# Patient Record
Sex: FEMALE | Race: WHITE | NOT HISPANIC OR LATINO | Employment: FULL TIME | URBAN - METROPOLITAN AREA
[De-identification: names, ages, dates, MRNs, and addresses within clinical notes are randomized per-mention and may not be internally consistent; named-entity substitution may affect disease eponyms.]

---

## 2017-01-03 ENCOUNTER — HOSPITAL ENCOUNTER (OUTPATIENT)
Dept: RADIOLOGY | Facility: HOSPITAL | Age: 47
Discharge: HOME | End: 2017-01-03
Attending: SURGERY
Payer: COMMERCIAL

## 2017-01-03 ENCOUNTER — GENERIC CONVERSION - ENCOUNTER (OUTPATIENT)
Dept: OTHER | Facility: OTHER | Age: 47
End: 2017-01-03

## 2017-01-03 DIAGNOSIS — T88.8XXA OTHER SPECIFIED COMPLICATIONS OF SURGICAL AND MEDICAL CARE, NOT ELSEWHERE CLASSIFIED, INITIAL ENCOUNTER: ICD-10-CM

## 2017-01-03 DIAGNOSIS — N64.89 RECURRENT SEROMA OF BREAST: ICD-10-CM

## 2017-01-03 DIAGNOSIS — N64.89 OTHER SPECIFIED DISORDERS OF BREAST: ICD-10-CM

## 2017-01-03 PROCEDURE — 87070 CULTURE OTHR SPECIMN AEROBIC: CPT | Performed by: SURGERY

## 2017-01-03 PROCEDURE — 87205 SMEAR GRAM STAIN: CPT | Performed by: SURGERY

## 2017-01-03 PROCEDURE — 10030 IMG GID FLU COLL DRG SFT TIS: CPT

## 2017-01-05 ENCOUNTER — HOSPITAL ENCOUNTER (OUTPATIENT)
Dept: RADIOLOGY | Facility: HOSPITAL | Age: 47
Discharge: HOME | End: 2017-01-05
Attending: SURGERY | Admitting: SURGERY
Payer: COMMERCIAL

## 2017-01-05 ENCOUNTER — GENERIC CONVERSION - ENCOUNTER (OUTPATIENT)
Dept: OTHER | Facility: OTHER | Age: 47
End: 2017-01-05

## 2017-01-05 VITALS
BODY MASS INDEX: 35.87 KG/M2 | SYSTOLIC BLOOD PRESSURE: 144 MMHG | TEMPERATURE: 98.4 F | OXYGEN SATURATION: 99 % | DIASTOLIC BLOOD PRESSURE: 75 MMHG | WEIGHT: 190 LBS | HEIGHT: 61 IN | RESPIRATION RATE: 18 BRPM | HEART RATE: 67 BPM

## 2017-01-05 DIAGNOSIS — N64.89 RECURRENT SEROMA OF BREAST: ICD-10-CM

## 2017-01-05 PROCEDURE — C1729 CATH, DRAINAGE: HCPCS

## 2017-01-05 PROCEDURE — 10030 IMG GID FLU COLL DRG SFT TIS: CPT

## 2017-01-05 PROCEDURE — C1769 GUIDE WIRE: HCPCS

## 2017-01-05 RX ORDER — MIDAZOLAM HYDROCHLORIDE 1 MG/ML
INJECTION INTRAMUSCULAR; INTRAVENOUS CODE/TRAUMA/SEDATION MEDICATION
Status: COMPLETED | OUTPATIENT
Start: 2017-01-05 | End: 2017-01-05

## 2017-01-05 RX ORDER — FENTANYL CITRATE 50 UG/ML
INJECTION, SOLUTION INTRAMUSCULAR; INTRAVENOUS CODE/TRAUMA/SEDATION MEDICATION
Status: COMPLETED | OUTPATIENT
Start: 2017-01-05 | End: 2017-01-05

## 2017-01-05 RX ORDER — SODIUM CHLORIDE 9 MG/ML
75 INJECTION, SOLUTION INTRAVENOUS CONTINUOUS
Status: DISCONTINUED | OUTPATIENT
Start: 2017-01-05 | End: 2017-01-06 | Stop reason: HOSPADM

## 2017-01-05 RX ADMIN — FENTANYL CITRATE 50 MCG: 50 INJECTION INTRAMUSCULAR; INTRAVENOUS at 11:08

## 2017-01-05 RX ADMIN — MIDAZOLAM HYDROCHLORIDE 1 MG: 1 INJECTION, SOLUTION INTRAMUSCULAR; INTRAVENOUS at 10:56

## 2017-01-05 RX ADMIN — MIDAZOLAM HYDROCHLORIDE 1 MG: 1 INJECTION, SOLUTION INTRAMUSCULAR; INTRAVENOUS at 10:59

## 2017-01-05 RX ADMIN — FENTANYL CITRATE 50 MCG: 50 INJECTION INTRAMUSCULAR; INTRAVENOUS at 10:53

## 2017-01-05 RX ADMIN — MIDAZOLAM HYDROCHLORIDE 1 MG: 1 INJECTION, SOLUTION INTRAMUSCULAR; INTRAVENOUS at 10:53

## 2017-01-05 RX ADMIN — MIDAZOLAM HYDROCHLORIDE 1 MG: 1 INJECTION, SOLUTION INTRAMUSCULAR; INTRAVENOUS at 11:08

## 2017-01-05 RX ADMIN — FENTANYL CITRATE 50 MCG: 50 INJECTION INTRAMUSCULAR; INTRAVENOUS at 10:56

## 2017-01-06 ENCOUNTER — GENERIC CONVERSION - ENCOUNTER (OUTPATIENT)
Dept: OTHER | Facility: OTHER | Age: 47
End: 2017-01-06

## 2017-01-06 LAB
BACTERIA SPEC BFLD CULT: NO GROWTH
BACTERIA SPEC BFLD CULT: NO GROWTH
GRAM STN SPEC: NORMAL

## 2017-01-10 ENCOUNTER — HOSPITAL ENCOUNTER (OUTPATIENT)
Dept: RADIOLOGY | Facility: HOSPITAL | Age: 47
Discharge: HOME | End: 2017-01-10
Attending: SURGERY | Admitting: RADIOLOGY
Payer: COMMERCIAL

## 2017-01-10 DIAGNOSIS — N64.89 RECURRENT SEROMA OF BREAST: ICD-10-CM

## 2017-01-10 PROCEDURE — 49423 EXCHANGE DRAINAGE CATHETER: CPT

## 2017-01-10 PROCEDURE — 75984 XRAY CONTROL CATHETER CHANGE: CPT

## 2017-01-10 PROCEDURE — 49424 ASSESS CYST CONTRAST INJECT: CPT

## 2017-01-10 PROCEDURE — C1769 GUIDE WIRE: HCPCS

## 2017-01-10 PROCEDURE — C1729 CATH, DRAINAGE: HCPCS

## 2017-01-10 PROCEDURE — 76080 X-RAY EXAM OF FISTULA: CPT

## 2017-01-10 RX ADMIN — IOHEXOL 13 ML: 300 INJECTION, SOLUTION INTRAVENOUS at 14:49

## 2017-01-12 ENCOUNTER — ALLSCRIPTS OFFICE VISIT (OUTPATIENT)
Dept: OTHER | Facility: OTHER | Age: 47
End: 2017-01-12

## 2017-01-13 ENCOUNTER — APPOINTMENT (EMERGENCY)
Dept: RADIOLOGY | Facility: HOSPITAL | Age: 47
DRG: 858 | End: 2017-01-13
Payer: COMMERCIAL

## 2017-01-13 ENCOUNTER — APPOINTMENT (EMERGENCY)
Dept: ULTRASOUND IMAGING | Facility: HOSPITAL | Age: 47
DRG: 858 | End: 2017-01-13
Payer: COMMERCIAL

## 2017-01-13 ENCOUNTER — HOSPITAL ENCOUNTER (INPATIENT)
Facility: HOSPITAL | Age: 47
LOS: 3 days | Discharge: HOME | DRG: 858 | End: 2017-01-16
Attending: EMERGENCY MEDICINE | Admitting: SURGERY
Payer: COMMERCIAL

## 2017-01-13 DIAGNOSIS — A41.9 SEPSIS (HCC): Primary | ICD-10-CM

## 2017-01-13 DIAGNOSIS — T88.8XXA FLUID COLLECTION AT SURGICAL SITE: ICD-10-CM

## 2017-01-13 DIAGNOSIS — N64.4 BREAST PAIN, LEFT: ICD-10-CM

## 2017-01-13 LAB
ALBUMIN SERPL BCP-MCNC: 3.2 G/DL (ref 3.5–5)
ALP SERPL-CCNC: 74 U/L (ref 46–116)
ALT SERPL W P-5'-P-CCNC: 13 U/L (ref 12–78)
ANION GAP SERPL CALCULATED.3IONS-SCNC: 8 MMOL/L (ref 4–13)
AST SERPL W P-5'-P-CCNC: 12 U/L (ref 5–45)
BASOPHILS # BLD AUTO: 0.03 THOUSANDS/ΜL (ref 0–0.1)
BASOPHILS NFR BLD AUTO: 0 % (ref 0–1)
BILIRUB SERPL-MCNC: 0.4 MG/DL (ref 0.2–1)
BUN SERPL-MCNC: 25 MG/DL (ref 5–25)
CALCIUM SERPL-MCNC: 8.9 MG/DL (ref 8.3–10.1)
CHLORIDE SERPL-SCNC: 102 MMOL/L (ref 100–108)
CLARITY, POC: CLEAR
CO2 SERPL-SCNC: 24 MMOL/L (ref 21–32)
COLOR, POC: YELLOW
CREAT SERPL-MCNC: 0.86 MG/DL (ref 0.6–1.3)
EOSINOPHIL # BLD AUTO: 0.07 THOUSAND/ΜL (ref 0–0.61)
EOSINOPHIL NFR BLD AUTO: 0 % (ref 0–6)
ERYTHROCYTE [DISTWIDTH] IN BLOOD BY AUTOMATED COUNT: 19.8 % (ref 11.6–15.1)
EXT BILIRUBIN, UA: NEGATIVE
EXT BLOOD URINE: NEGATIVE
EXT GLUCOSE, UA: NEGATIVE
EXT KETONES: NEGATIVE
EXT NITRITE, UA: NEGATIVE
EXT PH, UA: 6
EXT PROTEIN, UA: NEGATIVE
EXT SPECIFIC GRAVITY, UA: 1
EXT UROBILINOGEN: 0.2
GFR SERPL CREATININE-BSD FRML MDRD: >60 ML/MIN/1.73SQ M
GLUCOSE SERPL-MCNC: 110 MG/DL (ref 65–140)
HCT VFR BLD AUTO: 32.4 % (ref 34.8–46.1)
HGB BLD-MCNC: 10.1 G/DL (ref 11.5–15.4)
INR PPP: 1.4 (ref 0.86–1.16)
LACTATE SERPL-SCNC: 1.1 MMOL/L (ref 0.5–2)
LYMPHOCYTES # BLD AUTO: 0.94 THOUSANDS/ΜL (ref 0.6–4.47)
LYMPHOCYTES NFR BLD AUTO: 5 % (ref 14–44)
MCH RBC QN AUTO: 23.7 PG (ref 26.8–34.3)
MCHC RBC AUTO-ENTMCNC: 31.2 G/DL (ref 31.4–37.4)
MCV RBC AUTO: 76 FL (ref 82–98)
MONOCYTES # BLD AUTO: 1.19 THOUSAND/ΜL (ref 0.17–1.22)
MONOCYTES NFR BLD AUTO: 6 % (ref 4–12)
NEUTROPHILS # BLD AUTO: 16.87 THOUSANDS/ΜL (ref 1.85–7.62)
NEUTS SEG NFR BLD AUTO: 89 % (ref 43–75)
PLATELET # BLD AUTO: 356 THOUSANDS/UL (ref 149–390)
PMV BLD AUTO: 9.3 FL (ref 8.9–12.7)
POTASSIUM SERPL-SCNC: 3.1 MMOL/L (ref 3.5–5.3)
PROT SERPL-MCNC: 7.6 G/DL (ref 6.4–8.2)
PROTHROMBIN TIME: 16.8 SECONDS (ref 12–14.3)
RBC # BLD AUTO: 4.26 MILLION/UL (ref 3.81–5.12)
SODIUM SERPL-SCNC: 134 MMOL/L (ref 136–145)
WBC # BLD AUTO: 19.1 THOUSAND/UL (ref 4.31–10.16)
WBC # BLD EST: NEGATIVE 10*3/UL

## 2017-01-13 PROCEDURE — 76642 ULTRASOUND BREAST LIMITED: CPT

## 2017-01-13 PROCEDURE — 80053 COMPREHEN METABOLIC PANEL: CPT | Performed by: EMERGENCY MEDICINE

## 2017-01-13 PROCEDURE — 87147 CULTURE TYPE IMMUNOLOGIC: CPT | Performed by: EMERGENCY MEDICINE

## 2017-01-13 PROCEDURE — 85610 PROTHROMBIN TIME: CPT | Performed by: EMERGENCY MEDICINE

## 2017-01-13 PROCEDURE — 87205 SMEAR GRAM STAIN: CPT | Performed by: EMERGENCY MEDICINE

## 2017-01-13 PROCEDURE — 87040 BLOOD CULTURE FOR BACTERIA: CPT | Performed by: EMERGENCY MEDICINE

## 2017-01-13 PROCEDURE — 96361 HYDRATE IV INFUSION ADD-ON: CPT

## 2017-01-13 PROCEDURE — 81002 URINALYSIS NONAUTO W/O SCOPE: CPT | Performed by: EMERGENCY MEDICINE

## 2017-01-13 PROCEDURE — 87186 SC STD MICRODIL/AGAR DIL: CPT | Performed by: EMERGENCY MEDICINE

## 2017-01-13 PROCEDURE — 36415 COLL VENOUS BLD VENIPUNCTURE: CPT | Performed by: EMERGENCY MEDICINE

## 2017-01-13 PROCEDURE — 71020 HB CHEST X-RAY 2VW FRONTAL&LATL: CPT

## 2017-01-13 PROCEDURE — 83605 ASSAY OF LACTIC ACID: CPT | Performed by: EMERGENCY MEDICINE

## 2017-01-13 PROCEDURE — 99285 EMERGENCY DEPT VISIT HI MDM: CPT

## 2017-01-13 PROCEDURE — 96365 THER/PROPH/DIAG IV INF INIT: CPT

## 2017-01-13 PROCEDURE — 87070 CULTURE OTHR SPECIMN AEROBIC: CPT | Performed by: EMERGENCY MEDICINE

## 2017-01-13 PROCEDURE — 85025 COMPLETE CBC W/AUTO DIFF WBC: CPT | Performed by: EMERGENCY MEDICINE

## 2017-01-13 RX ORDER — ONDANSETRON 2 MG/ML
4 INJECTION INTRAMUSCULAR; INTRAVENOUS ONCE AS NEEDED
Status: DISCONTINUED | OUTPATIENT
Start: 2017-01-13 | End: 2017-01-16 | Stop reason: HOSPADM

## 2017-01-13 RX ORDER — TEMAZEPAM 15 MG/1
15 CAPSULE ORAL
Status: DISCONTINUED | OUTPATIENT
Start: 2017-01-13 | End: 2017-01-16 | Stop reason: HOSPADM

## 2017-01-13 RX ORDER — ALPRAZOLAM 0.25 MG/1
0.25 TABLET ORAL 4 TIMES DAILY PRN
Status: DISCONTINUED | OUTPATIENT
Start: 2017-01-13 | End: 2017-01-16 | Stop reason: HOSPADM

## 2017-01-13 RX ORDER — LANOLIN ALCOHOL/MO/W.PET/CERES
6 CREAM (GRAM) TOPICAL
Status: DISCONTINUED | OUTPATIENT
Start: 2017-01-13 | End: 2017-01-16 | Stop reason: HOSPADM

## 2017-01-13 RX ORDER — CLINDAMYCIN PHOSPHATE 600 MG/50ML
600 INJECTION INTRAVENOUS ONCE
Status: DISCONTINUED | OUTPATIENT
Start: 2017-01-13 | End: 2017-01-13

## 2017-01-13 RX ORDER — ONDANSETRON 2 MG/ML
4 INJECTION INTRAMUSCULAR; INTRAVENOUS ONCE
Status: COMPLETED | OUTPATIENT
Start: 2017-01-13 | End: 2017-01-13

## 2017-01-13 RX ORDER — ACETAMINOPHEN 325 MG/1
650 TABLET ORAL EVERY 6 HOURS PRN
Status: DISCONTINUED | OUTPATIENT
Start: 2017-01-13 | End: 2017-01-16 | Stop reason: HOSPADM

## 2017-01-13 RX ORDER — ONDANSETRON 2 MG/ML
4 INJECTION INTRAMUSCULAR; INTRAVENOUS EVERY 6 HOURS PRN
Status: DISCONTINUED | OUTPATIENT
Start: 2017-01-13 | End: 2017-01-16 | Stop reason: HOSPADM

## 2017-01-13 RX ORDER — SODIUM CHLORIDE, SODIUM LACTATE, POTASSIUM CHLORIDE, CALCIUM CHLORIDE 600; 310; 30; 20 MG/100ML; MG/100ML; MG/100ML; MG/100ML
125 INJECTION, SOLUTION INTRAVENOUS CONTINUOUS
Status: DISCONTINUED | OUTPATIENT
Start: 2017-01-13 | End: 2017-01-16 | Stop reason: HOSPADM

## 2017-01-13 RX ORDER — ALPRAZOLAM 0.5 MG/1
0.5 TABLET ORAL
Status: DISCONTINUED | OUTPATIENT
Start: 2017-01-13 | End: 2017-01-16 | Stop reason: HOSPADM

## 2017-01-13 RX ORDER — METHOCARBAMOL 500 MG/1
500 TABLET, FILM COATED ORAL ONCE
Status: COMPLETED | OUTPATIENT
Start: 2017-01-13 | End: 2017-01-13

## 2017-01-13 RX ORDER — BUPROPION HYDROCHLORIDE 150 MG/1
150 TABLET ORAL DAILY
Status: DISCONTINUED | OUTPATIENT
Start: 2017-01-14 | End: 2017-01-16 | Stop reason: HOSPADM

## 2017-01-13 RX ORDER — IBUPROFEN 400 MG/1
400 TABLET ORAL ONCE
Status: COMPLETED | OUTPATIENT
Start: 2017-01-13 | End: 2017-01-13

## 2017-01-13 RX ORDER — ZOLPIDEM TARTRATE 5 MG/1
2.5 TABLET ORAL
Status: DISCONTINUED | OUTPATIENT
Start: 2017-01-13 | End: 2017-01-13

## 2017-01-13 RX ORDER — IBUPROFEN 400 MG/1
400 TABLET ORAL EVERY 4 HOURS PRN
Status: DISCONTINUED | OUTPATIENT
Start: 2017-01-13 | End: 2017-01-16 | Stop reason: HOSPADM

## 2017-01-13 RX ADMIN — ONDANSETRON 4 MG: 2 INJECTION INTRAMUSCULAR; INTRAVENOUS at 18:02

## 2017-01-13 RX ADMIN — TEMAZEPAM 15 MG: 15 CAPSULE ORAL at 23:39

## 2017-01-13 RX ADMIN — METHOCARBAMOL 500 MG: 500 TABLET ORAL at 18:10

## 2017-01-13 RX ADMIN — CEFEPIME 2000 MG: 2 INJECTION, POWDER, FOR SOLUTION INTRAMUSCULAR; INTRAVENOUS at 16:28

## 2017-01-13 RX ADMIN — IBUPROFEN 400 MG: 400 TABLET ORAL at 17:03

## 2017-01-13 RX ADMIN — SODIUM CHLORIDE 1000 ML: 0.9 INJECTION, SOLUTION INTRAVENOUS at 14:49

## 2017-01-14 ENCOUNTER — GENERIC CONVERSION - ENCOUNTER (OUTPATIENT)
Dept: OTHER | Facility: OTHER | Age: 47
End: 2017-01-14

## 2017-01-14 ENCOUNTER — APPOINTMENT (INPATIENT)
Dept: RADIOLOGY | Facility: HOSPITAL | Age: 47
DRG: 858 | End: 2017-01-14
Attending: SURGERY
Payer: COMMERCIAL

## 2017-01-14 PROBLEM — T88.8XXA FLUID COLLECTION AT SURGICAL SITE: Status: ACTIVE | Noted: 2017-01-14

## 2017-01-14 LAB
ANION GAP SERPL CALCULATED.3IONS-SCNC: 8 MMOL/L (ref 4–13)
BASOPHILS # BLD AUTO: 0.03 THOUSANDS/ΜL (ref 0–0.1)
BASOPHILS NFR BLD AUTO: 0 % (ref 0–1)
BUN SERPL-MCNC: 17 MG/DL (ref 5–25)
CALCIUM SERPL-MCNC: 8.4 MG/DL (ref 8.3–10.1)
CHLORIDE SERPL-SCNC: 105 MMOL/L (ref 100–108)
CO2 SERPL-SCNC: 22 MMOL/L (ref 21–32)
CREAT SERPL-MCNC: 0.71 MG/DL (ref 0.6–1.3)
EOSINOPHIL # BLD AUTO: 0.39 THOUSAND/ΜL (ref 0–0.61)
EOSINOPHIL NFR BLD AUTO: 3 % (ref 0–6)
ERYTHROCYTE [DISTWIDTH] IN BLOOD BY AUTOMATED COUNT: 19.9 % (ref 11.6–15.1)
GFR SERPL CREATININE-BSD FRML MDRD: >60 ML/MIN/1.73SQ M
GLUCOSE SERPL-MCNC: 97 MG/DL (ref 65–140)
HCT VFR BLD AUTO: 29.8 % (ref 34.8–46.1)
HGB BLD-MCNC: 9.1 G/DL (ref 11.5–15.4)
LYMPHOCYTES # BLD AUTO: 1.08 THOUSANDS/ΜL (ref 0.6–4.47)
LYMPHOCYTES NFR BLD AUTO: 7 % (ref 14–44)
MCH RBC QN AUTO: 23.6 PG (ref 26.8–34.3)
MCHC RBC AUTO-ENTMCNC: 30.5 G/DL (ref 31.4–37.4)
MCV RBC AUTO: 77 FL (ref 82–98)
MONOCYTES # BLD AUTO: 1 THOUSAND/ΜL (ref 0.17–1.22)
MONOCYTES NFR BLD AUTO: 7 % (ref 4–12)
NEUTROPHILS # BLD AUTO: 12.99 THOUSANDS/ΜL (ref 1.85–7.62)
NEUTS SEG NFR BLD AUTO: 83 % (ref 43–75)
PLATELET # BLD AUTO: 283 THOUSANDS/UL (ref 149–390)
PMV BLD AUTO: 9.5 FL (ref 8.9–12.7)
POTASSIUM SERPL-SCNC: 3.6 MMOL/L (ref 3.5–5.3)
RBC # BLD AUTO: 3.86 MILLION/UL (ref 3.81–5.12)
SODIUM SERPL-SCNC: 135 MMOL/L (ref 136–145)
WBC # BLD AUTO: 15.49 THOUSAND/UL (ref 4.31–10.16)

## 2017-01-14 PROCEDURE — 85025 COMPLETE CBC W/AUTO DIFF WBC: CPT | Performed by: PHYSICIAN ASSISTANT

## 2017-01-14 PROCEDURE — 0W9G3ZX DRAINAGE OF PERITONEAL CAVITY, PERCUTANEOUS APPROACH, DIAGNOSTIC: ICD-10-PCS | Performed by: RADIOLOGY

## 2017-01-14 PROCEDURE — C1729 CATH, DRAINAGE: HCPCS

## 2017-01-14 PROCEDURE — C1769 GUIDE WIRE: HCPCS

## 2017-01-14 PROCEDURE — 87070 CULTURE OTHR SPECIMN AEROBIC: CPT | Performed by: SURGERY

## 2017-01-14 PROCEDURE — 87186 SC STD MICRODIL/AGAR DIL: CPT | Performed by: SURGERY

## 2017-01-14 PROCEDURE — 80048 BASIC METABOLIC PNL TOTAL CA: CPT | Performed by: PHYSICIAN ASSISTANT

## 2017-01-14 PROCEDURE — 10030 IMG GID FLU COLL DRG SFT TIS: CPT

## 2017-01-14 PROCEDURE — 87147 CULTURE TYPE IMMUNOLOGIC: CPT | Performed by: SURGERY

## 2017-01-14 PROCEDURE — 87205 SMEAR GRAM STAIN: CPT | Performed by: SURGERY

## 2017-01-14 RX ORDER — MIDAZOLAM HYDROCHLORIDE 1 MG/ML
INJECTION INTRAMUSCULAR; INTRAVENOUS CODE/TRAUMA/SEDATION MEDICATION
Status: DISCONTINUED | OUTPATIENT
Start: 2017-01-14 | End: 2017-01-16 | Stop reason: HOSPADM

## 2017-01-14 RX ORDER — FENTANYL CITRATE 50 UG/ML
INJECTION, SOLUTION INTRAMUSCULAR; INTRAVENOUS CODE/TRAUMA/SEDATION MEDICATION
Status: DISCONTINUED | OUTPATIENT
Start: 2017-01-14 | End: 2017-01-16 | Stop reason: HOSPADM

## 2017-01-14 RX ADMIN — IBUPROFEN 400 MG: 400 TABLET ORAL at 16:50

## 2017-01-14 RX ADMIN — ALPRAZOLAM 0.25 MG: 0.25 TABLET ORAL at 16:49

## 2017-01-14 RX ADMIN — FENTANYL CITRATE 50 MCG: 50 INJECTION, SOLUTION INTRAMUSCULAR; INTRAVENOUS at 10:26

## 2017-01-14 RX ADMIN — SODIUM CHLORIDE, SODIUM LACTATE, POTASSIUM CHLORIDE, AND CALCIUM CHLORIDE 125 ML/HR: .6; .31; .03; .02 INJECTION, SOLUTION INTRAVENOUS at 08:58

## 2017-01-14 RX ADMIN — VANCOMYCIN HYDROCHLORIDE 1250 MG: 1 INJECTION, POWDER, LYOPHILIZED, FOR SOLUTION INTRAVENOUS at 16:33

## 2017-01-14 RX ADMIN — IBUPROFEN 400 MG: 400 TABLET ORAL at 00:14

## 2017-01-14 RX ADMIN — ALPRAZOLAM 0.5 MG: 0.5 TABLET ORAL at 22:44

## 2017-01-14 RX ADMIN — MIDAZOLAM HYDROCHLORIDE 0.5 MG: 1 INJECTION, SOLUTION INTRAMUSCULAR; INTRAVENOUS at 10:33

## 2017-01-14 RX ADMIN — TEMAZEPAM 15 MG: 15 CAPSULE ORAL at 22:45

## 2017-01-14 RX ADMIN — MIDAZOLAM HYDROCHLORIDE 0.5 MG: 1 INJECTION, SOLUTION INTRAMUSCULAR; INTRAVENOUS at 10:30

## 2017-01-14 RX ADMIN — MIDAZOLAM HYDROCHLORIDE 0.5 MG: 1 INJECTION, SOLUTION INTRAMUSCULAR; INTRAVENOUS at 10:42

## 2017-01-14 RX ADMIN — CEFEPIME HYDROCHLORIDE 2000 MG: 2 INJECTION, POWDER, FOR SOLUTION INTRAVENOUS at 07:39

## 2017-01-14 RX ADMIN — ALPRAZOLAM 0.5 MG: 0.5 TABLET ORAL at 00:14

## 2017-01-14 RX ADMIN — MIDAZOLAM HYDROCHLORIDE 1 MG: 1 INJECTION, SOLUTION INTRAMUSCULAR; INTRAVENOUS at 10:26

## 2017-01-14 RX ADMIN — BUPROPION HYDROCHLORIDE 150 MG: 150 TABLET, FILM COATED, EXTENDED RELEASE ORAL at 08:18

## 2017-01-14 RX ADMIN — FENTANYL CITRATE 25 MCG: 50 INJECTION, SOLUTION INTRAMUSCULAR; INTRAVENOUS at 10:39

## 2017-01-14 RX ADMIN — IBUPROFEN 400 MG: 400 TABLET ORAL at 07:39

## 2017-01-14 RX ADMIN — SODIUM CHLORIDE, SODIUM LACTATE, POTASSIUM CHLORIDE, AND CALCIUM CHLORIDE 125 ML/HR: .6; .31; .03; .02 INJECTION, SOLUTION INTRAVENOUS at 00:15

## 2017-01-14 RX ADMIN — FENTANYL CITRATE 25 MCG: 50 INJECTION, SOLUTION INTRAMUSCULAR; INTRAVENOUS at 10:48

## 2017-01-14 RX ADMIN — BUPROPION HYDROCHLORIDE 150 MG: 150 TABLET, FILM COATED, EXTENDED RELEASE ORAL at 08:13

## 2017-01-14 RX ADMIN — FENTANYL CITRATE 25 MCG: 50 INJECTION, SOLUTION INTRAMUSCULAR; INTRAVENOUS at 10:33

## 2017-01-15 LAB
ANION GAP SERPL CALCULATED.3IONS-SCNC: 6 MMOL/L (ref 4–13)
BASOPHILS # BLD AUTO: 0.02 THOUSANDS/ΜL (ref 0–0.1)
BASOPHILS NFR BLD AUTO: 0 % (ref 0–1)
BUN SERPL-MCNC: 12 MG/DL (ref 5–25)
CALCIUM SERPL-MCNC: 8.5 MG/DL (ref 8.3–10.1)
CHLORIDE SERPL-SCNC: 108 MMOL/L (ref 100–108)
CO2 SERPL-SCNC: 25 MMOL/L (ref 21–32)
CREAT SERPL-MCNC: 0.57 MG/DL (ref 0.6–1.3)
EOSINOPHIL # BLD AUTO: 0.46 THOUSAND/ΜL (ref 0–0.61)
EOSINOPHIL NFR BLD AUTO: 6 % (ref 0–6)
ERYTHROCYTE [DISTWIDTH] IN BLOOD BY AUTOMATED COUNT: 19.9 % (ref 11.6–15.1)
GFR SERPL CREATININE-BSD FRML MDRD: >60 ML/MIN/1.73SQ M
GLUCOSE SERPL-MCNC: 82 MG/DL (ref 65–140)
HCT VFR BLD AUTO: 27.2 % (ref 34.8–46.1)
HGB BLD-MCNC: 8.2 G/DL (ref 11.5–15.4)
LYMPHOCYTES # BLD AUTO: 1.06 THOUSANDS/ΜL (ref 0.6–4.47)
LYMPHOCYTES NFR BLD AUTO: 13 % (ref 14–44)
MCH RBC QN AUTO: 23.1 PG (ref 26.8–34.3)
MCHC RBC AUTO-ENTMCNC: 30.1 G/DL (ref 31.4–37.4)
MCV RBC AUTO: 77 FL (ref 82–98)
MONOCYTES # BLD AUTO: 0.71 THOUSAND/ΜL (ref 0.17–1.22)
MONOCYTES NFR BLD AUTO: 9 % (ref 4–12)
NEUTROPHILS # BLD AUTO: 5.87 THOUSANDS/ΜL (ref 1.85–7.62)
NEUTS SEG NFR BLD AUTO: 72 % (ref 43–75)
PLATELET # BLD AUTO: 287 THOUSANDS/UL (ref 149–390)
PMV BLD AUTO: 10.3 FL (ref 8.9–12.7)
POTASSIUM SERPL-SCNC: 3.5 MMOL/L (ref 3.5–5.3)
RBC # BLD AUTO: 3.55 MILLION/UL (ref 3.81–5.12)
SODIUM SERPL-SCNC: 139 MMOL/L (ref 136–145)
WBC # BLD AUTO: 8.12 THOUSAND/UL (ref 4.31–10.16)

## 2017-01-15 PROCEDURE — 80048 BASIC METABOLIC PNL TOTAL CA: CPT | Performed by: PHYSICIAN ASSISTANT

## 2017-01-15 PROCEDURE — 85025 COMPLETE CBC W/AUTO DIFF WBC: CPT | Performed by: PHYSICIAN ASSISTANT

## 2017-01-15 RX ORDER — POLYETHYLENE GLYCOL 3350 17 G/17G
17 POWDER, FOR SOLUTION ORAL DAILY PRN
Status: DISCONTINUED | OUTPATIENT
Start: 2017-01-15 | End: 2017-01-16 | Stop reason: HOSPADM

## 2017-01-15 RX ORDER — SENNOSIDES 8.6 MG
1 TABLET ORAL
Status: DISCONTINUED | OUTPATIENT
Start: 2017-01-15 | End: 2017-01-16 | Stop reason: HOSPADM

## 2017-01-15 RX ORDER — DOCUSATE SODIUM 100 MG/1
100 CAPSULE, LIQUID FILLED ORAL 2 TIMES DAILY PRN
Status: DISCONTINUED | OUTPATIENT
Start: 2017-01-15 | End: 2017-01-16 | Stop reason: HOSPADM

## 2017-01-15 RX ADMIN — TEMAZEPAM 15 MG: 15 CAPSULE ORAL at 22:45

## 2017-01-15 RX ADMIN — ALPRAZOLAM 0.5 MG: 0.5 TABLET ORAL at 08:00

## 2017-01-15 RX ADMIN — POLYETHYLENE GLYCOL 3350 17 G: 17 POWDER, FOR SOLUTION ORAL at 10:50

## 2017-01-15 RX ADMIN — DOCUSATE SODIUM 100 MG: 100 CAPSULE, LIQUID FILLED ORAL at 22:47

## 2017-01-15 RX ADMIN — VANCOMYCIN HYDROCHLORIDE 1250 MG: 1 INJECTION, POWDER, LYOPHILIZED, FOR SOLUTION INTRAVENOUS at 05:42

## 2017-01-15 RX ADMIN — IBUPROFEN 400 MG: 400 TABLET ORAL at 06:18

## 2017-01-15 RX ADMIN — IBUPROFEN 400 MG: 400 TABLET ORAL at 19:39

## 2017-01-15 RX ADMIN — ALPRAZOLAM 0.25 MG: 0.25 TABLET ORAL at 23:42

## 2017-01-15 RX ADMIN — BUPROPION HYDROCHLORIDE 150 MG: 150 TABLET, FILM COATED, EXTENDED RELEASE ORAL at 08:00

## 2017-01-15 RX ADMIN — ENOXAPARIN SODIUM 40 MG: 40 INJECTION SUBCUTANEOUS at 10:50

## 2017-01-15 RX ADMIN — VANCOMYCIN HYDROCHLORIDE 1250 MG: 1 INJECTION, POWDER, LYOPHILIZED, FOR SOLUTION INTRAVENOUS at 16:38

## 2017-01-15 RX ADMIN — DOCUSATE SODIUM 100 MG: 100 CAPSULE, LIQUID FILLED ORAL at 10:50

## 2017-01-16 VITALS
SYSTOLIC BLOOD PRESSURE: 108 MMHG | OXYGEN SATURATION: 100 % | TEMPERATURE: 98.2 F | WEIGHT: 200.62 LBS | BODY MASS INDEX: 39.39 KG/M2 | HEIGHT: 60 IN | RESPIRATION RATE: 16 BRPM | HEART RATE: 67 BPM | DIASTOLIC BLOOD PRESSURE: 54 MMHG

## 2017-01-16 PROBLEM — N64.89 SEROMA OF BREAST: Status: ACTIVE | Noted: 2017-01-16

## 2017-01-16 LAB
BACTERIA SPEC BFLD CULT: NORMAL
BACTERIA SPEC BFLD CULT: NORMAL
GRAM STN SPEC: NORMAL

## 2017-01-16 RX ORDER — CEPHALEXIN 500 MG/1
500 CAPSULE ORAL 4 TIMES DAILY
Qty: 28 CAPSULE | Refills: 0 | Status: SHIPPED | OUTPATIENT
Start: 2017-01-16 | End: 2017-01-23

## 2017-01-16 RX ADMIN — ENOXAPARIN SODIUM 40 MG: 40 INJECTION SUBCUTANEOUS at 09:22

## 2017-01-16 RX ADMIN — MAGNESIUM HYDROXIDE 30 ML: 400 SUSPENSION ORAL at 09:31

## 2017-01-16 RX ADMIN — VANCOMYCIN HYDROCHLORIDE 1250 MG: 1 INJECTION, POWDER, LYOPHILIZED, FOR SOLUTION INTRAVENOUS at 04:39

## 2017-01-16 RX ADMIN — CEFAZOLIN SODIUM 2000 MG: 2 SOLUTION INTRAVENOUS at 13:37

## 2017-01-16 RX ADMIN — BUPROPION HYDROCHLORIDE 150 MG: 150 TABLET, FILM COATED, EXTENDED RELEASE ORAL at 09:22

## 2017-01-16 RX ADMIN — DOCUSATE SODIUM 100 MG: 100 CAPSULE, LIQUID FILLED ORAL at 13:49

## 2017-01-18 ENCOUNTER — GENERIC CONVERSION - ENCOUNTER (OUTPATIENT)
Dept: OTHER | Facility: OTHER | Age: 47
End: 2017-01-18

## 2017-01-18 ENCOUNTER — HOSPITAL ENCOUNTER (OUTPATIENT)
Dept: RADIOLOGY | Facility: HOSPITAL | Age: 47
Discharge: HOME | End: 2017-01-18
Attending: SURGERY
Payer: COMMERCIAL

## 2017-01-18 DIAGNOSIS — N64.89 RECURRENT SEROMA OF BREAST: ICD-10-CM

## 2017-01-18 LAB — BACTERIA BLD CULT: NORMAL

## 2017-01-18 PROCEDURE — 75984 XRAY CONTROL CATHETER CHANGE: CPT

## 2017-01-18 PROCEDURE — C1729 CATH, DRAINAGE: HCPCS

## 2017-01-18 PROCEDURE — 49423 EXCHANGE DRAINAGE CATHETER: CPT

## 2017-01-18 PROCEDURE — 49185 SCLEROTX FLUID COLLECTION: CPT

## 2017-01-18 PROCEDURE — C1769 GUIDE WIRE: HCPCS

## 2017-01-18 RX ADMIN — ALCOHOL 5 ML: 0.98 INJECTION INTRASPINAL at 13:19

## 2017-01-18 RX ADMIN — IOHEXOL 30 ML: 300 INJECTION, SOLUTION INTRAVENOUS at 14:19

## 2017-01-19 ENCOUNTER — HOSPITAL ENCOUNTER (OUTPATIENT)
Dept: RADIOLOGY | Facility: HOSPITAL | Age: 47
Discharge: HOME | End: 2017-01-19
Attending: SURGERY

## 2017-01-22 ENCOUNTER — ANESTHESIA EVENT (OUTPATIENT)
Dept: PERIOP | Facility: HOSPITAL | Age: 47
End: 2017-01-22
Payer: COMMERCIAL

## 2017-01-23 ENCOUNTER — ANESTHESIA (OUTPATIENT)
Dept: PERIOP | Facility: HOSPITAL | Age: 47
End: 2017-01-23
Payer: COMMERCIAL

## 2017-01-23 ENCOUNTER — HOSPITAL ENCOUNTER (OUTPATIENT)
Facility: HOSPITAL | Age: 47
Setting detail: OUTPATIENT SURGERY
Discharge: HOME | End: 2017-01-23
Attending: SURGERY | Admitting: SURGERY
Payer: COMMERCIAL

## 2017-01-23 VITALS
TEMPERATURE: 98.6 F | OXYGEN SATURATION: 100 % | SYSTOLIC BLOOD PRESSURE: 106 MMHG | HEIGHT: 60 IN | DIASTOLIC BLOOD PRESSURE: 52 MMHG | RESPIRATION RATE: 18 BRPM | HEART RATE: 71 BPM

## 2017-01-23 DIAGNOSIS — Z85.3 PERSONAL HISTORY OF MALIGNANT NEOPLASM OF BREAST: ICD-10-CM

## 2017-01-23 PROBLEM — T85.79XA INFECTION OF BREAST IMPLANT (HCC): Status: ACTIVE | Noted: 2017-01-23

## 2017-01-23 LAB — EXT PREGNANCY TEST URINE: NEGATIVE

## 2017-01-23 PROCEDURE — 87077 CULTURE AEROBIC IDENTIFY: CPT | Performed by: SURGERY

## 2017-01-23 PROCEDURE — 87186 SC STD MICRODIL/AGAR DIL: CPT | Performed by: SURGERY

## 2017-01-23 PROCEDURE — 88305 TISSUE EXAM BY PATHOLOGIST: CPT | Performed by: SURGERY

## 2017-01-23 PROCEDURE — 87075 CULTR BACTERIA EXCEPT BLOOD: CPT | Performed by: SURGERY

## 2017-01-23 PROCEDURE — 87205 SMEAR GRAM STAIN: CPT | Performed by: SURGERY

## 2017-01-23 PROCEDURE — 88300 SURGICAL PATH GROSS: CPT | Performed by: SURGERY

## 2017-01-23 PROCEDURE — 87070 CULTURE OTHR SPECIMN AEROBIC: CPT | Performed by: SURGERY

## 2017-01-23 PROCEDURE — 88302 TISSUE EXAM BY PATHOLOGIST: CPT | Performed by: SURGERY

## 2017-01-23 PROCEDURE — 87176 TISSUE HOMOGENIZATION CULTR: CPT | Performed by: SURGERY

## 2017-01-23 RX ORDER — ACETAMINOPHEN 325 MG/1
975 TABLET ORAL ONCE
Status: DISCONTINUED | OUTPATIENT
Start: 2017-01-23 | End: 2017-01-23 | Stop reason: HOSPADM

## 2017-01-23 RX ORDER — EPHEDRINE SULFATE 50 MG/ML
INJECTION, SOLUTION INTRAVENOUS AS NEEDED
Status: DISCONTINUED | OUTPATIENT
Start: 2017-01-23 | End: 2017-01-23 | Stop reason: SURG

## 2017-01-23 RX ORDER — LORAZEPAM 2 MG/ML
1 INJECTION INTRAMUSCULAR ONCE AS NEEDED
Status: DISCONTINUED | OUTPATIENT
Start: 2017-01-23 | End: 2017-01-23

## 2017-01-23 RX ORDER — FENTANYL CITRATE 50 UG/ML
INJECTION, SOLUTION INTRAMUSCULAR; INTRAVENOUS AS NEEDED
Status: DISCONTINUED | OUTPATIENT
Start: 2017-01-23 | End: 2017-01-23 | Stop reason: SURG

## 2017-01-23 RX ORDER — SODIUM CHLORIDE, SODIUM LACTATE, POTASSIUM CHLORIDE, CALCIUM CHLORIDE 600; 310; 30; 20 MG/100ML; MG/100ML; MG/100ML; MG/100ML
125 INJECTION, SOLUTION INTRAVENOUS CONTINUOUS
Status: DISCONTINUED | OUTPATIENT
Start: 2017-01-23 | End: 2017-01-23 | Stop reason: HOSPADM

## 2017-01-23 RX ORDER — ONDANSETRON 2 MG/ML
4 INJECTION INTRAMUSCULAR; INTRAVENOUS ONCE
Status: DISCONTINUED | OUTPATIENT
Start: 2017-01-23 | End: 2017-01-23 | Stop reason: HOSPADM

## 2017-01-23 RX ORDER — CEPHALEXIN 500 MG/1
500 CAPSULE ORAL 4 TIMES DAILY
Qty: 28 CAPSULE | Refills: 0 | Status: SHIPPED | OUTPATIENT
Start: 2017-01-23 | End: 2017-01-30

## 2017-01-23 RX ORDER — MIDAZOLAM HYDROCHLORIDE 1 MG/ML
INJECTION INTRAMUSCULAR; INTRAVENOUS AS NEEDED
Status: DISCONTINUED | OUTPATIENT
Start: 2017-01-23 | End: 2017-01-23 | Stop reason: SURG

## 2017-01-23 RX ORDER — TRAMADOL HYDROCHLORIDE 50 MG/1
50 TABLET ORAL EVERY 6 HOURS PRN
Qty: 30 TABLET | Refills: 0 | Status: SHIPPED | OUTPATIENT
Start: 2017-01-23 | End: 2017-02-02

## 2017-01-23 RX ORDER — KETOROLAC TROMETHAMINE 30 MG/ML
30 INJECTION, SOLUTION INTRAMUSCULAR; INTRAVENOUS ONCE
Status: DISCONTINUED | OUTPATIENT
Start: 2017-01-23 | End: 2017-01-23 | Stop reason: HOSPADM

## 2017-01-23 RX ORDER — ALPRAZOLAM 0.5 MG/1
0.5 TABLET ORAL
Status: ON HOLD | COMMUNITY
End: 2017-06-15

## 2017-01-23 RX ORDER — FENTANYL CITRATE/PF 50 MCG/ML
25 SYRINGE (ML) INJECTION
Status: DISCONTINUED | OUTPATIENT
Start: 2017-01-23 | End: 2017-01-23 | Stop reason: HOSPADM

## 2017-01-23 RX ORDER — ROCURONIUM BROMIDE 10 MG/ML
INJECTION, SOLUTION INTRAVENOUS AS NEEDED
Status: DISCONTINUED | OUTPATIENT
Start: 2017-01-23 | End: 2017-01-23 | Stop reason: SURG

## 2017-01-23 RX ORDER — ONDANSETRON 2 MG/ML
INJECTION INTRAMUSCULAR; INTRAVENOUS AS NEEDED
Status: DISCONTINUED | OUTPATIENT
Start: 2017-01-23 | End: 2017-01-23 | Stop reason: SURG

## 2017-01-23 RX ORDER — LORAZEPAM 2 MG/ML
0.5 INJECTION INTRAMUSCULAR
Status: DISCONTINUED | OUTPATIENT
Start: 2017-01-23 | End: 2017-01-23 | Stop reason: HOSPADM

## 2017-01-23 RX ORDER — GLYCOPYRROLATE 0.2 MG/ML
INJECTION INTRAMUSCULAR; INTRAVENOUS AS NEEDED
Status: DISCONTINUED | OUTPATIENT
Start: 2017-01-23 | End: 2017-01-23 | Stop reason: SURG

## 2017-01-23 RX ORDER — TRAMADOL HYDROCHLORIDE 50 MG/1
50 TABLET ORAL EVERY 6 HOURS PRN
Status: DISCONTINUED | OUTPATIENT
Start: 2017-01-23 | End: 2017-01-23 | Stop reason: HOSPADM

## 2017-01-23 RX ORDER — LIDOCAINE HYDROCHLORIDE 10 MG/ML
INJECTION, SOLUTION INFILTRATION; PERINEURAL AS NEEDED
Status: DISCONTINUED | OUTPATIENT
Start: 2017-01-23 | End: 2017-01-23 | Stop reason: SURG

## 2017-01-23 RX ORDER — PROPOFOL 10 MG/ML
INJECTION, EMULSION INTRAVENOUS AS NEEDED
Status: DISCONTINUED | OUTPATIENT
Start: 2017-01-23 | End: 2017-01-23 | Stop reason: SURG

## 2017-01-23 RX ADMIN — NEOSTIGMINE METHYLSULFATE 2 MG: 1 INJECTION INTRAMUSCULAR; INTRAVENOUS; SUBCUTANEOUS at 09:07

## 2017-01-23 RX ADMIN — FENTANYL CITRATE 25 MCG: 50 INJECTION, SOLUTION INTRAMUSCULAR; INTRAVENOUS at 09:45

## 2017-01-23 RX ADMIN — ONDANSETRON 4 MG: 2 INJECTION INTRAMUSCULAR; INTRAVENOUS at 08:43

## 2017-01-23 RX ADMIN — CEFAZOLIN SODIUM 2000 MG: 2 SOLUTION INTRAVENOUS at 08:01

## 2017-01-23 RX ADMIN — GLYCOPYRROLATE 0.4 MG: 0.2 INJECTION INTRAMUSCULAR; INTRAVENOUS at 09:07

## 2017-01-23 RX ADMIN — LIDOCAINE HYDROCHLORIDE 50 MG: 10 INJECTION, SOLUTION INFILTRATION; PERINEURAL at 07:57

## 2017-01-23 RX ADMIN — FENTANYL CITRATE 50 MCG: 50 INJECTION INTRAMUSCULAR; INTRAVENOUS at 08:19

## 2017-01-23 RX ADMIN — ROCURONIUM BROMIDE 40 MG: 10 INJECTION, SOLUTION INTRAVENOUS at 08:01

## 2017-01-23 RX ADMIN — SODIUM CHLORIDE, SODIUM LACTATE, POTASSIUM CHLORIDE, AND CALCIUM CHLORIDE 125 ML/HR: .6; .31; .03; .02 INJECTION, SOLUTION INTRAVENOUS at 07:08

## 2017-01-23 RX ADMIN — FENTANYL CITRATE 50 MCG: 50 INJECTION INTRAMUSCULAR; INTRAVENOUS at 07:57

## 2017-01-23 RX ADMIN — PROPOFOL 200 MG: 10 INJECTION, EMULSION INTRAVENOUS at 07:57

## 2017-01-23 RX ADMIN — EPHEDRINE SULFATE 5 MG: 50 INJECTION, SOLUTION INTRAMUSCULAR; INTRAVENOUS; SUBCUTANEOUS at 08:56

## 2017-01-23 RX ADMIN — LORAZEPAM 0.5 MG: 2 INJECTION INTRAMUSCULAR; INTRAVENOUS at 09:54

## 2017-01-23 RX ADMIN — MIDAZOLAM HYDROCHLORIDE 4 MG: 1 INJECTION, SOLUTION INTRAMUSCULAR; INTRAVENOUS at 07:47

## 2017-01-25 ENCOUNTER — HOSPITAL ENCOUNTER (OUTPATIENT)
Dept: RADIOLOGY | Facility: HOSPITAL | Age: 47
Discharge: HOME | End: 2017-01-25
Attending: SURGERY
Payer: COMMERCIAL

## 2017-01-25 DIAGNOSIS — Z85.3 PERSONAL HISTORY OF MALIGNANT NEOPLASM OF BREAST: ICD-10-CM

## 2017-01-25 PROCEDURE — 49185 SCLEROTX FLUID COLLECTION: CPT

## 2017-01-25 RX ADMIN — IOHEXOL 5 ML: 300 INJECTION, SOLUTION INTRAVENOUS at 15:07

## 2017-01-26 LAB
BACTERIA SPEC ANAEROBE CULT: NO GROWTH
BACTERIA SPEC ANAEROBE CULT: NORMAL
BACTERIA WND AEROBE CULT: NO GROWTH
GRAM STN SPEC: NORMAL

## 2017-01-27 ENCOUNTER — GENERIC CONVERSION - ENCOUNTER (OUTPATIENT)
Dept: OTHER | Facility: OTHER | Age: 47
End: 2017-01-27

## 2017-01-27 LAB
BACTERIA TISS AEROBE CULT: NORMAL
GRAM STN SPEC: NORMAL

## 2017-01-30 ENCOUNTER — HOSPITAL ENCOUNTER (OUTPATIENT)
Dept: RADIOLOGY | Facility: HOSPITAL | Age: 47
Discharge: HOME | End: 2017-01-30
Attending: SURGERY | Admitting: SURGERY
Payer: COMMERCIAL

## 2017-01-30 DIAGNOSIS — C50.912 MALIGNANT NEOPLASM OF LEFT FEMALE BREAST (HCC): ICD-10-CM

## 2017-01-30 PROCEDURE — 49424 ASSESS CYST CONTRAST INJECT: CPT

## 2017-01-30 PROCEDURE — 76080 X-RAY EXAM OF FISTULA: CPT

## 2017-01-30 RX ADMIN — IOHEXOL 10 ML: 300 INJECTION, SOLUTION INTRAVENOUS at 16:13

## 2017-02-01 ENCOUNTER — ALLSCRIPTS OFFICE VISIT (OUTPATIENT)
Dept: OTHER | Facility: OTHER | Age: 47
End: 2017-02-01

## 2017-02-03 ENCOUNTER — ALLSCRIPTS OFFICE VISIT (OUTPATIENT)
Dept: OTHER | Facility: OTHER | Age: 47
End: 2017-02-03

## 2017-02-07 ENCOUNTER — GENERIC CONVERSION - ENCOUNTER (OUTPATIENT)
Dept: OTHER | Facility: OTHER | Age: 47
End: 2017-02-07

## 2017-02-07 ENCOUNTER — ALLSCRIPTS OFFICE VISIT (OUTPATIENT)
Dept: OTHER | Facility: OTHER | Age: 47
End: 2017-02-07

## 2017-02-09 ENCOUNTER — ALLSCRIPTS OFFICE VISIT (OUTPATIENT)
Dept: OTHER | Facility: OTHER | Age: 47
End: 2017-02-09

## 2017-02-14 ENCOUNTER — APPOINTMENT (OUTPATIENT)
Dept: PHYSICAL THERAPY | Facility: CLINIC | Age: 47
End: 2017-02-14
Payer: COMMERCIAL

## 2017-02-16 ENCOUNTER — APPOINTMENT (OUTPATIENT)
Dept: PHYSICAL THERAPY | Facility: CLINIC | Age: 47
End: 2017-02-16
Payer: COMMERCIAL

## 2017-02-16 PROCEDURE — 97110 THERAPEUTIC EXERCISES: CPT

## 2017-02-16 PROCEDURE — 97162 PT EVAL MOD COMPLEX 30 MIN: CPT

## 2017-02-22 ENCOUNTER — APPOINTMENT (OUTPATIENT)
Dept: PHYSICAL THERAPY | Facility: CLINIC | Age: 47
End: 2017-02-22
Payer: COMMERCIAL

## 2017-02-23 ENCOUNTER — APPOINTMENT (OUTPATIENT)
Dept: PHYSICAL THERAPY | Facility: CLINIC | Age: 47
End: 2017-02-23
Payer: COMMERCIAL

## 2017-02-23 PROCEDURE — 97140 MANUAL THERAPY 1/> REGIONS: CPT

## 2017-02-23 PROCEDURE — 97110 THERAPEUTIC EXERCISES: CPT

## 2017-02-24 ENCOUNTER — GENERIC CONVERSION - ENCOUNTER (OUTPATIENT)
Dept: OTHER | Facility: OTHER | Age: 47
End: 2017-02-24

## 2017-02-27 ENCOUNTER — GENERIC CONVERSION - ENCOUNTER (OUTPATIENT)
Dept: OTHER | Facility: OTHER | Age: 47
End: 2017-02-27

## 2017-02-28 ENCOUNTER — APPOINTMENT (OUTPATIENT)
Dept: PHYSICAL THERAPY | Facility: CLINIC | Age: 47
End: 2017-02-28
Payer: COMMERCIAL

## 2017-02-28 ENCOUNTER — GENERIC CONVERSION - ENCOUNTER (OUTPATIENT)
Dept: OTHER | Facility: OTHER | Age: 47
End: 2017-02-28

## 2017-02-28 ENCOUNTER — ALLSCRIPTS OFFICE VISIT (OUTPATIENT)
Dept: OTHER | Facility: OTHER | Age: 47
End: 2017-02-28

## 2017-02-28 PROCEDURE — 97140 MANUAL THERAPY 1/> REGIONS: CPT

## 2017-02-28 PROCEDURE — 97110 THERAPEUTIC EXERCISES: CPT

## 2017-03-02 ENCOUNTER — APPOINTMENT (OUTPATIENT)
Dept: PHYSICAL THERAPY | Facility: CLINIC | Age: 47
End: 2017-03-02
Payer: COMMERCIAL

## 2017-03-07 ENCOUNTER — ALLSCRIPTS OFFICE VISIT (OUTPATIENT)
Dept: OTHER | Facility: OTHER | Age: 47
End: 2017-03-07

## 2017-03-07 ENCOUNTER — APPOINTMENT (OUTPATIENT)
Dept: PHYSICAL THERAPY | Facility: CLINIC | Age: 47
End: 2017-03-07
Payer: COMMERCIAL

## 2017-03-07 PROCEDURE — 97140 MANUAL THERAPY 1/> REGIONS: CPT

## 2017-03-07 PROCEDURE — 97110 THERAPEUTIC EXERCISES: CPT

## 2017-03-14 ENCOUNTER — APPOINTMENT (OUTPATIENT)
Dept: PHYSICAL THERAPY | Facility: CLINIC | Age: 47
End: 2017-03-14
Payer: COMMERCIAL

## 2017-03-21 ENCOUNTER — ALLSCRIPTS OFFICE VISIT (OUTPATIENT)
Dept: OTHER | Facility: OTHER | Age: 47
End: 2017-03-21

## 2017-03-22 ENCOUNTER — APPOINTMENT (OUTPATIENT)
Dept: PHYSICAL THERAPY | Facility: CLINIC | Age: 47
End: 2017-03-22
Payer: COMMERCIAL

## 2017-03-22 PROCEDURE — 97110 THERAPEUTIC EXERCISES: CPT

## 2017-03-22 PROCEDURE — 97140 MANUAL THERAPY 1/> REGIONS: CPT

## 2017-03-23 ENCOUNTER — APPOINTMENT (OUTPATIENT)
Dept: PHYSICAL THERAPY | Facility: CLINIC | Age: 47
End: 2017-03-23
Payer: COMMERCIAL

## 2017-03-30 ENCOUNTER — APPOINTMENT (OUTPATIENT)
Dept: PHYSICAL THERAPY | Facility: CLINIC | Age: 47
End: 2017-03-30
Payer: COMMERCIAL

## 2017-03-30 PROCEDURE — 97140 MANUAL THERAPY 1/> REGIONS: CPT

## 2017-03-30 PROCEDURE — 97110 THERAPEUTIC EXERCISES: CPT

## 2017-04-04 ENCOUNTER — APPOINTMENT (OUTPATIENT)
Dept: PHYSICAL THERAPY | Facility: CLINIC | Age: 47
End: 2017-04-04
Payer: COMMERCIAL

## 2017-04-04 PROCEDURE — 97110 THERAPEUTIC EXERCISES: CPT

## 2017-04-04 PROCEDURE — 97140 MANUAL THERAPY 1/> REGIONS: CPT

## 2017-04-06 ENCOUNTER — APPOINTMENT (OUTPATIENT)
Dept: PHYSICAL THERAPY | Facility: CLINIC | Age: 47
End: 2017-04-06
Payer: COMMERCIAL

## 2017-04-13 ENCOUNTER — APPOINTMENT (OUTPATIENT)
Dept: PHYSICAL THERAPY | Facility: CLINIC | Age: 47
End: 2017-04-13
Payer: COMMERCIAL

## 2017-04-13 ENCOUNTER — GENERIC CONVERSION - ENCOUNTER (OUTPATIENT)
Dept: OTHER | Facility: OTHER | Age: 47
End: 2017-04-13

## 2017-04-13 PROCEDURE — 97110 THERAPEUTIC EXERCISES: CPT

## 2017-04-13 PROCEDURE — 97140 MANUAL THERAPY 1/> REGIONS: CPT

## 2017-04-18 ENCOUNTER — GENERIC CONVERSION - ENCOUNTER (OUTPATIENT)
Dept: OTHER | Facility: OTHER | Age: 47
End: 2017-04-18

## 2017-04-18 ENCOUNTER — HOSPITAL ENCOUNTER (OUTPATIENT)
Dept: ULTRASOUND IMAGING | Facility: CLINIC | Age: 47
Discharge: HOME | End: 2017-04-18
Payer: COMMERCIAL

## 2017-04-18 ENCOUNTER — HOSPITAL ENCOUNTER (OUTPATIENT)
Dept: MAMMOGRAPHY | Facility: CLINIC | Age: 47
Discharge: HOME | End: 2017-04-18
Payer: COMMERCIAL

## 2017-04-18 DIAGNOSIS — R35.1 NOCTURIA: ICD-10-CM

## 2017-04-18 DIAGNOSIS — C50.412 MALIGNANT NEOPLASM OF UPPER-OUTER QUADRANT OF LEFT FEMALE BREAST (HCC): ICD-10-CM

## 2017-04-18 DIAGNOSIS — R92.8 ABNORMAL FINDING ON MAMMOGRAPHY: ICD-10-CM

## 2017-04-18 PROCEDURE — G0206 DX MAMMO INCL CAD UNI: HCPCS

## 2017-04-18 PROCEDURE — 76642 ULTRASOUND BREAST LIMITED: CPT

## 2017-04-20 ENCOUNTER — ALLSCRIPTS OFFICE VISIT (OUTPATIENT)
Dept: OTHER | Facility: OTHER | Age: 47
End: 2017-04-20

## 2017-04-26 ENCOUNTER — ALLSCRIPTS OFFICE VISIT (OUTPATIENT)
Dept: OTHER | Facility: OTHER | Age: 47
End: 2017-04-26

## 2017-04-26 ENCOUNTER — APPOINTMENT (OUTPATIENT)
Dept: PHYSICAL THERAPY | Facility: CLINIC | Age: 47
End: 2017-04-26
Payer: COMMERCIAL

## 2017-05-23 ENCOUNTER — TRANSCRIBE ORDERS (OUTPATIENT)
Dept: LAB | Facility: CLINIC | Age: 47
End: 2017-05-23

## 2017-05-23 ENCOUNTER — APPOINTMENT (OUTPATIENT)
Dept: LAB | Facility: CLINIC | Age: 47
End: 2017-05-23
Payer: COMMERCIAL

## 2017-05-23 DIAGNOSIS — Z85.3 PERSONAL HISTORY OF MALIGNANT NEOPLASM OF BREAST: Primary | ICD-10-CM

## 2017-05-23 DIAGNOSIS — Z85.3 PERSONAL HISTORY OF MALIGNANT NEOPLASM OF BREAST: ICD-10-CM

## 2017-05-23 LAB
ANION GAP SERPL CALCULATED.3IONS-SCNC: 9 MMOL/L (ref 4–13)
BASOPHILS # BLD AUTO: 0.03 THOUSANDS/ΜL (ref 0–0.1)
BASOPHILS NFR BLD AUTO: 0 % (ref 0–1)
BUN SERPL-MCNC: 25 MG/DL (ref 5–25)
CALCIUM SERPL-MCNC: 8.9 MG/DL (ref 8.3–10.1)
CHLORIDE SERPL-SCNC: 107 MMOL/L (ref 100–108)
CO2 SERPL-SCNC: 23 MMOL/L (ref 21–32)
CREAT SERPL-MCNC: 0.93 MG/DL (ref 0.6–1.3)
EOSINOPHIL # BLD AUTO: 0.07 THOUSAND/ΜL (ref 0–0.61)
EOSINOPHIL NFR BLD AUTO: 1 % (ref 0–6)
ERYTHROCYTE [DISTWIDTH] IN BLOOD BY AUTOMATED COUNT: 19.7 % (ref 11.6–15.1)
GFR SERPL CREATININE-BSD FRML MDRD: >60 ML/MIN/1.73SQ M
GLUCOSE SERPL-MCNC: 94 MG/DL (ref 65–140)
HCT VFR BLD AUTO: 37.6 % (ref 34.8–46.1)
HGB BLD-MCNC: 12 G/DL (ref 11.5–15.4)
LYMPHOCYTES # BLD AUTO: 1.84 THOUSANDS/ΜL (ref 0.6–4.47)
LYMPHOCYTES NFR BLD AUTO: 22 % (ref 14–44)
MCH RBC QN AUTO: 26.3 PG (ref 26.8–34.3)
MCHC RBC AUTO-ENTMCNC: 31.9 G/DL (ref 31.4–37.4)
MCV RBC AUTO: 83 FL (ref 82–98)
MONOCYTES # BLD AUTO: 0.65 THOUSAND/ΜL (ref 0.17–1.22)
MONOCYTES NFR BLD AUTO: 8 % (ref 4–12)
NEUTROPHILS # BLD AUTO: 5.95 THOUSANDS/ΜL (ref 1.85–7.62)
NEUTS SEG NFR BLD AUTO: 69 % (ref 43–75)
PLATELET # BLD AUTO: 301 THOUSANDS/UL (ref 149–390)
PMV BLD AUTO: 9.1 FL (ref 8.9–12.7)
POTASSIUM SERPL-SCNC: 3.9 MMOL/L (ref 3.5–5.3)
RBC # BLD AUTO: 4.56 MILLION/UL (ref 3.81–5.12)
SODIUM SERPL-SCNC: 139 MMOL/L (ref 136–145)
WBC # BLD AUTO: 8.54 THOUSAND/UL (ref 4.31–10.16)

## 2017-05-23 PROCEDURE — 80048 BASIC METABOLIC PNL TOTAL CA: CPT

## 2017-05-23 PROCEDURE — 85025 COMPLETE CBC W/AUTO DIFF WBC: CPT

## 2017-05-23 PROCEDURE — 36415 COLL VENOUS BLD VENIPUNCTURE: CPT

## 2017-05-25 ENCOUNTER — ALLSCRIPTS OFFICE VISIT (OUTPATIENT)
Dept: OTHER | Facility: OTHER | Age: 47
End: 2017-05-25

## 2017-06-07 ENCOUNTER — ANESTHESIA EVENT (OUTPATIENT)
Dept: PERIOP | Facility: HOSPITAL | Age: 47
End: 2017-06-07
Payer: COMMERCIAL

## 2017-06-08 ENCOUNTER — HOSPITAL ENCOUNTER (OUTPATIENT)
Facility: HOSPITAL | Age: 47
Setting detail: OUTPATIENT SURGERY
Discharge: HOME | End: 2017-06-08
Attending: SURGERY | Admitting: SURGERY
Payer: COMMERCIAL

## 2017-06-08 ENCOUNTER — ANESTHESIA (OUTPATIENT)
Dept: PERIOP | Facility: HOSPITAL | Age: 47
End: 2017-06-08
Payer: COMMERCIAL

## 2017-06-08 VITALS
TEMPERATURE: 98.3 F | HEIGHT: 60 IN | HEART RATE: 68 BPM | SYSTOLIC BLOOD PRESSURE: 98 MMHG | OXYGEN SATURATION: 99 % | BODY MASS INDEX: 36.12 KG/M2 | DIASTOLIC BLOOD PRESSURE: 53 MMHG | WEIGHT: 184 LBS | RESPIRATION RATE: 18 BRPM

## 2017-06-08 LAB — EXT PREGNANCY TEST URINE: NEGATIVE

## 2017-06-08 PROCEDURE — A9270 NON-COVERED ITEM OR SERVICE: HCPCS | Performed by: SURGERY

## 2017-06-08 PROCEDURE — A9270 NON-COVERED ITEM OR SERVICE: HCPCS | Performed by: NURSE ANESTHETIST, CERTIFIED REGISTERED

## 2017-06-08 RX ORDER — FENTANYL CITRATE/PF 50 MCG/ML
50 SYRINGE (ML) INJECTION
Status: DISCONTINUED | OUTPATIENT
Start: 2017-06-08 | End: 2017-06-08 | Stop reason: HOSPADM

## 2017-06-08 RX ORDER — METOCLOPRAMIDE HYDROCHLORIDE 5 MG/ML
10 INJECTION INTRAMUSCULAR; INTRAVENOUS ONCE AS NEEDED
Status: DISCONTINUED | OUTPATIENT
Start: 2017-06-08 | End: 2017-06-08 | Stop reason: HOSPADM

## 2017-06-08 RX ORDER — MIDAZOLAM HYDROCHLORIDE 1 MG/ML
INJECTION INTRAMUSCULAR; INTRAVENOUS AS NEEDED
Status: DISCONTINUED | OUTPATIENT
Start: 2017-06-08 | End: 2017-06-08 | Stop reason: SURG

## 2017-06-08 RX ORDER — ONDANSETRON 2 MG/ML
4 INJECTION INTRAMUSCULAR; INTRAVENOUS ONCE AS NEEDED
Status: DISCONTINUED | OUTPATIENT
Start: 2017-06-08 | End: 2017-06-08 | Stop reason: HOSPADM

## 2017-06-08 RX ORDER — LIDOCAINE HYDROCHLORIDE 10 MG/ML
INJECTION, SOLUTION INFILTRATION; PERINEURAL AS NEEDED
Status: DISCONTINUED | OUTPATIENT
Start: 2017-06-08 | End: 2017-06-08 | Stop reason: SURG

## 2017-06-08 RX ORDER — SUCCINYLCHOLINE CHLORIDE 20 MG/ML
INJECTION INTRAMUSCULAR; INTRAVENOUS AS NEEDED
Status: DISCONTINUED | OUTPATIENT
Start: 2017-06-08 | End: 2017-06-08 | Stop reason: SURG

## 2017-06-08 RX ORDER — SODIUM CHLORIDE, SODIUM LACTATE, POTASSIUM CHLORIDE, CALCIUM CHLORIDE 600; 310; 30; 20 MG/100ML; MG/100ML; MG/100ML; MG/100ML
100 INJECTION, SOLUTION INTRAVENOUS CONTINUOUS
Status: DISCONTINUED | OUTPATIENT
Start: 2017-06-08 | End: 2017-06-08 | Stop reason: HOSPADM

## 2017-06-08 RX ORDER — NAPROXEN 500 MG/1
500 TABLET ORAL 2 TIMES DAILY WITH MEALS
Qty: 60 TABLET | Refills: 0 | Status: SHIPPED | OUTPATIENT
Start: 2017-06-08 | End: 2017-06-15 | Stop reason: HOSPADM

## 2017-06-08 RX ORDER — EPHEDRINE SULFATE 50 MG/ML
INJECTION, SOLUTION INTRAVENOUS AS NEEDED
Status: DISCONTINUED | OUTPATIENT
Start: 2017-06-08 | End: 2017-06-08 | Stop reason: SURG

## 2017-06-08 RX ORDER — OXYCODONE HYDROCHLORIDE 5 MG/1
5 TABLET ORAL EVERY 4 HOURS PRN
Qty: 61 TABLET | Refills: 0 | Status: SHIPPED | OUTPATIENT
Start: 2017-06-08 | End: 2017-06-18

## 2017-06-08 RX ORDER — PROPOFOL 10 MG/ML
INJECTION, EMULSION INTRAVENOUS AS NEEDED
Status: DISCONTINUED | OUTPATIENT
Start: 2017-06-08 | End: 2017-06-08 | Stop reason: SURG

## 2017-06-08 RX ORDER — ROCURONIUM BROMIDE 10 MG/ML
INJECTION, SOLUTION INTRAVENOUS AS NEEDED
Status: DISCONTINUED | OUTPATIENT
Start: 2017-06-08 | End: 2017-06-08 | Stop reason: SURG

## 2017-06-08 RX ORDER — SODIUM CHLORIDE, SODIUM LACTATE, POTASSIUM CHLORIDE, AND CALCIUM CHLORIDE .6; .31; .03; .02 G/100ML; G/100ML; G/100ML; G/100ML
IRRIGANT IRRIGATION AS NEEDED
Status: DISCONTINUED | OUTPATIENT
Start: 2017-06-08 | End: 2017-06-08 | Stop reason: HOSPADM

## 2017-06-08 RX ORDER — ACETAMINOPHEN 325 MG/1
975 TABLET ORAL ONCE
Status: COMPLETED | OUTPATIENT
Start: 2017-06-08 | End: 2017-06-08

## 2017-06-08 RX ORDER — SENNA AND DOCUSATE SODIUM 50; 8.6 MG/1; MG/1
1 TABLET, FILM COATED ORAL
Qty: 60 TABLET | Refills: 0 | OUTPATIENT
Start: 2017-06-08 | End: 2017-07-08

## 2017-06-08 RX ORDER — ACETAMINOPHEN 500 MG
500 TABLET ORAL EVERY 6 HOURS
Qty: 30 TABLET | Refills: 0 | Status: SHIPPED | OUTPATIENT
Start: 2017-06-08 | End: 2017-06-15

## 2017-06-08 RX ORDER — FENTANYL CITRATE 50 UG/ML
INJECTION, SOLUTION INTRAMUSCULAR; INTRAVENOUS AS NEEDED
Status: DISCONTINUED | OUTPATIENT
Start: 2017-06-08 | End: 2017-06-08 | Stop reason: SURG

## 2017-06-08 RX ORDER — OXYCODONE HYDROCHLORIDE 5 MG/1
5 TABLET ORAL EVERY 4 HOURS PRN
Status: DISCONTINUED | OUTPATIENT
Start: 2017-06-08 | End: 2017-06-08 | Stop reason: HOSPADM

## 2017-06-08 RX ORDER — MINERAL OIL
OIL (ML) MISCELLANEOUS AS NEEDED
Status: DISCONTINUED | OUTPATIENT
Start: 2017-06-08 | End: 2017-06-08 | Stop reason: HOSPADM

## 2017-06-08 RX ADMIN — EPHEDRINE SULFATE 10 MG: 50 INJECTION, SOLUTION INTRAMUSCULAR; INTRAVENOUS; SUBCUTANEOUS at 08:25

## 2017-06-08 RX ADMIN — SODIUM CHLORIDE, SODIUM LACTATE, POTASSIUM CHLORIDE, AND CALCIUM CHLORIDE: .6; .31; .03; .02 INJECTION, SOLUTION INTRAVENOUS at 10:28

## 2017-06-08 RX ADMIN — EPHEDRINE SULFATE 10 MG: 50 INJECTION, SOLUTION INTRAMUSCULAR; INTRAVENOUS; SUBCUTANEOUS at 10:50

## 2017-06-08 RX ADMIN — FENTANYL CITRATE 50 MCG: 50 INJECTION, SOLUTION INTRAMUSCULAR; INTRAVENOUS at 12:08

## 2017-06-08 RX ADMIN — FENTANYL CITRATE 50 MCG: 50 INJECTION, SOLUTION INTRAMUSCULAR; INTRAVENOUS at 08:00

## 2017-06-08 RX ADMIN — EPHEDRINE SULFATE 10 MG: 50 INJECTION, SOLUTION INTRAMUSCULAR; INTRAVENOUS; SUBCUTANEOUS at 09:15

## 2017-06-08 RX ADMIN — HYDROMORPHONE HYDROCHLORIDE 0.3 MG: 1 INJECTION, SOLUTION INTRAMUSCULAR; INTRAVENOUS; SUBCUTANEOUS at 09:56

## 2017-06-08 RX ADMIN — LIDOCAINE HYDROCHLORIDE 50 MG: 10 INJECTION, SOLUTION INFILTRATION; PERINEURAL at 07:53

## 2017-06-08 RX ADMIN — EPHEDRINE SULFATE 10 MG: 50 INJECTION, SOLUTION INTRAMUSCULAR; INTRAVENOUS; SUBCUTANEOUS at 08:33

## 2017-06-08 RX ADMIN — SODIUM CHLORIDE, SODIUM LACTATE, POTASSIUM CHLORIDE, AND CALCIUM CHLORIDE: .6; .31; .03; .02 INJECTION, SOLUTION INTRAVENOUS at 07:15

## 2017-06-08 RX ADMIN — PROPOFOL 200 MG: 10 INJECTION, EMULSION INTRAVENOUS at 07:53

## 2017-06-08 RX ADMIN — FENTANYL CITRATE 50 MCG: 50 INJECTION, SOLUTION INTRAMUSCULAR; INTRAVENOUS at 08:50

## 2017-06-08 RX ADMIN — EPHEDRINE SULFATE 10 MG: 50 INJECTION, SOLUTION INTRAMUSCULAR; INTRAVENOUS; SUBCUTANEOUS at 09:11

## 2017-06-08 RX ADMIN — FENTANYL CITRATE 50 MCG: 50 INJECTION, SOLUTION INTRAMUSCULAR; INTRAVENOUS at 10:00

## 2017-06-08 RX ADMIN — CEFAZOLIN SODIUM 2000 MG: 2 SOLUTION INTRAVENOUS at 11:23

## 2017-06-08 RX ADMIN — MIDAZOLAM HYDROCHLORIDE 4 MG: 1 INJECTION, SOLUTION INTRAMUSCULAR; INTRAVENOUS at 07:51

## 2017-06-08 RX ADMIN — EPHEDRINE SULFATE 10 MG: 50 INJECTION, SOLUTION INTRAMUSCULAR; INTRAVENOUS; SUBCUTANEOUS at 08:30

## 2017-06-08 RX ADMIN — FENTANYL CITRATE 50 MCG: 50 INJECTION, SOLUTION INTRAMUSCULAR; INTRAVENOUS at 09:25

## 2017-06-08 RX ADMIN — OXYCODONE HYDROCHLORIDE 5 MG: 5 TABLET ORAL at 13:24

## 2017-06-08 RX ADMIN — FENTANYL CITRATE 50 MCG: 50 INJECTION, SOLUTION INTRAMUSCULAR; INTRAVENOUS at 07:53

## 2017-06-08 RX ADMIN — EPHEDRINE SULFATE 10 MG: 50 INJECTION, SOLUTION INTRAMUSCULAR; INTRAVENOUS; SUBCUTANEOUS at 10:46

## 2017-06-08 RX ADMIN — SUCCINYLCHOLINE CHLORIDE 100 MG: 20 INJECTION, SOLUTION INTRAMUSCULAR; INTRAVENOUS at 07:54

## 2017-06-08 RX ADMIN — CEFAZOLIN SODIUM 2000 MG: 2 SOLUTION INTRAVENOUS at 07:56

## 2017-06-08 RX ADMIN — ACETAMINOPHEN 975 MG: 325 TABLET, FILM COATED ORAL at 13:22

## 2017-06-08 RX ADMIN — FENTANYL CITRATE 50 MCG: 50 INJECTION, SOLUTION INTRAMUSCULAR; INTRAVENOUS at 12:04

## 2017-06-08 RX ADMIN — DEXAMETHASONE SODIUM PHOSPHATE 10 MG: 10 INJECTION INTRAMUSCULAR; INTRAVENOUS at 08:00

## 2017-06-08 RX ADMIN — ROCURONIUM BROMIDE 10 MG: 10 INJECTION, SOLUTION INTRAVENOUS at 08:05

## 2017-06-08 RX ADMIN — SODIUM CHLORIDE, SODIUM LACTATE, POTASSIUM CHLORIDE, AND CALCIUM CHLORIDE: .6; .31; .03; .02 INJECTION, SOLUTION INTRAVENOUS at 08:31

## 2017-06-08 RX ADMIN — EPHEDRINE SULFATE 10 MG: 50 INJECTION, SOLUTION INTRAMUSCULAR; INTRAVENOUS; SUBCUTANEOUS at 10:43

## 2017-06-13 ENCOUNTER — APPOINTMENT (EMERGENCY)
Dept: ULTRASOUND IMAGING | Facility: HOSPITAL | Age: 47
End: 2017-06-13
Payer: COMMERCIAL

## 2017-06-13 ENCOUNTER — APPOINTMENT (EMERGENCY)
Dept: CT IMAGING | Facility: HOSPITAL | Age: 47
End: 2017-06-13
Payer: COMMERCIAL

## 2017-06-13 ENCOUNTER — GENERIC CONVERSION - ENCOUNTER (OUTPATIENT)
Dept: OTHER | Facility: OTHER | Age: 47
End: 2017-06-13

## 2017-06-13 ENCOUNTER — HOSPITAL ENCOUNTER (OUTPATIENT)
Facility: HOSPITAL | Age: 47
Setting detail: OBSERVATION
Discharge: HOME | End: 2017-06-15
Attending: EMERGENCY MEDICINE | Admitting: INTERNAL MEDICINE
Payer: COMMERCIAL

## 2017-06-13 ENCOUNTER — APPOINTMENT (EMERGENCY)
Dept: RADIOLOGY | Facility: HOSPITAL | Age: 47
End: 2017-06-13
Payer: COMMERCIAL

## 2017-06-13 DIAGNOSIS — T81.9XXA POST-OPERATIVE COMPLICATION: ICD-10-CM

## 2017-06-13 DIAGNOSIS — R55 NEAR SYNCOPE: ICD-10-CM

## 2017-06-13 DIAGNOSIS — T88.8XXA FLUID COLLECTION AT SURGICAL SITE: ICD-10-CM

## 2017-06-13 DIAGNOSIS — C50.912 MALIGNANT NEOPLASM OF LEFT BREAST (HCC): ICD-10-CM

## 2017-06-13 DIAGNOSIS — D64.9 ANEMIA: Primary | ICD-10-CM

## 2017-06-13 PROBLEM — R06.02 SHORTNESS OF BREATH: Status: ACTIVE | Noted: 2017-06-13

## 2017-06-13 PROBLEM — M79.606 LEG PAIN: Status: ACTIVE | Noted: 2017-06-13

## 2017-06-13 LAB
ABO GROUP BLD: NORMAL
ANION GAP SERPL CALCULATED.3IONS-SCNC: 7 MMOL/L (ref 4–13)
BASOPHILS # BLD AUTO: 0.02 THOUSANDS/ΜL (ref 0–0.1)
BASOPHILS NFR BLD AUTO: 0 % (ref 0–1)
BLD GP AB SCN SERPL QL: NEGATIVE
BUN SERPL-MCNC: 21 MG/DL (ref 5–25)
CALCIUM SERPL-MCNC: 8.2 MG/DL (ref 8.3–10.1)
CHLORIDE SERPL-SCNC: 110 MMOL/L (ref 100–108)
CO2 SERPL-SCNC: 25 MMOL/L (ref 21–32)
CREAT SERPL-MCNC: 0.72 MG/DL (ref 0.6–1.3)
DEPRECATED D DIMER PPP: 1744 NG/ML (FEU) (ref 0–424)
EOSINOPHIL # BLD AUTO: 0.11 THOUSAND/ΜL (ref 0–0.61)
EOSINOPHIL NFR BLD AUTO: 1 % (ref 0–6)
ERYTHROCYTE [DISTWIDTH] IN BLOOD BY AUTOMATED COUNT: 18.9 % (ref 11.6–15.1)
GFR SERPL CREATININE-BSD FRML MDRD: >60 ML/MIN/1.73SQ M
GLUCOSE SERPL-MCNC: 113 MG/DL (ref 65–140)
HCT VFR BLD AUTO: 23.1 % (ref 34.8–46.1)
HGB BLD-MCNC: 7.1 G/DL (ref 11.5–15.4)
LYMPHOCYTES # BLD AUTO: 1.44 THOUSANDS/ΜL (ref 0.6–4.47)
LYMPHOCYTES NFR BLD AUTO: 13 % (ref 14–44)
MCH RBC QN AUTO: 26.7 PG (ref 26.8–34.3)
MCHC RBC AUTO-ENTMCNC: 30.7 G/DL (ref 31.4–37.4)
MCV RBC AUTO: 87 FL (ref 82–98)
MONOCYTES # BLD AUTO: 0.81 THOUSAND/ΜL (ref 0.17–1.22)
MONOCYTES NFR BLD AUTO: 7 % (ref 4–12)
NEUTROPHILS # BLD AUTO: 8.74 THOUSANDS/ΜL (ref 1.85–7.62)
NEUTS SEG NFR BLD AUTO: 79 % (ref 43–75)
PLATELET # BLD AUTO: 286 THOUSANDS/UL (ref 149–390)
PMV BLD AUTO: 9.2 FL (ref 8.9–12.7)
POTASSIUM SERPL-SCNC: 3.5 MMOL/L (ref 3.5–5.3)
RBC # BLD AUTO: 2.66 MILLION/UL (ref 3.81–5.12)
RH BLD: POSITIVE
SODIUM SERPL-SCNC: 142 MMOL/L (ref 136–145)
SPECIMEN EXPIRATION DATE: NORMAL
TROPONIN I SERPL-MCNC: <0.02 NG/ML
WBC # BLD AUTO: 11.12 THOUSAND/UL (ref 4.31–10.16)

## 2017-06-13 PROCEDURE — 84443 ASSAY THYROID STIM HORMONE: CPT | Performed by: PHYSICIAN ASSISTANT

## 2017-06-13 PROCEDURE — 80048 BASIC METABOLIC PNL TOTAL CA: CPT | Performed by: EMERGENCY MEDICINE

## 2017-06-13 PROCEDURE — 36415 COLL VENOUS BLD VENIPUNCTURE: CPT | Performed by: EMERGENCY MEDICINE

## 2017-06-13 PROCEDURE — 93970 EXTREMITY STUDY: CPT

## 2017-06-13 PROCEDURE — 85025 COMPLETE CBC W/AUTO DIFF WBC: CPT | Performed by: EMERGENCY MEDICINE

## 2017-06-13 PROCEDURE — 86900 BLOOD TYPING SEROLOGIC ABO: CPT | Performed by: EMERGENCY MEDICINE

## 2017-06-13 PROCEDURE — 71275 CT ANGIOGRAPHY CHEST: CPT

## 2017-06-13 PROCEDURE — 93005 ELECTROCARDIOGRAM TRACING: CPT | Performed by: EMERGENCY MEDICINE

## 2017-06-13 PROCEDURE — 85014 HEMATOCRIT: CPT | Performed by: PHYSICIAN ASSISTANT

## 2017-06-13 PROCEDURE — 85018 HEMOGLOBIN: CPT | Performed by: PHYSICIAN ASSISTANT

## 2017-06-13 PROCEDURE — 96375 TX/PRO/DX INJ NEW DRUG ADDON: CPT

## 2017-06-13 PROCEDURE — 99285 EMERGENCY DEPT VISIT HI MDM: CPT

## 2017-06-13 PROCEDURE — 84484 ASSAY OF TROPONIN QUANT: CPT | Performed by: EMERGENCY MEDICINE

## 2017-06-13 PROCEDURE — 86850 RBC ANTIBODY SCREEN: CPT | Performed by: EMERGENCY MEDICINE

## 2017-06-13 PROCEDURE — 86920 COMPATIBILITY TEST SPIN: CPT

## 2017-06-13 PROCEDURE — 96361 HYDRATE IV INFUSION ADD-ON: CPT

## 2017-06-13 PROCEDURE — 86901 BLOOD TYPING SEROLOGIC RH(D): CPT | Performed by: EMERGENCY MEDICINE

## 2017-06-13 PROCEDURE — 85379 FIBRIN DEGRADATION QUANT: CPT | Performed by: EMERGENCY MEDICINE

## 2017-06-13 PROCEDURE — 71010 HB CHEST X-RAY 1 VIEW FRONTAL (PORTABLE): CPT

## 2017-06-13 PROCEDURE — 96374 THER/PROPH/DIAG INJ IV PUSH: CPT

## 2017-06-13 RX ORDER — AMOXICILLIN 250 MG
1 CAPSULE ORAL
Status: DISCONTINUED | OUTPATIENT
Start: 2017-06-14 | End: 2017-06-15 | Stop reason: HOSPADM

## 2017-06-13 RX ORDER — BUPROPION HYDROCHLORIDE 150 MG/1
150 TABLET ORAL
Status: DISCONTINUED | OUTPATIENT
Start: 2017-06-14 | End: 2017-06-15 | Stop reason: HOSPADM

## 2017-06-13 RX ORDER — MORPHINE SULFATE 10 MG/ML
8 INJECTION, SOLUTION INTRAMUSCULAR; INTRAVENOUS ONCE
Status: COMPLETED | OUTPATIENT
Start: 2017-06-13 | End: 2017-06-13

## 2017-06-13 RX ORDER — ALPRAZOLAM 0.5 MG/1
0.5 TABLET ORAL
Status: DISCONTINUED | OUTPATIENT
Start: 2017-06-13 | End: 2017-06-15 | Stop reason: HOSPADM

## 2017-06-13 RX ORDER — TEMAZEPAM 15 MG/1
15 CAPSULE ORAL
Status: DISCONTINUED | OUTPATIENT
Start: 2017-06-13 | End: 2017-06-15 | Stop reason: HOSPADM

## 2017-06-13 RX ORDER — ACETAMINOPHEN 325 MG/1
650 TABLET ORAL EVERY 6 HOURS PRN
Status: DISCONTINUED | OUTPATIENT
Start: 2017-06-13 | End: 2017-06-14

## 2017-06-13 RX ORDER — DOCUSATE SODIUM 100 MG/1
100 CAPSULE, LIQUID FILLED ORAL 2 TIMES DAILY
Status: DISCONTINUED | OUTPATIENT
Start: 2017-06-13 | End: 2017-06-13 | Stop reason: SDUPTHER

## 2017-06-13 RX ORDER — SODIUM CHLORIDE 9 MG/ML
75 INJECTION, SOLUTION INTRAVENOUS CONTINUOUS
Status: DISCONTINUED | OUTPATIENT
Start: 2017-06-13 | End: 2017-06-14

## 2017-06-13 RX ORDER — DIPHENHYDRAMINE HYDROCHLORIDE 50 MG/ML
25 INJECTION INTRAMUSCULAR; INTRAVENOUS ONCE
Status: COMPLETED | OUTPATIENT
Start: 2017-06-13 | End: 2017-06-13

## 2017-06-13 RX ORDER — ONDANSETRON 2 MG/ML
4 INJECTION INTRAMUSCULAR; INTRAVENOUS EVERY 6 HOURS PRN
Status: DISCONTINUED | OUTPATIENT
Start: 2017-06-13 | End: 2017-06-15 | Stop reason: HOSPADM

## 2017-06-13 RX ORDER — TOPIRAMATE 25 MG/1
25 TABLET ORAL 2 TIMES DAILY
Status: DISCONTINUED | OUTPATIENT
Start: 2017-06-13 | End: 2017-06-15 | Stop reason: HOSPADM

## 2017-06-13 RX ORDER — ONDANSETRON 2 MG/ML
4 INJECTION INTRAMUSCULAR; INTRAVENOUS ONCE
Status: COMPLETED | OUTPATIENT
Start: 2017-06-13 | End: 2017-06-13

## 2017-06-13 RX ORDER — OXYCODONE HYDROCHLORIDE 5 MG/1
5 TABLET ORAL EVERY 4 HOURS PRN
Status: DISCONTINUED | OUTPATIENT
Start: 2017-06-13 | End: 2017-06-15 | Stop reason: HOSPADM

## 2017-06-13 RX ADMIN — SODIUM CHLORIDE 500 ML: 0.9 INJECTION, SOLUTION INTRAVENOUS at 19:23

## 2017-06-13 RX ADMIN — ACETAMINOPHEN 650 MG: 325 TABLET, FILM COATED ORAL at 22:44

## 2017-06-13 RX ADMIN — DIPHENHYDRAMINE HYDROCHLORIDE 25 MG: 50 INJECTION, SOLUTION INTRAMUSCULAR; INTRAVENOUS at 20:02

## 2017-06-13 RX ADMIN — TEMAZEPAM 15 MG: 15 CAPSULE ORAL at 22:44

## 2017-06-13 RX ADMIN — MORPHINE SULFATE 8 MG: 10 INJECTION, SOLUTION INTRAMUSCULAR; INTRAVENOUS at 20:02

## 2017-06-13 RX ADMIN — SODIUM CHLORIDE 75 ML/HR: 0.9 INJECTION, SOLUTION INTRAVENOUS at 22:45

## 2017-06-13 RX ADMIN — ONDANSETRON 4 MG: 2 INJECTION INTRAMUSCULAR; INTRAVENOUS at 19:18

## 2017-06-13 RX ADMIN — IOHEXOL 85 ML: 350 INJECTION, SOLUTION INTRAVENOUS at 20:41

## 2017-06-13 RX ADMIN — TOPIRAMATE 25 MG: 25 TABLET, FILM COATED ORAL at 22:44

## 2017-06-14 ENCOUNTER — APPOINTMENT (OUTPATIENT)
Dept: CT IMAGING | Facility: HOSPITAL | Age: 47
End: 2017-06-14
Payer: COMMERCIAL

## 2017-06-14 LAB
ANION GAP SERPL CALCULATED.3IONS-SCNC: 8 MMOL/L (ref 4–13)
ATRIAL RATE: 89 BPM
BUN SERPL-MCNC: 16 MG/DL (ref 5–25)
CALCIUM SERPL-MCNC: 8 MG/DL (ref 8.3–10.1)
CHLORIDE SERPL-SCNC: 112 MMOL/L (ref 100–108)
CO2 SERPL-SCNC: 24 MMOL/L (ref 21–32)
CREAT SERPL-MCNC: 0.63 MG/DL (ref 0.6–1.3)
ERYTHROCYTE [DISTWIDTH] IN BLOOD BY AUTOMATED COUNT: 18.9 % (ref 11.6–15.1)
GFR SERPL CREATININE-BSD FRML MDRD: >60 ML/MIN/1.73SQ M
GLUCOSE SERPL-MCNC: 94 MG/DL (ref 65–140)
HCT VFR BLD AUTO: 21.1 % (ref 34.8–46.1)
HCT VFR BLD AUTO: 21.3 % (ref 34.8–46.1)
HGB BLD-MCNC: 6.4 G/DL (ref 11.5–15.4)
HGB BLD-MCNC: 6.5 G/DL (ref 11.5–15.4)
MAGNESIUM SERPL-MCNC: 1.8 MG/DL (ref 1.6–2.6)
MCH RBC QN AUTO: 26.8 PG (ref 26.8–34.3)
MCHC RBC AUTO-ENTMCNC: 30.3 G/DL (ref 31.4–37.4)
MCV RBC AUTO: 88 FL (ref 82–98)
P AXIS: 74 DEGREES
PHOSPHATE SERPL-MCNC: 3.5 MG/DL (ref 2.7–4.5)
PLATELET # BLD AUTO: 255 THOUSANDS/UL (ref 149–390)
PMV BLD AUTO: 9.3 FL (ref 8.9–12.7)
POTASSIUM SERPL-SCNC: 4 MMOL/L (ref 3.5–5.3)
PR INTERVAL: 130 MS
QRS AXIS: 46 DEGREES
QRSD INTERVAL: 102 MS
QT INTERVAL: 362 MS
QTC INTERVAL: 430 MS
RBC # BLD AUTO: 2.39 MILLION/UL (ref 3.81–5.12)
SODIUM SERPL-SCNC: 144 MMOL/L (ref 136–145)
T WAVE AXIS: 47 DEGREES
TSH SERPL DL<=0.05 MIU/L-ACNC: 5.06 UIU/ML (ref 0.36–3.74)
VENTRICULAR RATE: 85 BPM
WBC # BLD AUTO: 8.58 THOUSAND/UL (ref 4.31–10.16)

## 2017-06-14 PROCEDURE — 85027 COMPLETE CBC AUTOMATED: CPT | Performed by: PHYSICIAN ASSISTANT

## 2017-06-14 PROCEDURE — P9021 RED BLOOD CELLS UNIT: HCPCS

## 2017-06-14 PROCEDURE — 73701 CT LOWER EXTREMITY W/DYE: CPT

## 2017-06-14 PROCEDURE — P9016 RBC LEUKOCYTES REDUCED: HCPCS

## 2017-06-14 PROCEDURE — 80048 BASIC METABOLIC PNL TOTAL CA: CPT | Performed by: PHYSICIAN ASSISTANT

## 2017-06-14 PROCEDURE — 83735 ASSAY OF MAGNESIUM: CPT | Performed by: PHYSICIAN ASSISTANT

## 2017-06-14 PROCEDURE — 84100 ASSAY OF PHOSPHORUS: CPT | Performed by: PHYSICIAN ASSISTANT

## 2017-06-14 RX ORDER — ACETAMINOPHEN 325 MG/1
650 TABLET ORAL EVERY 6 HOURS PRN
Status: DISCONTINUED | OUTPATIENT
Start: 2017-06-14 | End: 2017-06-15 | Stop reason: HOSPADM

## 2017-06-14 RX ORDER — IBUPROFEN 600 MG/1
600 TABLET ORAL EVERY 8 HOURS PRN
Status: DISCONTINUED | OUTPATIENT
Start: 2017-06-14 | End: 2017-06-14

## 2017-06-14 RX ADMIN — OXYCODONE HYDROCHLORIDE 5 MG: 5 TABLET ORAL at 21:01

## 2017-06-14 RX ADMIN — BUPROPION HYDROCHLORIDE 150 MG: 150 TABLET, FILM COATED, EXTENDED RELEASE ORAL at 05:13

## 2017-06-14 RX ADMIN — IBUPROFEN 600 MG: 600 TABLET ORAL at 12:31

## 2017-06-14 RX ADMIN — STANDARDIZED SENNA CONCENTRATE AND DOCUSATE SODIUM 1 TABLET: 8.6; 5 TABLET, FILM COATED ORAL at 17:23

## 2017-06-14 RX ADMIN — TEMAZEPAM 15 MG: 15 CAPSULE ORAL at 23:26

## 2017-06-14 RX ADMIN — TOPIRAMATE 25 MG: 25 TABLET, FILM COATED ORAL at 09:50

## 2017-06-14 RX ADMIN — STANDARDIZED SENNA CONCENTRATE AND DOCUSATE SODIUM 1 TABLET: 8.6; 5 TABLET, FILM COATED ORAL at 09:50

## 2017-06-14 RX ADMIN — TOPIRAMATE 25 MG: 25 TABLET, FILM COATED ORAL at 17:24

## 2017-06-14 RX ADMIN — IOHEXOL 100 ML: 350 INJECTION, SOLUTION INTRAVENOUS at 16:27

## 2017-06-14 RX ADMIN — OXYCODONE HYDROCHLORIDE 5 MG: 5 TABLET ORAL at 06:52

## 2017-06-15 VITALS
DIASTOLIC BLOOD PRESSURE: 68 MMHG | SYSTOLIC BLOOD PRESSURE: 118 MMHG | OXYGEN SATURATION: 99 % | RESPIRATION RATE: 18 BRPM | HEIGHT: 60 IN | TEMPERATURE: 98.5 F | HEART RATE: 82 BPM

## 2017-06-15 PROBLEM — R06.02 SHORTNESS OF BREATH: Status: RESOLVED | Noted: 2017-06-13 | Resolved: 2017-06-15

## 2017-06-15 LAB
ABO GROUP BLD BPU: NORMAL
ABO GROUP BLD BPU: NORMAL
BPU ID: NORMAL
BPU ID: NORMAL
CROSSMATCH: NORMAL
CROSSMATCH: NORMAL
ERYTHROCYTE [DISTWIDTH] IN BLOOD BY AUTOMATED COUNT: 17.6 % (ref 11.6–15.1)
FERRITIN SERPL-MCNC: 129 NG/ML (ref 8–388)
FOLATE SERPL-MCNC: 9.2 NG/ML (ref 3.1–17.5)
HCT VFR BLD AUTO: 29.5 % (ref 34.8–46.1)
HEMOCCULT STL QL: NEGATIVE
HGB BLD-MCNC: 9.3 G/DL (ref 11.5–15.4)
IRON SATN MFR SERPL: 14 %
IRON SERPL-MCNC: 35 UG/DL (ref 50–170)
MCH RBC QN AUTO: 27.7 PG (ref 26.8–34.3)
MCHC RBC AUTO-ENTMCNC: 31.5 G/DL (ref 31.4–37.4)
MCV RBC AUTO: 88 FL (ref 82–98)
PLATELET # BLD AUTO: 283 THOUSANDS/UL (ref 149–390)
PMV BLD AUTO: 9 FL (ref 8.9–12.7)
RBC # BLD AUTO: 3.36 MILLION/UL (ref 3.81–5.12)
RETICS # AUTO: ABNORMAL 10*3/UL (ref 14097–95744)
RETICS # CALC: 3.03 % (ref 0.37–1.87)
TIBC SERPL-MCNC: 259 UG/DL (ref 250–450)
UNIT DISPENSE STATUS: NORMAL
UNIT DISPENSE STATUS: NORMAL
UNIT PRODUCT CODE: NORMAL
UNIT PRODUCT CODE: NORMAL
UNIT RH: NORMAL
UNIT RH: NORMAL
VIT B12 SERPL-MCNC: 478 PG/ML (ref 100–900)
WBC # BLD AUTO: 9.7 THOUSAND/UL (ref 4.31–10.16)

## 2017-06-15 PROCEDURE — 82272 OCCULT BLD FECES 1-3 TESTS: CPT | Performed by: PHYSICIAN ASSISTANT

## 2017-06-15 PROCEDURE — 82607 VITAMIN B-12: CPT | Performed by: INTERNAL MEDICINE

## 2017-06-15 PROCEDURE — 82728 ASSAY OF FERRITIN: CPT | Performed by: INTERNAL MEDICINE

## 2017-06-15 PROCEDURE — 85045 AUTOMATED RETICULOCYTE COUNT: CPT | Performed by: INTERNAL MEDICINE

## 2017-06-15 PROCEDURE — 85027 COMPLETE CBC AUTOMATED: CPT | Performed by: INTERNAL MEDICINE

## 2017-06-15 PROCEDURE — 82746 ASSAY OF FOLIC ACID SERUM: CPT | Performed by: INTERNAL MEDICINE

## 2017-06-15 PROCEDURE — 83540 ASSAY OF IRON: CPT | Performed by: INTERNAL MEDICINE

## 2017-06-15 PROCEDURE — 83550 IRON BINDING TEST: CPT | Performed by: INTERNAL MEDICINE

## 2017-06-15 RX ORDER — FERROUS SULFATE 325(65) MG
325 TABLET ORAL 2 TIMES DAILY WITH MEALS
Qty: 60 TABLET | Refills: 0 | Status: SHIPPED | OUTPATIENT
Start: 2017-06-15 | End: 2017-07-02 | Stop reason: ALTCHOICE

## 2017-06-15 RX ORDER — ALPRAZOLAM 0.5 MG/1
0.5 TABLET ORAL
Qty: 30 TABLET | Refills: 0 | Status: SHIPPED | OUTPATIENT
Start: 2017-06-15 | End: 2017-12-20 | Stop reason: ALTCHOICE

## 2017-06-15 RX ADMIN — BUPROPION HYDROCHLORIDE 150 MG: 150 TABLET, FILM COATED, EXTENDED RELEASE ORAL at 05:12

## 2017-06-15 RX ADMIN — ACETAMINOPHEN 650 MG: 325 TABLET, FILM COATED ORAL at 08:49

## 2017-06-15 RX ADMIN — TOPIRAMATE 25 MG: 25 TABLET, FILM COATED ORAL at 08:51

## 2017-06-15 RX ADMIN — STANDARDIZED SENNA CONCENTRATE AND DOCUSATE SODIUM 1 TABLET: 8.6; 5 TABLET, FILM COATED ORAL at 08:51

## 2017-06-19 ENCOUNTER — GENERIC CONVERSION - ENCOUNTER (OUTPATIENT)
Dept: OTHER | Facility: OTHER | Age: 47
End: 2017-06-19

## 2017-06-20 ENCOUNTER — GENERIC CONVERSION - ENCOUNTER (OUTPATIENT)
Dept: OTHER | Facility: OTHER | Age: 47
End: 2017-06-20

## 2017-06-20 ENCOUNTER — TRANSCRIBE ORDERS (OUTPATIENT)
Dept: LAB | Facility: CLINIC | Age: 47
End: 2017-06-20

## 2017-06-20 ENCOUNTER — APPOINTMENT (OUTPATIENT)
Dept: LAB | Facility: CLINIC | Age: 47
End: 2017-06-20
Payer: COMMERCIAL

## 2017-06-20 DIAGNOSIS — D64.9 ANEMIA: ICD-10-CM

## 2017-06-20 LAB
ERYTHROCYTE [DISTWIDTH] IN BLOOD BY AUTOMATED COUNT: 19.3 % (ref 11.6–15.1)
HCT VFR BLD AUTO: 33.2 % (ref 34.8–46.1)
HGB BLD-MCNC: 10.1 G/DL (ref 11.5–15.4)
MCH RBC QN AUTO: 27.8 PG (ref 26.8–34.3)
MCHC RBC AUTO-ENTMCNC: 30.4 G/DL (ref 31.4–37.4)
MCV RBC AUTO: 92 FL (ref 82–98)
PLATELET # BLD AUTO: 491 THOUSANDS/UL (ref 149–390)
PMV BLD AUTO: 8.8 FL (ref 8.9–12.7)
RBC # BLD AUTO: 3.63 MILLION/UL (ref 3.81–5.12)
WBC # BLD AUTO: 9.42 THOUSAND/UL (ref 4.31–10.16)

## 2017-06-20 PROCEDURE — 36415 COLL VENOUS BLD VENIPUNCTURE: CPT

## 2017-06-20 PROCEDURE — 85027 COMPLETE CBC AUTOMATED: CPT

## 2017-06-22 ENCOUNTER — ALLSCRIPTS OFFICE VISIT (OUTPATIENT)
Dept: OTHER | Facility: OTHER | Age: 47
End: 2017-06-22

## 2017-06-22 ENCOUNTER — GENERIC CONVERSION - ENCOUNTER (OUTPATIENT)
Dept: OTHER | Facility: OTHER | Age: 47
End: 2017-06-22

## 2017-07-02 ENCOUNTER — APPOINTMENT (EMERGENCY)
Dept: CT IMAGING | Facility: HOSPITAL | Age: 47
End: 2017-07-02
Payer: COMMERCIAL

## 2017-07-02 ENCOUNTER — HOSPITAL ENCOUNTER (EMERGENCY)
Facility: HOSPITAL | Age: 47
Discharge: HOME | End: 2017-07-03
Attending: EMERGENCY MEDICINE | Admitting: EMERGENCY MEDICINE
Payer: COMMERCIAL

## 2017-07-02 DIAGNOSIS — K59.00 CONSTIPATION: Primary | ICD-10-CM

## 2017-07-02 LAB
ALBUMIN SERPL BCP-MCNC: 3.2 G/DL (ref 3.5–5)
ALP SERPL-CCNC: 62 U/L (ref 46–116)
ALT SERPL W P-5'-P-CCNC: 24 U/L (ref 12–78)
ANION GAP SERPL CALCULATED.3IONS-SCNC: 9 MMOL/L (ref 4–13)
AST SERPL W P-5'-P-CCNC: 39 U/L (ref 5–45)
BASOPHILS # BLD AUTO: 0.04 THOUSANDS/ΜL (ref 0–0.1)
BASOPHILS NFR BLD AUTO: 1 % (ref 0–1)
BILIRUB SERPL-MCNC: 0.4 MG/DL (ref 0.2–1)
BUN SERPL-MCNC: 24 MG/DL (ref 5–25)
CALCIUM SERPL-MCNC: 8.9 MG/DL (ref 8.3–10.1)
CHLORIDE SERPL-SCNC: 109 MMOL/L (ref 100–108)
CLARITY, POC: CLEAR
CO2 SERPL-SCNC: 23 MMOL/L (ref 21–32)
COLOR, POC: NORMAL
CREAT SERPL-MCNC: 0.83 MG/DL (ref 0.6–1.3)
EOSINOPHIL # BLD AUTO: 0.21 THOUSAND/ΜL (ref 0–0.61)
EOSINOPHIL NFR BLD AUTO: 3 % (ref 0–6)
ERYTHROCYTE [DISTWIDTH] IN BLOOD BY AUTOMATED COUNT: 19.6 % (ref 11.6–15.1)
EXT BILIRUBIN, UA: NEGATIVE
EXT BLOOD URINE: NORMAL
EXT GLUCOSE, UA: NEGATIVE
EXT KETONES: NEGATIVE
EXT NITRITE, UA: NEGATIVE
EXT PH, UA: 6
EXT PROTEIN, UA: NEGATIVE
EXT SPECIFIC GRAVITY, UA: 1.02
EXT UROBILINOGEN: 0.2
GFR SERPL CREATININE-BSD FRML MDRD: >60 ML/MIN/1.73SQ M
GLUCOSE SERPL-MCNC: 98 MG/DL (ref 65–140)
HCT VFR BLD AUTO: 36.6 % (ref 34.8–46.1)
HGB BLD-MCNC: 11.2 G/DL (ref 11.5–15.4)
LIPASE SERPL-CCNC: 102 U/L (ref 73–393)
LYMPHOCYTES # BLD AUTO: 1.6 THOUSANDS/ΜL (ref 0.6–4.47)
LYMPHOCYTES NFR BLD AUTO: 24 % (ref 14–44)
MCH RBC QN AUTO: 28.9 PG (ref 26.8–34.3)
MCHC RBC AUTO-ENTMCNC: 30.6 G/DL (ref 31.4–37.4)
MCV RBC AUTO: 95 FL (ref 82–98)
MONOCYTES # BLD AUTO: 0.49 THOUSAND/ΜL (ref 0.17–1.22)
MONOCYTES NFR BLD AUTO: 8 % (ref 4–12)
NEUTROPHILS # BLD AUTO: 4.22 THOUSANDS/ΜL (ref 1.85–7.62)
NEUTS SEG NFR BLD AUTO: 64 % (ref 43–75)
PLATELET # BLD AUTO: 363 THOUSANDS/UL (ref 149–390)
PMV BLD AUTO: 8.8 FL (ref 8.9–12.7)
POTASSIUM SERPL-SCNC: 4.2 MMOL/L (ref 3.5–5.3)
PROT SERPL-MCNC: 6.8 G/DL (ref 6.4–8.2)
RBC # BLD AUTO: 3.87 MILLION/UL (ref 3.81–5.12)
SODIUM SERPL-SCNC: 141 MMOL/L (ref 136–145)
WBC # BLD AUTO: 6.56 THOUSAND/UL (ref 4.31–10.16)
WBC # BLD EST: NEGATIVE 10*3/UL

## 2017-07-02 PROCEDURE — 80053 COMPREHEN METABOLIC PANEL: CPT | Performed by: PHYSICIAN ASSISTANT

## 2017-07-02 PROCEDURE — 85025 COMPLETE CBC W/AUTO DIFF WBC: CPT | Performed by: PHYSICIAN ASSISTANT

## 2017-07-02 PROCEDURE — 83690 ASSAY OF LIPASE: CPT | Performed by: PHYSICIAN ASSISTANT

## 2017-07-02 PROCEDURE — 96374 THER/PROPH/DIAG INJ IV PUSH: CPT

## 2017-07-02 PROCEDURE — 96361 HYDRATE IV INFUSION ADD-ON: CPT

## 2017-07-02 PROCEDURE — 81002 URINALYSIS NONAUTO W/O SCOPE: CPT | Performed by: PHYSICIAN ASSISTANT

## 2017-07-02 PROCEDURE — 96375 TX/PRO/DX INJ NEW DRUG ADDON: CPT

## 2017-07-02 PROCEDURE — 36415 COLL VENOUS BLD VENIPUNCTURE: CPT | Performed by: PHYSICIAN ASSISTANT

## 2017-07-02 PROCEDURE — 74177 CT ABD & PELVIS W/CONTRAST: CPT

## 2017-07-02 RX ORDER — ONDANSETRON 2 MG/ML
4 INJECTION INTRAMUSCULAR; INTRAVENOUS ONCE
Status: COMPLETED | OUTPATIENT
Start: 2017-07-02 | End: 2017-07-02

## 2017-07-02 RX ORDER — FENTANYL CITRATE 50 UG/ML
50 INJECTION, SOLUTION INTRAMUSCULAR; INTRAVENOUS ONCE
Status: COMPLETED | OUTPATIENT
Start: 2017-07-02 | End: 2017-07-02

## 2017-07-02 RX ORDER — RAMELTEON 8 MG/1
8 TABLET ORAL
COMMUNITY
End: 2017-12-20 | Stop reason: ALTCHOICE

## 2017-07-02 RX ORDER — KETOROLAC TROMETHAMINE 30 MG/ML
15 INJECTION, SOLUTION INTRAMUSCULAR; INTRAVENOUS ONCE
Status: COMPLETED | OUTPATIENT
Start: 2017-07-02 | End: 2017-07-02

## 2017-07-02 RX ADMIN — FENTANYL CITRATE 50 MCG: 50 INJECTION INTRAMUSCULAR; INTRAVENOUS at 21:04

## 2017-07-02 RX ADMIN — IOHEXOL 100 ML: 350 INJECTION, SOLUTION INTRAVENOUS at 22:30

## 2017-07-02 RX ADMIN — SODIUM CHLORIDE 1000 ML: 0.9 INJECTION, SOLUTION INTRAVENOUS at 21:00

## 2017-07-02 RX ADMIN — IOHEXOL 50 ML: 240 INJECTION, SOLUTION INTRATHECAL; INTRAVASCULAR; INTRAVENOUS; ORAL at 20:11

## 2017-07-02 RX ADMIN — KETOROLAC TROMETHAMINE 15 MG: 30 INJECTION, SOLUTION INTRAMUSCULAR at 22:45

## 2017-07-02 RX ADMIN — ONDANSETRON 4 MG: 2 INJECTION INTRAMUSCULAR; INTRAVENOUS at 20:36

## 2017-07-03 VITALS
SYSTOLIC BLOOD PRESSURE: 108 MMHG | RESPIRATION RATE: 16 BRPM | DIASTOLIC BLOOD PRESSURE: 56 MMHG | TEMPERATURE: 98.1 F | WEIGHT: 174 LBS | BODY MASS INDEX: 33.98 KG/M2 | OXYGEN SATURATION: 99 % | HEART RATE: 61 BPM

## 2017-07-03 PROCEDURE — 99284 EMERGENCY DEPT VISIT MOD MDM: CPT

## 2017-07-03 RX ORDER — LACTULOSE 20 G/30ML
30 SOLUTION ORAL ONCE
Status: COMPLETED | OUTPATIENT
Start: 2017-07-03 | End: 2017-07-03

## 2017-07-03 RX ADMIN — LACTULOSE 30 G: 20 SOLUTION ORAL at 00:53

## 2017-07-05 ENCOUNTER — ALLSCRIPTS OFFICE VISIT (OUTPATIENT)
Dept: OTHER | Facility: OTHER | Age: 47
End: 2017-07-05

## 2017-07-07 ENCOUNTER — ALLSCRIPTS OFFICE VISIT (OUTPATIENT)
Dept: OTHER | Facility: OTHER | Age: 47
End: 2017-07-07

## 2017-07-18 ENCOUNTER — GENERIC CONVERSION - ENCOUNTER (OUTPATIENT)
Dept: OTHER | Facility: OTHER | Age: 47
End: 2017-07-18

## 2017-08-01 ENCOUNTER — GENERIC CONVERSION - ENCOUNTER (OUTPATIENT)
Dept: OTHER | Facility: OTHER | Age: 47
End: 2017-08-01

## 2017-08-18 ENCOUNTER — APPOINTMENT (OUTPATIENT)
Dept: LAB | Facility: CLINIC | Age: 47
End: 2017-08-18
Payer: COMMERCIAL

## 2017-08-18 DIAGNOSIS — C50.412 MALIGNANT NEOPLASM OF UPPER-OUTER QUADRANT OF LEFT FEMALE BREAST (HCC): ICD-10-CM

## 2017-08-18 LAB
BASOPHILS # BLD AUTO: 0.02 THOUSANDS/ΜL (ref 0–0.1)
BASOPHILS NFR BLD AUTO: 0 % (ref 0–1)
EOSINOPHIL # BLD AUTO: 0.08 THOUSAND/ΜL (ref 0–0.61)
EOSINOPHIL NFR BLD AUTO: 1 % (ref 0–6)
ERYTHROCYTE [DISTWIDTH] IN BLOOD BY AUTOMATED COUNT: 14.8 % (ref 11.6–15.1)
FERRITIN SERPL-MCNC: 19 NG/ML (ref 8–388)
HCT VFR BLD AUTO: 39.9 % (ref 34.8–46.1)
HGB BLD-MCNC: 12.9 G/DL (ref 11.5–15.4)
IRON SATN MFR SERPL: 26 %
IRON SERPL-MCNC: 91 UG/DL (ref 50–170)
LYMPHOCYTES # BLD AUTO: 1.32 THOUSANDS/ΜL (ref 0.6–4.47)
LYMPHOCYTES NFR BLD AUTO: 17 % (ref 14–44)
MCH RBC QN AUTO: 29.9 PG (ref 26.8–34.3)
MCHC RBC AUTO-ENTMCNC: 32.3 G/DL (ref 31.4–37.4)
MCV RBC AUTO: 93 FL (ref 82–98)
MONOCYTES # BLD AUTO: 0.51 THOUSAND/ΜL (ref 0.17–1.22)
MONOCYTES NFR BLD AUTO: 6 % (ref 4–12)
NEUTROPHILS # BLD AUTO: 6.03 THOUSANDS/ΜL (ref 1.85–7.62)
NEUTS SEG NFR BLD AUTO: 76 % (ref 43–75)
PLATELET # BLD AUTO: 271 THOUSANDS/UL (ref 149–390)
PMV BLD AUTO: 8.6 FL (ref 8.9–12.7)
RBC # BLD AUTO: 4.31 MILLION/UL (ref 3.81–5.12)
TIBC SERPL-MCNC: 354 UG/DL (ref 250–450)
WBC # BLD AUTO: 7.96 THOUSAND/UL (ref 4.31–10.16)

## 2017-08-18 PROCEDURE — 82728 ASSAY OF FERRITIN: CPT

## 2017-08-18 PROCEDURE — 83550 IRON BINDING TEST: CPT

## 2017-08-18 PROCEDURE — 36415 COLL VENOUS BLD VENIPUNCTURE: CPT

## 2017-08-18 PROCEDURE — 85025 COMPLETE CBC W/AUTO DIFF WBC: CPT

## 2017-08-18 PROCEDURE — 83540 ASSAY OF IRON: CPT

## 2017-08-25 ENCOUNTER — TRANSCRIBE ORDERS (OUTPATIENT)
Dept: ADMINISTRATIVE | Facility: HOSPITAL | Age: 47
End: 2017-08-25

## 2017-08-25 DIAGNOSIS — C50.412 MALIGNANT NEOPLASM OF UPPER-OUTER QUADRANT OF LEFT FEMALE BREAST (HCC): Primary | ICD-10-CM

## 2017-08-29 ENCOUNTER — ALLSCRIPTS OFFICE VISIT (OUTPATIENT)
Dept: OTHER | Facility: OTHER | Age: 47
End: 2017-08-29

## 2017-08-30 ENCOUNTER — HOSPITAL ENCOUNTER (OUTPATIENT)
Dept: NUCLEAR MEDICINE | Facility: HOSPITAL | Age: 47
Discharge: HOME | End: 2017-08-30
Attending: INTERNAL MEDICINE
Payer: COMMERCIAL

## 2017-08-30 DIAGNOSIS — C50.412 MALIGNANT NEOPLASM OF UPPER-OUTER QUADRANT OF LEFT FEMALE BREAST (HCC): ICD-10-CM

## 2017-08-30 PROCEDURE — 78306 BONE IMAGING WHOLE BODY: CPT

## 2017-08-30 PROCEDURE — A9503 TC99M MEDRONATE: HCPCS

## 2017-10-02 ENCOUNTER — GENERIC CONVERSION - ENCOUNTER (OUTPATIENT)
Dept: OTHER | Facility: OTHER | Age: 47
End: 2017-10-02

## 2017-10-03 ENCOUNTER — GENERIC CONVERSION - ENCOUNTER (OUTPATIENT)
Dept: OTHER | Facility: OTHER | Age: 47
End: 2017-10-03

## 2017-10-11 ENCOUNTER — GENERIC CONVERSION - ENCOUNTER (OUTPATIENT)
Dept: OTHER | Facility: OTHER | Age: 47
End: 2017-10-11

## 2017-10-24 NOTE — PROGRESS NOTES
Assessment  1  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)    Plan  Anxiety    · ALPRAZolam 0 5 MG Oral Tablet (Xanax); take 1 tablet daily prn   Rx By: Vianey Gan; Dispense: 30 Days ; #:30 Tablet; Refill: 0;For: Anxiety; GINA = N; Print Rx; Last Updated By: Eryn Ayoub; 8/29/2017 11:21:01 AM  Malignant neoplasm of upper-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    · Follow-up visit in 6 months Evaluation and Treatment  Follow-up  Status: Hold For -  Scheduling  Requested for: 48Zdf2980   Ordered; For: Malignant neoplasm of upper-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; Ordered By: Vianey Gan Performed:  Due: 25YVT1845   · (1) CBC/PLT/DIFF; Status:Active; Requested for:15Elz5713; Perform:[facility]; NMK:29KUT9036;POGQCKM; For:Malignant neoplasm of upper-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; Ordered By:Aaliyah Bro;   · (1) COMPREHENSIVE METABOLIC PANEL; Status:Active; Requested for:77Zgy1042; Perform:[facility]; XKU:44PPW6977;UFYFHEM; For:Malignant neoplasm of upper-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; Ordered By:Aaliyah Bro;   · (1) FERRITIN; Status:Active; Requested for:07Yoe0250; Perform:[facility]; CUN:74PBQ1704;PVEONBQ; For:Malignant neoplasm of upper-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; Ordered By:Aaliyah Bro;   · * NM BONE SCAN WHOLE BODY; Status:Active; Requested for:93Veg8705 12:00PM;    Perform:[facility]; Order Comments:INJECTION 12:00SCAN 2:30; Due:65Nzx9506; Last Updated By:Jessica Tavarez; 8/28/2017 8:13:22 AM;Ordered; For:Malignant neoplasm of upper-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; Ordered By:Aaliyah Bro;    Discussion/Summary  Discussion Summary:   A 42year old premenopausal woman with clinical T2 N0 left breast cancer, grade 1, ER positive, MT negative, HER-2 Fish negative disease   She was treated by Dr Nikunj Durant at the [facility] with neoadjuvant chemotherapy with dose dense AC ?4  She only had minor clinical response  Subsequently, she switch her care at [place] [address], where she underwent left mastectomy with sentinel lymph node biopsy, resulting in SIMRAN  She had a 1 6 cm of invasive ductal carcinoma, grade 2  Currently, she is on adjuvant hormonal therapy with tamoxifen with no side effects  Regarding her new complaint of right hip pain, I have relatively low suspicion that she has metastatic disease  Once I obtain the report of bone scan, I will contact her  Assuming that she has no evidence of disease, she will continue with tamoxifen 20 mg daily  She is in agreement with my recommendation  She has long history of iron deficiency anemia  She is currently on oral iron once a day, which corrected anemia  I recommended her to continue with oral iron once a day  I will see her again in 6 months with CBC, CMP and ferritin  She is in agreement with my recommendation  Chief Complaint  Chief Complaint: Chief Complaint:   The patient presents to the office today with 1  Left breast cancer, stage IA (pT1c, pN0,M0) grade 2, % positive, IL negative, HER-2 Fish negative disease  Diagnosed in April 2016  BRCA gene mutation negative  Chief Complaint Free Text Note Form: Left breast cancer, stage IA (pT1c, pN0,M0) grade 2, % positive, IL negative, HER-2 Fish negative disease  Diagnosed in April 2016  History of Present Illness  HPI: A 39year old premenopausal woman who underwent first screening mammography which was abnormal in her left breast  First FNA was inconclusive  Core biopsy showed invasive ductal carcinoma at the [facility]  MRI showed T2 lesion, with no lymph node abnormalities  This was grade 1, % positive, IL negative, HER-2 Fish negative disease  However, Ki-67 was 50-60%  She was seen by Dr Katia Paulson, who recommended neoadjuvant chemotherapy  She received dose dense AC ?4 with very minor response   She has some toxicity with alopecia, nausea, vomiting, as well as significant fatigue  She gained 30 pounds during the chemotherapy  Subsequently, she was seen by Dr Shar Hayes who I have been discussing her case  She subsequently underwent left mastectomy with sentinel lymph node biopsy in August 2016 which showed a 1 6?1 4?1 3 cm of invasive ductal carcinoma, grade 2  One sentinel lymph node was negative for metastatic disease  She is going to have separate tram flap reconstruction next week  She presents today to discuss further systemic treatment  She has no other significant past medical history  She was tested for BRCA gene mutation which was negative  She denied any pain  She has no respiratory symptom  Her performance status is normal  Her menstrual cycle stopped due to chemotherapy  However, in late August 2016, her menstrual cycles resumed  Previous Therapy:   Neoadjuvant chemotherapy with AC ?4 completed in May 2016, which was given at [facility]    Current Therapy: Adjuvant hormonal therapy with tamoxifen since September 2016  Disease Status: SIMRAN status post mastectomy with sentinel lymph node biopsy  Interval History: A 42year old premenopausal woman who was initially diagnosed clinical stage II left breast cancer, grade 1, ER strongly positive, SD negative, HER-2 negative disease  She was seen by Dr Germania Baker, who treated her with neoadjuvant chemotherapy with [facility] ?4, resulting in only minor response  She subsequently obtained a second opinion  She underwent left mastectomy with sentinel lymph node biopsy which showed stage IA left breast cancer, grade 2  I saw her, after the mastectomy  I recommended her to have adjuvant hormonal therapy with tamoxifen  She has been on tamoxifen since September 2016 with which she is doing well  She has a regular menstrual cycle  She has no complaint of hot flashes  In the last 6-7 weeks, she has intermittent right hip pain  She is going to have on scan, tomorrow  Otherwise, she feels well  She has no respiratory symptoms   She is quite anxious  Her performance status is normal       Review of Systems  Complete-Female:   Constitutional: No fever, no chills, feels well, no tiredness, no recent weight gain or weight loss  Eyes: No complaints of eye pain, no red eyes, no eyesight problems, no discharge, no dry eyes, no itching of eyes  ENT: no complaints of earache, no loss of hearing, no nose bleeds, no nasal discharge, no sore throat, no hoarseness  Cardiovascular: No complaints of slow heart rate, no fast heart rate, no chest pain, no palpitations, no leg claudication, no lower extremity edema  Respiratory: No complaints of shortness of breath, no wheezing, no cough, no SOB on exertion, no orthopnea, no PND  Gastrointestinal: No complaints of abdominal pain, no constipation, no nausea or vomiting, no diarrhea, no bloody stools  Genitourinary: No complaints of dysuria, no incontinence, no pelvic pain, no dysmenorrhea, no vaginal discharge or bleeding  Musculoskeletal: No complaints of arthralgias, no myalgias, no joint swelling or stiffness, no limb pain or swelling  Integumentary: No complaints of skin rash or lesions, no itching, no skin wounds, no breast pain or lump  Neurological: No complaints of headache, no confusion, no convulsions, no numbness, no dizziness or fainting, no tingling, no limb weakness, no difficulty walking  Psychiatric: Not suicidal, no sleep disturbance, no anxiety or depression, no change in personality, no emotional problems  Endocrine: No complaints of proptosis, no hot flashes, no muscle weakness, no deepening of the voice, no feelings of weakness  Hematologic/Lymphatic: No complaints of swollen glands, no swollen glands in the neck, does not bleed easily, does not bruise easily  ROS Reviewed:   ROS reviewed  Active Problems  1  Acquired breast deformity (611 89) (N64 89)   2  Anxiety (300 00) (F41 9)   3  Constipation (564 00) (K59 00)   4  Insomnia (780 52) (G47 00)   5  Irregular bleeding (626 4) (N92 6)   6  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)   7  Nocturia (788 43) (R35 1)    Past Medical History  1  History of Abdominal wall seroma (998 13) (T88 8XXA,T79  2XXA)   2  History of Admission for removal of tissue expander of left breast without synchronous   insertion of permanent implant (V52 4) (Z45 812)   3  History of Breast Reconstruction With Implant Prosthesis Left Breast   4  History of Breast Reconstruction With Tissue Expander Left Breast   5  History of Exposure to scabies (V01 89) (Z20 89)   6  History of Healing wound (959 9) (T14 90)   7  History of chemotherapy (V87 41) (Z92 21)   8  History of Rash of hands (782 1) (R21)   9  History of Seroma of breast (998 13) (N64 89)   10  History of Status post breast implant removal (V45 83) (H33 86)  Active Problems And Past Medical History Reviewed: The active problems and past medical history were reviewed and updated today  Surgical History  1  History of Percutaneous Portal Vein Catheter Placement   2  History of [place] Lymph Node Biopsy   3  History of Simple Mastectomy Left Breast  Surgical History Reviewed: The surgical history was reviewed and updated today  Family History  Mother    1  Family history of diabetes mellitus (V18 0) (Z83 3)   2  Family history of glaucoma (V19 11) (Z83 511)   3  FHx: diabetes mellitus (V18 0) (Z83 3)  Father    4  Family history of lymphoma (V16 7) (Z80 2)  Paternal Grandmother    11  Family history of malignant neoplasm of breast (V16 3) (Z80 3)  Family History Reviewed: The family history was reviewed and updated today  Social History   · Currently working   · Does not exercise (V69 0) (Z72 3)   · Does not use illicit drugs (B01 93) (Z78 9)   · Former smoker (R29 54) (L25 450)   · Has 2 children   · Social alcohol use (Z78 9)  Social History Reviewed: The social history was reviewed and updated today   The social history was reviewed and is unchanged  Current Meds   1  ALPRAZolam 0 5 MG Oral Tablet; take 1 tablet daily prn; Last Rx:70Zya9772 Ordered   2  BuPROPion HCl ER (XL) 150 MG Oral Tablet Extended Release 24 Hour; TAKE 1   TABLET DAILY; Therapy: 11RVX4021 to (Prudence Fair)  Requested for: 40JDI6069; Last   Rx:65Lfb2463; Status: ACTIVE - Transmit to [facility] Verification Ordered   3  Magnesium Citrate 1 745 GM/30ML Oral Solution; USE AS DIRECTED; Therapy: 48RNE0371 to (Last Rx:41Izp0373) Ordered   4  Rozerem 8 MG Oral Tablet; TAKE 1 TABLET AT BEDTIME AS NEEDED FOR SLEEP; Therapy: 15GWX9209 to (Evaluate:13Hfq7698)  Requested for: 23VKG4749; Last   Rx:17Thz9646 Ordered   5  Tamoxifen Citrate 20 MG Oral Tablet; TAKE 1 TABLET DAILY; Therapy: 16Pyp5356 to (Evaluate:09Hdo8521)  Requested for: 58MUF6680; Last   Rx:01Rmr6050 Ordered   6  Topiramate 25 MG Oral Tablet; Therapy: (Recorded:52Kjc5539) to Recorded  Medication List Reviewed: The medication list was reviewed and updated today  Allergies  1  Ambien   2  Effexor   3  morphine    Vitals  Vital Signs    Recorded: 72Dov9979 10:35AM   Temperature 98 8 F   Heart Rate 106   Respiration 18   Systolic 400   Diastolic 90   Height 5 ft    Weight 180 lb    BMI Calculated 35 15   BSA Calculated 1 78   O2 Saturation 99     Physical Exam    Constitutional   General appearance: No acute distress, well appearing and well nourished  Eyes   Conjunctiva and lids: No swelling, erythema or discharge  Pupils and irises: Equal, round and reactive to light  Ears, Nose, Mouth, and Throat   External inspection of ears and nose: Normal     Otoscopic examination: Tympanic membranes translucent with normal light reflex  Canals patent without erythema  Nasal mucosa, septum, and turbinates: Normal without edema or erythema  Oropharynx: Normal with no erythema, edema, exudate or lesions      Pulmonary   Respiratory effort: No increased work of breathing or signs of respiratory distress  Auscultation of lungs: Clear to auscultation  Cardiovascular   Palpation of heart: Normal PMI, no thrills  Auscultation of heart: Normal rate and rhythm, normal S1 and S2, without murmurs  Examination of extremities for edema and/or varicosities: Normal     Carotid pulses: Normal     Abdomen   Abdomen: Non-tender, no masses  Liver and spleen: No hepatomegaly or splenomegaly  Lymphatic   Palpation of lymph nodes in neck: No lymphadenopathy  Musculoskeletal   Gait and station: Normal     Digits and nails: Normal without clubbing or cyanosis  Inspection/palpation of joints, bones, and muscles: Normal     Skin   Skin and subcutaneous tissue: Normal without rashes or lesions  Neurologic   Cranial nerves: Cranial nerves 2-12 intact  Reflexes: 2+ and symmetric  Sensation: No sensory loss  Psychiatric   Orientation to person, place, and time: Normal     Mood and affect: Normal     Additional Exam:  Breast exam; status post left mastectomy without reconstruction  No palpable abnormality in her left chest wall  Right breast exam is negative  ECOG 0       Results/Data  Results Form:   Results   Pathology Left mastectomy specimen showed 1 6?1 4?1 3 cm of invasive ductal carcinoma, grade 2  One sentinel lymph node was negative for metastatic disease  % positive, [place] negative, HER-2 Fish negative disease  Stage IA(pT1c, pN0,M0)biopsy showed Ki-67 50-60%  Radiology CT scan of chest, abdomen pelvis showed no evidence of metastatic disease  in the right breast in April 2017 was benign  BI-RADS 2  Lab Results CBC is within normal limits in August 2017  Ferritin is 19        Future Appointments    Date/Time Provider Specialty Site   10/31/2017 10:30 AM NICHO Gloria  Plastic Surgery BODY EVOLUTION PLASTIC RECONS [facility]   11/02/2017 10:15 AM Sirisha Spangler MD Surgical Oncology [facility]     Signatures   Electronically signed by : NICHO Pedroza ; Aug 29 2017 11:43AM EST                       (Author)

## 2017-10-26 ENCOUNTER — GENERIC CONVERSION - ENCOUNTER (OUTPATIENT)
Dept: OTHER | Facility: OTHER | Age: 47
End: 2017-10-26

## 2017-11-22 ENCOUNTER — GENERIC CONVERSION - ENCOUNTER (OUTPATIENT)
Dept: OTHER | Facility: OTHER | Age: 47
End: 2017-11-22

## 2017-12-05 ENCOUNTER — TRANSCRIBE ORDERS (OUTPATIENT)
Dept: LAB | Facility: CLINIC | Age: 47
End: 2017-12-05

## 2017-12-05 ENCOUNTER — APPOINTMENT (OUTPATIENT)
Dept: LAB | Facility: CLINIC | Age: 47
End: 2017-12-05
Payer: COMMERCIAL

## 2017-12-05 DIAGNOSIS — N65.0 DEFORMITY OF RECONSTRUCTED BREAST: ICD-10-CM

## 2017-12-05 DIAGNOSIS — Z01.812 PRE-OPERATIVE LABORATORY EXAMINATION: Primary | ICD-10-CM

## 2017-12-05 DIAGNOSIS — Z01.812 PRE-OPERATIVE LABORATORY EXAMINATION: ICD-10-CM

## 2017-12-05 LAB
ANION GAP SERPL CALCULATED.3IONS-SCNC: 10 MMOL/L (ref 4–13)
APTT PPP: 25 SECONDS (ref 23–35)
BUN SERPL-MCNC: 30 MG/DL (ref 5–25)
CALCIUM SERPL-MCNC: 8.9 MG/DL (ref 8.3–10.1)
CHLORIDE SERPL-SCNC: 106 MMOL/L (ref 100–108)
CO2 SERPL-SCNC: 25 MMOL/L (ref 21–32)
CREAT SERPL-MCNC: 0.87 MG/DL (ref 0.6–1.3)
ERYTHROCYTE [DISTWIDTH] IN BLOOD BY AUTOMATED COUNT: 13.8 % (ref 11.6–15.1)
GFR SERPL CREATININE-BSD FRML MDRD: 81 ML/MIN/1.73SQ M
GLUCOSE SERPL-MCNC: 97 MG/DL (ref 65–140)
HCT VFR BLD AUTO: 44.4 % (ref 34.8–46.1)
HGB BLD-MCNC: 14.1 G/DL (ref 11.5–15.4)
INR PPP: 0.98 (ref 0.86–1.16)
MCH RBC QN AUTO: 30.1 PG (ref 26.8–34.3)
MCHC RBC AUTO-ENTMCNC: 31.8 G/DL (ref 31.4–37.4)
MCV RBC AUTO: 95 FL (ref 82–98)
PLATELET # BLD AUTO: 296 THOUSANDS/UL (ref 149–390)
PMV BLD AUTO: 9 FL (ref 8.9–12.7)
POTASSIUM SERPL-SCNC: 3.7 MMOL/L (ref 3.5–5.3)
PROTHROMBIN TIME: 13.3 SECONDS (ref 12.1–14.4)
RBC # BLD AUTO: 4.68 MILLION/UL (ref 3.81–5.12)
SODIUM SERPL-SCNC: 141 MMOL/L (ref 136–145)
WBC # BLD AUTO: 8.38 THOUSAND/UL (ref 4.31–10.16)

## 2017-12-05 PROCEDURE — 80048 BASIC METABOLIC PNL TOTAL CA: CPT

## 2017-12-05 PROCEDURE — 85730 THROMBOPLASTIN TIME PARTIAL: CPT

## 2017-12-05 PROCEDURE — 85027 COMPLETE CBC AUTOMATED: CPT

## 2017-12-05 PROCEDURE — 36415 COLL VENOUS BLD VENIPUNCTURE: CPT

## 2017-12-05 PROCEDURE — 85610 PROTHROMBIN TIME: CPT

## 2017-12-14 ENCOUNTER — GENERIC CONVERSION - ENCOUNTER (OUTPATIENT)
Dept: OTHER | Facility: OTHER | Age: 47
End: 2017-12-14

## 2017-12-20 ENCOUNTER — ALLSCRIPTS OFFICE VISIT (OUTPATIENT)
Dept: OTHER | Facility: OTHER | Age: 47
End: 2017-12-20

## 2017-12-20 NOTE — PRE-PROCEDURE INSTRUCTIONS
Pre-Surgery Instructions:   Medication Instructions    BuPROPion HCl (WELLBUTRIN XL PO) Patient was instructed by Physician and understands   CLONAZEPAM PO Patient was instructed by Physician and understands   topiramate (TOPAMAX) 25 mg tablet Patient was instructed by Physician and understands  Pt instructed to take wellbutrin and topamax with a small sip of water the morning of surgery  [facility] preop instructions reviewed with pt  Pt to get dial antibacterial soap

## 2017-12-21 ENCOUNTER — ANESTHESIA EVENT (OUTPATIENT)
Dept: PERIOP | Facility: HOSPITAL | Age: 47
End: 2017-12-21
Payer: COMMERCIAL

## 2017-12-22 ENCOUNTER — HOSPITAL ENCOUNTER (OUTPATIENT)
Facility: HOSPITAL | Age: 47
Setting detail: OUTPATIENT SURGERY
Discharge: HOME | End: 2017-12-22
Attending: PLASTIC SURGERY | Admitting: PLASTIC SURGERY
Payer: COMMERCIAL

## 2017-12-22 ENCOUNTER — ANESTHESIA (OUTPATIENT)
Dept: PERIOP | Facility: HOSPITAL | Age: 47
End: 2017-12-22
Payer: COMMERCIAL

## 2017-12-22 VITALS
TEMPERATURE: 98.2 F | OXYGEN SATURATION: 98 % | HEART RATE: 62 BPM | DIASTOLIC BLOOD PRESSURE: 53 MMHG | RESPIRATION RATE: 18 BRPM | WEIGHT: 175 LBS | HEIGHT: 60 IN | BODY MASS INDEX: 34.36 KG/M2 | SYSTOLIC BLOOD PRESSURE: 113 MMHG

## 2017-12-22 LAB — EXT PREGNANCY TEST URINE: NEGATIVE

## 2017-12-22 PROCEDURE — 81025 URINE PREGNANCY TEST: CPT | Performed by: ANESTHESIOLOGY

## 2017-12-22 RX ORDER — SODIUM CHLORIDE 9 MG/ML
125 INJECTION, SOLUTION INTRAVENOUS CONTINUOUS
Status: DISCONTINUED | OUTPATIENT
Start: 2017-12-22 | End: 2017-12-22 | Stop reason: HOSPADM

## 2017-12-22 RX ORDER — TAMOXIFEN CITRATE 20 MG/1
1 TABLET ORAL DAILY
COMMUNITY
Start: 2016-09-12 | End: 2018-02-27 | Stop reason: SDUPTHER

## 2017-12-22 RX ORDER — ONDANSETRON 2 MG/ML
INJECTION INTRAMUSCULAR; INTRAVENOUS AS NEEDED
Status: DISCONTINUED | OUTPATIENT
Start: 2017-12-22 | End: 2017-12-22 | Stop reason: SURG

## 2017-12-22 RX ORDER — FENTANYL CITRATE 50 UG/ML
INJECTION, SOLUTION INTRAMUSCULAR; INTRAVENOUS AS NEEDED
Status: DISCONTINUED | OUTPATIENT
Start: 2017-12-22 | End: 2017-12-22 | Stop reason: SURG

## 2017-12-22 RX ORDER — MAGNESIUM HYDROXIDE 1200 MG/15ML
LIQUID ORAL AS NEEDED
Status: DISCONTINUED | OUTPATIENT
Start: 2017-12-22 | End: 2017-12-22 | Stop reason: HOSPADM

## 2017-12-22 RX ORDER — ONDANSETRON 2 MG/ML
4 INJECTION INTRAMUSCULAR; INTRAVENOUS ONCE AS NEEDED
Status: DISCONTINUED | OUTPATIENT
Start: 2017-12-22 | End: 2017-12-22 | Stop reason: HOSPADM

## 2017-12-22 RX ORDER — ONDANSETRON 2 MG/ML
4 INJECTION INTRAMUSCULAR; INTRAVENOUS EVERY 8 HOURS PRN
Status: DISCONTINUED | OUTPATIENT
Start: 2017-12-22 | End: 2017-12-22 | Stop reason: HOSPADM

## 2017-12-22 RX ORDER — FENTANYL CITRATE/PF 50 MCG/ML
50 SYRINGE (ML) INJECTION
Status: DISCONTINUED | OUTPATIENT
Start: 2017-12-22 | End: 2017-12-22 | Stop reason: HOSPADM

## 2017-12-22 RX ORDER — FERROUS SULFATE 325(65) MG
325 TABLET ORAL
COMMUNITY
End: 2018-04-25

## 2017-12-22 RX ORDER — MIDAZOLAM HYDROCHLORIDE 1 MG/ML
INJECTION INTRAMUSCULAR; INTRAVENOUS AS NEEDED
Status: DISCONTINUED | OUTPATIENT
Start: 2017-12-22 | End: 2017-12-22 | Stop reason: SURG

## 2017-12-22 RX ORDER — MEPERIDINE HYDROCHLORIDE 50 MG/ML
12.5 INJECTION INTRAMUSCULAR; INTRAVENOUS; SUBCUTANEOUS ONCE AS NEEDED
Status: DISCONTINUED | OUTPATIENT
Start: 2017-12-22 | End: 2017-12-22 | Stop reason: HOSPADM

## 2017-12-22 RX ORDER — PROPOFOL 10 MG/ML
INJECTION, EMULSION INTRAVENOUS AS NEEDED
Status: DISCONTINUED | OUTPATIENT
Start: 2017-12-22 | End: 2017-12-22 | Stop reason: SURG

## 2017-12-22 RX ORDER — BUPIVACAINE HYDROCHLORIDE AND EPINEPHRINE 2.5; 5 MG/ML; UG/ML
INJECTION, SOLUTION EPIDURAL; INFILTRATION; INTRACAUDAL; PERINEURAL AS NEEDED
Status: DISCONTINUED | OUTPATIENT
Start: 2017-12-22 | End: 2017-12-22 | Stop reason: HOSPADM

## 2017-12-22 RX ORDER — ACETAMINOPHEN 325 MG/1
650 TABLET ORAL EVERY 6 HOURS PRN
Status: DISCONTINUED | OUTPATIENT
Start: 2017-12-22 | End: 2017-12-22 | Stop reason: HOSPADM

## 2017-12-22 RX ORDER — LORAZEPAM 2 MG/ML
0.5 INJECTION INTRAMUSCULAR
Status: DISCONTINUED | OUTPATIENT
Start: 2017-12-22 | End: 2017-12-22 | Stop reason: HOSPADM

## 2017-12-22 RX ADMIN — PROPOFOL 200 MG: 10 INJECTION, EMULSION INTRAVENOUS at 08:45

## 2017-12-22 RX ADMIN — FENTANYL CITRATE 100 MCG: 50 INJECTION INTRAMUSCULAR; INTRAVENOUS at 08:42

## 2017-12-22 RX ADMIN — DEXAMETHASONE SODIUM PHOSPHATE 4 MG: 10 INJECTION INTRAMUSCULAR; INTRAVENOUS at 09:55

## 2017-12-22 RX ADMIN — FENTANYL CITRATE 50 MCG: 50 INJECTION INTRAMUSCULAR; INTRAVENOUS at 08:45

## 2017-12-22 RX ADMIN — ONDANSETRON HYDROCHLORIDE 4 MG: 2 INJECTION, SOLUTION INTRAVENOUS at 09:55

## 2017-12-22 RX ADMIN — MIDAZOLAM HYDROCHLORIDE 2 MG: 1 INJECTION, SOLUTION INTRAMUSCULAR; INTRAVENOUS at 08:40

## 2017-12-22 RX ADMIN — SODIUM CHLORIDE 125 ML/HR: 0.9 INJECTION, SOLUTION INTRAVENOUS at 08:16

## 2017-12-22 NOTE — DISCHARGE INSTRUCTIONS
1 [facility], [address], [address], [facility], [address] [place] /W / [place]       Keep waist flexed at 30-45 degrees at all times, use recliner or pillows in bed    No ice or heating pack    Change the dressings daily    It is ok to shower    Avoid direct water pressure on incisions    Bruising is normal and will resolve over the next 2-3 weeks    Swelling is normal and can increase over the first week after surgery;   It will slowly resolve    Call my office at [phone] for an appointment in 5-14 days

## 2017-12-22 NOTE — DISCHARGE SUMMARY
Discharge Summary - Medical Tinaa Welch 41 y o  female MRN: [id number]    [facility] [address] [facility] PACU Room / Bed: OR POOL/OR POOL Encounter: [id number]    BRIEF OVERVIEW  Admitting Provider: Graham Galvan MD  Discharge Provider: Graham Galvan MD  Primary Care Physician at Discharge: Pablo Barron [phone]    Discharge To: Home      Admission Date: 12/22/2017     Discharge Date: No discharge date for patient encounter  Code Status: Prior  Advance Directive and Living Will: <no information>  Power of :        Primary Discharge Diagnosis  Active Problems:    * No active hospital problems  *  Resolved Problems:    * No resolved hospital problems   *        Discharge Disposition: [address] [place] [address]    Presenting Problem/History of Present Illness  <principal problem not specified>      Discharge Condition: stable    Patient tolerated the procedure well, recovered in PACU and was discharged home in stable condition    Graham Galvan MD  12/22/2017  10:06 AM

## 2017-12-22 NOTE — ANESTHESIA PREPROCEDURE EVALUATION
Review of Systems/Medical History  Patient summary reviewed    No history of anesthetic complications (No PONV  Extreme anxiety preop/postop)     Cardiovascular  Negative cardio ROS    Pulmonary  Negative pulmonary ROS ,        GI/Hepatic  Negative GI/hepatic ROS   Bariatric surgery,             Endo/Other  Blood dyscrasia (anemia),   Comment: Left Breast Ca-  8/10/2016-Left mastectomy, [place] node bx  9/18//2016- Breast reconstruction-free flap  9/21/2016-Free flap underperfused, flap removed, tissue expander placed, removal ba-cath  12/2016-left breast implant - subsequent infected seroma/IR drainage, now for removal  S/P chemotherapy    ]BMI 44   GYN    Breast cancer left mastectomy       Hematology  Anemia ,     Musculoskeletal  Obesity ,        Neurology  Negative neurology ROS      Psychology   Psychiatric history: Anxiety/Depression  , Anxiety, Depression ,          Lab Results   Component Value Date    WBC 8 38 12/05/2017    HGB 14 1 12/05/2017     12/05/2017     Lab Results   Component Value Date     12/05/2017    K 3 7 12/05/2017    BUN 30 (H) 12/05/2017    CREATININE 0 87 12/05/2017    GLUCOSE 97 12/05/2017     Lab Results   Component Value Date    INR 0 98 12/05/2017     Blood type A      Physical Exam    Airway    Mallampati score: II  TM Distance: >3 FB  Neck ROM: full     Dental   Comment: Upper front veneers,     Cardiovascular  Comment: Negative ROS, Rhythm: regular, Rate: normal, Cardiovascular exam normal    Pulmonary  Pulmonary exam normal Breath sounds clear to auscultation,     Other Findings        Anesthesia Plan  ASA Score- 2     Anesthesia Type- general with ASA Monitors  Additional Monitors:   Airway Plan:     Comment: Requires higher dose premeds for anxiety/pain  Plan Factors-Patient not instructed to abstain from smoking on day of procedure       Induction- intravenous  Postoperative Plan- Plan for postoperative opioid use       Informed Consent- Anesthetic plan and risks discussed with patient

## 2017-12-22 NOTE — PROGRESS NOTES
IV attempted here - had to be d/c'd (infiltrated) called OR - they are aware that IV needs to be started in OR Holding  Pt very anxious about IV start

## 2017-12-22 NOTE — OP NOTE
OPERATIVE REPORT  PATIENT NAME: Manuel Quinn    :  1970  MRN: [id number]  Pt Location: [facility] OR ROOM 05    SURGERY DATE: 2017    Surgeon(s) and Role:     Steve Barros MD - Primary    Preop Diagnosis:  Deformity of reconstructed breast [N65 0]  Disproportion of reconstructed breast [N65 1]  Personal history of malignant neoplasm of breast [Z85 3]    Post-Op Diagnosis Codes: * Deformity of reconstructed breast [N65 0]     * Disproportion of reconstructed breast [N65 1]     * Personal history of malignant neoplasm of breast [Z85 3]    Procedure(s) (LRB):  REVISION OF LEFT BREAST SCAR, LOCAL FLAP (Left)    Specimen(s):  * No specimens in log *    Estimated Blood Loss:   Minimal    Drains:       Anesthesia Type:   General/LMA    Operative Indications:  Deformity of reconstructed breast [N65 0]  Disproportion of reconstructed breast [N65 1]  Personal history of malignant neoplasm of breast [Z85 3]      Operative Findings:      Complications:   None    Procedure and Technique:  The patient was brought to the operating room and placed supine on the operating room table  Time-out procedure was performed SCDs were applied and IV antibiotics were given  After adequate anesthesia was obtained the chest was prepped and draped using standard surgical technique  Attention was turned to the left chest where a adherent, tight, immobile scar was noted  This was excised in a full-thickness fashion  The decision was made to perform a local flap closure to avoid distortion of the inframammary fold region  Incisions and dissection were carried superiorly and laterally in a plane deep to the pectoralis major muscle  The total flap plus defect was 28 centimeters squared  The deep soft tissue was also advanced inferiorly and secured to Triny's fascia using a 2-0 PDS suture in interrupted technique  The skin of the flap was inset using 3-0 Vicryl 3-0 PDS 4-0 PDS suture in interrupted deep dermal technique    The superficial skin was closed using 4-0 Monocryl suture running subcuticular technique  Skin glue was applied     I was present for the entire procedure and A qualified resident physician was not available    Patient Disposition:  hemodynamically stable and extubated and stable    SIGNATURE: Ben Hart MD  DATE: December 22, 2017  TIME: 1000

## 2018-01-03 NOTE — CONSULTS
Discussion/Summary   Discussion Summary:    1) Left breast Cancer  left acquired absence of breast   reconstruction options were presented as outlined below  detailed conversation was had regarding the patientâs options for breast reconstruction  Five main points, which are explained to all breast reconstruction patients, were discussed  Breast reconstruction is an optional process  In addition, breast reconstruction can be performed in an immediate and delayed fashion  Even if a patient does not opt for reconstruction now, it can performed at a later time  Breast reconstruction is a multi-stage process which involves multiple surgeries spaced several months apart  The entire process can take over one year  The patient can stop within this process and/or resume it again at any time  The major goal of breast reconstruction is to have the patient look normal in clothing  When naked, there will always be scars and other stigmata of the breast reconstruction process  Asymmetries are often present during the reconstruction process  Several operations may be needed, including surgery to the non-cancerous breast, to achieve satisfactory results  No matter the reconstructive method, there are ways that the reconstruction can fail and a secondary reconstructive plan would need to be created  a general discussion regarding all available methods of breast reconstruction were discussed  The types of reconstructions described included:   Tissue expander and implant based reconstruction, both single and multi-stage approaches  Autologous only reconstructions, including free abdominal-tissue, gluteal and thighs based reconstructions  Combination procedures, particularly latissimus dorsi flaps combined with either expanders or implants  each of the reconstruction methods mentioned above, the risks, benefits, alternatives, scarring, and recovery time were discussed in great detail   Specific risks detailed included bleeding, infection, hematoma, seroma, scarring, pain, wound healing complications, flap loss, fat necrosis, capsular contracture, need for implant removal, donor site complications, bulge, hernia, umbilical necrosis, need for urgent reoperation, and need for dressing changes were discussed  patient would be a candidate for:     -Implant based reconstruction: yes, despite prior failure due to infection     -Abdominally based free flap reconstruction: no, already performed and was not successful    - Gluteal Artery  free flap, yes, but complicated due to prior use of NOE vessels, would require use of thoracodorsal vs contralateral NOE vessels with possible need for vein graft  -LD+implant based reconstruction: yes    case is complicated due to prior procedures  There is distortion of the IMF with significant thinning of the skin at that area  Patients upper pole is very pliable and seems good quality for expantion  Patient has discussed that was offered a LD flap but she has an incapacitated children at home and is concerned about the use of the muscle  a long discussion about these factors, I recommended the following options in order to restore her acquired absence of left breast:   1 ( most recommended)  Ipsilateral Thoracodorsal  flap vs Muscle sparing LD flap and placement of tissue expander, this would allow to restore the lower pole skin deficit, remove the poor tissue and allow good submuscular expansion ot TE without ADM  Exchange of TE to implant at later stage       2    Orlin type procedure to restore the IMF, 2) followed by a later date Tissue expander placement and 3) TE to implant exchange    patient would like to think about it, discuss it with family and would decided  her questions and concerns were answered during this encounter    minutes were spent face to face with the patient, of which over half were counseling and coordination of care  Counseling Documentation With Imm: The patient was counseled regarding risks and benefits of treatment options  total time of encounter was 60 minutes  Chief Complaint   Chief Complaint Free Text Note Form: Absence of left breast      History of Present Illness   HPI: Mrs Otis Frias is a very pleasant 41years old female with history of left breast cancer diagnosed in 2016, Clinical stage II and underwent incomplete course of neoadjuvant chemotherapy at [facility]  Patient subsequently underwent following treatment and procedures    left total mastectomy with SLNBx August 2016  Delayed Left breast reconstruction with STEVE flap 9/19/16 Debridement of left breast flap and Insertion of Left Breast tissue expander with ADM 9/19/2016 TE to implant exchange left and right symmetry breast reduction 12/19/16 removal of intact implant  1/23/17   characteristics medical history: morbid obesity  abdominal surgery: Gastric bypass surgery, STEVE flap  use: none  bra size: absences of left breast  of DVT/PE: none of miscarriages: none  works in an office  is here for opinion with regards breast reconstruction options  Review of Systems   Complete-Female:      Constitutional: No fever, no chills, feels well, no tiredness, no recent weight gain or weight loss  Eyes: No complaints of eye pain, no red eyes, no eyesight problems, no discharge, no dry eyes, no itching of eyes  ENT: no complaints of earache, no loss of hearing, no nose bleeds, no nasal discharge, no sore throat, no hoarseness  Cardiovascular: No complaints of slow heart rate, no fast heart rate, no chest pain, no palpitations, no leg claudication, no lower extremity edema  Respiratory: No complaints of shortness of breath, no wheezing, no cough, no SOB on exertion, no orthopnea, no PND  Gastrointestinal: No complaints of abdominal pain, no constipation, no nausea or vomiting, no diarrhea, no bloody stools  Genitourinary: No complaints of dysuria, no incontinence, no pelvic pain, no dysmenorrhea, no vaginal discharge or bleeding  Musculoskeletal: No complaints of arthralgias, no myalgias, no joint swelling or stiffness, no limb pain or swelling  Integumentary: as noted in HPI  Neurological: No complaints of headache, no confusion, no convulsions, no numbness, no dizziness or fainting, no tingling, no limb weakness, no difficulty walking  Psychiatric: anxiety-- and-- depression  Endocrine: No complaints of proptosis, no hot flashes, no muscle weakness, no deepening of the voice, no feelings of weakness  Hematologic/Lymphatic: No complaints of swollen glands, no swollen glands in the neck, does not bleed easily, does not bruise easily  ROS Reviewed:    ROS reviewed  Active Problems   1  Acquired breast deformity (611 89) (N64 89)   2  Anxiety (300 00) (F41 9)   3  Constipation (564 00) (K59 00)   4  Insomnia (780 52) (G47 00)   5  Irregular bleeding (626 4) (N92 6)   6  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)   7  Nocturia (788 43) (R35 1)    Past Medical History   1  History of Abdominal wall seroma (998 13) (T88 8XXA,T79  2XXA)   2  History of Admission for removal of tissue expander of left breast without synchronous     insertion of permanent implant (V52 4) (Z45 812)   3  History of Breast Reconstruction With Implant Prosthesis Left Breast   4  History of Breast Reconstruction With Tissue Expander Left Breast   5  History of Exposure to scabies (V01 89) (Z20 89)   6  History of Healing wound (959 9) (T14 90XD)   7  History of chemotherapy (V87 41) (Z92 21)   8  History of Rash of hands (782 1) (R21)   9  History of Seroma of breast (998 13) (N64 89)   10  History of Status post breast implant removal (V45 83) (S38 38)  Active Problems And Past Medical History Reviewed:     The active problems and past medical history were reviewed and updated today  Surgical History   1  History of Percutaneous Portal Vein Catheter Placement   2  History of [place] Lymph Node Biopsy   3  History of Simple Mastectomy Left Breast  Surgical History Reviewed: The surgical history was reviewed and updated today  Family History   Mother    1  Family history of diabetes mellitus (V18 0) (Z83 3)   2  Family history of glaucoma (V19 11) (Z83 511)   3  FHx: diabetes mellitus (V18 0) (Z83 3)  Father    4  Family history of lymphoma (V16 7) (Z80 2)  Paternal Grandmother    11  Family history of malignant neoplasm of breast (V16 3) (Z80 3)  Family History Reviewed: The family history was reviewed and updated today  Social History    · Currently working   · Does not exercise (V69 0) (Z72 3)   · Does not use illicit drugs (S23 62) (Z78 9)   · Former smoker (S97 33) (X29 401)   · Has 2 children   · Social alcohol use (Z78 9)  Social History Reviewed: The social history was reviewed and updated today  Current Meds    1  ALPRAZolam 0 5 MG Oral Tablet (Xanax); take 1 tablet daily prn; Last Rx:22Zae0370     Ordered   2  BuPROPion HCl ER (XL) 150 MG Oral Tablet Extended Release 24 Hour (Wellbutrin XL);     TAKE 1 TABLET DAILY; Therapy: 86LGY2858 to (Claudeen Poplar)  Requested for: 27UIB4889; Last     Rx:94Vsz6115; Status: ACTIVE - Transmit to [facility] Verification Ordered   3  KlonoPIN 0 5 MG Oral Tablet (ClonazePAM) Recorded   4  Magnesium Citrate 1 745 GM/30ML Oral Solution; USE AS DIRECTED; Therapy: 10KEO3359 to (Last Rx:40Dbr3587) Ordered   5  Rozerem 8 MG Oral Tablet; TAKE 1 TABLET AT BEDTIME AS NEEDED FOR SLEEP; Therapy: 64ZJD0517 to (Evaluate:68Uia3509)  Requested for: 90ZEO7133; Last     Rx:72Qbn2515 Ordered   6  Tamoxifen Citrate 20 MG Oral Tablet; TAKE 1 TABLET DAILY; Therapy: 35Uqf4011 to (Evaluate:97Azb0908)  Requested for: 36GKL0131; Last     Rx:58Ugs3877 Ordered   7   Topiramate 25 MG Oral Tablet; Therapy: (Recorded:75Rwe3722) to Recorded  Medication List Reviewed: The medication list was reviewed and updated today  Allergies   1  Ambien   2  Effexor   3  morphine    Vitals   Vital Signs    Recorded: 28Kmj6841 02:40PM   BP Comments unobtainable   Height 5 ft    Weight 174 lb    BMI Calculated 33 98   BSA Calculated 1 76     Physical Exam   General Complete Exam (Brief) Plastics [facility]:      Constitutional      General appearance: No acute distress, well appearing and well nourished  -- looks very anxious  Eyes      Conjunctiva and lids: No swelling, erythema or discharge  Pupils and irises: Equal, round and reactive to light  Ears, Nose, Mouth, and Throat      External inspection of ears and nose: Normal        Pulmonary      Respiratory effort: No increased work of breathing or signs of respiratory distress  Auscultation of lungs: Clear to auscultation  Cardiovascular      Palpation of heart: Normal PMI, no thrills  Auscultation of heart: Normal rate and rhythm, normal S1 and S2, without murmurs  Abdomen soft, nondistended  Well healed lower abdomen surgical scars  Liver and spleen: No hepatomegaly or splenomegaly  Lymphatic      Palpation of lymph nodes in neck: No lymphadenopathy  Musculoskeletal      Gait and station: Normal          Breast Reconstruction [facility]      Breast Cancer:  Cancer of the left breast       Current Smoker: Patient is not currently smoking  Additional Notes Right breast: s/p reduction, inverted T scar, well healed, soft no palpable masses  breast: absence of breast, there is horizontal scar along lower third chest wall from parasternal to anterior axillary fold with several fold indententions  There is widening of scar and very thin skin along suspected IMF position  Ther eis loss of landmarks  patient upper pole with very good and soft skin quality and thickness      bilateral latissimus dorsi are active and no deficit  There is adequate skin and subcutaneous tissue for reconstruction use  there is large excess of skin and adipose tissue for reconstruction  moderate skin laxity, not enough adipose tissue for breast reconstruction  Future Appointments      Date/Time Provider Specialty Site   02/27/2018 10:20 AM NICHO Cox   Hematology Oncology [facility] [place]   05/03/2018 09:00 AM Liz Coleman MD Surgical Oncology [facility] SURGICAL ONCOLOGY     Signatures    Electronically signed by : NICHO Dao ; Jan 2 2018 12:41PM EST                       (Author)

## 2018-01-10 NOTE — MISCELLANEOUS
Message  Patient called regarding healing issues post her surgery with Dr Abelardo Pérez  STEVE flap failed, expander placed  Patient with three open abdominal wounds  Development of post surgical seroma with subsequent drain placement in IR  She wishes to have a second opinion with Dr Elsie Munoz  Appointment made for patient to see Dr Elsie Munoz tomorrow at 1:15      Active Problems    1  Anxiety (300 00) (F41 9)   2  Breast Reconstruction With Tissue Expander Left Breast   3  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)    Current Meds   1  Brintellix 10 MG TABS; Therapy: (Recorded:27Jun2016) to Recorded   2  Lunesta TABS; TAKE 1 TABLET AT BEDTIME; Therapy: (Recorded:12Sep2016) to Recorded   3  SLPG Magic Mouthwash 1:1:1 maalox/diphenhydramine/lidocaine; 5 mL Swish and Spit   3 times daily as needed for mouth soreness; Therapy: 61NDV5073 to (Last Rx:30Sep2016) Ordered   4  Tamoxifen Citrate 20 MG Oral Tablet; TAKE 1 TABLET DAILY; Therapy: 58Bmb9545 to (Evaluate:11Mar2017); Last Rx:25Kml5467 Ordered    Allergies    1   No Known Drug Allergies    Signatures   Electronically signed by : Сергей Castano, ; Oct 31 2016  3:31PM EST                       (Author)

## 2018-01-10 NOTE — MISCELLANEOUS
Message  I return the patient's phone call who had questions regarding her choice to have Dr Marisol Lanza form her surgery  She is canceling surgery with   [facility]  Her scans are not a plastic surgeon and this is a difficult scenario  I did explain that second opinions her always useful and provide different points of view        Signatures   Electronically signed by : Melisa Page MD; Nov 22 2017  3:50PM EST                       (Author)

## 2018-01-11 NOTE — MISCELLANEOUS
Message    Date: 12 Jun 2017 4:00 PM EST, Recorded By: Sea Arias For: Precious Denis: Alda Nevarez , Care Coordinator   Phone: [phone] (Home)   Reason: Care Coordination   Patient called yesterday (6/12/17) with complaints of nausea and vomiting  States she is "unable to keep anything down"  States feels dizzy whenever she changes positions, feels fatigued, and is very pale  Unable to check vital signs at home but states pulse feels "normal"  Reports very little urine output  States she is taking naproxen and tylenol around the clock  Reports taking oxy at night only  Advised to stop naproxen to see if nausea subsides  Advised patient to increase fluids and suck on ice chips as much as possible  Advised to get gatorade  Patient states she is unable to get gatorade or leave the house due to the needs of her children  Verbalized understanding to increase fluids  Information above reviewed with Dr Nimisha Dunn who agrees with recommendations above  Will follow up with patient tomorrow  Per Dr Nimisha Dunn, if patient has not improved may need ER visit for IV fluids  Active Problems   1  Acquired breast deformity (611 89) (N64 89)  2  Anxiety (300 00) (F41 9)  3  Insomnia (780 52) (G47 00)  4  Irregular bleeding (626 4) (N92 6)  5  Nocturia (788 43) (R35 1)    Current Meds  1  BuPROPion HCl ER (XL) 150 MG Oral Tablet Extended Release 24 Hour (Wellbutrin XL);   TAKE 1 TABLET DAILY; Therapy: 79PRJ6776 to (Evaluate:19Jun2017)  Requested for: 21Mar2017; Last   Rx:21Mar2017 Ordered  2  Rozerem 8 MG Oral Tablet; TAKE 1 TABLET AT BEDTIME AS NEEDED FOR SLEEP; Therapy: 78NTG9292 to (Evaluate:57Sxc8421)  Requested for: 20Frk9312; Last   Rx:64Ohc6413 Ordered  3  Rozerem 8 MG Oral Tablet; Therapy: (Jose Cruz Lang) to Recorded  4  Tamoxifen Citrate 20 MG Oral Tablet; TAKE 1 TABLET DAILY; Therapy: 46Hrm5678 to (Evaluate:28Nxi8270)  Requested for: 64KFV6417; Last   Rx:31Zct6733 Ordered  5  Topiramate 25 MG Oral Tablet; Therapy: (Recorded:14Rgm7838) to Recorded    Allergies   1  Ambien  2   Effexor  3  morphine    Signatures   Electronically signed by : Pilar Arguello RN; Jun 13 2017 10:49AM EST                       (Author)

## 2018-01-11 NOTE — MISCELLANEOUS
Message  Patient called regarding her consultation with Dr Dave Schirmer today  The only surgical option per Dr Dave Schirmer would be reconstruction with implants  The patient is interested in having a deep flap performed  I referred patient to Dr Akua Bernabe and made an appointment for 7/15 at 9:30 am at the [place] office  Address and phone number for Dr Akua Bernabe provided to the patient  Patient also states she needs her port a cath flushed  I made an appointment for her port flush at the [facility] infusion center for 7/15 at 2:00 pm  Standing order sheet obtained from infusion center and Dr Audi Salvador completed and signed form which was returned to [facility] in the infusion center  Active Problems    1  Anxiety (300 00) (F41 9)   2  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)    Current Meds   1  Brintellix 10 MG TABS; Therapy: (Recorded:27Jun2016) to Recorded   2  Oxycodone-Acetaminophen 5-325 MG Oral Tablet; TAKE 1 TABLET EVERY 4 HOURS   AS NEEDED FOR PAIN;   Therapy: 68GIG3571 to (Complete:51Zog6052); Last Rx:60Fgg5348; Status: ACTIVE -   Retrospective By Protocol Authorization Ordered    Allergies    1   No Known Drug Allergies    Signatures   Electronically signed by : Orlin Leone MD; Jul 21 2016  9:27AM EST

## 2018-01-11 NOTE — PROCEDURES
Procedures by Opal Braga MD at 1/14/2017  11:13 AM      Author:  Opal Braga MD Service:  Interventional Radiology  Author Type:  Physician     Filed:  1/14/2017 11:15 AM Date of Service:  1/14/2017 11:13 AM Status:  Signed     :  Opal Braga MD (Physician)            Procedures    INTERVENTIONAL RADIOLOGY PROCEDURE NOTE    Date: 01/14/17    Procedures:     1  US guided left breast collection drain placement    Preoperative diagnosis: Seroma    Postoperative diagnosis: likely super infected  Surgeon: Opal Braga MD     Assistant: None  No qualified resident was available  Blood loss: Minimal    Specimens: 66 mL of cloudy serous fluid    Findings: fluid appeared to be cloudy, different from previous aspirations  Suspect infeciton  This would be in line with patients symptoms  Culture sent    Complications: None    Anesthesia: IV moderate conscious sedation    Plan:  Follow up on cultures   Flush drain 3 x daily while patient is in the hospital                  Received for:Provider  EPIC   Jan 14 2017 11:15AM [facility] Standard Time

## 2018-01-11 NOTE — PROCEDURES
Procedures by Juan Carlos Roach MD at 1/5/2017  11:24 AM      Author:  Juan Carlos Roach MD Service:  Interventional Radiology  Author Type:  Physician     Filed:  1/5/2017 11:26 AM Date of Service:  1/5/2017 11:24 AM Status:  Signed     :  Juan Carlos Roach MD (Physician)            Procedures    INTERVENTIONAL RADIOLOGY PROCEDURE NOTE    Date: 01/05/17    Procedures:     1  US guided drain placement into seroma right lower quadrant  2  US guided drain placement into left breast seroma  Preoperative diagnosis: seroma, post breast resection, refractory to aspiration  Postoperative diagnosis: Same    Surgeon: Juan Carlos Roach MD     Assistant: None  No qualified resident was available  Blood loss: Minimal    Specimens: bloody serous fluid from each drain site  Findings: 2 8 Cypriot apd's placed, one right lower quadrant seroma, one left axilla/breast fold  Complications: None    Anesthesia: IV moderate conscious sedation    Plan:  Keep to bulb suction  Monitor, DC home today if patient remains stable                  Received for:Provider  EPIC   Jan 5 2017 11:26AM [place] Standard Time

## 2018-01-12 VITALS
HEIGHT: 60 IN | TEMPERATURE: 98.2 F | WEIGHT: 186.38 LBS | DIASTOLIC BLOOD PRESSURE: 80 MMHG | SYSTOLIC BLOOD PRESSURE: 110 MMHG | HEART RATE: 80 BPM | BODY MASS INDEX: 36.59 KG/M2 | RESPIRATION RATE: 18 BRPM

## 2018-01-12 VITALS
HEART RATE: 84 BPM | SYSTOLIC BLOOD PRESSURE: 108 MMHG | WEIGHT: 185.38 LBS | BODY MASS INDEX: 36.4 KG/M2 | RESPIRATION RATE: 18 BRPM | OXYGEN SATURATION: 98 % | DIASTOLIC BLOOD PRESSURE: 78 MMHG | HEIGHT: 60 IN

## 2018-01-12 VITALS — HEIGHT: 60 IN | WEIGHT: 185 LBS | BODY MASS INDEX: 36.32 KG/M2

## 2018-01-12 NOTE — MISCELLANEOUS
Message   Recorded as Task   Date: 04/13/2017 10:30 AM, Created By: Morris Hill   Task Name: Follow Up   Assigned To: Mireya Field   Regarding Patient: Estefany Sharma, Status: In Progress   Comment:    Kiya Perez - 13 Apr 2017 10:30 AM     TASK CREATED  Caller: Self; Other; [phone] (Home); [phone] (Work)  PT  STOPPED BY THE OFFICE  SAID YOU TOLD HER TO LET YOU KNOW IF SHE WANTED THE MED  TO HELP HER SLEEP - DOXEPIA  SHE WOULD LIKE YOU TO SEND THIS TO [facility] [address]  Jose Blake - 13 Apr 2017 5:03 PM     TASK IN PROGRESS   Jose Blake - 13 Apr 2017 5:04 PM     TASK REPLIED TO: Previously Assigned To Jose Blake  I am out of office and may not be able to address until after I return on 4/18 thx   Jose Blake - 18 Apr 2017 3:20 PM     TASK REPLIED TO: Previously Assigned To [employer],Team  doxepin is usually well tolerated with medications such as bupropion    a rare side effect can occur in pt's that have a seizure disorder, which Hailee has no history of    it is taken 3mg at bedtime to help with sleep    ordered to her pharmacy on file    please stop rozerem    let me know if questions, thx        Plan  Anxiety, Insomnia    · Rozerem 8 MG Oral Tablet  Insomnia    · Silenor 3 MG Oral Tablet; TAKE 1 TABLET(3MG) orally once daily within 30 minutes  of bedtime as needed for sleep    Signatures   Electronically signed by : Jasmyne Sotomayor DO;  Apr 18 2017  3:25PM EST                       (Author)

## 2018-01-12 NOTE — MISCELLANEOUS
Message     Recorded as Task   Date: 09/26/2017 01:34 PM, Created By: Megan Calzada   Task Name: Care Coordination   Assigned To: Cari Simmons   Regarding Patient: Elizabeth Sukhdev, Status: In Progress   JenniferTorinernestine Gamma - 26 Sep 2017 1:34 PM     TASK CREATED  Caller: Self; Care Coordination; [phone] (Home)  req that you call her back regarding her chemo  She stated that it was a long story and didnt want to get into it  Call back at [facility] - 26 Sep 2017 4:01 PM     TASK IN [facility] - 02 Oct 2017 8:20 AM     TASK EDITED  Spoke with patient regarding chemo  She stated that she went for a second opinion for her breast reconstruction surgery  When they reviewed her records, they looked at her chemo records and they recommended that she more rounds of chemo  She stated that she isn't sure why they recommended that and she is waiting for more information from them  She is going to call back when she has an explanation from the company  Active Problems    1  Acquired breast deformity (611 89) (N64 89)   2  Anxiety (300 00) (F41 9)   3  Constipation (564 00) (K59 00)   4  Insomnia (780 52) (G47 00)   5  Irregular bleeding (626 4) (N92 6)   6  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)   7  Nocturia (788 43) (R35 1)    Current Meds   1  ALPRAZolam 0 5 MG Oral Tablet (Xanax); take 1 tablet daily prn; Last Rx:86Zdy9927   Ordered   2  BuPROPion HCl ER (XL) 150 MG Oral Tablet Extended Release 24 Hour (Wellbutrin XL);   TAKE 1 TABLET DAILY; Therapy: 46XCG7387 to (Beth Driver)  Requested for: 56ZLF4341; Last   Rx:24Ncn8776; Status: ACTIVE - Transmit to [facility] Verification Ordered   3  Magnesium Citrate 1 745 GM/30ML Oral Solution; USE AS DIRECTED; Therapy: 22NAP2784 to (Last Rx:21Bjy8723) Ordered   4   Rozerem 8 MG Oral Tablet; TAKE 1 TABLET AT BEDTIME AS NEEDED FOR SLEEP; Therapy: 24INJ7749 to (Evaluate:31Sse9603)  Requested for: 72GIG9304; Last   Rx:23Onc0592 Ordered   5  Tamoxifen Citrate 20 MG Oral Tablet; TAKE 1 TABLET DAILY; Therapy: 06Yxo3989 to (Evaluate:31Arg0428)  Requested for: 35EDX5691; Last   Rx:50Xra3179 Ordered   6  Topiramate 25 MG Oral Tablet; Therapy: (Recorded:50Suf5579) to Recorded    Allergies    1  Ambien   2   Effexor   3  morphine    Signatures   Electronically signed by : Nena Spence RN; Oct  2 2017  8:21AM EST                       (Author)

## 2018-01-12 NOTE — MISCELLANEOUS
Message   Date: 19 Jun 2017 4:31 PM EST, Recorded By: Lynn Mendoza For: Ab Honor: Angel Lopes, Self   Phone: [phone] (Home), [phone] (Work)   Reason: Care Coordination   Patient reports that right thigh from knee to mid thigh is swollen more so than the left  States area is hard and slightly warm to touch  States legs were hanging in a dependent position most of the day while she was at work  Reports that she is not wearing compression garment at this time due to discomfort from swelling  Advised patient to elevate legs above the level of her heart to help alleviate swelling  Advised to try to put compression garment back on once edema has started to subside  She will follow up with Dr Qasim Ny in office tomorrow 6/20/17  Active Problems    1  Acquired breast deformity (611 89) (N64 89)   2  Anxiety (300 00) (F41 9)   3  Insomnia (780 52) (G47 00)   4  Irregular bleeding (626 4) (N92 6)   5  Nocturia (788 43) (R35 1)    Current Meds   1  BuPROPion HCl ER (XL) 150 MG Oral Tablet Extended Release 24 Hour (Wellbutrin XL);   TAKE 1 TABLET DAILY; Therapy: 73PTG9806 to (Evaluate:19Jun2017)  Requested for: 21Mar2017; Last   Rx:21Mar2017 Ordered   2  Cephalexin 500 MG Oral Tablet (Cephalexin Monohydrate); Take 1 tablet 4 times daily; Therapy: 63CPW7645 to (Evaluate:25Jun2017)  Requested for: 70YHL5051; Last   Rx:18Jun2017 Ordered   3  Rozerem 8 MG Oral Tablet; TAKE 1 TABLET AT BEDTIME AS NEEDED FOR SLEEP; Therapy: 63YLA5736 to (Evaluate:25Jun2017)  Requested for: 23Ahj0440; Last   Rx:96Iam9511 Ordered   4  Rozerem 8 MG Oral Tablet; Therapy: (Kranthi Cuba) to Recorded   5  Tamoxifen Citrate 20 MG Oral Tablet; TAKE 1 TABLET DAILY; Therapy: 02Sfk9692 to (Evaluate:69Qil8664)  Requested for: 55EEG4386; Last   Rx:68Gmr3820 Ordered   6  Topiramate 25 MG Oral Tablet; Therapy: (Recorded:92Ylm7918) to Recorded    Allergies    1  Ambien   2  Effexor   3  morphine    Signatures   Electronically signed by : Samantha Grimaldo RN; Jun 19 2017  4:35PM EST                       (Author)

## 2018-01-13 VITALS — HEIGHT: 60 IN | BODY MASS INDEX: 39.27 KG/M2 | WEIGHT: 200 LBS

## 2018-01-13 VITALS
HEART RATE: 96 BPM | DIASTOLIC BLOOD PRESSURE: 84 MMHG | HEIGHT: 60 IN | TEMPERATURE: 98.6 F | SYSTOLIC BLOOD PRESSURE: 112 MMHG | WEIGHT: 185.38 LBS | OXYGEN SATURATION: 98 % | BODY MASS INDEX: 36.4 KG/M2 | RESPIRATION RATE: 16 BRPM

## 2018-01-13 VITALS
SYSTOLIC BLOOD PRESSURE: 100 MMHG | TEMPERATURE: 97 F | WEIGHT: 190 LBS | HEART RATE: 96 BPM | HEIGHT: 60 IN | RESPIRATION RATE: 18 BRPM | OXYGEN SATURATION: 99 % | DIASTOLIC BLOOD PRESSURE: 60 MMHG | BODY MASS INDEX: 37.3 KG/M2

## 2018-01-13 VITALS
HEART RATE: 82 BPM | TEMPERATURE: 97.8 F | HEIGHT: 60 IN | DIASTOLIC BLOOD PRESSURE: 80 MMHG | RESPIRATION RATE: 18 BRPM | BODY MASS INDEX: 36.32 KG/M2 | SYSTOLIC BLOOD PRESSURE: 112 MMHG | WEIGHT: 185 LBS

## 2018-01-13 NOTE — MISCELLANEOUS
Message  Patient brought written recommendation from outside consultation physician, which her insurance company provider  She did not see of cycle consultation physician  Physician, reviewed medical records and made suggestion and recommendation  Based on report, consideration was given to have bisphosphonate to reduce the risk of skeletal metastatic disease  Based on multiple studies in the past, role of bisphosphonate in preventing bone metastasis is not clear  There is no recommendation to give bisphosphonate in adjuvant setting for breast cancer  Therefore, I am against his recommendation  Secondly, consultant raised the question of addition of taxanes, since she only had 4 cycle of neoadjuvant AC  We had a long conversation regarding this  Based on her previous adjuvant trial, one study showed no benefit of adding Taxol for ER positive early stage breast cancer  Second trial showed very small benefit  Based on her early stage of disease, I do not think adding Taxan impact her survival  My recommendation is basically unchanged  I recommended her no additional Taxan  I recommended her to continue with tamoxifen and continue with close surveillance  I also reassured her that she has relatively good prognosis  However, I told her that her chance of cure obviously is not 100%  She understood and wished to proceed  All the patient, questions were answered to her satisfaction        Signatures   Electronically signed by : NICHO Mercedes ; Oct  4 2017  8:20AM EST                       (Author)

## 2018-01-14 VITALS
HEIGHT: 60 IN | DIASTOLIC BLOOD PRESSURE: 90 MMHG | BODY MASS INDEX: 35.34 KG/M2 | OXYGEN SATURATION: 99 % | RESPIRATION RATE: 18 BRPM | WEIGHT: 180 LBS | HEART RATE: 106 BPM | SYSTOLIC BLOOD PRESSURE: 122 MMHG | TEMPERATURE: 98.8 F

## 2018-01-14 VITALS
RESPIRATION RATE: 18 BRPM | OXYGEN SATURATION: 98 % | TEMPERATURE: 97.5 F | WEIGHT: 187 LBS | DIASTOLIC BLOOD PRESSURE: 72 MMHG | HEIGHT: 60 IN | BODY MASS INDEX: 36.71 KG/M2 | HEART RATE: 88 BPM | SYSTOLIC BLOOD PRESSURE: 102 MMHG

## 2018-01-14 VITALS — HEIGHT: 60 IN

## 2018-01-14 NOTE — MISCELLANEOUS
Message   Recorded as Task   Date: 07/13/2017 09:05 AM, Created By: Donnel Rubinstein   Task Name: Call Back   Assigned To: Talon Dawkins   Regarding Patient: Shirley Hamlin, Status: Active   Comment:    Luba Valdovinos - 13 Jul 2017 9:05 AM     TASK CREATED  she is experiencing a new sympton every night bilateral muscle cramps in calf  She is currently taking tamoxifen  Ægissidu 8 17 Jul 2017 12:55 PM     TASK REPLIED TO: Previously Assigned To Jonna Ceja pt   Jonna Ceja - 17 Jul 2017 12:55 PM     TASK COMPLETED   Lety Fitch - 17 Jul 2017 3:35 PM     TASK REACTIVATED   Lety Fitch - 17 Jul 2017 3:36 PM     TASK EDITED  PT CALLED BACK - THIS TASK WAS NEVER FWD TO CORRECT NURSE AND WAS NOT ADDRESSED  PLEASE CALL PT ABOUT HER LEG CRAMPS WHICH WERE ALL WEEKEND AND ADVISE WHAT SHE CAN DO   Avis Duran - 17 Jul 2017 4:05 PM     TASK EDITED  Called and spoke with the patient regarding her tamoxifen and amanda horses  Patient called last Thursday to report that after she restarted her Tamoxifen after her June 8th surgery (Tamoxifen was held 2 weeks) she began to experience amanda horses at night  Patient was instructed to hold the tamoxifen for 1 week and see if her symptoms improve and call the office back if they do not  Also suggested that the patient eat more calcium rich foods and drink tonic water  Patient also has questions for Dr Yuli Quintana about why she her hgb levels are consistently low  Patient just received 2 units PRBC's  Patient also is planning on travelling internationally on August 5th and wants to know if it's ok considering all the side effects she is having with the Tamoxifen  Talon Dawkins - 18 Jul 2017 10:44 AM     TASK EDITED  Spoke to patient, she had concerns about traveling with her hgb being low  she did state that she was instructed to take oral iron on her discharge from the hospital  her Iron sat  was tested and came back low   she has not started this due to constipation  We went over the use of stool softner and fiber rich foods to help prevent this  patient was in agreement with this  we will recheck labs before her 8/29 apt with Dr Bro        Active Problems    1  Acquired breast deformity (611 89) (N64 89)   2  Anxiety (300 00) (F41 9)   3  Constipation (564 00) (K59 00)   4  Insomnia (780 52) (G47 00)   5  Irregular bleeding (626 4) (N92 6)   6  Nocturia (788 43) (R35 1)    Current Meds   1  ALPRAZolam 0 5 MG Oral Tablet (Xanax); take 1 tablet daily prn; Last Rx:33Khj7265   Ordered   2  BuPROPion HCl ER (XL) 150 MG Oral Tablet Extended Release 24 Hour (Wellbutrin XL);   TAKE 1 TABLET DAILY; Therapy: 55UMQ4783 to [phone])  Requested for: 60ATW3106; Last   Rx:99Guc3503; Status: ACTIVE - [facility] Ordered   3  Magnesium Citrate 1 745 GM/30ML Oral Solution; USE AS DIRECTED; Therapy: 77GXW5283 to (Last Rx:50Szh9407) Ordered   4  Rozerem 8 MG Oral Tablet; TAKE 1 TABLET AT BEDTIME AS NEEDED FOR SLEEP; Therapy: 56XOR4321 to (Evaluate:53Cyy5962)  Requested for: 94HHR5622; Last   Rx:46Uzw0361 Ordered   5  Tamoxifen Citrate 20 MG Oral Tablet; TAKE 1 TABLET DAILY; Therapy: 04Gaz0116 to (Evaluate:85Sxn6626)  Requested for: 86OPE6673; Last   Rx:81Khm7420 Ordered   6  Topiramate 25 MG Oral Tablet; Therapy: (Recorded:46Gld9605) to Recorded    Allergies    1  Ambien   2   Effexor   3  morphine    Signatures   Electronically signed by : Lillie Wiseman, ; Jul 18 2017 10:44AM EST                       (Author)

## 2018-01-14 NOTE — PROGRESS NOTES
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

## 2018-01-15 VITALS
HEIGHT: 60 IN | BODY MASS INDEX: 34.21 KG/M2 | DIASTOLIC BLOOD PRESSURE: 86 MMHG | RESPIRATION RATE: 18 BRPM | SYSTOLIC BLOOD PRESSURE: 112 MMHG | TEMPERATURE: 98.1 F | WEIGHT: 174.25 LBS | OXYGEN SATURATION: 98 % | HEART RATE: 86 BPM

## 2018-01-15 NOTE — MISCELLANEOUS
Message     Recorded as Task   Date: 10/03/2017 10:57 AM, Created By: MARK PAREKH   Task Name: Call Back   Assigned To: Eneida Garner   Regarding Patient: Jaxson Hough, Status: In Progress   Comment:    Jonna Ceja - 03 Oct 2017 10:57 AM     TASK CREATED  Pt called and would like to speak to you regarding chemo that is being prescribed to her  #: [phone]   [facility]-30 Oct 2017 1:19 PM     TASK IN Mary Anne Polk - 03 Oct 2017 2:05 PM     TASK EDITED  Spoke with patient regarding additional chemo recommended to her from insurance company  She is going to bring in the report for Dr Sayra Case to look at  He stated that he does not recommend that she needs more chemotherapy but will review the report she received from them  Active Problems    1  Acquired breast deformity (611 89) (N64 89)   2  Anxiety (300 00) (F41 9)   3  Constipation (564 00) (K59 00)   4  Insomnia (780 52) (G47 00)   5  Irregular bleeding (626 4) (N92 6)   6  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)   7  Nocturia (788 43) (R35 1)    Current Meds   1  ALPRAZolam 0 5 MG Oral Tablet (Xanax); take 1 tablet daily prn; Last Rx:83Vda2873   Ordered   2  BuPROPion HCl ER (XL) 150 MG Oral Tablet Extended Release 24 Hour (Wellbutrin XL);   TAKE 1 TABLET DAILY; Therapy: 07RAC5374 to [phone])  Requested for: 04QYU0898; Last   Rx:48Srd6255; Status: ACTIVE - Transmit to [facility] Ordered   3  Magnesium Citrate 1 745 GM/30ML Oral Solution; USE AS DIRECTED; Therapy: 26OAD3811 to (Last Rx:36Wgy8485) Ordered   4  Rozerem 8 MG Oral Tablet; TAKE 1 TABLET AT BEDTIME AS NEEDED FOR SLEEP; Therapy: 70BKX1492 to (Evaluate:15Zfb2697)  Requested for: 20YTK0086; Last   Rx:52Rtp7066 Ordered   5  Tamoxifen Citrate 20 MG Oral Tablet; TAKE 1 TABLET DAILY; Therapy: 16Nqf4711 to (Evaluate:17Sob8093)  Requested for: 64ADF8868; Last   Rx:56Fxk4862 Ordered   6  Topiramate 25 MG Oral Tablet; Therapy: (Recorded:54Hxb3996) to Recorded    Allergies    1  Ambien   2   Effexor   3  morphine    Signatures   Electronically signed by : Foreign Wheeler RN; Oct  3 2017  2:11PM EST                       (Author)

## 2018-01-15 NOTE — MISCELLANEOUS
Message   Date: 22 Jun 2017 12:48 PM EST, Recorded By: Toshia Vera For: Paty Rose: Gosia Poon, Self   Phone: [phone] (Home), [phone] (Work)   Patient left message stating she wanted us to be aware of increased drainage from breast  States she is not concerned and has no other symptoms  Discussed with Dr Carlos A Phelan who advises that patient use ABD pads and gauze to absorb the drainage  Left message for patient with this information  Asked patient to call if symptoms worsen, develops fever, body aches, or chills, or if drainage becomes foul smelling  Active Problems    1  Acquired breast deformity (611 89) (N64 89)   2  Anxiety (300 00) (F41 9)   3  Insomnia (780 52) (G47 00)   4  Irregular bleeding (626 4) (N92 6)   5  Nocturia (788 43) (R35 1)    Current Meds   1  BuPROPion HCl ER (XL) 150 MG Oral Tablet Extended Release 24 Hour (Wellbutrin XL);   TAKE 1 TABLET DAILY; Therapy: 68HNJ5921 to (Evaluate:19Jun2017)  Requested for: 21Mar2017; Last   Rx:21Mar2017 Ordered   2  Cephalexin 500 MG Oral Tablet (Cephalexin Monohydrate); Take 1 tablet 4 times daily; Therapy: 92PQR2267 to (Evaluate:25Jun2017)  Requested for: 71IEV9607; Last   Rx:18Jun2017 Ordered   3  Rozerem 8 MG Oral Tablet; TAKE 1 TABLET AT BEDTIME AS NEEDED FOR SLEEP; Therapy: 96QCP1211 to (Evaluate:25Jun2017)  Requested for: 00Tho0541; Last   Rx:80Lhf7308 Ordered   4  Rozerem 8 MG Oral Tablet; Therapy: (Rebbecca Old) to Recorded   5  Tamoxifen Citrate 20 MG Oral Tablet; TAKE 1 TABLET DAILY; Therapy: 69Ccn6505 to (Evaluate:00Pzc3141)  Requested for: 49MSB8460; Last   Rx:65Ogu7896 Ordered   6  Topiramate 25 MG Oral Tablet; Therapy: (Recorded:14Mux6367) to Recorded   7  Xanax 0 5 MG Oral Tablet (ALPRAZolam); Therapy: (Recorded:20Jun2017) to Recorded    Allergies    1  Ambien   2   Effexor   3  morphine    Signatures   Electronically signed by : Chaneta Leventhal, RN; Jun 22 2017 12:51PM EST                       (Author)

## 2018-01-15 NOTE — MISCELLANEOUS
Message    Date: 13 Jun 2017 10:39 AM EST, Recorded By: Qasim Guan For: Asha Briceno: Elysia Garner   Phone: [phone] (Work)   Reason: Care Coordination   Spoke with patient  States she is less nauseous today and her stomach is "better"  Trying to get fluids down but is still having a difficult time  Reports still feeling dizzy and still having decreased urine output  Did not take oxycodone last night and stopped naproxen  She reports still taking Tylenol around the clock  Patient states she picked up her 19 lb dog and fell over  States she did not hit her head and does not feel that she injured herself  Discussed with patient the possible need for ER visit for IV fluids as suggested by Dr Benny Ralph  Patient would like to wait and try to increase fluid intake  Reviewed post op instructions with patient to avoid lifting anything and avoid strenous activity  Verbalized understanding  Will call if symptoms worsen or vomiting continues today  Active Problems   1  Acquired breast deformity (611 89) (N64 89)  2  Anxiety (300 00) (F41 9)  3  Insomnia (780 52) (G47 00)  4  Irregular bleeding (626 4) (N92 6)  5  Nocturia (788 43) (R35 1)    Current Meds  1  BuPROPion HCl ER (XL) 150 MG Oral Tablet Extended Release 24 Hour (Wellbutrin XL);   TAKE 1 TABLET DAILY; Therapy: 63MPP9653 to (Evaluate:19Jun2017)  Requested for: 21Mar2017; Last   Rx:21Mar2017 Ordered  2  Rozerem 8 MG Oral Tablet; TAKE 1 TABLET AT BEDTIME AS NEEDED FOR SLEEP; Therapy: 08MUN9665 to (Evaluate:25Jun2017)  Requested for: 26Apr2017; Last   Rx:26Apr2017 Ordered  3  Rozerem 8 MG Oral Tablet; Therapy: (Ruddy Bernard) to Recorded  4  Tamoxifen Citrate 20 MG Oral Tablet; TAKE 1 TABLET DAILY; Therapy: 85Iog5596 to (Evaluate:49Fvv3477)  Requested for: 61EBB0201; Last   Rx:03Rlw1288 Ordered  5  Topiramate 25 MG Oral Tablet; Therapy: (Recorded:19Vtd1362) to Recorded    Allergies   1  Ambien  2  Effexor  3  morphine    Signatures   Electronically signed by : Herson Mendez RN; Jun 13 2017 10:47AM EST                       (Author)

## 2018-01-16 NOTE — MISCELLANEOUS
Message   Date: 22 Jun 2017 2:14 PM EST, Recorded By: Severo Skains For: Dixie Hughes: Domenica Quiñonez, Elliot   Phone: [phone] [facility]), [phone] (Work)   Reason: Other   Patient calling with c/o constipation for the last few days  Reports having colace, dulcolax, and senna-ar at home  States she has tried all of them and they are not helping  States she also has Miralax at home but has not tried it  Suggested trying Miralax  Advised to drink plenty of fluids, try probiotics, and make sure she is walking as much as possible to help with constipation  Verbalized understanding  Active Problems    1  Acquired breast deformity (611 89) (N64 89)   2  Anxiety (300 00) (F41 9)   3  Insomnia (780 52) (G47 00)   4  Irregular bleeding (626 4) (N92 6)   5  Nocturia (788 43) (R35 1)    Current Meds   1  BuPROPion HCl ER (XL) 150 MG Oral Tablet Extended Release 24 Hour (Wellbutrin XL);   TAKE 1 TABLET DAILY; Therapy: 41AWL4281 to (Evaluate:19Jun2017)  Requested for: 21Mar2017; Last   Rx:21Mar2017 Ordered   2  Cephalexin 500 MG Oral Tablet (Cephalexin Monohydrate); Take 1 tablet 4 times daily; Therapy: 10SFO4889 to (Evaluate:25Jun2017)  Requested for: 59BQN1061; Last   Rx:18Jun2017 Ordered   3  Rozerem 8 MG Oral Tablet; TAKE 1 TABLET AT BEDTIME AS NEEDED FOR SLEEP; Therapy: 92AYU0677 to (Evaluate:25Jun2017)  Requested for: 26Apr2017; Last   Rx:26Apr2017 Ordered   4  Rozerem 8 MG Oral Tablet; Therapy: (Aurea Hays) to Recorded   5  Tamoxifen Citrate 20 MG Oral Tablet; TAKE 1 TABLET DAILY; Therapy: 31Gsr4778 to (Evaluate:74Dcf0145)  Requested for: 20ZRB2806; Last   Rx:28Feb2017 Ordered   6  Topiramate 25 MG Oral Tablet; Therapy: (Recorded:66Mqr3294) to Recorded   7  Xanax 0 5 MG Oral Tablet (ALPRAZolam); Therapy: (Recorded:20Jun2017) to Recorded    Allergies    1  Ambien   2   Effexor   3  morphine    Signatures   Electronically signed by : Rosalio Lehman RN; Jun 22 2017  2:18PM EST                       (Author)

## 2018-01-16 NOTE — MISCELLANEOUS
Message  I provided the following information the patient telephonically today  I spoke with Dr Aydin Swann  I reviewed her tumor size grade Ki-67 hormonal in her HER-2 status  We also reviewed that she receive for treatments with before meals in a dose dense fashion and that she had an additional weekly paclitaxel scheduled  Dr Aydin Swann and I both agreed that we would of treated her without new adjuvant chemotherapy  In that she has received sufficient chemotherapy most likely  Since the patient wishes to stop chemotherapy at this point in time we think this is a reasonable choice  It is possible she might need some additional therapy if we found other findings at the time of surgery  All this was explained to the patient  She'll cancel her further treatments with paclitaxel, we will see her later this week  She will subtotally see plastic surgery and Dr Aydin Swann        Signatures   Electronically signed by : Robby Mendez MD; Jul 5 2016  3:16PM EST                       (Author)

## 2018-01-16 NOTE — MISCELLANEOUS
Message   Recorded as Task   Date: 01/06/2017 11:06 AM, Created By: Mir Boateng   Task Name: Call Back   Assigned To: Silvia Yepez   Regarding Patient: Bill Amor, Status: Active   Comment:    Dany Valdovinosin - 06 Jan 2017 11:06 AM     TASK CREATED  "kind of a long story" patient would like to talk to you about her tamoxifen [phone]   Silvia Yepez - 06 Jan 2017 2:29 PM     TASK EDITED  Spoke to patient she had some questions about her period being irregular  we did discuss that her tamoxifen can cause menstrual irregularities  she just started the tamoxifen 12/6/16  she will have a f/u with us 2/28/17        Active Problems    1  Abdominal wall seroma (998 13) (T88 8XXA,T79  2XXA)   2  Anxiety (300 00) (F41 9)   3  Breast Reconstruction With Tissue Expander Left Breast   4  Irregular bleeding (626 4) (N92 6)   5  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

## 2018-01-16 NOTE — MISCELLANEOUS
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  TOMORROW HAVING A BX OF HER UTERUS BY HER GYNO DR Milena Bowling  SHE HAD AN ULTRASOUND DONE ON FRIDAY AT HIS OFFICE  WAS TOLD HER UTERUS WAS ENLARGED   WANTS TO KNOW DR CHAVIS IS AWARE AND WOULD LIKE TO SPEAK TO SOMEONE HERE ABOUT WHAT IS GOING ON  Ilean Cull - 21 Oct 2017 2:38 PM     TASK EDITED   Melvi Walters - 11 Oct 2017 4:07 PM     TASK EDITED  Spoke with patient  I asked the results from her US to be faxed to us  They stated they will either tomorrow or Friday  I will keep an eye out for them  She is also having a biopsy of her uterus lining tomorrow  I will discuss with Dr Kiara Garcia tomorrow  Active Problems    1  Acquired breast deformity (611 89) (N64 89)   2  Anxiety (300 00) (F41 9)   3  Constipation (564 00) (K59 00)   4  Insomnia (780 52) (G47 00)   5  Irregular bleeding (626 4) (N92 6)   6  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)   7  Nocturia (788 43) (R35 1)    Current Meds   1  ALPRAZolam 0 5 MG Oral Tablet (Xanax); take 1 tablet daily prn; Last Rx:45Jxh5876   Ordered   2  BuPROPion HCl ER (XL) 150 MG Oral Tablet Extended Release 24 Hour (Wellbutrin XL);   TAKE 1 TABLET DAILY; Therapy: 72ECQ0374 to [phone])  Requested for: 27EXI4272; Last   Rx:99Sww9656; Status: ACTIVE - [facility] Ordered   3  Magnesium Citrate 1 745 GM/30ML Oral Solution; USE AS DIRECTED; Therapy: 50IQL9141 to (Last Rx:57Lyv2037) Ordered   4  Rozerem 8 MG Oral Tablet; TAKE 1 TABLET AT BEDTIME AS NEEDED FOR SLEEP;    Therapy: 79RZG3121 to (Evaluate:32Zdd1052) Requested for: 52UOE5410; Last   Rx:78Uwu9969 Ordered   5  Tamoxifen Citrate 20 MG Oral Tablet; TAKE 1 TABLET DAILY; Therapy: 50Pcd2643 to (Evaluate:23Bag8016)  Requested for: 91VST0213; Last   Rx:11Njj3964 Ordered   6  Topiramate 25 MG Oral Tablet; Therapy: (Recorded:94Ysh2012) to Recorded    Allergies    1  Ambien   2   Effexor   3  morphine    Signatures   Electronically signed by : Julia Yao RN; Oct 11 2017  4:07PM EST                       (Author)

## 2018-01-17 NOTE — PSYCH
Behavioral Health Outpatient Intake    Referred By: DR KING [facility]  Intake Questions: there are no developmental disabilities  the patient does not have a hearing impairment  the patient does not have an ICM or CTT  patient is not taking injectable psychiatric medications  Employment: The patient is employed part time at [employer] [address] [employer]  Emergency Contact Information:   Emergency Contact: Thresea Fleet   Relationship to Patient: KIARRA   Phone Number: [phone]   Previous Psychiatric Treatment: She has not been previously seen by a psychiatrist     She has previously been seen by a therapist  Igor French   History: no  service  She has not had combat service  Insurance Subscriber: [employer]   Primary Insurance: [employer]   ID number: [id number]   Group number: [id number]         Presenting Problem (in patient's words): ANXIETY, INSOMNIA, HX OF BREAST CA  Substance Abuse: NONE  Previous Treatment: The patient has not been seen here in the past      [facility] as [facility]   [facility]     Primary Care Physician: DR KING [facility]       Signatures   Electronically signed by : Michelle Tatum, ; Feb 7 2017 11:43AM EST                       (Author)

## 2018-01-17 NOTE — MISCELLANEOUS
Message   Recorded as Task   Date: 02/27/2017 12:02 PM, Created By: Paulette Valentino   Task Name: Follow Up   Assigned To: Jose Blake   Regarding Patient: Tyron Yoder, Status: Active   Comment:    Hanh Lanier - 27 Feb 2017 12:02 PM     TASK CREATED  Caller: Self; General Medical Question; [phone] (Home); [phone] (Work)  pt states she cannot use the Effexor because of advserse symptoms  she would like to go back on the Wellbutrin 150 mg , not the 300 mg she was previously on  pharmacy [facility]  please advise  call pt at           Plan  Anxiety    · Venlafaxine HCl ER 37 5 MG Oral Capsule Extended Release 24 Hour  SocHx: Currently working    · Wellbutrin  MG Oral Tablet Extended Release 24 Hour (BuPROPion HCl ER  (XL))    Signatures   Electronically signed by Misha Isabel DO; Feb 27 2017  1:58PM EST                       (Author)

## 2018-01-17 NOTE — MISCELLANEOUS
Message   Recorded as Task   Date: 02/28/2017 10:59 AM, Created By: Teri Mejias   Task Name: Follow Up   Assigned To: Jose Blake   Regarding Patient: Angelina García, Status: Active   Comment:    Teri Mejias - 28 Feb 2017 10:59 AM     TASK CREATED  Caller: Self; Other; [phone] (Home); [phone] (Work)  PT  SAID DR GALO SAID ITS OK TO TAKE WELBUTRIN W/ THE TAMOXIFEN  PT  NEEDS REFILL FOR WELBUTRIN  SEND TO [facility] [address]          Plan  Anxiety    · From  BuPROPion HCl ER (XL) 150 MG Oral Tablet Extended Release 24  Hour take 1 tablet by mouth every day for 7 days, then stop To BuPROPion HCl  ER (XL) 150 MG Oral Tablet Extended Release 24 Hour (Wellbutrin XL) TAKE 1 TABLET  DAILY    Signatures   Electronically signed by Jacinda Ruggiero DO; Feb 28 2017 11:05AM EST                       (Author)

## 2018-01-18 NOTE — MISCELLANEOUS
Message  Patient called with concerns of abnormal uterine bleeding  Period stopped while on chemotherapy and started up again in August  It has been irregular and heavy at times  She saw her PCP on Friday, Dr Katja Foy at [facility] who told her to immediately go to emergency room for evaluation, which she declined  The physician also called our office and demanded an immediate appointment with gyn onc and Dr Jovanny Cerda  Patient states he was very rude  She had a pelvic ultrasound done in September of 2016  Recommended the patient see a gynecologist  Appointment made for patient to see Dr Junior Kulkarni on 12/13 at 1:30  Patient also requested an appointment with a [facility] doctor  She was given the name of Dr Ashley Wise  Active Problems    1  Anxiety (300 00) (F41 9)   2  Breast Reconstruction With Tissue Expander Left Breast   3  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)    Current Meds   1  Brintellix 10 MG TABS; Therapy: (Recorded:27Jun2016) to Recorded   2  Lunesta TABS (Eszopiclone); TAKE 1 TABLET AT BEDTIME; Therapy: (Recorded:32Cde0375) to Recorded   3  Tamoxifen Citrate 20 MG Oral Tablet; TAKE 1 TABLET DAILY; Therapy: [id number] to (Evaluate:11Mar2017); Last Rx:12Sep2016 Ordered    Allergies    1   No Known Drug Allergies    Signatures   Electronically signed by : Connie Gaitan MD; Dec 18 2016  5:00PM EST

## 2018-01-18 NOTE — MISCELLANEOUS
Message  Spoke with patient at length on 7/15 and again today regarding surgery scheduling  The patient has seen both Dr Mehdi Huerta and Dr Loki Aquino to discuss reconstruction  She prefers to proceed with the STEVE flap procedure with Dr Loki Aquino which would not be scheduled until 9/1 due to the OR availability and physician schedules  Hailee would like surgery done ASAP  Explained to patient that Dr Judie Valdivia is out of town the week of July 25th  His OR time is already booked for the first week of August  We could accommodate her surgery on 8/10 at the [facility], but that would be without reconstruction  The patient spoke with Dr Loki Aquino this morning and he stated he could due the STEVE procedure as delayed reconstruction in September  Patient is agreeable to proceeding with mastectomy, SLN biopsy and possible axillary dissection at the [facility] on August 10th with delayed reconstruction with Dr Loki Aquino in September  Aguilar Level made aware and surgery will be scheduled  I spoke with Manuel Lynch in Dr Jamie North office to relay the above information and she will contact the patient to schedule the delayed reconstruction procedure in September  Active Problems    1  Anxiety (300 00) (F41 9)   2  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)    Current Meds   1  Brintellix 10 MG TABS; Therapy: (Recorded:27Jun2016) to Recorded    Allergies    1   No Known Drug Allergies    Signatures   Electronically signed by : Mariaa Rucker MD; Jul 21 2016  9:28AM EST

## 2018-01-18 NOTE — MISCELLANEOUS
Provider Comments  Provider Comments:   2/3/17-PT  MISSED APPT   TODAY/CW      Signatures   Electronically signed by : Tiffanie Rodrigues DO; Feb  3 2017  8:51AM EST                       (Author)

## 2018-01-22 VITALS — HEIGHT: 60 IN | WEIGHT: 200 LBS | BODY MASS INDEX: 39.27 KG/M2

## 2018-01-22 VITALS
SYSTOLIC BLOOD PRESSURE: 122 MMHG | RESPIRATION RATE: 16 BRPM | HEIGHT: 60 IN | HEART RATE: 94 BPM | BODY MASS INDEX: 34.16 KG/M2 | WEIGHT: 174 LBS | TEMPERATURE: 98.1 F | DIASTOLIC BLOOD PRESSURE: 84 MMHG

## 2018-01-22 VITALS — HEIGHT: 60 IN

## 2018-01-23 VITALS — HEIGHT: 60 IN | WEIGHT: 174 LBS | BODY MASS INDEX: 34.16 KG/M2

## 2018-01-23 NOTE — CONSULTS
History of Present Illness  Mrs oSo Garcia is a very pleasant 41years old female with history of left breast cancer diagnosed in 2016, Clinical stage II and underwent incomplete course of neoadjuvant chemotherapy at [facility]  Patient subsequently underwent following treatment and procedures     - left total mastectomy with SLNBx August 2016   - Delayed Left breast reconstruction with STEVE flap 9/19/16  - Debridement of left breast flap and Insertion of Left Breast tissue expander with ADM 9/19/2016  - TE to implant exchange left and right symmetry breast reduction 12/19/16  - removal of intact implant  1/23/17    Patient characteristics  Significant medical history: morbid obesity   Prior abdominal surgery: Gastric bypass surgery, STEVE flap   Tobacco use: none   Current bra size: absences of left breast   PMH/FH of DVT/PE: none  PMH/FH of miscarriages: none   Occupation: works in an office  Patient is here for opinion with regards breast reconstruction options  Discussion/Summary    1) Left breast Cancer   2) left acquired absence of breast    Pertinent reconstruction options were presented as outlined below  A detailed conversation was had regarding the patient's options for breast reconstruction  Five main points, which are explained to all breast reconstruction patients, were discussed  1) Breast reconstruction is an optional process  In addition, breast reconstruction can be performed in an immediate and delayed fashion  Even if a patient does not opt for reconstruction now, it can performed at a later time  2) Breast reconstruction is a multi-stage process which involves multiple surgeries spaced several months apart  The entire process can take over one year  The patient can stop within this process and/or resume it again at any time  3) The major goal of breast reconstruction is to have the patient look normal in clothing   When naked, there will always be scars and other stigmata of the breast reconstruction process  4) Asymmetries are often present during the reconstruction process  Several operations may be needed, including surgery to the non-cancerous breast, to achieve satisfactory results  5) No matter the reconstructive method, there are ways that the reconstruction can fail and a secondary reconstructive plan would need to be created  Next, a general discussion regarding all available methods of breast reconstruction were discussed  The types of reconstructions described included:    1) Tissue expander and implant based reconstruction, both single and multi-stage approaches  2) Autologous only reconstructions, including free abdominal-tissue, gluteal and thighs based reconstructions  3) Combination procedures, particularly latissimus dorsi flaps combined with either expanders or implants  For each of the reconstruction methods mentioned above, the risks, benefits, alternatives, scarring, and recovery time were discussed in great detail  Specific risks detailed included bleeding, infection, hematoma, seroma, scarring, pain, wound healing complications, flap loss, fat necrosis, capsular contracture, need for implant removal, donor site complications, bulge, hernia, umbilical necrosis, need for urgent reoperation, and need for dressing changes were discussed  The patient would be a candidate for:     -Implant based reconstruction: yes, despite prior failure due to infection     -Abdominally based free flap reconstruction: no, already performed and was not successful    - Gluteal Artery  free flap, yes, but complicated due to prior use of NOE vessels, would require use of thoracodorsal vs contralateral NOE vessels with possible need for vein graft  -LD+implant based reconstruction: yes     Patient case is complicated due to prior procedures  There is distortion of the IMF with significant thinning of the skin at that area   Patients upper pole is very pliable and seems good quality for expantion  Patient has discussed that was offered a LD flap but she has an incapacitated children at home and is concerned about the use of the muscle  After a long discussion about these factors, I recommended the following options in order to restore her acquired absence of left breast:    Option 1 ( most recommended)   1) Ipsilateral Thoracodorsal  flap vs Muscle sparing LD flap and placement of tissue expander, this would allow to restore the lower pole skin deficit, remove the poor tissue and allow good submuscular expansion ot TE without ADM  2) Exchange of TE to implant at later stage     vs     Option 2     1) Orlin type procedure to restore the IMF, 2) followed by a later date Tissue expander placement and 3) TE to implant exchange     the patient would like to think about it, discuss it with family and would decided  All her questions and concerns were answered during this encounter     60 minutes were spent face to face with the patient, of which over half were counseling and coordination of care  The patient was counseled regarding risks and benefits of treatment options  total time of encounter was 60 minutes        Signatures   Electronically signed by : NICHO Pedro ; Jan 2 2018 12:41PM EST                       (Author)

## 2018-01-24 VITALS — WEIGHT: 174 LBS | BODY MASS INDEX: 34.16 KG/M2 | HEIGHT: 60 IN

## 2018-02-19 DIAGNOSIS — C50.412 MALIGNANT NEOPLASM OF UPPER-OUTER QUADRANT OF LEFT FEMALE BREAST (HCC): ICD-10-CM

## 2018-02-20 ENCOUNTER — TELEPHONE (OUTPATIENT)
Dept: HEMATOLOGY ONCOLOGY | Facility: CLINIC | Age: 48
End: 2018-02-20

## 2018-02-20 NOTE — TELEPHONE ENCOUNTER
Spoke with patient  She stated that 2 months ago she saw her OBGYN and they said everything was ok  They did a biospsy and said that everything was normal  She stated that they told her that she had swelling in her uterus and that she may need an ablation to stop the bleeding  She was calling to tell us that she had some bleeding 2 weeks ago and now heavier bleeding  I recommended that she follow up with her OBGYN concerning the bleeding  She was not satisfied with my recommendation and abruptly ended the conversation

## 2018-02-20 NOTE — TELEPHONE ENCOUNTER
Pt called and would like a call back, she is on tamoxifen and is having abnormal bleeding   Pt prefers a call back from [id number] [facility]

## 2018-02-22 ENCOUNTER — APPOINTMENT (OUTPATIENT)
Dept: LAB | Facility: CLINIC | Age: 48
End: 2018-02-22
Payer: COMMERCIAL

## 2018-02-22 DIAGNOSIS — C50.412 MALIGNANT NEOPLASM OF UPPER-OUTER QUADRANT OF LEFT FEMALE BREAST (HCC): ICD-10-CM

## 2018-02-22 LAB
ALBUMIN SERPL BCP-MCNC: 3.8 G/DL (ref 3.5–5)
ALP SERPL-CCNC: 57 U/L (ref 46–116)
ALT SERPL W P-5'-P-CCNC: 29 U/L (ref 12–78)
ANION GAP SERPL CALCULATED.3IONS-SCNC: 8 MMOL/L (ref 4–13)
AST SERPL W P-5'-P-CCNC: 17 U/L (ref 5–45)
BASOPHILS # BLD AUTO: 0.01 THOUSANDS/ΜL (ref 0–0.1)
BASOPHILS NFR BLD AUTO: 0 % (ref 0–1)
BILIRUB SERPL-MCNC: 0.3 MG/DL (ref 0.2–1)
BUN SERPL-MCNC: 24 MG/DL (ref 5–25)
CALCIUM SERPL-MCNC: 9 MG/DL (ref 8.3–10.1)
CHLORIDE SERPL-SCNC: 106 MMOL/L (ref 100–108)
CO2 SERPL-SCNC: 27 MMOL/L (ref 21–32)
CREAT SERPL-MCNC: 0.83 MG/DL (ref 0.6–1.3)
EOSINOPHIL # BLD AUTO: 0.08 THOUSAND/ΜL (ref 0–0.61)
EOSINOPHIL NFR BLD AUTO: 1 % (ref 0–6)
ERYTHROCYTE [DISTWIDTH] IN BLOOD BY AUTOMATED COUNT: 14.5 % (ref 11.6–15.1)
FERRITIN SERPL-MCNC: 28 NG/ML (ref 8–388)
GFR SERPL CREATININE-BSD FRML MDRD: 85 ML/MIN/1.73SQ M
GLUCOSE P FAST SERPL-MCNC: 88 MG/DL (ref 65–99)
HCT VFR BLD AUTO: 42.8 % (ref 34.8–46.1)
HGB BLD-MCNC: 13.8 G/DL (ref 11.5–15.4)
LYMPHOCYTES # BLD AUTO: 1.7 THOUSANDS/ΜL (ref 0.6–4.47)
LYMPHOCYTES NFR BLD AUTO: 26 % (ref 14–44)
MCH RBC QN AUTO: 30.3 PG (ref 26.8–34.3)
MCHC RBC AUTO-ENTMCNC: 32.2 G/DL (ref 31.4–37.4)
MCV RBC AUTO: 94 FL (ref 82–98)
MONOCYTES # BLD AUTO: 0.36 THOUSAND/ΜL (ref 0.17–1.22)
MONOCYTES NFR BLD AUTO: 6 % (ref 4–12)
NEUTROPHILS # BLD AUTO: 4.28 THOUSANDS/ΜL (ref 1.85–7.62)
NEUTS SEG NFR BLD AUTO: 67 % (ref 43–75)
PLATELET # BLD AUTO: 264 THOUSANDS/UL (ref 149–390)
PMV BLD AUTO: 9.2 FL (ref 8.9–12.7)
POTASSIUM SERPL-SCNC: 3.8 MMOL/L (ref 3.5–5.3)
PROT SERPL-MCNC: 7.4 G/DL (ref 6.4–8.2)
RBC # BLD AUTO: 4.56 MILLION/UL (ref 3.81–5.12)
SODIUM SERPL-SCNC: 141 MMOL/L (ref 136–145)
WBC # BLD AUTO: 6.43 THOUSAND/UL (ref 4.31–10.16)

## 2018-02-22 PROCEDURE — 80053 COMPREHEN METABOLIC PANEL: CPT

## 2018-02-22 PROCEDURE — 36415 COLL VENOUS BLD VENIPUNCTURE: CPT

## 2018-02-22 PROCEDURE — 85025 COMPLETE CBC W/AUTO DIFF WBC: CPT

## 2018-02-22 PROCEDURE — 82728 ASSAY OF FERRITIN: CPT

## 2018-02-27 ENCOUNTER — OFFICE VISIT (OUTPATIENT)
Dept: HEMATOLOGY ONCOLOGY | Facility: CLINIC | Age: 48
End: 2018-02-27
Payer: COMMERCIAL

## 2018-02-27 VITALS
HEART RATE: 83 BPM | HEIGHT: 60 IN | OXYGEN SATURATION: 98 % | BODY MASS INDEX: 32.98 KG/M2 | WEIGHT: 168 LBS | RESPIRATION RATE: 16 BRPM | TEMPERATURE: 98.3 F | SYSTOLIC BLOOD PRESSURE: 102 MMHG | DIASTOLIC BLOOD PRESSURE: 70 MMHG

## 2018-02-27 DIAGNOSIS — C50.912 MALIGNANT NEOPLASM OF LEFT BREAST IN FEMALE, ESTROGEN RECEPTOR POSITIVE, UNSPECIFIED SITE OF BREAST (HCC): Primary | ICD-10-CM

## 2018-02-27 DIAGNOSIS — Z17.0 MALIGNANT NEOPLASM OF LEFT BREAST IN FEMALE, ESTROGEN RECEPTOR POSITIVE, UNSPECIFIED SITE OF BREAST (HCC): Primary | ICD-10-CM

## 2018-02-27 PROCEDURE — 99214 OFFICE O/P EST MOD 30 MIN: CPT | Performed by: INTERNAL MEDICINE

## 2018-02-27 RX ORDER — TAMOXIFEN CITRATE 20 MG/1
20 TABLET ORAL DAILY
Qty: 30 TABLET | Refills: 6 | Status: SHIPPED | OUTPATIENT
Start: 2018-02-27 | End: 2018-04-25 | Stop reason: SDUPTHER

## 2018-02-27 NOTE — PROGRESS NOTES
Hematology / Oncology Outpatient Follow Up Note    Linh Manuel 41 y o  female :1970 [facility]:[id number]         Date:  2018    Assessment / Plan:  A 41year old premenopausal woman with clinical T2 N0 left breast cancer, grade 1, ER positive, LA negative, HER-2 Fish negative disease  She was treated by Dr Ivone Martinez at the [facility] with neoadjuvant chemotherapy with dose dense AC x4  She only had minor clinical response  Subsequently, she switch her care at [place] [address], where she underwent left mastectomy with sentinel lymph node biopsy, resulting in SIMRAN  She had a 1 6 cm of invasive ductal carcinoma, grade 2  Currently, she is on adjuvant hormonal therapy with tamoxifen with no side effects  Clinically as well as radiographically, she has no evidence recurrent disease  I recommended her to continue with tamoxifen 20 mg daily  She is aware that Wellbutrin may decreased active metabolite of tamoxifen  She has tried different antidepressant which did not work  Since there is no evidence of increased recurrence of breast cancer with Wellbutrin with tamoxifen, she may stay on same medications  I will see her again in 6 months for routine follow-up  She is in agreement with my recommendations  Subjective:     HPI:   A 39year old premenopausal woman who underwent first screening mammography which was abnormal in her left breast  First FNA was inconclusive  Core biopsy showed invasive ductal carcinoma at the [facility]  MRI showed T2 lesion, with no lymph node abnormalities  This was grade 1, % positive, LA negative, HER-2 Fish negative disease  However, Ki-67 was 50-60%  She was seen by Dr Ivone Martinez, who recommended neoadjuvant chemotherapy  She received dose dense AC ?4 with very minor response  She has some toxicity with alopecia, nausea, vomiting, as well as significant fatigue  She gained 30 pounds during the chemotherapy   Subsequently, she was seen by Dr Jaqueline Lopez who I have been discussing her case  She subsequently underwent left mastectomy with sentinel lymph node biopsy in August 2016 which showed a 1 6?1 4?1 3 cm of invasive ductal carcinoma, grade 2  One sentinel lymph node was negative for metastatic disease  She is going to have separate tram flap reconstruction next week  She presents today to discuss further systemic treatment  She has no other significant past medical history  She was tested for BRCA gene mutation which was negative  She denied any pain  She has no respiratory symptom  Her performance status is normal  Her menstrual cycle stopped due to chemotherapy  However, in late August 2016, her menstrual cycles resumed  Interval History:  A 41year old premenopausal woman who was initially diagnosed clinical stage II left breast cancer, grade 1, ER strongly positive, CO negative, HER-2 negative disease  She was seen by Dr Marcelo Santizo, who treated her with neoadjuvant chemotherapy with AC x4, resulting in only minor response  She subsequently obtained a second opinion  She underwent left mastectomy with sentinel lymph node biopsy which showed stage IA left breast cancer, grade 2  I saw her, after the mastectomy  I recommended her to have adjuvant hormonal therapy with tamoxifen  She has been on tamoxifen since September 2016 with which she is doing well  She has somewhat irregular menstrual cycle  She denied hot flashes  She has history of depression for which she takes Wellbutrin  She has tried other antidepressant which did not work well  She wished to stay on Wellbutrin  She has no respiratory symptoms or pain  Her previous reconstruction failed  She has not decided whether she would like to have reconstruction again  Her performance status is normal     Objective:     Primary Diagnosis:    1  Left breast cancer, stage IA (pT1c, pN0,M0) grade 2, % positive, CO negative, HER-2 Fish negative disease  Diagnosed in April 2016   2  BRCA gene mutation negative  Cancer Staging:  No matching staging information was found for the patient  Previous Hematologic/ Oncologic Treatment:     Neoadjuvant chemotherapy with AC x4 completed in May 2016, which was given at [facility]      Current Hematologic/ Oncologic Treatment:      Adjuvant hormonal therapy with tamoxifen since September 2016  Disease Status:     SIMRAN status post mastectomy with sentinel lymph node biopsy  Test Results:    Pathology:    Left mastectomy specimen showed 1 6 x1 4 x 1 3 cm of invasive ductal carcinoma, grade 2  One sentinel lymph node was negative for metastatic disease  % positive, ID negative, HER-2 Fish negative disease  Stage IA(pT1c, pN0,M0)biopsy showed Ki-67 50-60%  Radiology:    CT scan of chest, abdomen pelvis showed no evidence of metastatic disease  Mammography in the right breast in April 2017 was benign  BI-RADS 2  Bone scan in August 2017 showed no evidence of osseous metastasis  Laboratory:        Physical Exam:      General Appearance:    Alert, oriented        Eyes:    PERRL   Ears:    Normal external ear canals, both ears   Nose:   Nares normal, septum midline   Throat:   Mucosa moist  Pharynx without injection  Neck:   Supple       Lungs:     Clear to auscultation bilaterally   Chest Wall:    No tenderness or deformity    Heart:    Regular rate and rhythm       Abdomen:     Soft, non-tender, bowel sounds +, no organomegaly           Extremities:   Extremities no cyanosis or edema       Skin:   no rash or icterus  Lymph nodes:   Cervical, supraclavicular, and axillary nodes normal   Neurologic:   CNII-XII intact, normal strength, sensation and reflexes     Throughout          Breast exam:   status post left mastectomy without reconstruction  No palpable abnormality in her left chest wall  Right breast exam is negative  ROS: Review of Systems   All other systems reviewed and are negative  Imaging: No results found        Labs: Lab Results   Component Value Date    WBC 6 43 02/22/2018    HGB 13 8 02/22/2018    HCT 42 8 02/22/2018    MCV 94 02/22/2018     02/22/2018     Lab Results   Component Value Date     02/22/2018    K 3 8 02/22/2018     02/22/2018    CO2 27 02/22/2018    ANIONGAP 8 02/22/2018    BUN 24 02/22/2018    CREATININE 0 83 02/22/2018    GLUCOSE 97 12/05/2017    GLUF 88 02/22/2018    CALCIUM 9 0 02/22/2018    AST 17 02/22/2018    ALT 29 02/22/2018    ALKPHOS 57 02/22/2018    PROT 7 4 02/22/2018    BILITOT 0 30 02/22/2018    EGFR 85 02/22/2018       Lab Results   Component Value Date    IRON 91 08/18/2017    TIBC 354 08/18/2017    FERRITIN 28 02/22/2018       Lab Results   Component Value Date    SEEOGIGM71 478 06/15/2017       Lab Results   Component Value Date    FOLATE 9 2 06/15/2017         Current Medications: Reviewed  Allergies: Reviewed  PMH/FH/SH:  Reviewed      Vital Sign:    Body surface area is 1 73 meters squared      Wt Readings from Last 3 Encounters:   02/27/18 76 2 kg (168 lb)   12/22/17 79 4 kg (175 lb)   12/20/17 78 9 kg (174 lb)        Temp Readings from Last 3 Encounters:   02/27/18 98 3 °F (36 8 °C) (Tympanic)   12/22/17 98 2 °F (36 8 °C)   10/26/17 98 1 °F (36 7 °C)        BP Readings from Last 3 Encounters:   02/27/18 102/70   12/22/17 113/53   10/26/17 122/84         Pulse Readings from Last 3 Encounters:   02/27/18 83   12/22/17 62   10/26/17 94     @LASTSAO2(3)@

## 2018-03-15 ENCOUNTER — TELEPHONE (OUTPATIENT)
Dept: SURGICAL ONCOLOGY | Facility: CLINIC | Age: 48
End: 2018-03-15

## 2018-03-15 DIAGNOSIS — C50.812 MALIGNANT NEOPLASM OF OVERLAPPING SITES OF LEFT BREAST IN FEMALE, ESTROGEN RECEPTOR POSITIVE (HCC): Primary | ICD-10-CM

## 2018-03-15 DIAGNOSIS — N93.8 UTERINE BLEEDING, DYSFUNCTIONAL: ICD-10-CM

## 2018-03-15 DIAGNOSIS — Z17.0 MALIGNANT NEOPLASM OF OVERLAPPING SITES OF LEFT BREAST IN FEMALE, ESTROGEN RECEPTOR POSITIVE (HCC): Primary | ICD-10-CM

## 2018-03-15 NOTE — TELEPHONE ENCOUNTER
Patient called to discuss possible referral to gyn onc for ongoing increased uterine bleeding/mass issues  Patient saw gyn at [facility] who performed ultrasounds of the abdomen in his office last week and several months ago  When seen by gyn this week, she was told mass was enlarging  She is having a formal ultrasound next week  Uterine biopsy done last fall for bleeding and thickening lining was negative  Patient is concerned due to her past history of breast cancer and she is on Tamoxifen  She would like another opinion  Order placed for a gyn onc consultation  HOPE line is aware

## 2018-03-27 ENCOUNTER — OFFICE VISIT (OUTPATIENT)
Dept: GYNECOLOGIC ONCOLOGY | Facility: CLINIC | Age: 48
End: 2018-03-27
Payer: COMMERCIAL

## 2018-03-27 VITALS
RESPIRATION RATE: 14 BRPM | HEART RATE: 81 BPM | DIASTOLIC BLOOD PRESSURE: 72 MMHG | HEIGHT: 60 IN | SYSTOLIC BLOOD PRESSURE: 124 MMHG

## 2018-03-27 DIAGNOSIS — N93.8 UTERINE BLEEDING, DYSFUNCTIONAL: ICD-10-CM

## 2018-03-27 DIAGNOSIS — Z17.0 MALIGNANT NEOPLASM OF OVERLAPPING SITES OF LEFT BREAST IN FEMALE, ESTROGEN RECEPTOR POSITIVE (HCC): ICD-10-CM

## 2018-03-27 DIAGNOSIS — N83.202 CYST OF LEFT OVARY: ICD-10-CM

## 2018-03-27 DIAGNOSIS — C50.812 MALIGNANT NEOPLASM OF OVERLAPPING SITES OF LEFT BREAST IN FEMALE, ESTROGEN RECEPTOR POSITIVE (HCC): ICD-10-CM

## 2018-03-27 DIAGNOSIS — N93.9 ABNORMAL UTERINE BLEEDING (AUB): Primary | ICD-10-CM

## 2018-03-27 PROCEDURE — 99214 OFFICE O/P EST MOD 30 MIN: CPT | Performed by: OBSTETRICS & GYNECOLOGY

## 2018-03-27 NOTE — ASSESSMENT & PLAN NOTE
-  Patient states she was told she has a 5 cm cystic ovarian lesion at last ultrasound performed by her primary gynecologist Dr Alfredo Jimenez on 3/8/2018  Those records are not available to me at this time  I have requested report  I have recommended follow-up ultrasound in 8-12 weeks

## 2018-03-27 NOTE — LETTER
March 27, 2018     Steve Carrasco MD  [address] [address]    Patient: Idris De   YOB: 1970   Date of Visit: 3/27/2018       Dear Dr Garry Ramirez:    Thank you for referring Idris De to me for evaluation  Below are my notes for this consultation  If you have questions, please do not hesitate to call me  I look forward to following your patient along with you  Sincerely,        Maximino Sanchez MD        CC: No Recipients  Maximino Sanchez MD  3/27/2018  3:24 PM  Sign at close encounter  Assessment/Plan:    Problem List Items Addressed This Visit        Genitourinary    Abnormal uterine bleeding (AUB) - Primary    Cyst of left ovary     -  Patient states she was told she has a 5 cm cystic ovarian lesion at last ultrasound performed by her primary gynecologist Dr Kasie Dowd on 3/8/2018  Those records are not available to me at this time  I have requested report  I have recommended follow-up ultrasound in 8-12 weeks  Other    Malignant neoplasm of left female breast ([address] )      Other Visit Diagnoses     Uterine bleeding, dysfunctional                  CHIEF COMPLAINT:       Here seeking a 2nd opinion and evaluation for abnormal uterine bleeding and cystic left ovarian lesion  Patient ID: Idris De is a 41 y o  female  HPI    Patient with history of prior laparoscopic band which was removed in 2013  She had no recent gynecologic care  Was diagnosed with breast cancer last year and her treatment was complicated by multiple failed attempts at reconstruction  Now, established gynecologic care with Dr Lizette Lemos  Per patient's report an office ultrasound was done as her uterus was palpated and noted to be enlarged  Report available to me at this time from October 2017 shows a uterus that measures to normal size with no gross abnormalities in tubes and ovaries    However, patient states a follow-up ultrasound was performed in March which demonstrated a  Larger uterus and a left ovarian cyst   Of note, an endometrial biopsy was obtained for abnormal uterine bleeding  She reports episodes of oligomenorrhea alternating with menometrorrhagia since starting tamoxifen for her breast cancer in January 2017  Results of such biopsy were inconclusive due to scant tissue but showed fragments of proliferative endometrium  Review of Systems    Mid pelvic pain  Abnormal uterine bleeding  Breast deformity from filled reconstructions  Otherwise 12 point review of systems is unremarkable  Current Outpatient Prescriptions   Medication Sig Dispense Refill    BuPROPion HCl (WELLBUTRIN XL PO) Take 150 mg by mouth daily in the early morning        CLONAZEPAM PO Take 1 tablet by mouth daily at bedtime      ferrous sulfate 325 (65 Fe) mg tablet Take 325 mg by mouth daily with breakfast      tamoxifen (NOLVADEX) 20 mg tablet Take 1 tablet (20 mg total) by mouth daily 30 tablet 6    topiramate (TOPAMAX) 25 mg tablet Take 25 mg by mouth 2 (two) times a day       No current facility-administered medications for this visit  Allergies   Allergen Reactions    Ambien [Zolpidem] Hallucinations    Effexor [Venlafaxine] GI Intolerance    Morphine Other (See Comments)     Itching, crying, short of breath       Past Medical History:   Diagnosis Date    Anxiety     Headache     History of transfusion     no adverse reaction    Malignant neoplasm of upper-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 [facility])     s/p chemotherapy       Past Surgical History:   Procedure Laterality Date    ABDOMINAL WALL SURGERY Left 9/21/2016    Procedure: DEBRIDEMENT OF LEFT ABDOMINAL TISSUE AND CLOSURE; DEBRIDEMENT OF LEFT BREAST FLAP; SUBMUSCULAR TISSUE EXPANDER PLACEMENT WITH ADM (ACELLULAR DERMAL MATRIX);   Surgeon: Jorge Luis Krishna MD;  [facility];  Service:     BREAST BIOPSY  04/2016    with sentinel node biospy    BREAST RECONSTRUCTION Left 12/22/2017    Procedure: REVISION OF LEFT BREAST SCAR, LOCAL FLAP;  Surgeon: Sebastian Gray MD;  [facility];  Service: Plastics    [address] OF UTERUS      LAPAROSCOPIC CHOLECYSTECTOMY  2000    LAPAROSCOPIC GASTRIC BANDING  2008    removed 2013    LAPAROSCOPIC GASTRIC BANDING  2013    removal    LIPOSUCTION W/ FAT INJECTION Left 6/8/2017    Procedure: BREAST FAT GRAFTING ;  Surgeon: Loida Owen MD;  [facility];  Service:     MASTECTOMY      MEDIPORT INSERTION, SINGLE  2016    ID BIOPSY/EXCISION, LYMPH NODE(S) Left 8/10/2016    Procedure: LYMPHOSCINTIGRAPHY; SENTINAL LYMPH NODE BIOPSY (INJECT AT 1100);   Surgeon: Nicolette Greenberg MD;  [facility];  Service: Surgical Oncology    ID BREAST RECONSTRUC W FREE FLAP Left 9/19/2016    Procedure: BREAST DELAYED RECONSTRUCTION; DEIP FREE FLAP removal of port-a -cath;  Surgeon: Loida Owen MD;  [facility];  Service: Plastics    ID DELAY BREAST PROS AFTER BREAST SURG Left 12/19/2016    Procedure: BREAST TISSUE EXPANDER REMOVAL; BREAST IMPLANT PLACEMENT;  Surgeon: Loida Owen MD;  [facility];  Service: Plastics    ID MASTECTOMY, SIMPLE, COMPLETE Left 8/10/2016    Procedure: BREAST MASTECTOMY; FROZEN SECTION ;  Surgeon: Nicolette Greenberg MD;  [facility];  Service: Surgical Oncology    ID REDUCTION OF LARGE BREAST Right 12/19/2016    Procedure: BREAST REDUCTION/MASTOPEXY ;  Surgeon: Loida Owen MD;  [facility];  Service: Plastics    ID REMOVE TISSUE EXPANDER(S) Left 1/23/2017    Procedure: BREAST IMPLANT REMOVAL; WASHOUT AND DEBRIDEMENT;  Surgeon: Loida Owen MD;  [facility];  Service: Plastics    REVISION OF SCAR ON TORSO N/A 12/19/2016    Procedure: ABDOMINAL SCAR REVISION ;  Surgeon: Loida Owen MD;  [facility];  Service:    Aria Contreras WISDOM TOOTH EXTRACTION         OB History     No data available          Family History   Problem Relation Age of Onset    Diabetes Mother     Heart disease Mother     Kidney disease Mother     Other Father        The following portions of the patient's history were reviewed and updated as appropriate: allergies, current medications, past family history, past medical history, past social history, past surgical history and problem list       Objective:    Blood pressure 124/72, pulse 81, resp  rate 14, height 5' (1 524 m), last menstrual period 03/16/2018, not currently breastfeeding  There is no height or weight on file to calculate BMI  Physical Exam   Constitutional: She is oriented to person, place, and time  She appears well-developed and well-nourished  HENT:   Head: Normocephalic and atraumatic  Neck: Normal range of motion  Neck supple  No thyromegaly present  Cardiovascular: Normal rate and regular rhythm  No murmur heard  Pulmonary/Chest: Effort normal    Abdominal: Soft  She exhibits no distension and no mass  There is no rebound  Genitourinary:   Genitourinary Comments:   Normal external genitalia  Normal vagina  No discharge  No lesions  No blood  Large cervix with no gross lesions and large ectropion  Difficult to palpate uterus and adnexa due to patient's intolerance to exam/ voluntary guarding  Musculoskeletal: Normal range of motion  She exhibits no edema  Lymphadenopathy:     She has no cervical adenopathy  Neurological: She is alert and oriented to person, place, and time  Skin: Skin is warm and dry  No rash noted  Psychiatric: She has a normal mood and affect  Her behavior is normal    Vitals reviewed  Pelvic ultrasound at Dr Will Enciso 10/6/2017:    uterus 7 9 x 5 06 x 6 17 cm  Right ovary 3 7 x 1 9 x 1 8 cm  Left ovary 1 7 x 2 4 x 2 1 cm  Endometrial stripe 7 6 mm        Lyubov Mann MD, [place], [facility]  3/27/2018  3:24 PM

## 2018-03-27 NOTE — PROGRESS NOTES
Assessment/Plan:    Problem List Items Addressed This Visit        Genitourinary    Abnormal uterine bleeding (AUB) - Primary    Cyst of left ovary     -  Patient states she was told she has a 5 cm cystic ovarian lesion at last ultrasound performed by her primary gynecologist Dr Mckayla Peterson on 3/8/2018  Those records are not available to me at this time  I have requested report  I have recommended follow-up ultrasound in 8-12 weeks  Other    Malignant neoplasm of left female breast ([address] )      Other Visit Diagnoses     Uterine bleeding, dysfunctional                  CHIEF COMPLAINT:       Here seeking a 2nd opinion and evaluation for abnormal uterine bleeding and cystic left ovarian lesion  Patient ID: Dhruv Reynoso is a 41 y o  female  HPI    Patient with history of prior laparoscopic band which was removed in 2013  She had no recent gynecologic care  Was diagnosed with breast cancer last year and her treatment was complicated by multiple failed attempts at reconstruction  Now, established gynecologic care with Dr Andrey Asher  Per patient's report an office ultrasound was done as her uterus was palpated and noted to be enlarged  Report available to me at this time from October 2017 shows a uterus that measures to normal size with no gross abnormalities in tubes and ovaries  However, patient states a follow-up ultrasound was performed in March which demonstrated a  Larger uterus and a left ovarian cyst   Of note, an endometrial biopsy was obtained for abnormal uterine bleeding  She reports episodes of oligomenorrhea alternating with menometrorrhagia since starting tamoxifen for her breast cancer in January 2017  Results of such biopsy were inconclusive due to scant tissue but showed fragments of proliferative endometrium  Review of Systems    Mid pelvic pain  Abnormal uterine bleeding  Breast deformity from filled reconstructions    Otherwise 12 point review of systems is unremarkable  Current Outpatient Prescriptions   Medication Sig Dispense Refill    BuPROPion HCl (WELLBUTRIN XL PO) Take 150 mg by mouth daily in the early morning        CLONAZEPAM PO Take 1 tablet by mouth daily at bedtime      ferrous sulfate 325 (65 Fe) mg tablet Take 325 mg by mouth daily with breakfast      tamoxifen (NOLVADEX) 20 mg tablet Take 1 tablet (20 mg total) by mouth daily 30 tablet 6    topiramate (TOPAMAX) 25 mg tablet Take 25 mg by mouth 2 (two) times a day       No current facility-administered medications for this visit  Allergies   Allergen Reactions    Ambien [Zolpidem] Hallucinations    Effexor [Venlafaxine] GI Intolerance    Morphine Other (See Comments)     Itching, crying, short of breath       Past Medical History:   Diagnosis Date    Anxiety     Headache     History of transfusion     no adverse reaction    Malignant neoplasm of upper-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 [facility])     s/p chemotherapy       Past Surgical History:   Procedure Laterality Date    ABDOMINAL WALL SURGERY Left 9/21/2016    Procedure: DEBRIDEMENT OF LEFT ABDOMINAL TISSUE AND CLOSURE; DEBRIDEMENT OF LEFT BREAST FLAP; SUBMUSCULAR TISSUE EXPANDER PLACEMENT WITH ADM (ACELLULAR DERMAL MATRIX);   Surgeon: Laura Marina MD;  [facility];  Service:     BREAST BIOPSY  04/2016    with sentinel node biospy    BREAST RECONSTRUCTION Left 12/22/2017    Procedure: REVISION OF LEFT BREAST SCAR, LOCAL FLAP;  Surgeon: Katelyn Marr MD;  [facility];  Service: Plastics    [address] [place] [address] Drive OF [facility]      LAPAROSCOPIC CHOLECYSTECTOMY  2000    LAPAROSCOPIC GASTRIC BANDING  2008    removed 2013    LAPAROSCOPIC GASTRIC BANDING  2013    removal    LIPOSUCTION W/ FAT INJECTION Left 6/8/2017    Procedure: BREAST FAT GRAFTING ;  Surgeon: Laura Marina MD;  [facility];  Service:    [address] [place] [address]  2016    UT BIOPSY/EXCISION, LYMPH NODE(S) Left 8/10/2016 Procedure: LYMPHOSCINTIGRAPHY; SENTINAL LYMPH NODE BIOPSY (INJECT AT 1100); Surgeon: Rancho Cerda MD;  [facility];  Service: Surgical Oncology    AR BREAST [address] Left 9/19/2016    Procedure: BREAST DELAYED RECONSTRUCTION; DEIP FREE FLAP removal of port-a -cath;  Surgeon: Alton Lehman MD;  [facility];  Service: Plastics    AR DELAY BREAST PROS AFTER BREAST SURG Left 12/19/2016    Procedure: BREAST TISSUE EXPANDER REMOVAL; BREAST IMPLANT PLACEMENT;  Surgeon: Alton Lehman MD;  [facility];  Service: Plastics    AR MASTECTOMY, SIMPLE, COMPLETE Left 8/10/2016    Procedure: BREAST MASTECTOMY; FROZEN SECTION ;  Surgeon: Rancho Cerda MD;  [facility];  Service: Surgical Oncology    AR REDUCTION OF LARGE BREAST Right 12/19/2016    Procedure: BREAST REDUCTION/MASTOPEXY ;  Surgeon: Alton Lehman MD;  [facility];  Service: Plastics    AR REMOVE TISSUE EXPANDER(S) Left 1/23/2017    Procedure: BREAST IMPLANT REMOVAL; WASHOUT AND DEBRIDEMENT;  Surgeon: Alton Lehman MD;  [facility];  Service: Plastics    REVISION OF SCAR ON TORSO N/A 12/19/2016    Procedure: ABDOMINAL SCAR REVISION ;  Surgeon: Alton Lehman MD;  [facility];  Service:    Melonie Anderson WISDOM TOOTH EXTRACTION         OB History     No data available          Family History   Problem Relation Age of Onset    Diabetes Mother     Heart disease Mother     Kidney disease Mother     Other Father        The following portions of the patient's history were reviewed and updated as appropriate: allergies, current medications, past family history, past medical history, past social history, past surgical history and problem list       Objective:    Blood pressure 124/72, pulse 81, resp  rate 14, height 5' (1 524 m), last menstrual period 03/16/2018, not currently breastfeeding  There is no height or weight on file to calculate BMI      Physical Exam   Constitutional: She is oriented to person, place, and time  She appears well-developed and well-nourished  HENT:   Head: Normocephalic and atraumatic  Neck: Normal range of motion  Neck supple  No thyromegaly present  Cardiovascular: Normal rate and regular rhythm  No murmur heard  Pulmonary/Chest: Effort normal    Abdominal: Soft  She exhibits no distension and no mass  There is no rebound  Genitourinary:   Genitourinary Comments:   Normal external genitalia  Normal vagina  No discharge  No lesions  No blood  Large cervix with no gross lesions and large ectropion  Difficult to palpate uterus and adnexa due to patient's intolerance to exam/ voluntary guarding  Musculoskeletal: Normal range of motion  She exhibits no edema  Lymphadenopathy:     She has no cervical adenopathy  Neurological: She is alert and oriented to person, place, and time  Skin: Skin is warm and dry  No rash noted  Psychiatric: She has a normal mood and affect  Her behavior is normal    Vitals reviewed  Pelvic ultrasound at Dr Michelle Dasilva 10/6/2017:    uterus 7 9 x 5 06 x 6 17 cm  Right ovary 3 7 x 1 9 x 1 8 cm  Left ovary 1 7 x 2 4 x 2 1 cm  Endometrial stripe 7 6 mm        Miguel Jalloh MD, Harjit Mason, [facility]  3/27/2018  3:24 PM

## 2018-04-02 ENCOUNTER — HOSPITAL ENCOUNTER (OUTPATIENT)
Dept: ULTRASOUND IMAGING | Facility: HOSPITAL | Age: 48
Discharge: HOME | End: 2018-04-02
Attending: OBSTETRICS & GYNECOLOGY
Payer: COMMERCIAL

## 2018-04-02 DIAGNOSIS — N93.9 ABNORMAL UTERINE BLEEDING (AUB): ICD-10-CM

## 2018-04-02 DIAGNOSIS — N83.202 CYST OF LEFT OVARY: ICD-10-CM

## 2018-04-02 PROCEDURE — 76856 US EXAM PELVIC COMPLETE: CPT

## 2018-04-03 ENCOUNTER — OFFICE VISIT (OUTPATIENT)
Dept: GYNECOLOGIC ONCOLOGY | Facility: CLINIC | Age: 48
End: 2018-04-03
Payer: COMMERCIAL

## 2018-04-03 VITALS
RESPIRATION RATE: 14 BRPM | HEIGHT: 60 IN | SYSTOLIC BLOOD PRESSURE: 112 MMHG | HEART RATE: 82 BPM | DIASTOLIC BLOOD PRESSURE: 68 MMHG

## 2018-04-03 DIAGNOSIS — N93.9 ABNORMAL UTERINE BLEEDING (AUB): Primary | ICD-10-CM

## 2018-04-03 PROCEDURE — 99214 OFFICE O/P EST MOD 30 MIN: CPT | Performed by: OBSTETRICS & GYNECOLOGY

## 2018-04-03 NOTE — PROGRESS NOTES
Assessment/Plan:    Problem List Items Addressed This Visit        Genitourinary    Abnormal uterine bleeding (AUB) - Primary     -   Patient is on tamoxifen  She has abnormal uterine bleeding  Prior biopsy by Dr Ermalinda Mcardle  Was nondiagnostic  She was also told about ovarian cyst   Today, I see her with a follow-up ultrasound that shows normal ovaries, an 11 mm endometrial stripe and approximately 10-12 week size uterus  I discussed with patient differential diagnosis including benign, precancerous and cancerous pathology  I discussed with her the pathophysiology of endometrial polyps and endometrial stimulation as well as potential progression to hyperplasia and carcinoma associated with tamoxifen  She understands that given abnormal uterine bleeding definitive histologic diagnosis is mandatory  Patient can barely tolerate an exam in the office  Prior endometrial biopsy in the office was insufficient  I have recommended hysteroscopy, dilatation and curettage with the understanding that subsequent interventions or procedures might be required depending on findings  Patient seems very in secure about wanting to proceed with neck is steps and evaluation  I emphasized risks of not obtaining definitive diagnosis which include potential failure to diagnose precancer or progression into more serious malignant process  Patient verbalized understanding  She desires to discuss this with her   She will contact us if she wants to reschedule an appointment to plan for surgical intervention  CHIEF COMPLAINT:       Here to discuss ultrasound results and next steps required for evaluation of abnormal uterine bleeding  Patient ID: Nick Atkinson is a 41 y o  female  HPI     Patient with breast cancer and complicated surgical history after failed attempts at reconstruction  She has abnormal uterine bleeding on tamoxifen    After 1st visit with review of prior records including an insufficient endometrial biopsy, I recommended follow-up ultrasound  She is here to discuss results  The following portions of the patient's history were reviewed and updated as appropriate: allergies, current medications, past family history, past medical history, past social history, past surgical history and problem list     Review of Systems    Current Outpatient Prescriptions   Medication Sig Dispense Refill    BuPROPion HCl (WELLBUTRIN XL PO) Take 150 mg by mouth daily in the early morning        CLONAZEPAM PO Take 1 tablet by mouth daily at bedtime      ferrous sulfate 325 (65 Fe) mg tablet Take 325 mg by mouth daily with breakfast      tamoxifen (NOLVADEX) 20 mg tablet Take 1 tablet (20 mg total) by mouth daily 30 tablet 6    topiramate (TOPAMAX) 25 mg tablet Take 25 mg by mouth 2 (two) times a day       No current facility-administered medications for this visit  Objective:    Blood pressure 112/68, pulse 82, resp  rate 14, height 5' (1 524 m), last menstrual period 03/16/2018, not currently breastfeeding  There is no height or weight on file to calculate BMI  There is no height or weight on file to calculate BSA  Physical Exam     Deferred  4/2/2018:  PELVIC ULTRASOUND, COMPLETE     INDICATION:  The patient is 41years old  N93 9: Abnormal uterine and vaginal bleeding, unspecified  N83 202: Unspecified ovarian cyst, left side  LMP 3/16/2018      COMPARISON: Pelvic ultrasound 9/2/2016, CT abdomen and pelvis 7/2/2017     TECHNIQUE:   Transabdominal pelvic ultrasound was performed in sagittal and transverse planes with a curvilinear transducer  Additional transvaginal imaging was performed to better evaluate the endometrium and ovaries  Imaging included volumetric   sweeps as well as traditional still imaging technique      FINDINGS:     UTERUS:  The uterus is anteverted in position, measuring 11 8 x 5 4 x 6 0 cm  Contour appears within normal limits    The myometrium is slightly heterogeneous although without focal pathology seen  The cervix shows no suspicious abnormality      ENDOMETRIUM:    Normal caliber of 11 mm  Homogenous and normal in appearance      OVARIES/ADNEXA:  Right ovary:  3 3 x 1 9 x 1 8 cm  No suspicious right ovarian abnormality  Doppler flow within normal limits      Left ovary:  2 8 x 1 2 x 1 3 cm  No suspicious left ovarian abnormality  Doppler flow within normal limits      No suspicious adnexal mass or loculated collections  There is no free fluid      IMPRESSION:     Mildly heterogeneous uterus  Otherwise, unremarkable pelvic ultrasound       Workstation performed: [id number]    -----------------------------------------------------------------------------------      I spent a total of 25 minutes in face-to-face counseling  I discussed differential diagnosis, importance of histologic sampling, risk of premalignant and malignant pathology  All questions answered to her satisfaction      Day Meléndez MD, [address]  4/3/2018  10:06 AM

## 2018-04-03 NOTE — ASSESSMENT & PLAN NOTE
-   Patient is on tamoxifen  She has abnormal uterine bleeding  Prior biopsy by Dr Mercy Gomez  Was nondiagnostic  She was also told about ovarian cyst   Today, I see her with a follow-up ultrasound that shows normal ovaries, an 11 mm endometrial stripe and approximately 10-12 week size uterus  I discussed with patient differential diagnosis including benign, precancerous and cancerous pathology  I discussed with her the pathophysiology of endometrial polyps and endometrial stimulation as well as potential progression to hyperplasia and carcinoma associated with tamoxifen  She understands that given abnormal uterine bleeding definitive histologic diagnosis is mandatory  Patient can barely tolerate an exam in the office  Prior endometrial biopsy in the office was insufficient  I have recommended hysteroscopy, dilatation and curettage with the understanding that subsequent interventions or procedures might be required depending on findings  Patient seems very in secure about wanting to proceed with neck is steps and evaluation  I emphasized risks of not obtaining definitive diagnosis which include potential failure to diagnose precancer or progression into more serious malignant process  Patient verbalized understanding  She desires to discuss this with her   She will contact us if she wants to reschedule an appointment to plan for surgical intervention

## 2018-04-10 DIAGNOSIS — C50.412 MALIGNANT NEOPLASM OF UPPER-OUTER QUADRANT OF LEFT FEMALE BREAST (HCC): ICD-10-CM

## 2018-04-19 ENCOUNTER — HOSPITAL ENCOUNTER (OUTPATIENT)
Dept: MAMMOGRAPHY | Facility: CLINIC | Age: 48
Discharge: HOME | End: 2018-04-19
Payer: COMMERCIAL

## 2018-04-19 DIAGNOSIS — C50.412 MALIGNANT NEOPLASM OF UPPER-OUTER QUADRANT OF LEFT FEMALE BREAST (HCC): ICD-10-CM

## 2018-04-19 PROBLEM — N64.89 SEROMA OF BREAST: Status: RESOLVED | Noted: 2017-01-16 | Resolved: 2018-04-19

## 2018-04-19 PROBLEM — T85.79XA INFECTION OF BREAST IMPLANT (HCC): Status: RESOLVED | Noted: 2017-01-23 | Resolved: 2018-04-19

## 2018-04-19 PROBLEM — M79.606 LEG PAIN: Status: RESOLVED | Noted: 2017-06-13 | Resolved: 2018-04-19

## 2018-04-19 PROBLEM — T88.8XXA FLUID COLLECTION AT SURGICAL SITE: Status: RESOLVED | Noted: 2017-01-14 | Resolved: 2018-04-19

## 2018-04-19 PROCEDURE — 77065 DX MAMMO INCL CAD UNI: CPT

## 2018-04-19 PROCEDURE — G0279 TOMOSYNTHESIS, MAMMO: HCPCS

## 2018-04-24 PROBLEM — Z79.810 USE OF TAMOXIFEN (NOLVADEX): Status: ACTIVE | Noted: 2018-04-24

## 2018-04-25 ENCOUNTER — OFFICE VISIT (OUTPATIENT)
Dept: SURGICAL ONCOLOGY | Facility: CLINIC | Age: 48
End: 2018-04-25
Payer: COMMERCIAL

## 2018-04-25 VITALS
TEMPERATURE: 98.1 F | RESPIRATION RATE: 14 BRPM | BODY MASS INDEX: 33.18 KG/M2 | HEART RATE: 100 BPM | WEIGHT: 169 LBS | SYSTOLIC BLOOD PRESSURE: 100 MMHG | DIASTOLIC BLOOD PRESSURE: 72 MMHG | HEIGHT: 60 IN

## 2018-04-25 DIAGNOSIS — Z17.0 MALIGNANT NEOPLASM OF LEFT BREAST IN FEMALE, ESTROGEN RECEPTOR POSITIVE, UNSPECIFIED SITE OF BREAST (HCC): ICD-10-CM

## 2018-04-25 DIAGNOSIS — C50.912 MALIGNANT NEOPLASM OF LEFT BREAST IN FEMALE, ESTROGEN RECEPTOR POSITIVE, UNSPECIFIED SITE OF BREAST (HCC): ICD-10-CM

## 2018-04-25 DIAGNOSIS — Z17.0 MALIGNANT NEOPLASM OF UPPER-OUTER QUADRANT OF LEFT BREAST IN FEMALE, ESTROGEN RECEPTOR POSITIVE (HCC): Primary | ICD-10-CM

## 2018-04-25 DIAGNOSIS — C50.412 MALIGNANT NEOPLASM OF UPPER-OUTER QUADRANT OF LEFT BREAST IN FEMALE, ESTROGEN RECEPTOR POSITIVE (HCC): Primary | ICD-10-CM

## 2018-04-25 DIAGNOSIS — Z79.810 USE OF TAMOXIFEN (NOLVADEX): ICD-10-CM

## 2018-04-25 PROCEDURE — 99214 OFFICE O/P EST MOD 30 MIN: CPT | Performed by: SURGERY

## 2018-04-25 RX ORDER — TAMOXIFEN CITRATE 20 MG/1
20 TABLET ORAL DAILY
Qty: 90 TABLET | Refills: 1 | Status: SHIPPED | OUTPATIENT
Start: 2018-04-25 | End: 2018-05-21 | Stop reason: HOSPADM

## 2018-04-25 NOTE — LETTER
April 25, 2018     Clayton Eaton, [address] [facility]    Patient: Sarah Guzmán   YOB: 1970   Date of Visit: 4/25/2018       Dear Dr Carlton Gip:    Thank you for referring Sarah Guzmán to me for evaluation  Below are my notes for this consultation  If you have questions, please do not hesitate to call me  I look forward to following your patient along with you  Sincerely,        Nisa Mcmahon MD        CC: No Recipients  Nisa Mcmahon MD  4/25/2018  3:54 PM  Sign at close encounter     Surgical Oncology Follow Up       [address] [place] [address] [address] 1000 [facility]  1970  [id number]  [address] [facility],[address]  [facility] [address] [address]    Chief Complaint   Patient presents with    Breast Cancer     six month follow up          Assessment & Plan:   Assessment/Plan   No evidence of local regional or distant recurrence disease    Mammogram from April this year showed no worrisome findings in the right breast   Cancer History:        Malignant neoplasm of upper-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 [facility])     - 6/2016 Chemotherapy     Theron Adjuvant chemo at [facility]   Adriamycin and Cytoxan for four cycles  Patient declined further chemo and came to [place] [id number] for second opinion         4/25/2016 Initial Diagnosis     Left lumpectomy  [facility]  Invasive ductal carcinoma  Grade 1  1 86 on utrasound    RI 0  Her 2 2+  Fish negative  Stage IIA per [facility]         6/21/2016 Genetic Testing     BRCA negative  Myriad-Done through [facility]         8/10/2016 Surgery     Left mastectomy with sentinel lymph node biopsy  Invasive ductal carcinoma  Grade 2  1 6 cm  0/1 lymph node  Stage 1A    Co surgery with Dr Abelardo Pérez  Failed STEVE flap         10/2016 -  Hormone Therapy     Tamoxifen 20 mg daily    Eufemia Ailin         2017 Surgery     Revision of left breast scar/local flap  Dr Kimberlee Piper            History of Present Illness:   See above    Interval History:   Patient is happy with her recent reconstructive surgery with Dr Kimberlee Piper   She is debating whether to have another endometrial biopsy  She has discussed this with Dr Anniece Najjar as well as her gynecologist     Review of Systems:   Review of Systems   Constitutional: Negative for activity change, appetite change, fatigue and unexpected weight change  HENT: Negative for ear pain, tinnitus, trouble swallowing and voice change  Eyes: Negative for pain and visual disturbance  Respiratory: Negative for cough, shortness of breath, wheezing and stridor  Cardiovascular: Negative for chest pain, palpitations and leg swelling  Gastrointestinal: Negative for abdominal distention, abdominal pain and blood in stool  Endocrine: Negative for cold intolerance and heat intolerance  Genitourinary: Negative for difficulty urinating, dysuria, flank pain and hematuria  Musculoskeletal: Negative for arthralgias, back pain, gait problem and joint swelling  Skin: Negative for color change, rash and wound  Allergic/Immunologic: Negative for immunocompromised state  Neurological: Negative for dizziness, seizures, speech difficulty, weakness and headaches  Hematological: Negative for adenopathy  Psychiatric/Behavioral: Negative for confusion         Past Medical History     Patient Active Problem List   Diagnosis    Symptomatic anemia    Malignant neoplasm of upper-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 ([address] )    Depression    Anxiety    Abnormal uterine bleeding (AUB)    Cyst of left ovary    Use of tamoxifen (Nolvadex)     Past Medical History:   Diagnosis Date    Anxiety     Headache     History of transfusion     no adverse reaction    Malignant neoplasm of upper-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 [facility])     s/p chemotherapy     Past Surgical History:   Procedure Laterality Date    ABDOMINAL WALL SURGERY Left 9/21/2016    Procedure: DEBRIDEMENT OF LEFT ABDOMINAL TISSUE AND CLOSURE; DEBRIDEMENT OF LEFT BREAST FLAP; SUBMUSCULAR TISSUE EXPANDER PLACEMENT WITH ADM (ACELLULAR DERMAL MATRIX); Surgeon: Jennifer Joaquin MD;  [facility];  Service:     BREAST BIOPSY  04/2016    with sentinel node biospy    BREAST RECONSTRUCTION Left 12/22/2017    Procedure: REVISION OF LEFT BREAST SCAR, LOCAL FLAP;  Surgeon: Consuelo Dyer MD;  [facility];  Service: Plastics    [address] [place] [address] Drive OF UTERUS      LAPAROSCOPIC CHOLECYSTECTOMY  2000    LAPAROSCOPIC GASTRIC BANDING  2008    removed 2013    LAPAROSCOPIC GASTRIC BANDING  2013    removal    LIPOSUCTION W/ FAT INJECTION Left 6/8/2017    Procedure: BREAST FAT GRAFTING ;  Surgeon: Jennifer Joaquin MD;  [facility];  Service:     MASTECTOMY      MEDIPORT INSERTION, SINGLE  2016    VT BIOPSY/EXCISION, LYMPH NODE(S) Left 8/10/2016    Procedure: LYMPHOSCINTIGRAPHY; SENTINAL LYMPH NODE BIOPSY (INJECT AT 1100);   Surgeon: Svetlana Otero MD;  [facility];  Service: Surgical Oncology    VT BREAST RECONSTRUC W FREE FLAP Left 9/19/2016    Procedure: BREAST DELAYED RECONSTRUCTION; DEIP FREE FLAP removal of port-a -cath;  Surgeon: Jennifer Joaquin MD;  [facility];  Service: Plastics    VT DELAY BREAST PROS AFTER BREAST SURG Left 12/19/2016    Procedure: BREAST TISSUE EXPANDER REMOVAL; BREAST IMPLANT PLACEMENT;  Surgeon: Jennifer Joaquin MD;  [facility];  Service: Plastics    VT MASTECTOMY, SIMPLE, COMPLETE Left 8/10/2016    Procedure: BREAST MASTECTOMY; FROZEN SECTION ;  Surgeon: Svetlana Otero MD;  [facility];  Service: Surgical Oncology    VT REDUCTION OF LARGE BREAST Right 12/19/2016    Procedure: BREAST REDUCTION/MASTOPEXY ;  Surgeon: Jennifer Joaquin MD;  [facility];  Service: Plastics    VT REMOVE TISSUE EXPANDER(S) Left 1/23/2017    Procedure: BREAST IMPLANT REMOVAL; WASHOUT AND DEBRIDEMENT;  Surgeon: Yuriy Carrasco MD;  [facility];  Service: Plastics    REVISION OF SCAR ON TORSO N/A 12/19/2016    Procedure: ABDOMINAL SCAR REVISION ;  Surgeon: Yuriy Carrasco MD;  [facility];  Service:    Neema Hernandez WISDOM TOOTH EXTRACTION       Family History   Problem Relation Age of Onset    Diabetes Mother     Heart disease Mother     Kidney disease Mother     Other Father      Social History     Social History    Marital status: /Civil Union     Spouse name: N/A    Number of children: N/A    Years of education: N/A     Occupational History    Not on file  Social History Main Topics    Smoking status: Former Smoker     Packs/day: 1 00     Years: 15 00     Quit date: 2002    Smokeless tobacco: Never Used    Alcohol use No    Drug use: No    Sexual activity: Not on file     Other Topics Concern    Not on file     Social History Narrative    No narrative on file       Current Outpatient Prescriptions:     BuPROPion HCl (WELLBUTRIN XL PO), Take 150 mg by mouth daily in the early morning  , Disp: , Rfl:     CLONAZEPAM PO, Take 1 tablet by mouth daily at bedtime, Disp: , Rfl:     tamoxifen (NOLVADEX) 20 mg tablet, Take 1 tablet (20 mg total) by mouth daily, Disp: 30 tablet, Rfl: 6    topiramate (TOPAMAX) 25 mg tablet, Take 25 mg by mouth 2 (two) times a day, Disp: , Rfl:   Allergies   Allergen Reactions    Ambien [Zolpidem] Hallucinations    Effexor [Venlafaxine] GI Intolerance    Morphine Other (See Comments)     Itching, crying, short of breath       Physical Exam:     Vitals:    04/25/18 1508   BP: 100/72   Pulse: 100   Resp: 14   Temp: 98 1 °F (36 7 °C)     Physical Exam   Constitutional: She is oriented to person, place, and time  She appears well-developed and well-nourished  HENT:   Head: Normocephalic and atraumatic  Mouth/Throat: Oropharynx is clear and moist    Eyes: EOM are normal  Pupils are equal, round, and reactive to light     Neck: Normal range of motion  Neck supple  No JVD present  No tracheal deviation present  No thyromegaly present  Cardiovascular: Normal rate, regular rhythm, normal heart sounds and intact distal pulses  Exam reveals no gallop and no friction rub  No murmur heard  Pulmonary/Chest: Effort normal and breath sounds normal  No respiratory distress  She has no wheezes  She has no rales  The right breast shows no evidence of a dominant mass or axillary adenopathy or abnormal skin findings  The left mastectomy site shows no evidence of local regional recurrence  Abdominal: Soft  She exhibits no distension and no mass  There is no hepatomegaly  There is no tenderness  There is no rebound and no guarding  Musculoskeletal: Normal range of motion  She exhibits no edema or tenderness  Lymphadenopathy:     She has no cervical adenopathy  Neurological: She is alert and oriented to person, place, and time  No cranial nerve deficit  Skin: Skin is warm and dry  No rash noted  No erythema  Psychiatric: She has a normal mood and affect  Her behavior is normal    Vitals reviewed  Results:       Discussion/Summary:   Patient is clinically and radiographically free of any evidence of local regional recurrence  We will see her back in 6 months  The patient is considering a 2nd opinion regarding her endometrial biopsies  Advance Care Planning/Advance Directives:  I discussed the disease status, treatment plans and follow-up with the patient

## 2018-04-25 NOTE — PROGRESS NOTES
Surgical Oncology Follow Up       [address] [facility],[address] [facility]  [facility] [place]  [address] [address]    Vidal Espinoza  1970  [id number]  [address] [facility] [facility]  [facility] ONCOLOG[facility] [place]  [address] [address]    Chief Complaint   Patient presents with    Breast Cancer     six month follow up          Assessment & Plan:   Assessment/Plan   No evidence of local regional or distant recurrence disease  Mammogram from April this year showed no worrisome findings in the right breast   Cancer History:        Malignant neoplasm of upper-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 [facility])     - 6/2016 Chemotherapy     Theron Adjuvant chemo at [facility]   Adriamycin and Cytoxan for four cycles  Patient declined further chemo and came to [place] [phone] for second opinion         4/25/2016 Initial Diagnosis     Left lumpectomy  [facility]  Invasive ductal carcinoma  Grade 1  1 86 on utrasound    MT 0  Her 2 2+  Fish negative  Stage IIA per [facility]         6/21/2016 Genetic Testing     BRCA negative  Myriad-Done through [facility]         8/10/2016 Surgery     Left mastectomy with sentinel lymph node biopsy  Invasive ductal carcinoma  Grade 2  1 6 cm  0/1 lymph node  Stage 1A    Co surgery with Dr Shana Sen  Failed STEVE flap         10/2016 -  Hormone Therapy     Tamoxifen 20 mg daily  Dr Jemima Gilman         12/22/2017 Surgery     Revision of left breast scar/local flap  Dr Rajat Griggs            History of Present Illness:   See above    Interval History:   Patient is happy with her recent reconstructive surgery with Dr Rajat Griggs   She is debating whether to have another endometrial biopsy  She has discussed this with Dr Robert Womack as well as her gynecologist     Review of Systems:   Review of Systems   Constitutional: Negative for activity change, appetite change, fatigue and unexpected weight change     HENT: Negative for ear pain, tinnitus, trouble swallowing and voice change  Eyes: Negative for pain and visual disturbance  Respiratory: Negative for cough, shortness of breath, wheezing and stridor  Cardiovascular: Negative for chest pain, palpitations and leg swelling  Gastrointestinal: Negative for abdominal distention, abdominal pain and blood in stool  Endocrine: Negative for cold intolerance and heat intolerance  Genitourinary: Negative for difficulty urinating, dysuria, flank pain and hematuria  Musculoskeletal: Negative for arthralgias, back pain, gait problem and joint swelling  Skin: Negative for color change, rash and wound  Allergic/Immunologic: Negative for immunocompromised state  Neurological: Negative for dizziness, seizures, speech difficulty, weakness and headaches  Hematological: Negative for adenopathy  Psychiatric/Behavioral: Negative for confusion  Past Medical History     Patient Active Problem List   Diagnosis    Symptomatic anemia    Malignant neoplasm of upper-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 ([facility] [address] )    Depression    Anxiety    Abnormal uterine bleeding (AUB)    Cyst of left ovary    Use of tamoxifen (Nolvadex)     Past Medical History:   Diagnosis Date    Anxiety     Headache     History of transfusion     no adverse reaction    Malignant neoplasm of upper-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 [facility])     s/p chemotherapy     Past Surgical History:   Procedure Laterality Date    ABDOMINAL WALL SURGERY Left 9/21/2016    Procedure: DEBRIDEMENT OF LEFT ABDOMINAL TISSUE AND CLOSURE; DEBRIDEMENT OF LEFT BREAST FLAP; SUBMUSCULAR TISSUE EXPANDER PLACEMENT WITH ADM (ACELLULAR DERMAL MATRIX);   Surgeon: Salvatore Ndiaye MD;  Location:  MAIN OR;  Service:     BREAST BIOPSY  04/2016    with sentinel node biospy    BREAST RECONSTRUCTION Left 12/22/2017    Procedure: REVISION OF LEFT BREAST SCAR, LOCAL FLAP;  Surgeon: Saba Man MD;  [facility];  Service: Plastics    DILATION AND CURETTAGE OF UTERUS      LAPAROSCOPIC CHOLECYSTECTOMY  2000    LAPAROSCOPIC GASTRIC BANDING  2008    removed 2013    LAPAROSCOPIC GASTRIC BANDING  2013    removal    LIPOSUCTION W/ FAT INJECTION Left 6/8/2017    Procedure: BREAST FAT GRAFTING ;  Surgeon: Jose Alberto Gill MD;  [facility];  Service:     MASTECTOMY      MEDIPORT INSERTION, SINGLE  2016    UT BIOPSY/EXCISION, LYMPH NODE(S) Left 8/10/2016    Procedure: LYMPHOSCINTIGRAPHY; SENTINAL LYMPH NODE BIOPSY (INJECT AT 1100);   Surgeon: Shu Leon MD;  [facility];  Service: Surgical Oncology    UT BREAST [address] Left 9/19/2016    Procedure: BREAST DELAYED RECONSTRUCTION; DEIP FREE FLAP removal of port-a -cath;  Surgeon: Jose Alberto Gill MD;  [facility]: [facility];  Service: Plastics    UT DELAY BREAST PROS AFTER BREAST SURG Left 12/19/2016    Procedure: BREAST TISSUE EXPANDER REMOVAL; BREAST IMPLANT PLACEMENT;  Surgeon: Jose Alberto Gill MD;  [facility];  Service: Plastics    UT MASTECTOMY, SIMPLE, COMPLETE Left 8/10/2016    Procedure: BREAST MASTECTOMY; FROZEN SECTION ;  Surgeon: Shu Leon MD;  [facility];  Service: Surgical Oncology    UT REDUCTION OF LARGE BREAST Right 12/19/2016    Procedure: BREAST REDUCTION/MASTOPEXY ;  Surgeon: Jose Alberto Gill MD;  [facility];  Service: Plastics    UT REMOVE TISSUE EXPANDER(S) Left 1/23/2017    Procedure: BREAST IMPLANT REMOVAL; WASHOUT AND DEBRIDEMENT;  Surgeon: Jose Alberto Gill MD;  [facility];  Service: Plastics    REVISION OF SCAR ON TORSO N/A 12/19/2016    Procedure: ABDOMINAL SCAR REVISION ;  Surgeon: Jose Alberto Glil MD;  [facility];  Service:    Alonzo Matias WISDOM TOOTH EXTRACTION       Family History   Problem Relation Age of Onset    Diabetes Mother     Heart disease Mother     Kidney disease Mother     Other Father      Social History     Social History    Marital status: /Civil Union     Spouse name: N/A    Number of children: N/A  Years of education: N/A     Occupational History    Not on file  Social History Main Topics    Smoking status: Former Smoker     Packs/day: 1 00     Years: 15 00     Quit date: 2002    Smokeless tobacco: Never Used    Alcohol use No    Drug use: No    Sexual activity: Not on file     Other Topics Concern    Not on file     Social History Narrative    No narrative on file       Current Outpatient Prescriptions:     BuPROPion HCl (WELLBUTRIN XL PO), Take 150 mg by mouth daily in the early morning  , Disp: , Rfl:     CLONAZEPAM PO, Take 1 tablet by mouth daily at bedtime, Disp: , Rfl:     tamoxifen (NOLVADEX) 20 mg tablet, Take 1 tablet (20 mg total) by mouth daily, Disp: 30 tablet, Rfl: 6    topiramate (TOPAMAX) 25 mg tablet, Take 25 mg by mouth 2 (two) times a day, Disp: , Rfl:   Allergies   Allergen Reactions    Ambien [Zolpidem] Hallucinations    Effexor [Venlafaxine] GI Intolerance    Morphine Other (See Comments)     Itching, crying, short of breath       Physical Exam:     Vitals:    04/25/18 1508   BP: 100/72   Pulse: 100   Resp: 14   Temp: 98 1 °F (36 7 °C)     Physical Exam   Constitutional: She is oriented to person, place, and time  She appears well-developed and well-nourished  HENT:   Head: Normocephalic and atraumatic  Mouth/Throat: Oropharynx is clear and moist    Eyes: EOM are normal  Pupils are equal, round, and reactive to light  Neck: Normal range of motion  Neck supple  No JVD present  No tracheal deviation present  No thyromegaly present  Cardiovascular: Normal rate, regular rhythm, normal heart sounds and intact distal pulses  Exam reveals no gallop and no friction rub  No murmur heard  Pulmonary/Chest: Effort normal and breath sounds normal  No respiratory distress  She has no wheezes  She has no rales  The right breast shows no evidence of a dominant mass or axillary adenopathy or abnormal skin findings   The left mastectomy site shows no evidence of local regional recurrence  Abdominal: Soft  She exhibits no distension and no mass  There is no hepatomegaly  There is no tenderness  There is no rebound and no guarding  Musculoskeletal: Normal range of motion  She exhibits no edema or tenderness  Lymphadenopathy:     She has no cervical adenopathy  Neurological: She is alert and oriented to person, place, and time  No cranial nerve deficit  Skin: Skin is warm and dry  No rash noted  No erythema  Psychiatric: She has a normal mood and affect  Her behavior is normal    Vitals reviewed  Results:       Discussion/Summary:   Patient is clinically and radiographically free of any evidence of local regional recurrence  We will see her back in 6 months  The patient is considering a 2nd opinion regarding her endometrial biopsies  Advance Care Planning/Advance Directives:  I discussed the disease status, treatment plans and follow-up with the patient

## 2018-05-03 ENCOUNTER — OFFICE VISIT (OUTPATIENT)
Dept: GYNECOLOGIC ONCOLOGY | Facility: CLINIC | Age: 48
End: 2018-05-03
Payer: COMMERCIAL

## 2018-05-03 VITALS
HEART RATE: 68 BPM | RESPIRATION RATE: 12 BRPM | TEMPERATURE: 97.5 F | DIASTOLIC BLOOD PRESSURE: 78 MMHG | SYSTOLIC BLOOD PRESSURE: 110 MMHG | HEIGHT: 60 IN

## 2018-05-03 DIAGNOSIS — N93.9 ABNORMAL UTERINE BLEEDING (AUB): Primary | ICD-10-CM

## 2018-05-03 PROCEDURE — 99214 OFFICE O/P EST MOD 30 MIN: CPT | Performed by: OBSTETRICS & GYNECOLOGY

## 2018-05-03 RX ORDER — SODIUM CHLORIDE, SODIUM LACTATE, POTASSIUM CHLORIDE, CALCIUM CHLORIDE 600; 310; 30; 20 MG/100ML; MG/100ML; MG/100ML; MG/100ML
125 INJECTION, SOLUTION INTRAVENOUS CONTINUOUS
Status: CANCELLED | OUTPATIENT
Start: 2018-05-03

## 2018-05-03 NOTE — ASSESSMENT & PLAN NOTE
51-year-old with a personal history of breast cancer currently undergoing tamoxifen therapy who has abnormal uterine bleeding and pelvic pain  Pelvic ultrasound on April 2nd 2018 revealed a thickened endometrium, uterus measured 11 centimeters  She was counseled for dilation curettage as it is difficult for her to tolerate office exam    Performance status is 0   1  I discussed the risks and benefits of dilation and curettage with hysteroscopy  She understands the risks and benefits of the operation and agrees to proceed as outlined  All questions were answered  Consent was obtained by me in the office  2  If her pathology is benign and she continues to have debilitating pelvic pain on a monthly basis, consideration can be given to hysterectomy  I spent 25 minutes with the patient  More than half the time was spent in counseling and coordination of care regarding treatment of her abnormal uterine bleeding

## 2018-05-03 NOTE — H&P
Assessment/Plan:    Problem List Items Addressed This Visit        Genitourinary    Abnormal uterine bleeding (AUB) - Primary      79-year-old with a personal history of breast cancer currently undergoing tamoxifen therapy who has abnormal uterine bleeding and pelvic pain  Pelvic ultrasound on April 2nd 2018 revealed a thickened endometrium, uterus measured 11 centimeters  She was counseled for dilation curettage as it is difficult for her to tolerate office exam    Performance status is 0   1  I discussed the risks and benefits of dilation and curettage with hysteroscopy  She understands the risks and benefits of the operation and agrees to proceed as outlined  All questions were answered  Consent was obtained by me in the office  2  If her pathology is benign and she continues to have debilitating pelvic pain on a monthly basis, consideration can be given to hysterectomy  I spent 25 minutes with the patient  More than half the time was spent in counseling and coordination of care regarding treatment of her abnormal uterine bleeding           Relevant Orders    Case request operating room: DILATATION AND CURETTAGE (D&C) (Completed)    Comprehensive metabolic panel    CBC and differential            CHIEF COMPLAINT: Here to discuss surgery      Problem: abnormal uterine bleeding  Cancer Staging  Malignant neoplasm of upper-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 [facility])  Staging form: Breast, AJCC 7th Edition  - Pathologic stage from 8/10/2016: Stage IA (yT1c, N0, cM0) - Signed by Nisa Mcmahon MD on 4/25/2018        Previous therapy:     Malignant neoplasm of upper-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 [facility])     - 6/2016 Chemotherapy     Theron Adjuvant chemo at [facility] for four cycles  Patient declined further chemo and came to [place] [id number] for second opinion         4/25/2016 Initial Diagnosis     Left lumpectomy  [facility]  Invasive ductal carcinoma  Grade 1  1 86 on utrasound    [place] 0  Her 2 2+  Fish negative  Stage IIA per [facility]         6/21/2016 Genetic Testing     BRCA negative  Myriad-Done through [facility]         8/10/2016 Surgery     Left mastectomy with sentinel lymph node biopsy  Invasive ductal carcinoma  Grade 2  1 6 cm  0/1 lymph node  Stage 1A    Co surgery with Dr Jennifer Joshi  Failed STEVE flap         10/2016 -  Hormone Therapy     Tamoxifen 20 mg daily  Dr Clark Mcgraw         12/22/2017 Surgery     Revision of left breast scar/local flap  Dr Navin Dunn              Patient ID: Sudha Monae is a 41 y o  female   61-year-old returns to discuss dilation and curettage with hysteroscopy for thickened endometrium, abnormal uterine bleeding while on tamoxifen therapy  There has been no interval change in her medications or medical history since her last visit  She states that she has pelvic pain on a monthly basis that can be debilitating  Endometrial biopsy had been offered as an office procedure, but she would prefer to have sampling under anesthesia due to discomfort  The following portions of the patient's history were reviewed and updated as appropriate: allergies, current medications, past family history, past medical history, past social history, past surgical history and problem list     Review of Systems   Gastrointestinal: Negative for abdominal pain  Genitourinary: Positive for pelvic pain and vaginal bleeding  All other systems reviewed and are negative  Current Outpatient Prescriptions   Medication Sig Dispense Refill    BuPROPion HCl (WELLBUTRIN XL PO) Take 150 mg by mouth daily in the early morning        CLONAZEPAM PO Take 1 tablet by mouth daily at bedtime      tamoxifen (NOLVADEX) 20 mg tablet Take 1 tablet (20 mg total) by mouth daily 90 tablet 1    topiramate (TOPAMAX) 25 mg tablet Take 25 mg by mouth 2 (two) times a day       No current facility-administered medications for this visit              Objective:    Blood pressure 110/78, pulse 68, temperature 97 5 °F (36 4 °C), temperature source Oral, resp  rate 12, height 5' (1 524 m), not currently breastfeeding  There is no height or weight on file to calculate BMI  There is no height or weight on file to calculate BSA  Physical Exam   Constitutional: She is oriented to person, place, and time  She appears well-developed and well-nourished  Cardiovascular: Normal rate and regular rhythm  Pulmonary/Chest: Effort normal and breath sounds normal    Genitourinary:   Genitourinary Comments:   Pelvic examination is deferred   Neurological: She is alert and oriented to person, place, and time  Skin: Skin is warm and dry  Psychiatric: She has a normal mood and affect   Her behavior is normal  Judgment and thought content normal

## 2018-05-10 ENCOUNTER — APPOINTMENT (OUTPATIENT)
Dept: LAB | Facility: CLINIC | Age: 48
End: 2018-05-10
Payer: COMMERCIAL

## 2018-05-10 DIAGNOSIS — Z01.818 PRE-OP TESTING: Primary | ICD-10-CM

## 2018-05-10 DIAGNOSIS — Z01.818 PRE-OP TESTING: ICD-10-CM

## 2018-05-10 DIAGNOSIS — N93.9 ABNORMAL UTERINE BLEEDING (AUB): ICD-10-CM

## 2018-05-10 LAB
ABO GROUP BLD: NORMAL
ALBUMIN SERPL BCP-MCNC: 3.5 G/DL (ref 3.5–5)
ALP SERPL-CCNC: 55 U/L (ref 46–116)
ALT SERPL W P-5'-P-CCNC: 24 U/L (ref 12–78)
ANION GAP SERPL CALCULATED.3IONS-SCNC: 5 MMOL/L (ref 4–13)
AST SERPL W P-5'-P-CCNC: 14 U/L (ref 5–45)
BASOPHILS # BLD AUTO: 0.03 THOUSANDS/ΜL (ref 0–0.1)
BASOPHILS NFR BLD AUTO: 0 % (ref 0–1)
BILIRUB SERPL-MCNC: 0.2 MG/DL (ref 0.2–1)
BLD GP AB SCN SERPL QL: NEGATIVE
BUN SERPL-MCNC: 19 MG/DL (ref 5–25)
CALCIUM SERPL-MCNC: 9 MG/DL (ref 8.3–10.1)
CHLORIDE SERPL-SCNC: 110 MMOL/L (ref 100–108)
CO2 SERPL-SCNC: 28 MMOL/L (ref 21–32)
CREAT SERPL-MCNC: 0.94 MG/DL (ref 0.6–1.3)
EOSINOPHIL # BLD AUTO: 0.06 THOUSAND/ΜL (ref 0–0.61)
EOSINOPHIL NFR BLD AUTO: 1 % (ref 0–6)
ERYTHROCYTE [DISTWIDTH] IN BLOOD BY AUTOMATED COUNT: 13.5 % (ref 11.6–15.1)
GFR SERPL CREATININE-BSD FRML MDRD: 74 ML/MIN/1.73SQ M
GLUCOSE SERPL-MCNC: 76 MG/DL (ref 65–140)
HCT VFR BLD AUTO: 40.6 % (ref 34.8–46.1)
HGB BLD-MCNC: 13.7 G/DL (ref 11.5–15.4)
LYMPHOCYTES # BLD AUTO: 2.62 THOUSANDS/ΜL (ref 0.6–4.47)
LYMPHOCYTES NFR BLD AUTO: 35 % (ref 14–44)
MCH RBC QN AUTO: 31.1 PG (ref 26.8–34.3)
MCHC RBC AUTO-ENTMCNC: 33.7 G/DL (ref 31.4–37.4)
MCV RBC AUTO: 92 FL (ref 82–98)
MONOCYTES # BLD AUTO: 0.55 THOUSAND/ΜL (ref 0.17–1.22)
MONOCYTES NFR BLD AUTO: 7 % (ref 4–12)
NEUTROPHILS # BLD AUTO: 4.25 THOUSANDS/ΜL (ref 1.85–7.62)
NEUTS SEG NFR BLD AUTO: 57 % (ref 43–75)
PLATELET # BLD AUTO: 283 THOUSANDS/UL (ref 149–390)
PMV BLD AUTO: 9 FL (ref 8.9–12.7)
POTASSIUM SERPL-SCNC: 4.1 MMOL/L (ref 3.5–5.3)
PROT SERPL-MCNC: 7 G/DL (ref 6.4–8.2)
RBC # BLD AUTO: 4.41 MILLION/UL (ref 3.81–5.12)
RH BLD: POSITIVE
SODIUM SERPL-SCNC: 143 MMOL/L (ref 136–145)
SPECIMEN EXPIRATION DATE: NORMAL
WBC # BLD AUTO: 7.51 THOUSAND/UL (ref 4.31–10.16)

## 2018-05-10 PROCEDURE — 86900 BLOOD TYPING SEROLOGIC ABO: CPT

## 2018-05-10 PROCEDURE — 36415 COLL VENOUS BLD VENIPUNCTURE: CPT

## 2018-05-10 PROCEDURE — 86901 BLOOD TYPING SEROLOGIC RH(D): CPT

## 2018-05-10 PROCEDURE — 85025 COMPLETE CBC W/AUTO DIFF WBC: CPT

## 2018-05-10 PROCEDURE — 86850 RBC ANTIBODY SCREEN: CPT

## 2018-05-10 PROCEDURE — 80053 COMPREHEN METABOLIC PANEL: CPT

## 2018-05-10 NOTE — PRE-PROCEDURE INSTRUCTIONS
Pre-Surgery Instructions:   Medication Instructions    BuPROPion HCl (WELLBUTRIN XL PO) Instructed patient per Anesthesia Guidelines   CLONAZEPAM PO Instructed patient per Anesthesia Guidelines   tamoxifen (NOLVADEX) 20 mg tablet Instructed patient per Anesthesia Guidelines   topiramate (TOPAMAX) 25 mg tablet Instructed patient per Anesthesia Guidelines     Pre op and bathing instructions given as per Dr Noe Frazier

## 2018-05-21 ENCOUNTER — HOSPITAL ENCOUNTER (OUTPATIENT)
Facility: HOSPITAL | Age: 48
Setting detail: OUTPATIENT SURGERY
Discharge: HOME | End: 2018-05-21
Attending: OBSTETRICS & GYNECOLOGY | Admitting: OBSTETRICS & GYNECOLOGY
Payer: COMMERCIAL

## 2018-05-21 ENCOUNTER — ANESTHESIA EVENT (OUTPATIENT)
Dept: PERIOP | Facility: HOSPITAL | Age: 48
End: 2018-05-21
Payer: COMMERCIAL

## 2018-05-21 ENCOUNTER — ANESTHESIA (OUTPATIENT)
Dept: PERIOP | Facility: HOSPITAL | Age: 48
End: 2018-05-21
Payer: COMMERCIAL

## 2018-05-21 VITALS
TEMPERATURE: 98.1 F | BODY MASS INDEX: 32.2 KG/M2 | HEART RATE: 68 BPM | HEIGHT: 60 IN | WEIGHT: 164 LBS | RESPIRATION RATE: 16 BRPM | DIASTOLIC BLOOD PRESSURE: 68 MMHG | SYSTOLIC BLOOD PRESSURE: 122 MMHG | OXYGEN SATURATION: 98 %

## 2018-05-21 DIAGNOSIS — N93.9 ABNORMAL UTERINE BLEEDING (AUB): ICD-10-CM

## 2018-05-21 LAB — EXT PREGNANCY TEST URINE: NEGATIVE

## 2018-05-21 PROCEDURE — 88305 TISSUE EXAM BY PATHOLOGIST: CPT | Performed by: PATHOLOGY

## 2018-05-21 PROCEDURE — 81025 URINE PREGNANCY TEST: CPT | Performed by: OBSTETRICS & GYNECOLOGY

## 2018-05-21 PROCEDURE — 58558 HYSTEROSCOPY BIOPSY: CPT | Performed by: OBSTETRICS & GYNECOLOGY

## 2018-05-21 RX ORDER — SODIUM CHLORIDE, SODIUM LACTATE, POTASSIUM CHLORIDE, CALCIUM CHLORIDE 600; 310; 30; 20 MG/100ML; MG/100ML; MG/100ML; MG/100ML
125 INJECTION, SOLUTION INTRAVENOUS CONTINUOUS
Status: DISCONTINUED | OUTPATIENT
Start: 2018-05-21 | End: 2018-05-21 | Stop reason: HOSPADM

## 2018-05-21 RX ORDER — KETOROLAC TROMETHAMINE 30 MG/ML
INJECTION, SOLUTION INTRAMUSCULAR; INTRAVENOUS AS NEEDED
Status: DISCONTINUED | OUTPATIENT
Start: 2018-05-21 | End: 2018-05-21 | Stop reason: SURG

## 2018-05-21 RX ORDER — LIDOCAINE HYDROCHLORIDE 10 MG/ML
INJECTION, SOLUTION INFILTRATION; PERINEURAL AS NEEDED
Status: DISCONTINUED | OUTPATIENT
Start: 2018-05-21 | End: 2018-05-21 | Stop reason: SURG

## 2018-05-21 RX ORDER — ALBUTEROL SULFATE 2.5 MG/3ML
2.5 SOLUTION RESPIRATORY (INHALATION) ONCE AS NEEDED
Status: DISCONTINUED | OUTPATIENT
Start: 2018-05-21 | End: 2018-05-21 | Stop reason: HOSPADM

## 2018-05-21 RX ORDER — FENTANYL CITRATE 50 UG/ML
INJECTION, SOLUTION INTRAMUSCULAR; INTRAVENOUS AS NEEDED
Status: DISCONTINUED | OUTPATIENT
Start: 2018-05-21 | End: 2018-05-21 | Stop reason: SURG

## 2018-05-21 RX ORDER — MEPERIDINE HYDROCHLORIDE 25 MG/ML
12.5 INJECTION INTRAMUSCULAR; INTRAVENOUS; SUBCUTANEOUS AS NEEDED
Status: DISCONTINUED | OUTPATIENT
Start: 2018-05-21 | End: 2018-05-21 | Stop reason: HOSPADM

## 2018-05-21 RX ORDER — FENTANYL CITRATE/PF 50 MCG/ML
25 SYRINGE (ML) INJECTION
Status: DISCONTINUED | OUTPATIENT
Start: 2018-05-21 | End: 2018-05-21 | Stop reason: HOSPADM

## 2018-05-21 RX ORDER — MIDAZOLAM HYDROCHLORIDE 1 MG/ML
INJECTION INTRAMUSCULAR; INTRAVENOUS AS NEEDED
Status: DISCONTINUED | OUTPATIENT
Start: 2018-05-21 | End: 2018-05-21 | Stop reason: SURG

## 2018-05-21 RX ORDER — ONDANSETRON 2 MG/ML
INJECTION INTRAMUSCULAR; INTRAVENOUS AS NEEDED
Status: DISCONTINUED | OUTPATIENT
Start: 2018-05-21 | End: 2018-05-21 | Stop reason: SURG

## 2018-05-21 RX ORDER — PROPOFOL 10 MG/ML
INJECTION, EMULSION INTRAVENOUS AS NEEDED
Status: DISCONTINUED | OUTPATIENT
Start: 2018-05-21 | End: 2018-05-21 | Stop reason: SURG

## 2018-05-21 RX ORDER — ONDANSETRON 2 MG/ML
4 INJECTION INTRAMUSCULAR; INTRAVENOUS ONCE AS NEEDED
Status: DISCONTINUED | OUTPATIENT
Start: 2018-05-21 | End: 2018-05-21 | Stop reason: HOSPADM

## 2018-05-21 RX ADMIN — DEXAMETHASONE SODIUM PHOSPHATE 8 MG: 10 INJECTION INTRAMUSCULAR; INTRAVENOUS at 14:56

## 2018-05-21 RX ADMIN — MIDAZOLAM HYDROCHLORIDE 2 MG: 1 INJECTION, SOLUTION INTRAMUSCULAR; INTRAVENOUS at 14:47

## 2018-05-21 RX ADMIN — ONDANSETRON 4 MG: 2 INJECTION INTRAMUSCULAR; INTRAVENOUS at 14:57

## 2018-05-21 RX ADMIN — SODIUM CHLORIDE, SODIUM LACTATE, POTASSIUM CHLORIDE, AND CALCIUM CHLORIDE: .6; .31; .03; .02 INJECTION, SOLUTION INTRAVENOUS at 14:47

## 2018-05-21 RX ADMIN — LIDOCAINE HYDROCHLORIDE 50 MG: 10 INJECTION, SOLUTION INFILTRATION; PERINEURAL at 14:53

## 2018-05-21 RX ADMIN — FENTANYL CITRATE 25 MCG: 50 INJECTION INTRAMUSCULAR; INTRAVENOUS at 16:12

## 2018-05-21 RX ADMIN — FENTANYL CITRATE 25 MCG: 50 INJECTION INTRAMUSCULAR; INTRAVENOUS at 16:09

## 2018-05-21 RX ADMIN — FENTANYL CITRATE 50 MCG: 50 INJECTION INTRAMUSCULAR; INTRAVENOUS at 14:54

## 2018-05-21 RX ADMIN — PROPOFOL 200 MG: 10 INJECTION, EMULSION INTRAVENOUS at 14:53

## 2018-05-21 RX ADMIN — KETOROLAC TROMETHAMINE 30 MG: 30 INJECTION, SOLUTION INTRAMUSCULAR at 15:10

## 2018-05-21 NOTE — TREATMENT PLAN
I discussed the intraoperative findings with her   Based on her globular, enlarged uterus with a relatively normal endometrial cavity, adenomyosis is a possibility and could explain her abnormal bleeding as well as pelvic pain while on tamoxifen therapy  I therefore recommended she stopped her to amoxicillin for at least a week to assess symptoms  She will follow up in my office in 1-2 weeks to discuss more definitive therapy

## 2018-05-21 NOTE — DISCHARGE INSTRUCTIONS
Dilation and Curettage   WHAT YOU NEED TO KNOW:   Dilation and curettage (D&C) is a procedure to remove tissue from the lining of your uterus  DISCHARGE INSTRUCTIONS:   Call 911 for any of the following:   · You have signs of an allergic reaction, such as hives, trouble breathing, or severe swelling  Seek care immediately if:   · You have heavy vaginal bleeding that soaks 1 pad in 1 hour for 2 hours in a row  · You have a fever higher than 100 4°F (38°C)  · You have abdominal cramps for more than 2 days  · Your pain does not get better, even after treatment  Contact your healthcare provider if:   · You have foul-smelling vaginal discharge  · You do not get your monthly period  · You feel depressed or anxious  · You feel very tired and weak  · You have questions or concerns about your condition or care  Medicines: You may need any of the following:  · Prescription pain medicine  may be given  Do not wait until the pain is severe before you take your medicine  Ask your healthcare provider how to take this medicine safely  · Antibiotics  help fight or prevent a bacterial infection  · Take your medicine as directed  Contact your healthcare provider if you think your medicine is not helping or if you have side effects  Tell him or her if you are allergic to any medicine  Keep a list of the medicines, vitamins, and herbs you take  Include the amounts, and when and why you take them  Bring the list or the pill bottles to follow-up visits  Carry your medicine list with you in case of an emergency  Self-care:   · Use sanitary pads if needed  You may have light bleeding for up to 2 weeks  Do not use tampons  Use sanitary pads instead  This will help prevent a vaginal infection  · Rest as needed  Slowly start to do more each day  Return to your daily activities as directed  · Do not have sex for at least 2 weeks after the procedure  This will help prevent an infection      · Use birth control right after your procedure  Your monthly period should start again in 4 to 8 weeks  During this time, you could still ovulate (release an egg)  Use birth control as directed to prevent pregnancy during this time  Follow up with your healthcare provider as directed:  Write down your questions so you remember to ask them during your visits  © 2017 [employer] Information is for End User's use only and may not be sold, redistributed or otherwise used for commercial purposes  All illustrations and images included in CareNotes® are the copyrighted property of [employer]  or [address]  The above information is an  only  It is not intended as medical advice for individual conditions or treatments  Talk to your doctor, nurse or pharmacist before following any medical regimen to see if it is safe and effective for you

## 2018-05-21 NOTE — OP NOTE
OPERATIVE REPORT  PATIENT NAME: Giovanny Hatch    :  1970  MRN: [id number]  Pt Location: AN OR ROOM 02    SURGERY DATE: 2018    Surgeon(s) and Role:     Vielka Badillo MD - Primary    Preop Diagnosis:  Abnormal uterine bleeding (AUB) [N93 9]    Post-Op Diagnosis Codes:     * Abnormal uterine bleeding (AUB) [N93 9]    Procedure(s) (LRB):  DILATATION AND CURETTAGE (D&C), HYSTEROSCOPY (N/A)    Specimen(s):  ID Type Source Tests Collected by Time Destination   1 : [facility] Tissue Endometrium TISSUE EXAM Ayde Hoover MD 2018 [id number]        Estimated Blood Loss:   Minimal    Drains:       Anesthesia Type:   General/LMA    Operative Indications:  Abnormal uterine bleeding (AUB) [N809]  65-year-old with a personal history of breast cancer taking tamoxifen therapy  She has abnormal uterine bleeding and pelvic pain  She presented for dilation curettage with hysteroscopy  Operative Findings:  1  Enlarged, globular uterus measuring approximately 13 weeks in size  2   Endometrial cavity sounded to 11 cm  On hysteroscopy, there was a grossly normal endometrium present without evidence of endometrial polyp or submucous myoma  Both tubal ostia were visualized  Complications:   None    Procedure and Technique:  After informed consent was obtained, the patient was taken to the operating room where general anesthesia was administered via LMA  She was then prepped and draped in normal sterile fashion in the dorsal lithotomy position  Exam under anesthesia revealed a 13 week size mobile uterus without palpable adnexal masses  A speculum was placed in the vagina  The anterior lip of the cervix was grasped with a ring forcep  The uterus was then sounded  The cervix was dilated with [employer] dilators  A 30 degree hysteroscope was then advanced into the endometrial cavity with findings noted as above  A sharp curettage was then performed with a gritty texture noted throughout    All specimen was sent for permanent section  The ring forcep was removed  Hemostasis was spontaneous  She was then awakened and transferred to the recovery room in stable condition  All instrument counts correct x2 for procedure  No complications   Estimated blood loss is minimal    I was present for the entire procedure    Patient Disposition:  PACU     SIGNATURE: Jeannie Perez MD  DATE: May 21, 2018  TIME: 3:24 PM

## 2018-05-21 NOTE — ANESTHESIA PREPROCEDURE EVALUATION
Review of Systems/Medical History  Patient summary reviewed    No history of anesthetic complications (No PONV  Extreme anxiety preop/postop)     Cardiovascular  Negative cardio ROS    Pulmonary  Negative pulmonary ROS        GI/Hepatic  Negative GI/hepatic ROS   Bariatric surgery,        Negative  ROS        Endo/Other  Negative endo/other ROS Blood dyscrasia (anemia),   Comment: Left Breast Ca-  8/10/2016-Left mastectomy, [place] node bx  9/18//2016- Breast reconstruction-free flap  9/21/2016-Free flap underperfused, flap removed, tissue expander placed, removal ba-cath  12/2016-left breast implant - subsequent infected seroma/IR drainage, now for removal  S/P chemotherapy    ]BMI 39 Obesity    GYN  Negative gynecology ROS   Breast cancer left mastectomy       Hematology  Negative hematology ROS Anemia ,     Musculoskeletal  Negative musculoskeletal ROS        Neurology  Negative neurology ROS      Psychology   Negative psychology ROS Psychiatric history: Anxiety/Depression  , Anxiety, Depression ,            Lab Results   Component Value Date    WBC 7 51 05/10/2018    HGB 13 7 05/10/2018     05/10/2018     Lab Results   Component Value Date     05/10/2018    K 4 1 05/10/2018    BUN 19 05/10/2018    CREATININE 0 94 05/10/2018    GLUCOSE 76 05/10/2018     Lab Results   Component Value Date    INR 0 98 12/05/2017     Blood type A      Physical Exam    Airway    Mallampati score: II  TM Distance: >3 FB  Neck ROM: full     Dental   Comment: Upper front veneers,     Cardiovascular  Comment: Negative ROS, Rhythm: regular, Rate: normal, Cardiovascular exam normal    Pulmonary  Pulmonary exam normal Breath sounds clear to auscultation,     Other Findings        Anesthesia Plan  ASA Score- 2     Anesthesia Type- general with ASA Monitors  Additional Monitors:   Airway Plan: LMA  Comment: Patient seen and examined, history reviewed    Patient to be done under general anesthesia with LMA and routine monitors  Risks discussed with the patient, consent obtained        Plan Factors-Patient not instructed to abstain from smoking on day of procedure       Induction- intravenous  Postoperative Plan- Plan for postoperative opioid use  Informed Consent- Anesthetic plan and risks discussed with patient  I personally reviewed this patient with the CRNA  Discussed and agreed on the Anesthesia Plan with the CRNA  Naomi Rodríguez

## 2018-05-21 NOTE — H&P (VIEW-ONLY)
Assessment/Plan:    Problem List Items Addressed This Visit        Genitourinary    Abnormal uterine bleeding (AUB) - Primary      72-year-old with a personal history of breast cancer currently undergoing tamoxifen therapy who has abnormal uterine bleeding and pelvic pain  Pelvic ultrasound on April 2nd 2018 revealed a thickened endometrium, uterus measured 11 centimeters  She was counseled for dilation curettage as it is difficult for her to tolerate office exam    Performance status is 0   1  I discussed the risks and benefits of dilation and curettage with hysteroscopy  She understands the risks and benefits of the operation and agrees to proceed as outlined  All questions were answered  Consent was obtained by me in the office  2  If her pathology is benign and she continues to have debilitating pelvic pain on a monthly basis, consideration can be given to hysterectomy  I spent 25 minutes with the patient  More than half the time was spent in counseling and coordination of care regarding treatment of her abnormal uterine bleeding           Relevant Orders    Case request operating room: DILATATION AND CURETTAGE (D&C) (Completed)    Comprehensive metabolic panel    CBC and differential            CHIEF COMPLAINT: Here to discuss surgery      Problem: abnormal uterine bleeding  Cancer Staging  Malignant neoplasm of upper-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 [facility])  Staging form: Breast, AJCC 7th Edition  - Pathologic stage from 8/10/2016: Stage IA (yT1c, N0, cM0) - Signed by Candy Wilson MD on 4/25/2018        Previous therapy:     Malignant neoplasm of upper-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 [facility])     - 6/2016 Chemotherapy     Theron Adjuvant chemo at [facility] for four cycles  Patient declined further chemo and came to [place] [id number] for second opinion         4/25/2016 Initial Diagnosis     Left lumpectomy  [facility]  Invasive ductal carcinoma  Grade 1  1 86 on utrasound    [place] 0  Her 2 2+  Fish negative  Stage IIA per [facility]         6/21/2016 Genetic Testing     BRCA negative  Myriad-Done through [facility]         8/10/2016 Surgery     Left mastectomy with sentinel lymph node biopsy  Invasive ductal carcinoma  Grade 2  1 6 cm  0/1 lymph node  Stage 1A    Co surgery with Dr Akua Bernabe  Failed STEVE flap         10/2016 -  Hormone Therapy     Tamoxifen 20 mg daily  Dr Remington Ferreira         12/22/2017 Surgery     Revision of left breast scar/local flap  Dr Dave Schirmer              Patient ID: Maria Eugenia Schaffer is a 41 y o  female   82-year-old returns to discuss dilation and curettage with hysteroscopy for thickened endometrium, abnormal uterine bleeding while on tamoxifen therapy  There has been no interval change in her medications or medical history since her last visit  She states that she has pelvic pain on a monthly basis that can be debilitating  Endometrial biopsy had been offered as an office procedure, but she would prefer to have sampling under anesthesia due to discomfort  The following portions of the patient's history were reviewed and updated as appropriate: allergies, current medications, past family history, past medical history, past social history, past surgical history and problem list     Review of Systems   Gastrointestinal: Negative for abdominal pain  Genitourinary: Positive for pelvic pain and vaginal bleeding  All other systems reviewed and are negative  Current Outpatient Prescriptions   Medication Sig Dispense Refill    BuPROPion HCl (WELLBUTRIN XL PO) Take 150 mg by mouth daily in the early morning        CLONAZEPAM PO Take 1 tablet by mouth daily at bedtime      tamoxifen (NOLVADEX) 20 mg tablet Take 1 tablet (20 mg total) by mouth daily 90 tablet 1    topiramate (TOPAMAX) 25 mg tablet Take 25 mg by mouth 2 (two) times a day       No current facility-administered medications for this visit              Objective:    Blood pressure 110/78, pulse 68, temperature 97 5 °F (36 4 °C), temperature source Oral, resp  rate 12, height 5' (1 524 m), not currently breastfeeding  There is no height or weight on file to calculate BMI  There is no height or weight on file to calculate BSA  Physical Exam   Constitutional: She is oriented to person, place, and time  She appears well-developed and well-nourished  Cardiovascular: Normal rate and regular rhythm  Pulmonary/Chest: Effort normal and breath sounds normal    Genitourinary:   Genitourinary Comments:   Pelvic examination is deferred   Neurological: She is alert and oriented to person, place, and time  Skin: Skin is warm and dry  Psychiatric: She has a normal mood and affect   Her behavior is normal  Judgment and thought content normal

## 2018-05-21 NOTE — ANESTHESIA POSTPROCEDURE EVALUATION
Post-Op Assessment Note      CV Status:  Stable    Mental Status:  Alert and awake    Hydration Status:  Euvolemic    PONV Controlled:  Controlled    Airway Patency:  Patent    Post Op Vitals Reviewed: Yes          Staff: CRNA           /65 (05/21/18 1522)    Temp 99 7 °F (37 6 °C) (05/21/18 1522)    Pulse 69 (05/21/18 1522)   Resp   18   SpO2   100

## 2018-05-23 ENCOUNTER — TELEPHONE (OUTPATIENT)
Dept: HEMATOLOGY ONCOLOGY | Facility: CLINIC | Age: 48
End: 2018-05-23

## 2018-05-23 NOTE — TELEPHONE ENCOUNTER
Spoke with patient  She is very anxious about what is going on  I told her that we would keep in touch with Dr Brandt Ko regarding her GYN status  She had a biopsy and procedure on 5/21  She will see Dr Brandt Ko again on 5/31 to discuss treatment options and results  Based on what Dr Brandt Ko says Thursday she may need to have a hysterectomy  I advised her that she would need to switch to Anastrozole if she had the hysterectomy  She was reluctant to switch  She will follow-up with Dr Brandt Ko and let us know the plan of care

## 2018-05-23 NOTE — TELEPHONE ENCOUNTER
Pt called  Had GYN surg for uterine bleeding 5/21/18 with Dr Celina Han who is holding tamoxifen until pt has post-op visit  Wants to make sure Dr Anabel Rausch aware of what is going on and questioning if needs appt with Dr Anabel Rausch before September    Please call

## 2018-05-23 NOTE — TELEPHONE ENCOUNTER
Dr Amena Hdez spoke with Dr Vigil Rank  He is ok with her holding her tamoxifen for 1-2 weeks  He does not need to see her sooner than September

## 2018-05-25 ENCOUNTER — TELEPHONE (OUTPATIENT)
Dept: OTHER | Facility: HOSPITAL | Age: 48
End: 2018-05-25

## 2018-05-25 NOTE — TELEPHONE ENCOUNTER
I left a voicemail message describing her benign pathology results  Plan to follow up in the office as scheduled

## 2018-05-30 PROBLEM — Z98.890 POSTOPERATIVE STATE: Status: ACTIVE | Noted: 2018-05-30

## 2018-05-31 ENCOUNTER — OFFICE VISIT (OUTPATIENT)
Dept: GYNECOLOGIC ONCOLOGY | Facility: CLINIC | Age: 48
End: 2018-05-31
Payer: COMMERCIAL

## 2018-05-31 VITALS
SYSTOLIC BLOOD PRESSURE: 118 MMHG | HEIGHT: 60 IN | TEMPERATURE: 98 F | HEART RATE: 72 BPM | RESPIRATION RATE: 16 BRPM | DIASTOLIC BLOOD PRESSURE: 78 MMHG

## 2018-05-31 DIAGNOSIS — N93.9 ABNORMAL UTERINE BLEEDING (AUB): Primary | ICD-10-CM

## 2018-05-31 PROCEDURE — 99214 OFFICE O/P EST MOD 30 MIN: CPT | Performed by: OBSTETRICS & GYNECOLOGY

## 2018-05-31 RX ORDER — SPIRONOLACTONE 25 MG/1
TABLET ORAL
COMMUNITY
Start: 2018-05-08 | End: 2018-09-11

## 2018-05-31 NOTE — ASSESSMENT & PLAN NOTE
70-year-old with  ER positive breast cancer taking adjuvant tamoxifen therapy with abnormal uterine bleeding who underwent D&C hysteroscopy for benign findings  She also likely has adenomyosis which is causing severe dysmenorrhea  1  I discussed a surgical treatment option for her severe dysmenorrhea likely secondary to adenomyosis -total laparoscopic hysterectomy and bilateral salpingo-oophorectomy with conversion to aromatase inhibitor therapy postoperatively  I discussed some of the risks and benefits of this surgical option  She will discuss hormonal ramifications with her medical oncologist   2   In the absence of definitive therapy, she understands that endometrial ablation will lessen bleeding but may not change dysmenorrhea  3  Ibuprofen taken prior to this start of menses and throughout her cycle may help manage her severe dysmenorrhea in the absence of surgical therapy  4   Return in 1 month for treatment discussion  I spent 30 min with the patient  More than half the time was spent in counseling and coordination of care regarding treatment of her abnormal uterine bleeding and dysmenorrhea  Only a brief time was spent on the postoperative history and physical examination

## 2018-05-31 NOTE — LETTER
May 31, 2018     Benja Atkins MD  [address]    Patient: Yovani Guidry   YOB: 1970   Date of Visit: 5/31/2018       Dear Dr Beckwith Loop:    Thank you for referring Yovani Guidry to me for evaluation  Below are my notes for this consultation  If you have questions, please do not hesitate to call me  I look forward to following your patient along with you  Sincerely,        Roni Nava MD        CC: No Recipients  Roni Nava MD  5/31/2018  9:39 AM  Sign at close encounter  Assessment/Plan:    Problem List Items Addressed This Visit        Genitourinary    Abnormal uterine bleeding (AUB) - Primary       44-year-old with  ER positive breast cancer taking adjuvant tamoxifen therapy with abnormal uterine bleeding who underwent D&C hysteroscopy for benign findings  She also likely has adenomyosis which is causing severe dysmenorrhea  1  I discussed a surgical treatment option for her severe dysmenorrhea likely secondary to adenomyosis -total laparoscopic hysterectomy and bilateral salpingo-oophorectomy with conversion to aromatase inhibitor therapy postoperatively  I discussed some of the risks and benefits of this surgical option  She will discuss hormonal ramifications with her medical oncologist   2   In the absence of definitive therapy, she understands that endometrial ablation will lessen bleeding but may not change dysmenorrhea  3  Ibuprofen taken prior to this start of menses and throughout her cycle may help manage her severe dysmenorrhea in the absence of surgical therapy  4   Return in 1 month for treatment discussion  I spent 30 min with the patient  More than half the time was spent in counseling and coordination of care regarding treatment of her abnormal uterine bleeding and dysmenorrhea  Only a brief time was spent on the postoperative history and physical examination                   CHIEF COMPLAINT: Postoperative evaluation, treatment discussion       Problem:  Cancer Staging  Malignant neoplasm of upper-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 [facility])  Staging form: Breast, AJCC 7th Edition  - Pathologic stage from 8/10/2016: Stage IA (yT1c, N0, cM0) - Signed by Dinorah Mata MD on 4/25/2018        Previous therapy:     Malignant neoplasm of upper-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 [facility])     - 6/2016 Chemotherapy     Theron Adjuvant chemo at [facility] for four cycles  Patient declined further chemo and came to [place] [address] for second opinion         4/25/2016 Initial Diagnosis     Left lumpectomy  [facility]  Invasive ductal carcinoma  Grade 1  1 86 on utrasound    MS 0  Her 2 2+  Fish negative  Stage IIA per [facility]         6/21/2016 Genetic Testing     BRCA negative  Myriad-Done through [facility]         8/10/2016 Surgery     Left mastectomy with sentinel lymph node biopsy  Invasive ductal carcinoma  Grade 2  1 6 cm  0/1 lymph node  Stage 1A    Co surgery with Dr Shana Sen  Failed STEVE flap         10/2016 -  Hormone Therapy     Tamoxifen 20 mg daily  Dr Jemima Gilman         12/22/2017 Surgery     Revision of left breast scar/local flap  Dr Rajat Griggs              Patient ID: Audrey Araiza is a 41 y o  female    57-year-old returns for postoperative evaluation  She has no complaints          The following portions of the patient's history were reviewed and updated as appropriate: allergies, current medications, past family history, past medical history, past social history, past surgical history and problem list     Review of Systems      Current Outpatient Prescriptions:     BuPROPion HCl (WELLBUTRIN XL PO), Take 150 mg by mouth 2 (two) times a day  , Disp: , Rfl:     CLONAZEPAM PO, Take 1 tablet by mouth daily at bedtime, Disp: , Rfl:     spironolactone (ALDACTONE) 25 mg tablet, , Disp: , Rfl:     topiramate (TOPAMAX) 25 mg tablet, Take 25 mg by mouth 2 (two) times a day, Disp: , Rfl:     Objective:    Blood pressure 118/78, pulse 72, temperature 98 °F (36 7 °C), temperature source Oral, resp  rate 16, height 5' (1 524 m), not currently breastfeeding  There is no height or weight on file to calculate BMI  There is no height or weight on file to calculate BSA  Physical Exam   Constitutional: She appears well-developed and well-nourished  Pulmonary/Chest: Effort normal    Psychiatric: She has a normal mood and affect   Her behavior is normal  Judgment and thought content normal

## 2018-05-31 NOTE — PROGRESS NOTES
Assessment/Plan:    Problem List Items Addressed This Visit        Genitourinary    Abnormal uterine bleeding (AUB) - Primary       53-year-old with  ER positive breast cancer taking adjuvant tamoxifen therapy with abnormal uterine bleeding who underwent D&C hysteroscopy for benign findings  She also likely has adenomyosis which is causing severe dysmenorrhea  1  I discussed a surgical treatment option for her severe dysmenorrhea likely secondary to adenomyosis -total laparoscopic hysterectomy and bilateral salpingo-oophorectomy with conversion to aromatase inhibitor therapy postoperatively  I discussed some of the risks and benefits of this surgical option  She will discuss hormonal ramifications with her medical oncologist   2   In the absence of definitive therapy, she understands that endometrial ablation will lessen bleeding but may not change dysmenorrhea  3  Ibuprofen taken prior to this start of menses and throughout her cycle may help manage her severe dysmenorrhea in the absence of surgical therapy  4   Return in 1 month for treatment discussion  I spent 30 min with the patient  More than half the time was spent in counseling and coordination of care regarding treatment of her abnormal uterine bleeding and dysmenorrhea  Only a brief time was spent on the postoperative history and physical examination                   CHIEF COMPLAINT:  Postoperative evaluation, treatment discussion       Problem:  Cancer Staging  Malignant neoplasm of upper-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 [facility])  Staging form: Breast, AJCC 7th Edition  - Pathologic stage from 8/10/2016: Stage IA (yT1c, N0, cM0) - Signed by Milton Maldonado MD on 4/25/2018        Previous therapy:     Malignant neoplasm of upper-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 [facility])     - 6/2016 Chemotherapy     Theron Adjuvant chemo at [facility] for four cycles  Patient declined further chemo and came to [place] [id number] for second opinion         4/25/2016 Initial Diagnosis     Left lumpectomy  [facility]  Invasive ductal carcinoma  Grade 1  1 86 on utrasound    MN 0  Her 2 2+  Fish negative  Stage IIA per [facility]         6/21/2016 Genetic Testing     BRCA negative  Myriad-Done through [facility]         8/10/2016 Surgery     Left mastectomy with sentinel lymph node biopsy  Invasive ductal carcinoma  Grade 2  1 6 cm  0/1 lymph node  Stage 1A    Co surgery with Dr Morena Camejo  Failed STEVE flap         10/2016 -  Hormone Therapy     Tamoxifen 20 mg daily  Dr Rodney Liu         12/22/2017 Surgery     Revision of left breast scar/local flap  Dr Jese Lou              Patient ID: Manuel President is a 41 y o  female    46-year-old returns for postoperative evaluation  She has no complaints  The following portions of the patient's history were reviewed and updated as appropriate: allergies, current medications, past family history, past medical history, past social history, past surgical history and problem list     Review of Systems      Current Outpatient Prescriptions:     BuPROPion HCl (WELLBUTRIN XL PO), Take 150 mg by mouth 2 (two) times a day  , Disp: , Rfl:     CLONAZEPAM PO, Take 1 tablet by mouth daily at bedtime, Disp: , Rfl:     spironolactone (ALDACTONE) 25 mg tablet, , Disp: , Rfl:     topiramate (TOPAMAX) 25 mg tablet, Take 25 mg by mouth 2 (two) times a day, Disp: , Rfl:     Objective:    Blood pressure 118/78, pulse 72, temperature 98 °F (36 7 °C), temperature source Oral, resp  rate 16, height 5' (1 524 m), not currently breastfeeding  There is no height or weight on file to calculate BMI  There is no height or weight on file to calculate BSA  Physical Exam   Constitutional: She appears well-developed and well-nourished  Pulmonary/Chest: Effort normal    Psychiatric: She has a normal mood and affect   Her behavior is normal  Judgment and thought content normal

## 2018-06-05 ENCOUNTER — OFFICE VISIT (OUTPATIENT)
Dept: HEMATOLOGY ONCOLOGY | Facility: CLINIC | Age: 48
End: 2018-06-05
Payer: COMMERCIAL

## 2018-06-05 VITALS
SYSTOLIC BLOOD PRESSURE: 98 MMHG | OXYGEN SATURATION: 100 % | TEMPERATURE: 97.6 F | RESPIRATION RATE: 18 BRPM | HEART RATE: 89 BPM | DIASTOLIC BLOOD PRESSURE: 70 MMHG

## 2018-06-05 DIAGNOSIS — Z17.0 MALIGNANT NEOPLASM OF BREAST IN FEMALE, ESTROGEN RECEPTOR POSITIVE, UNSPECIFIED LATERALITY, UNSPECIFIED SITE OF BREAST (HCC): Primary | ICD-10-CM

## 2018-06-05 DIAGNOSIS — C50.412 MALIGNANT NEOPLASM OF UPPER-OUTER QUADRANT OF LEFT BREAST IN FEMALE, ESTROGEN RECEPTOR POSITIVE (HCC): ICD-10-CM

## 2018-06-05 DIAGNOSIS — C50.919 MALIGNANT NEOPLASM OF BREAST IN FEMALE, ESTROGEN RECEPTOR POSITIVE, UNSPECIFIED LATERALITY, UNSPECIFIED SITE OF BREAST (HCC): Primary | ICD-10-CM

## 2018-06-05 DIAGNOSIS — Z17.0 MALIGNANT NEOPLASM OF UPPER-OUTER QUADRANT OF LEFT BREAST IN FEMALE, ESTROGEN RECEPTOR POSITIVE (HCC): ICD-10-CM

## 2018-06-05 PROCEDURE — 99214 OFFICE O/P EST MOD 30 MIN: CPT | Performed by: INTERNAL MEDICINE

## 2018-06-05 NOTE — PROGRESS NOTES
Hematology / Oncology Outpatient Follow Up Note    Michael Birmingham 41 y o  female :1970 [id number]         Date:  2018    Assessment / Plan:  A 41year old premenopausal woman with clinical T2 N0 left breast cancer, grade 1, ER positive, ID negative, HER-2 Fish negative disease  She was treated by Dr Wilfredo Diaz at the [facility] with neoadjuvant chemotherapy with dose dense AC x4  She only had minor clinical response  Subsequently, she switch her care at [place] [address], where she underwent left mastectomy with sentinel lymph node biopsy, resulting in SIMRAN  She had a 1 6 cm of invasive ductal carcinoma, grade 2  Currently, she is on adjuvant hormonal therapy with tamoxifen with no side effects  she has dysmenorrhea , probably due to the adenomyosis  She is considering hysterectomy and bilateral oophorectomy  Strictly from breast cancer standpoint, oophorectomy as surgical ovarian ablation is not absolutely indicated  However, if she has gynecology reason to have hysterectomy, oophorectomy can be done at the same time which make her menopause  Subsequently, I will strongly consider aromatase inhibitor  By doing this, she may have some reduction of risk of recurrence of breast cancer by 5%, possibly  The other option would be continue with tamoxifen 20 mg daily  She is going to see Dr Robert Goodman in the end of this month to make decision  I will see her again in 2018 for follow-up  All the patient questions were answered to her satisfaction  Subjective:      HPI:  [de-identified] 39year old premenopausal woman who underwent first screening mammography which was abnormal in her left breast  First FNA was inconclusive  Core biopsy showed invasive ductal carcinoma at the [facility]  MRI showed T2 lesion, with no lymph node abnormalities  This was grade 1, % positive, ID negative, HER-2 Fish negative disease  However, Ki-67 was 50-60%   She was seen by Dr Wilfredo Diaz, who recommended neoadjuvant chemotherapy  She received dose dense AC ?4 with very minor response  She has some toxicity with alopecia, nausea, vomiting, as well as significant fatigue  She gained 30 pounds during the chemotherapy  Subsequently, she was seen by Dr Darlene Verdugo who I have been discussing her case  She subsequently underwent left mastectomy with sentinel lymph node biopsy in August 2016 which showed a 1 6?1 4?1 3 cm of invasive ductal carcinoma, grade 2  One sentinel lymph node was negative for metastatic disease  She is going to have separate tram flap reconstruction next week  She presents today to discuss further systemic treatment  She has no other significant past medical history  She was tested for BRCA gene mutation which was negative  She denied any pain  She has no respiratory symptom  Her performance status is normal  Her menstrual cycle stopped due to chemotherapy  However, in late August 2016, her menstrual cycles resumed             Interval History:  A 41year old premenopausal woman who was initially diagnosed clinical stage II left breast cancer, grade 1, ER strongly positive, IA negative, HER-2 negative disease  She was seen by Dr Zack Romero, who treated her with neoadjuvant chemotherapy with AC x4, resulting in only minor response  She subsequently obtained a second opinion  She underwent left mastectomy with sentinel lymph node biopsy which showed stage IA left breast cancer, grade 2  I saw her, after the mastectomy  I recommended her to have adjuvant hormonal therapy with tamoxifen  She has been on tamoxifen since September 2016 with which she is doing well  She has somewhat irregular menstrual cycle  in the last several months, she developed heavy menstrual bleeding as well as dysmenorrhea  She was seen by Dr Margie Lorenz  She underwent D and C which showed no evidence of malignancy  She was offered as option to have hysterectomy and bilateral salpingo-oophorectomy  She is considering this option    She presents today to discuss medical treatment option if she does oophorectomy  She is back on tamoxifen 20 mg daily  She is in usual state of health otherwise          Objective:      Primary Diagnosis:     1  Left breast cancer, stage IA (pT1c, pN0,M0) grade 2, % positive, GA negative, HER-2 Fish negative disease  Diagnosed in April 2016   2  BRCA gene mutation negative       Cancer Staging:  No matching staging information was found for the patient         Previous Hematologic/ Oncologic Treatment:      Neoadjuvant chemotherapy with AC x4 completed in May 2016, which was given at [facility]       Current Hematologic/ Oncologic Treatment:       Adjuvant hormonal therapy with tamoxifen since September 2016       Disease Status:      SIMRAN status post mastectomy with sentinel lymph node biopsy       Test Results:     Pathology:     Left mastectomy specimen showed 1 6 x1 4 x 1 3 cm of invasive ductal carcinoma, grade 2  One sentinel lymph node was negative for metastatic disease  % positive, GA negative, HER-2 Fish negative disease  Stage IA(pT1c, pN0,M0)biopsy showed Ki-67 50-60%     Radiology:     CT scan of chest, abdomen pelvis showed no evidence of metastatic disease  Mammography in the right breast in April 2017 was benign  BI-RADS 2  Bone scan in August 2017 showed no evidence of osseous metastasis      Laboratory:           Physical Exam:        General Appearance:    Alert, oriented          Eyes:    PERRL   Ears:    Normal external ear canals, both ears   Nose:   Nares normal, septum midline   Throat:   Mucosa moist  Pharynx without injection  Neck:   Supple         Lungs:     Clear to auscultation bilaterally   Chest Wall:    No tenderness or deformity    Heart:    Regular rate and rhythm         Abdomen:     Soft, non-tender, bowel sounds +, no organomegaly               Extremities:   Extremities no cyanosis or edema         Skin:   no rash or icterus      Lymph nodes:   Cervical, supraclavicular, and axillary nodes normal   Neurologic:   CNII-XII intact, normal strength, sensation and reflexes     Throughout             Breast exam:   status post left mastectomy without reconstruction  No palpable abnormality in her left chest wall  Right breast exam is negative                ROS: Review of Systems   All other systems reviewed and are negative  Imaging: No results found  Labs:   Lab Results   Component Value Date    WBC 7 51 05/10/2018    HGB 13 7 05/10/2018    HCT 40 6 05/10/2018    MCV 92 05/10/2018     05/10/2018     Lab Results   Component Value Date     05/10/2018    K 4 1 05/10/2018     (H) 05/10/2018    CO2 28 05/10/2018    ANIONGAP 5 05/10/2018    BUN 19 05/10/2018    CREATININE 0 94 05/10/2018    GLUCOSE 76 05/10/2018    GLUF 88 02/22/2018    CALCIUM 9 0 05/10/2018    AST 14 05/10/2018    ALT 24 05/10/2018    ALKPHOS 55 05/10/2018    PROT 7 0 05/10/2018    BILITOT 0 20 05/10/2018    EGFR 74 05/10/2018         Lab Results   Component Value Date    IRON 91 08/18/2017    TIBC 354 08/18/2017    FERRITIN 28 02/22/2018       Lab Results   Component Value Date    VDXXYTUL71 478 06/15/2017       Lab Results   Component Value Date    FOLATE 9 2 06/15/2017         Current Medications: Reviewed  Allergies: Reviewed  PMH/FH/SH:  Reviewed      Vital Sign:    There is no height or weight on file to calculate BSA      Wt Readings from Last 3 Encounters:   05/10/18 74 4 kg (164 lb)   05/21/18 74 4 kg (164 lb)   04/25/18 76 7 kg (169 lb)        Temp Readings from Last 3 Encounters:   06/05/18 97 6 °F (36 4 °C) (Tympanic)   05/31/18 98 °F (36 7 °C) (Oral)   05/21/18 98 1 °F (36 7 °C) (Temporal)        BP Readings from Last 3 Encounters:   06/05/18 98/70   05/31/18 118/78   05/21/18 122/68         Pulse Readings from Last 3 Encounters:   06/05/18 89   05/31/18 72   05/21/18 68     @LASTSAO2(3)@

## 2018-07-10 ENCOUNTER — TELEPHONE (OUTPATIENT)
Dept: HEMATOLOGY ONCOLOGY | Facility: CLINIC | Age: 48
End: 2018-07-10

## 2018-07-10 NOTE — TELEPHONE ENCOUNTER
Pt has been bleeding vaginally for about 3 weeks now  She is starting to feel sick  She wants to know if this is a side affect of her medication ( tomaxifin)   Please call the pt to advise

## 2018-07-10 NOTE — TELEPHONE ENCOUNTER
Spoke with patient  She stated that she has her normal cycle but then started again a couple days later she started bleeding again  I recommended her to speak with her gynecologist which she has an appointment with tomorrow (7/11)  She was unhappy with this answer as she is leaving for [place] in the end of the month and "just wants this figured out"  The gynecologist has recommended a hysterectomy in the past but she does not want to do that  I will make sure Dr Yuli Quintana is aware

## 2018-07-20 ENCOUNTER — TELEPHONE (OUTPATIENT)
Dept: HEMATOLOGY ONCOLOGY | Facility: CLINIC | Age: 48
End: 2018-07-20

## 2018-07-20 NOTE — TELEPHONE ENCOUNTER
Spoke with patient  She is going to have breast and leg reconstruction surgery on August 10th  Does she need to stop the Tamoxifen throughout the surgery process? She also had an ablation last Friday but her surgeon is not sure if that will "fix the problem"  She would like to discuss the possibility of a hysterectomy at her next visit (9/11/18)

## 2018-07-20 NOTE — TELEPHONE ENCOUNTER
PT HAVING UPCOMING SURGERY  NEEDS TO KNOW ABOUT STOPPING TAMOXIFIN IF THIS IS NECESSARY OR NOT?   PLEASE CALL TO ADVISE, EMILY

## 2018-07-30 NOTE — H&P
Inpatient History & Physical     Admitting Diagnosis: Arm Lipodystrophy, Absence Left Breast    HPI  Patient is a 47 y.o. female     Medical History: History of breast cancer    Surgical History: Breast reconstruction, left mastectomy, cholecystectomy     Allergies: Morphine      Social History:   Social History     Social History   • Marital status:      Spouse name: N/A   • Number of children: N/A   • Years of education: N/A     Social History Main Topics   • Smoking status: Not on file   • Smokeless tobacco: Not on file   • Alcohol use Not on file   • Drug use: Unknown   • Sexual activity: Not on file     Other Topics Concern   • Not on file     Social History Narrative   • No narrative on file       Family History: No family history on file.    Review of Systems  All other systems reviewed and negative except as noted in the HPI.    Objective     Vital Signs for the last 24 hours:  BP: ()/()   Arterial Line BP: ()/()       There were no vitals taken for this visit.    General Appearance:    Alert, cooperative, no distress, appears stated age   Head:    Normocephalic, without obvious abnormality, atraumatic   Eyes:    PERRL, conjunctiva/corneas clear, EOM's intact, fundi     benign, both eyes   Ears:    Normal TM's and external ear canals, both ears   Nose:   Nares normal, septum midline, mucosa normal, no drainage     or     sinus tenderness   Throat:   Lips, mucosa, and tongue normal; teeth and gums normal   Neck:   Supple, symmetrical, trachea midline, no adenopathy;     thyroid:  no enlargement/tenderness/nodules; no carotid    bruit or JVD   Back:     Symmetric, no curvature, ROM normal, no CVA tenderness   Lungs:     Clear to auscultation bilaterally, respirations unlabored   Chest Wall:    No tenderness or deformity   Heart:    Regular rate and rhythm, S1 and S2 normal, no murmur, rub or          gallop   Breast Exam:    No tenderness, masses, or nipple abnormality   Abdomen:     Soft, non-tender,  bowel sounds active all four quadrants,     no masses, no organomegaly   Genitalia:    Normal female without lesion, discharge or tenderness   Rectal:    Normal tone, no masses or tenderness; guaiac negative stool   Extremities:   Extremities normal, atraumatic, no cyanosis or edema   Musculoskeletal:  Pulses:   No injury or deformity    2+ and symmetric all extremities   Skin:   Skin color, texture, turgor normal, no rashes or lesions   Lymph nodes:   Cervical, supraclavicular, and axillary nodes normal   Neurologic:    Behavior/  Emotional:   CNII-XII intact, normal strength, sensation and reflexes     throughout    Appropriate, cooperative       Labs   I have reviewed the patient's pertinent labs. Pertinent labs are within normal limits.    Imaging  Not applicable    ECG/Telemetry  Not Applicable    Assessment/Plan   Surgery

## 2018-08-08 ENCOUNTER — TELEPHONE (OUTPATIENT)
Dept: HEMATOLOGY ONCOLOGY | Facility: CLINIC | Age: 48
End: 2018-08-08

## 2018-08-09 ENCOUNTER — ANESTHESIA EVENT (OUTPATIENT)
Dept: OPERATING ROOM | Facility: HOSPITAL | Age: 48
Setting detail: HOSPITAL OUTPATIENT SURGERY
End: 2018-08-09
Payer: COMMERCIAL

## 2018-08-10 ENCOUNTER — HOSPITAL ENCOUNTER (OUTPATIENT)
Facility: HOSPITAL | Age: 48
LOS: 1 days | Discharge: HOME | End: 2018-08-10
Attending: SURGERY | Admitting: SURGERY
Payer: COMMERCIAL

## 2018-08-10 ENCOUNTER — ANESTHESIA (OUTPATIENT)
Dept: OPERATING ROOM | Facility: HOSPITAL | Age: 48
Setting detail: HOSPITAL OUTPATIENT SURGERY
End: 2018-08-10
Payer: COMMERCIAL

## 2018-08-10 VITALS
HEART RATE: 73 BPM | TEMPERATURE: 97.4 F | OXYGEN SATURATION: 100 % | SYSTOLIC BLOOD PRESSURE: 127 MMHG | RESPIRATION RATE: 18 BRPM | DIASTOLIC BLOOD PRESSURE: 59 MMHG

## 2018-08-10 PROBLEM — N62 MACROMASTIA: Status: ACTIVE | Noted: 2018-08-10

## 2018-08-10 LAB
B-HCG UR QL: NEGATIVE
POCT TEST: NORMAL

## 2018-08-10 PROCEDURE — 63600000 HC DRUGS/DETAIL CODE: Performed by: SURGERY

## 2018-08-10 PROCEDURE — 0HUU37Z SUPPLEMENT LEFT BREAST WITH AUTOLOGOUS TISSUE SUBSTITUTE, PERCUTANEOUS APPROACH: ICD-10-PCS | Performed by: SURGERY

## 2018-08-10 PROCEDURE — 0J0D0ZZ ALTERATION OF RIGHT UPPER ARM SUBCUTANEOUS TISSUE AND FASCIA, OPEN APPROACH: ICD-10-PCS | Performed by: SURGERY

## 2018-08-10 PROCEDURE — 0J0D3ZZ ALTERATION OF RIGHT UPPER ARM SUBCUTANEOUS TISSUE AND FASCIA, PERCUTANEOUS APPROACH: ICD-10-PCS | Performed by: SURGERY

## 2018-08-10 PROCEDURE — 63600000 HC DRUGS/DETAIL CODE: Performed by: ANESTHESIOLOGY

## 2018-08-10 PROCEDURE — 63600000 HC DRUGS/DETAIL CODE

## 2018-08-10 PROCEDURE — 0JR637Z REPLACEMENT OF CHEST SUBCUTANEOUS TISSUE AND FASCIA WITH AUTOLOGOUS TISSUE SUBSTITUTE, PERCUTANEOUS APPROACH: ICD-10-PCS | Performed by: SURGERY

## 2018-08-10 PROCEDURE — 63700000 HC SELF-ADMINISTRABLE DRUG: Performed by: SURGERY

## 2018-08-10 PROCEDURE — 36000013 HC OR LEVEL 3 EA ADDL MIN

## 2018-08-10 PROCEDURE — 0H0U37Z ALTERATION OF LEFT BREAST WITH AUTOLOGOUS TISSUE SUBSTITUTE, PERCUTANEOUS APPROACH: ICD-10-PCS | Performed by: SURGERY

## 2018-08-10 PROCEDURE — 36000013 HC OR LEVEL 3 EA ADDL MIN: Performed by: SURGERY

## 2018-08-10 PROCEDURE — 0JDM3ZZ EXTRACTION OF LEFT UPPER LEG SUBCUTANEOUS TISSUE AND FASCIA, PERCUTANEOUS APPROACH: ICD-10-PCS | Performed by: SURGERY

## 2018-08-10 PROCEDURE — 71000001 HC PACU PHASE 1 INITIAL 30MIN: Performed by: SURGERY

## 2018-08-10 PROCEDURE — 0J0F0ZZ ALTERATION OF LEFT UPPER ARM SUBCUTANEOUS TISSUE AND FASCIA, OPEN APPROACH: ICD-10-PCS | Performed by: SURGERY

## 2018-08-10 PROCEDURE — 0J0N3ZZ ALTERATION OF RIGHT LOWER LEG SUBCUTANEOUS TISSUE AND FASCIA, PERCUTANEOUS APPROACH: ICD-10-PCS | Performed by: SURGERY

## 2018-08-10 PROCEDURE — 0J0F3ZZ ALTERATION OF LEFT UPPER ARM SUBCUTANEOUS TISSUE AND FASCIA, PERCUTANEOUS APPROACH: ICD-10-PCS | Performed by: SURGERY

## 2018-08-10 PROCEDURE — 36000003 HC OR LEVEL 3 INITIAL 30MIN: Performed by: SURGERY

## 2018-08-10 PROCEDURE — 37000001 HC ANESTHESIA GENERAL: Performed by: SURGERY

## 2018-08-10 PROCEDURE — 25800000 HC PHARMACY IV SOLUTIONS: Performed by: ANESTHESIOLOGY

## 2018-08-10 PROCEDURE — 0JBD0ZZ EXCISION OF RIGHT UPPER ARM SUBCUTANEOUS TISSUE AND FASCIA, OPEN APPROACH: ICD-10-PCS | Performed by: SURGERY

## 2018-08-10 PROCEDURE — 71000011 HC PACU PHASE 1 EA ADDL MIN: Performed by: SURGERY

## 2018-08-10 PROCEDURE — 0JBF0ZZ EXCISION OF LEFT UPPER ARM SUBCUTANEOUS TISSUE AND FASCIA, OPEN APPROACH: ICD-10-PCS | Performed by: SURGERY

## 2018-08-10 PROCEDURE — 0JDF3ZZ EXTRACTION OF LEFT UPPER ARM SUBCUTANEOUS TISSUE AND FASCIA, PERCUTANEOUS APPROACH: ICD-10-PCS | Performed by: SURGERY

## 2018-08-10 PROCEDURE — 0J063ZZ ALTERATION OF CHEST SUBCUTANEOUS TISSUE AND FASCIA, PERCUTANEOUS APPROACH: ICD-10-PCS | Performed by: SURGERY

## 2018-08-10 PROCEDURE — 25000000 HC PHARMACY GENERAL: Performed by: ANESTHESIOLOGY

## 2018-08-10 PROCEDURE — 0JDD3ZZ EXTRACTION OF RIGHT UPPER ARM SUBCUTANEOUS TISSUE AND FASCIA, PERCUTANEOUS APPROACH: ICD-10-PCS | Performed by: SURGERY

## 2018-08-10 PROCEDURE — 25000000 HC PHARMACY GENERAL: Performed by: SURGERY

## 2018-08-10 PROCEDURE — 63700000 HC SELF-ADMINISTRABLE DRUG

## 2018-08-10 PROCEDURE — 25800000 HC PHARMACY IV SOLUTIONS: Performed by: SURGERY

## 2018-08-10 PROCEDURE — 0JDL3ZZ EXTRACTION OF RIGHT UPPER LEG SUBCUTANEOUS TISSUE AND FASCIA, PERCUTANEOUS APPROACH: ICD-10-PCS | Performed by: SURGERY

## 2018-08-10 PROCEDURE — 0J083ZZ ALTERATION OF ABDOMEN SUBCUTANEOUS TISSUE AND FASCIA, PERCUTANEOUS APPROACH: ICD-10-PCS | Performed by: SURGERY

## 2018-08-10 PROCEDURE — 27200000 HC STERILE SUPPLY: Performed by: SURGERY

## 2018-08-10 PROCEDURE — 0J0P3ZZ ALTERATION OF LEFT LOWER LEG SUBCUTANEOUS TISSUE AND FASCIA, PERCUTANEOUS APPROACH: ICD-10-PCS | Performed by: SURGERY

## 2018-08-10 RX ORDER — SODIUM CHLORIDE 9 MG/ML
INJECTION, SOLUTION INTRAVENOUS CONTINUOUS
Status: DISCONTINUED | OUTPATIENT
Start: 2018-08-10 | End: 2018-08-10 | Stop reason: HOSPADM

## 2018-08-10 RX ORDER — ACETAMINOPHEN 325 MG/1
650 TABLET ORAL
Status: DISCONTINUED | OUTPATIENT
Start: 2018-08-10 | End: 2018-08-10 | Stop reason: HOSPADM

## 2018-08-10 RX ORDER — FENTANYL CITRATE 50 UG/ML
INJECTION, SOLUTION INTRAMUSCULAR; INTRAVENOUS AS NEEDED
Status: DISCONTINUED | OUTPATIENT
Start: 2018-08-10 | End: 2018-08-10 | Stop reason: SURG

## 2018-08-10 RX ORDER — TOPIRAMATE 25 MG/1
25 TABLET ORAL ONCE
Status: COMPLETED | OUTPATIENT
Start: 2018-08-10 | End: 2018-08-10

## 2018-08-10 RX ORDER — PROPOFOL 10 MG/ML
INJECTION, EMULSION INTRAVENOUS AS NEEDED
Status: DISCONTINUED | OUTPATIENT
Start: 2018-08-10 | End: 2018-08-10 | Stop reason: SURG

## 2018-08-10 RX ORDER — DIPHENHYDRAMINE HYDROCHLORIDE 50 MG/ML
25 INJECTION INTRAMUSCULAR; INTRAVENOUS EVERY 6 HOURS PRN
Status: DISCONTINUED | OUTPATIENT
Start: 2018-08-10 | End: 2018-08-10 | Stop reason: HOSPADM

## 2018-08-10 RX ORDER — IBUPROFEN 200 MG
16-32 TABLET ORAL AS NEEDED
Status: DISCONTINUED | OUTPATIENT
Start: 2018-08-10 | End: 2018-08-10 | Stop reason: HOSPADM

## 2018-08-10 RX ORDER — ONDANSETRON HYDROCHLORIDE 2 MG/ML
4 INJECTION, SOLUTION INTRAVENOUS EVERY 8 HOURS PRN
Status: DISCONTINUED | OUTPATIENT
Start: 2018-08-10 | End: 2018-08-10 | Stop reason: HOSPADM

## 2018-08-10 RX ORDER — DEXTROSE 50 % IN WATER (D50W) INTRAVENOUS SYRINGE
25 AS NEEDED
Status: DISCONTINUED | OUTPATIENT
Start: 2018-08-10 | End: 2018-08-10 | Stop reason: HOSPADM

## 2018-08-10 RX ORDER — DEXAMETHASONE SODIUM PHOSPHATE 4 MG/ML
10 INJECTION, SOLUTION INTRA-ARTICULAR; INTRALESIONAL; INTRAMUSCULAR; INTRAVENOUS; SOFT TISSUE ONCE
Status: COMPLETED | OUTPATIENT
Start: 2018-08-10 | End: 2018-08-10

## 2018-08-10 RX ORDER — HYDROMORPHONE HYDROCHLORIDE 2 MG/1
2-4 TABLET ORAL EVERY 4 HOURS PRN
Status: DISCONTINUED | OUTPATIENT
Start: 2018-08-10 | End: 2018-08-10 | Stop reason: HOSPADM

## 2018-08-10 RX ORDER — DEXTROSE 40 %
15-30 GEL (GRAM) ORAL AS NEEDED
Status: DISCONTINUED | OUTPATIENT
Start: 2018-08-10 | End: 2018-08-10 | Stop reason: HOSPADM

## 2018-08-10 RX ORDER — MIDAZOLAM HYDROCHLORIDE 2 MG/2ML
INJECTION, SOLUTION INTRAMUSCULAR; INTRAVENOUS AS NEEDED
Status: DISCONTINUED | OUTPATIENT
Start: 2018-08-10 | End: 2018-08-10 | Stop reason: SURG

## 2018-08-10 RX ORDER — DOXYCYCLINE 100 MG/1
100 CAPSULE ORAL 2 TIMES DAILY
COMMUNITY
Start: 2018-08-06 | End: 2018-08-16

## 2018-08-10 RX ORDER — SCOPOLAMINE 1 MG/3D
PATCH, EXTENDED RELEASE TRANSDERMAL
Status: DISCONTINUED
Start: 2018-08-10 | End: 2018-08-10 | Stop reason: HOSPADM

## 2018-08-10 RX ORDER — BUPROPION HYDROCHLORIDE 150 MG/1
150 TABLET, EXTENDED RELEASE ORAL ONCE
Status: COMPLETED | OUTPATIENT
Start: 2018-08-10 | End: 2018-08-10

## 2018-08-10 RX ORDER — CEFAZOLIN SODIUM 2 G/50ML
2 SOLUTION INTRAVENOUS ONCE
Status: COMPLETED | OUTPATIENT
Start: 2018-08-10 | End: 2018-08-10

## 2018-08-10 RX ORDER — DEXAMETHASONE SODIUM PHOSPHATE 4 MG/ML
INJECTION, SOLUTION INTRA-ARTICULAR; INTRALESIONAL; INTRAMUSCULAR; INTRAVENOUS; SOFT TISSUE
Status: COMPLETED
Start: 2018-08-10 | End: 2018-08-10

## 2018-08-10 RX ORDER — CEFAZOLIN SODIUM 2 G/50ML
SOLUTION INTRAVENOUS
Status: COMPLETED
Start: 2018-08-10 | End: 2018-08-10

## 2018-08-10 RX ORDER — SCOPOLAMINE 1 MG/3D
1 PATCH, EXTENDED RELEASE TRANSDERMAL ONCE
Status: DISCONTINUED | OUTPATIENT
Start: 2018-08-10 | End: 2018-08-10 | Stop reason: HOSPADM

## 2018-08-10 RX ORDER — ROCURONIUM BROMIDE 10 MG/ML
INJECTION, SOLUTION INTRAVENOUS AS NEEDED
Status: DISCONTINUED | OUTPATIENT
Start: 2018-08-10 | End: 2018-08-10 | Stop reason: SURG

## 2018-08-10 RX ORDER — FENTANYL CITRATE 50 UG/ML
50 INJECTION, SOLUTION INTRAMUSCULAR; INTRAVENOUS
Status: DISCONTINUED | OUTPATIENT
Start: 2018-08-10 | End: 2018-08-10 | Stop reason: HOSPADM

## 2018-08-10 RX ORDER — HYDROMORPHONE HYDROCHLORIDE 1 MG/ML
0.5 INJECTION, SOLUTION INTRAMUSCULAR; INTRAVENOUS; SUBCUTANEOUS
Status: DISCONTINUED | OUTPATIENT
Start: 2018-08-10 | End: 2018-08-10 | Stop reason: HOSPADM

## 2018-08-10 RX ORDER — DOXYCYCLINE HYCLATE 100 MG
100 TABLET ORAL ONCE
Status: COMPLETED | OUTPATIENT
Start: 2018-08-10 | End: 2018-08-10

## 2018-08-10 RX ORDER — SODIUM CHLORIDE, SODIUM GLUCONATE, SODIUM ACETATE, POTASSIUM CHLORIDE AND MAGNESIUM CHLORIDE 30; 37; 368; 526; 502 MG/100ML; MG/100ML; MG/100ML; MG/100ML; MG/100ML
INJECTION, SOLUTION INTRAVENOUS CONTINUOUS PRN
Status: DISCONTINUED | OUTPATIENT
Start: 2018-08-10 | End: 2018-08-10 | Stop reason: SURG

## 2018-08-10 RX ORDER — ONDANSETRON HYDROCHLORIDE 2 MG/ML
4 INJECTION, SOLUTION INTRAVENOUS
Status: DISCONTINUED | OUTPATIENT
Start: 2018-08-10 | End: 2018-08-10 | Stop reason: HOSPADM

## 2018-08-10 RX ORDER — DOCUSATE SODIUM 100 MG/1
100 CAPSULE, LIQUID FILLED ORAL ONCE
Status: DISCONTINUED | OUTPATIENT
Start: 2018-08-10 | End: 2018-08-10 | Stop reason: HOSPADM

## 2018-08-10 RX ORDER — ONDANSETRON HYDROCHLORIDE 2 MG/ML
INJECTION, SOLUTION INTRAVENOUS AS NEEDED
Status: DISCONTINUED | OUTPATIENT
Start: 2018-08-10 | End: 2018-08-10 | Stop reason: SURG

## 2018-08-10 RX ORDER — HYDROMORPHONE HYDROCHLORIDE 1 MG/ML
.25-.5 INJECTION, SOLUTION INTRAMUSCULAR; INTRAVENOUS; SUBCUTANEOUS
Status: DISCONTINUED | OUTPATIENT
Start: 2018-08-10 | End: 2018-08-10 | Stop reason: HOSPADM

## 2018-08-10 RX ADMIN — CEFAZOLIN SODIUM 2 G: 2 SOLUTION INTRAVENOUS at 09:46

## 2018-08-10 RX ADMIN — PROPOFOL 200 MG: 10 INJECTION, EMULSION INTRAVENOUS at 10:14

## 2018-08-10 RX ADMIN — ONDANSETRON 4 MG: 2 INJECTION INTRAMUSCULAR; INTRAVENOUS at 13:15

## 2018-08-10 RX ADMIN — ROCURONIUM BROMIDE 50 MG: 10 INJECTION, SOLUTION INTRAVENOUS at 10:14

## 2018-08-10 RX ADMIN — ACETAMINOPHEN 650 MG: 325 TABLET, FILM COATED ORAL at 18:36

## 2018-08-10 RX ADMIN — FENTANYL CITRATE 50 MCG: 50 INJECTION INTRAMUSCULAR; INTRAVENOUS at 11:00

## 2018-08-10 RX ADMIN — SODIUM CHLORIDE, SODIUM GLUCONATE, SODIUM ACETATE, POTASSIUM CHLORIDE AND MAGNESIUM CHLORIDE: 526; 502; 368; 37; 30 INJECTION, SOLUTION INTRAVENOUS at 13:16

## 2018-08-10 RX ADMIN — SCOPALAMINE 1 PATCH: 1 PATCH, EXTENDED RELEASE TRANSDERMAL at 09:27

## 2018-08-10 RX ADMIN — SODIUM CHLORIDE: 9 INJECTION, SOLUTION INTRAVENOUS at 18:33

## 2018-08-10 RX ADMIN — FENTANYL CITRATE 50 MCG: 50 INJECTION INTRAMUSCULAR; INTRAVENOUS at 11:49

## 2018-08-10 RX ADMIN — FENTANYL CITRATE 100 MCG: 50 INJECTION INTRAMUSCULAR; INTRAVENOUS at 10:13

## 2018-08-10 RX ADMIN — SCOPOLAMINE 1 PATCH: 1 PATCH, EXTENDED RELEASE TRANSDERMAL at 09:27

## 2018-08-10 RX ADMIN — DEXAMETHASONE SODIUM PHOSPHATE 10 MG: 4 INJECTION, SOLUTION INTRA-ARTICULAR; INTRALESIONAL; INTRAMUSCULAR; INTRAVENOUS; SOFT TISSUE at 09:31

## 2018-08-10 RX ADMIN — TOPIRAMATE 25 MG: 25 TABLET ORAL at 20:26

## 2018-08-10 RX ADMIN — MIDAZOLAM HYDROCHLORIDE 2 MG: 1 INJECTION, SOLUTION INTRAMUSCULAR; INTRAVENOUS at 10:06

## 2018-08-10 RX ADMIN — FENTANYL CITRATE 50 MCG: 50 INJECTION INTRAMUSCULAR; INTRAVENOUS at 14:55

## 2018-08-10 RX ADMIN — SODIUM CHLORIDE: 9 INJECTION, SOLUTION INTRAVENOUS at 09:31

## 2018-08-10 RX ADMIN — DOXYCYCLINE HYCLATE 100 MG: 100 TABLET, FILM COATED ORAL at 20:22

## 2018-08-10 RX ADMIN — BUPROPION HYDROCHLORIDE 150 MG: 150 TABLET, FILM COATED, EXTENDED RELEASE ORAL at 20:22

## 2018-08-10 RX ADMIN — HYDROMORPHONE HYDROCHLORIDE 2 MG: 2 TABLET ORAL at 19:13

## 2018-08-10 RX ADMIN — HYDROMORPHONE HYDROCHLORIDE 0.25 MG: 1 INJECTION, SOLUTION INTRAMUSCULAR; INTRAVENOUS; SUBCUTANEOUS at 16:52

## 2018-08-10 RX ADMIN — FENTANYL CITRATE 50 MCG: 50 INJECTION INTRAMUSCULAR; INTRAVENOUS at 15:44

## 2018-08-10 NOTE — OP NOTE
REPORT TYPE:  Operative Note    DATE OF OPERATION:  08/10/2018      PREOPERATIVE DIAGNOSES:  Acquired deformity of left breast, acquired deformities of right chest and bilateral thighs, and bilateral arm lipodystrophy.    POSTOPERATIVE DIAGNOSES:  Acquired deformity of left breast, acquired deformities of right chest and bilateral thighs, and bilateral arm lipodystrophy.    PROCEDURE PERFORMED:  Left breast reconstruction with autologous fat grafting, correction of 2 right port site depressions with fat grafting, correction of leg deformities with lipoblending and fat grafting, and bilateral brachioplasty.    ATTENDING SURGEON:  Reymundo Pete MD    ANESTHESIA:  General.    ESTIMATED BLOOD LOSS:  Minimal.    DESCRIPTION OF PROCEDURE:  The patient was marked in the preoperative area and taken to the operating room.  The patient was placed supine on the OR table.  Perioperative antibiotics were given.  Venodyne boots were placed and checked for functionality,   and general anesthesia initiated.  The arms, trunk and thighs were prepped and draped in standard fashion.  Two liters of tumescent solution were used and a 4 mm cannula under 15 mmHg of suction was used to lipo aspirate 700 mL from each arm region and   200 mL from areas of irregularity in each thigh region.  The fat was allowed to decant.  The fat was placed in 60 mL syringes and 120 mL of fat were injected into the left breast in multiple planes to help cushion the pain from skin on bone along the   prior mastectomy regions.  A lateral mastectomy dog ear was excised and closed in layers.  Fat grafting was performed to two indentations on the right chest where prior medical ports had been placed.   Subcisional release was performed along the thigh   deformities to release scar tissue from prior liposuction.  A 300 mL of fat were then placed into areas of multiple depressions to help correct the deformities that had been created during prior attempts at breast  reconstruction.  The pinch-and-roll test   was used to confirm adequacy of resection and symmetry in the thigh regions.  The planned arm skin excisions were tailor tacked and to avoid damage to the lymphatic systems in the arms, a skin resection was performed hugging the underside of the dermis.    The arm incisions were closed in layers.  The patient was then cleaned and dressed with surgical glue, sterile gauze, and a garment, awoken from anesthesia, and taken to the recovery room uneventfully.      DEVIN BERRY MD        CC:     DD: 08/10/2018 15:30  DT: 08/10/2018 19:08  Voice ID: [id number]/Report ID: [id number]  ptslabe

## 2018-08-10 NOTE — ANESTHESIA PREPROCEDURE EVALUATION
Anesthesia ROS/MED HX    Anesthesia History - neg  Pulmonary - neg  Cardiovascular- neg  GI/Hepatic   GERD  Endo/Other- neg   Body Habitus: Overweight  ROS/MED HX Comments:    Anesthesia History: Hx left breast CA   Pulmonary: Cough for 1 month, no dyspnea, no fever      Past Surgical History:   Procedure Laterality Date   • BREAST RECONSTRUCTION     • CHOLECYSTECTOMY     • LAPAROSCOPIC GASTRIC BANDING  2013   • LAPAROSCOPY ROBOTIC     • MASTECTOMY Left    • PORTACATH PLACEMENT       Wt Readings from Last 3 Encounters:   08/02/18 73 kg (161 lb)       Temp Readings from Last 3 Encounters:   08/10/18 37 °C (98.6 °F) (Temporal)       BP Readings from Last 3 Encounters:   08/10/18 128/70       Pulse Readings from Last 3 Encounters:   08/10/18 78       No results found for: WBC, HGB, HCT, MCV, PLT    No results found for: GLUCOSE, CALCIUM, NA, K, CO2, CL, BUN, CREATININE    Lab Results   Component Value Date    HCGPREGUR Negative 08/10/2018             Current Facility-Administered Medications   Medication Dose Route Frequency Provider Last Rate Last Dose   • ceFAZolin in dextrose (iso-os) (ANCEF) 2 gram/50 mL IVPB  - Pyxis Override Pull            • ceFAZolin in dextrose (iso-os) (ANCEF) IVPB 2 g  2 g intravenous Once Reymundo Pete MD       • scopolamine (TRANSDERM-SCOP) 1 mg over 3 days patch 1 patch  1 patch transdermal Once Reymundo Pete MD   1 patch at 08/10/18 0927   • sodium chloride 0.9 % infusion   intravenous Continuous Reymundo Pete  mL/hr at 08/10/18 0931         Prior to Admission medications    Medication Sig Start Date End Date Taking? Authorizing Provider   buPROPion SR (WELLBUTRIN SR) 150 mg 12 hr tablet Take 150 mg by mouth 2 (two) times a day. 6/5/18  Yes Seth Zhang MD   clonazePAM (klonoPIN) 1 mg tablet Take 1 mg by mouth nightly. 7/5/18  Yes Seth Zhang MD   doxycycline (VIBRAMYCIN) 100 mg capsule Take 100 mg by mouth 2 (two) times a day. Taking for bronchitis 8/6/18  8/16/18 Yes Seth Zhang MD   tamoxifen (NOLVADEX) 20 mg chemo tablet TAKE 1 TABLET (20 MG TOTAL) BY MOUTH DAILY 4/25/18   Seth Zhang MD   topiramate (TOPAMAX) 25 mg tablet Take 25 mg by mouth 2 (two) times a day.    ProviderSeth MD       There is no problem list on file for this patient.      Past Medical History:   Diagnosis Date   • Anemia    • Anxiety    • Breast cancer (CMS/HCC) (HCC)    • Bulimia    • Constipation    • Depression    • Gall bladder disease    • GERD (gastroesophageal reflux disease)    • IBS (irritable bowel syndrome)    • Low blood pressure    • Migraines    • Obesity    • Panic attacks        Past Surgical History:   Procedure Laterality Date   • BREAST RECONSTRUCTION     • CHOLECYSTECTOMY     • LAPAROSCOPIC GASTRIC BANDING  2013   • LAPAROSCOPY ROBOTIC     • MASTECTOMY Left    • PORTACATH PLACEMENT               Physical Exam    Airway   Mallampati: II   TM distance: >3 FB   Neck ROM: full  Cardiovascular - normal   Rhythm: regular   Rate: normal  Pulmonary - normal   clear to auscultation  Dental - normal        Anesthesia Plan    Plan: general    Technique: general endotracheal     Airway: direct visual laryngoscopy and oral intubation   ASA 2  Induction:    intravenous

## 2018-08-10 NOTE — ANESTHESIA POSTPROCEDURE EVALUATION
Patient: Rehana Melvin    Procedure Summary     Date:  08/10/18 Room / Location:  [facility] OR 1 / [facility] OR    Anesthesia Start:  1007 Anesthesia Stop:  1427    Procedures:       Bilateral Brachioplasty, Liposuction & Sal Lipoblending Of Bilateral Thighs, Scar Revision Of 2 port scars, Right side of Chest, Breast Reconstruction Unilateral (Bilateral Arm Upper)      Left Breast W/ Fat Injection (Left Breast)      SUCTION ASSISTED LIPECTOMY LEGS/THIGHS/KNEES (Bilateral Thigh) Diagnosis:  (Arm Lipodystrophy, Absence Left Breast)    Surgeon:  Reymundo Pete MD Responsible Provider:  Gael Fry DO    Anesthesia Type:  general ASA Status:  2          Anesthesia Type: general  PACU Vitals     No data found.            Anesthesia Post Evaluation    Pain management: adequate  Patient location during evaluation: PACU  Patient participation: complete - patient participated  Level of consciousness: awake and alert  Cardiovascular status: acceptable  Airway Patency: adequate  Respiratory status: acceptable  Hydration status: acceptable  Anesthetic complications: no

## 2018-08-10 NOTE — BRIEF OP NOTE
Bilateral Brachioplasty, Liposuction & Sal Lipoblending Of Bilateral Thighs, Scar Revision Of 2 port scars, Right side of Chest, Breast Reconstruction Unilateral (B), Left Breast W/ Fat Injection (L), BREAST AND NIPPLE RECONSTRUCTION/REVISION (L), SUCTION ASSISTED LIPECTOMY LEGS/THIGHS/KNEES (B) Procedure Note    Procedure:    Bilateral Brachioplasty, Liposuction & Sal Lipoblending Of Bilateral Thighs, Scar Revision Of 2 port scars, Right side of Chest, Breast Reconstruction Unilateral  CPT(R) Code:  54512 - [place] EXCISE EXCESS SKIN TISSUE,ARM    Procedure:    Left Breast W/ Fat Injection  CPT(R) Code:  36761 - [place] FILL CONTOUR DEFCT >10CC    Procedure:    BREAST AND NIPPLE RECONSTRUCTION/REVISION    Procedure:    SUCTION ASSISTED LIPECTOMY LEGS/THIGHS/KNEES      * No Diagnosis Codes entered *       * No Diagnosis Codes entered *    Surgeon(s) and Role:     * Reymundo Pete MD - Primary    Anesthesia: General    Staff:   Circulator: Kecia Mckenzie RN  Scrub Person: Loreto Ashby RN  Registered Nurse First Assistant: Michael Whitney RN    Procedure Details   -    Estimated Blood Loss: No blood loss documented.    Specimens:                No specimens collected during this procedure.      Drains:      Implants: * No implants in log *           Complications:  None; patient tolerated the procedure well.           Disposition: PACU - hemodynamically stable.           Condition: stable    Reymundo Pete MD  Phone Number: [phone]

## 2018-08-10 NOTE — ANESTHESIA PROCEDURE NOTES
Airway  Urgency: elective    Start Time: 8/10/2018 10:15 AM    General Information and Staff    Patient location during procedure: OR    Indications and Patient Condition  Indications for airway management: anesthesia  Sedation level: deep  Preoxygenated: yes  Patient position: sniffing  Mask difficulty assessment: 1 - vent by mask    Final Airway Details  Final airway type: endotracheal airway      Successful airway: ETT  Cuffed: yes   Successful intubation technique: direct laryngoscopy  Blade: Bushra  Blade size: #3  ETT size: 7.0 mm  Cormack-Lehane Classification: grade I - full view of glottis  Placement verified by: chest auscultation   Measured from: lips  ETT to lips (cm): 22  Number of attempts at approach: 1  Number of other approaches attempted: 0

## 2018-08-16 ENCOUNTER — TELEPHONE (OUTPATIENT)
Dept: HEMATOLOGY ONCOLOGY | Facility: CLINIC | Age: 48
End: 2018-08-16

## 2018-09-11 ENCOUNTER — OFFICE VISIT (OUTPATIENT)
Dept: HEMATOLOGY ONCOLOGY | Facility: CLINIC | Age: 48
End: 2018-09-11
Payer: COMMERCIAL

## 2018-09-11 VITALS
RESPIRATION RATE: 16 BRPM | HEART RATE: 82 BPM | DIASTOLIC BLOOD PRESSURE: 62 MMHG | OXYGEN SATURATION: 99 % | TEMPERATURE: 96.1 F | SYSTOLIC BLOOD PRESSURE: 108 MMHG

## 2018-09-11 DIAGNOSIS — C50.412 MALIGNANT NEOPLASM OF UPPER-OUTER QUADRANT OF LEFT BREAST IN FEMALE, ESTROGEN RECEPTOR POSITIVE (HCC): Primary | ICD-10-CM

## 2018-09-11 DIAGNOSIS — Z17.0 MALIGNANT NEOPLASM OF UPPER-OUTER QUADRANT OF LEFT BREAST IN FEMALE, ESTROGEN RECEPTOR POSITIVE (HCC): Primary | ICD-10-CM

## 2018-09-11 PROCEDURE — 99215 OFFICE O/P EST HI 40 MIN: CPT | Performed by: INTERNAL MEDICINE

## 2018-09-11 RX ORDER — TAMOXIFEN CITRATE 20 MG/1
20 TABLET ORAL DAILY
Qty: 90 TABLET | Refills: 1 | Status: SHIPPED | OUTPATIENT
Start: 2018-09-11 | End: 2019-03-13 | Stop reason: SDUPTHER

## 2018-09-11 RX ORDER — TAMOXIFEN CITRATE 20 MG/1
20 TABLET ORAL 2 TIMES DAILY
COMMUNITY
End: 2018-09-11 | Stop reason: SDUPTHER

## 2018-09-11 RX ORDER — DIAZEPAM 2 MG/1
5 TABLET ORAL EVERY 6 HOURS PRN
COMMUNITY
End: 2019-09-10 | Stop reason: ALTCHOICE

## 2018-09-11 RX ORDER — TOPIRAMATE 50 MG/1
50 TABLET, FILM COATED ORAL EVERY 12 HOURS SCHEDULED
COMMUNITY
End: 2020-09-15 | Stop reason: ALTCHOICE

## 2018-09-11 NOTE — LETTER
2018     [address]  [address] [facility],[address]  [facility] [address]    Patient: Nick Atkinson   YOB: 1970   Date of Visit: 2018       Dear Dr Dheeraj Parikh:    Thank you for referring Nick Atkinson to me for evaluation  Below are my notes for this consultation  If you have questions, please do not hesitate to call me  I look forward to following your patient along with you  Sincerely,        Deacon Rodriguez MD        CC: MD Deacon Traylor MD  2018 10:32 AM  Sign at close encounter  Hematology / Oncology Outpatient Follow Up Note    Nick Atkinson 41 y o  female :1970 QTZ:[id number]         Date:  2018    Assessment / Plan:  A 38 year old premenopausal woman with clinical T2 N0 left breast cancer, grade 1, ER positive, [place] negative, HER-2 Fish negative disease  She was treated by Dr Santosh Luna at the [facility] with neoadjuvant chemotherapy with dose dense AC x4  She only had minor clinical response  Subsequently, she switch her care at [place] [address], where she underwent left mastectomy with sentinel lymph node biopsy, resulting in SIMRAN  She had a 1 6 cm of invasive ductal carcinoma, grade 2  Currently, she is on adjuvant hormonal therapy with tamoxifen with no side effects  She underwent endometrial biopsy x2, both of which showed no evidence of carcinoma  One biopsy showed hyperplasia  She underwent ablation which regulate her menstrual bleeding  Clinically, she has no evidence recurrence of breast cancer  She is going to see Dr Sergio Rice in 2018  I told her that if Dr Sergio Rice recommends hysterectomy and oophorectomy, I will strongly consider switching hormonal therapy to aromatase inhibitor  If he has no recommendation of hysterectomy, I recommend continue with tamoxifen 20 mg daily  I do not agree with Dr Ashley Fletcher recommendation regarding her breast cancer treatment which I think is over-treatment    She understood  I will see her again in 6 months for routine follow-up                                                          Subjective:      HPI: [de-identified] 39year old premenopausal woman who underwent first screening mammography which was abnormal in her left breast  First FNA was inconclusive  Core biopsy showed invasive ductal carcinoma at the [facility]  MRI showed T2 lesion, with no lymph node abnormalities  This was grade 1, % positive, [place] negative, HER-2 Fish negative disease  However, Ki-67 was 50-60%  She was seen by Dr Tara Pascual, who recommended neoadjuvant chemotherapy  She received dose dense AC ?4 with very minor response  She has some toxicity with alopecia, nausea, vomiting, as well as significant fatigue  She gained 30 pounds during the chemotherapy  Subsequently, she was seen by Dr Toby Vasquez who I have been discussing her case  She subsequently underwent left mastectomy with sentinel lymph node biopsy in August 2016 which showed a 1 6?1 4?1 3 cm of invasive ductal carcinoma, grade 2  One sentinel lymph node was negative for metastatic disease  She is going to have separate tram flap reconstruction next week  She presents today to discuss further systemic treatment  She has no other significant past medical history  She was tested for BRCA gene mutation which was negative  She denied any pain  She has no respiratory symptom  Her performance status is normal  Her menstrual cycle stopped due to chemotherapy  However, in late August 2016, her menstrual cycles resumed             Interval History:  A 38 year old premenopausal woman who was initially diagnosed clinical stage II left breast cancer, grade 1, ER strongly positive, [place] negative, HER-2 negative disease  She was seen by Dr Tara Pascual, who treated her with neoadjuvant chemotherapy with [facility] x4, resulting in only minor response  She subsequently obtained a second opinion   She underwent left mastectomy with sentinel lymph node biopsy which showed stage IA left breast cancer, grade 2  I saw her, after the mastectomy  I recommended her to have adjuvant hormonal therapy with tamoxifen  She has been on tamoxifen since September 2016 with which she is doing well  She has been extremely anxious regarding her breast cancer care  She recently had heavy menstrual bleeding  She was seen by Dr Tito Vazquez who did D&C  Pathology showed benign endometrial polyp  There was no hyperplasia or carcinoma identified  She was then seen by Dominic Gabriel, who did endometrial biopsy as well as ablation  After this procedure, her menstrual bleeding was well regulated  Another biopsy showed endometrial hyperplasia with no evidence of carcinoma  She was referred to Dr Gomez Fernandez who recommended additional chemotherapy with Taxine for breast cancer as well as possibly switching to [facility]  She presents today for follow-up  She has very minimal hot flashes  She has no complaint of pain  She continued to be very anxious  She has appointment with Dr Tito Vazquez in October 11, 2018 to discuss further management  Her performance status is normal                                                                              Objective:      Primary Diagnosis:     1  Left breast cancer, stage IA (pT1c, pN0,M0) grade 2, % positive, [place] negative, HER-2 Fish negative disease  Diagnosed in April 2016   2  BRCA gene mutation negative       Cancer Staging:  No matching staging information was found for the patient         Previous Hematologic/ Oncologic Treatment:      Neoadjuvant chemotherapy with AC x4 completed in May 2016, which was given at [facility]       Current Hematologic/ Oncologic Treatment:       Adjuvant hormonal therapy with tamoxifen since September 2016       Disease Status:      SIMRAN status post mastectomy with sentinel lymph node biopsy       Test Results:     Pathology:     Left mastectomy specimen showed 1 6 x1 4 x 1 3 cm of invasive ductal carcinoma, grade 2   One sentinel lymph node was negative for metastatic disease  % positive, [place] negative, HER-2 Fish negative disease  Stage IA(pT1c, pN0,M0)biopsy showed Ki-67 50-60%     Radiology:     CT scan of chest, abdomen pelvis showed no evidence of metastatic disease  Mammography in the right breast in April 2017 was benign  BI-RADS 2  Bone scan in August 2017 showed no evidence of osseous metastasis      Laboratory:           Physical Exam:        General Appearance:    Alert, oriented          Eyes:    PERRL   Ears:    Normal external ear canals, both ears   Nose:   Nares normal, septum midline   Throat:   Mucosa moist  Pharynx without injection  Neck:   Supple         Lungs:     Clear to auscultation bilaterally   Chest Wall:    No tenderness or deformity    Heart:    Regular rate and rhythm         Abdomen:     Soft, non-tender, bowel sounds +, no organomegaly               Extremities:   Extremities no cyanosis or edema         Skin:   no rash or icterus  Lymph nodes:   Cervical, supraclavicular, and axillary nodes normal   Neurologic:   CNII-XII intact, normal strength, sensation and reflexes     Throughout             Breast exam:   status post left mastectomy without reconstruction  No palpable abnormality in her left chest wall  Right breast exam is negative  ROS: Review of Systems   Psychiatric/Behavioral:        Anxiety   All other systems reviewed and are negative  Imaging: No results found        Labs:   Lab Results   Component Value Date    WBC 7 51 05/10/2018    HGB 13 7 05/10/2018    HCT 40 6 05/10/2018    MCV 92 05/10/2018     05/10/2018     Lab Results   Component Value Date     05/10/2018    K 4 1 05/10/2018     (H) 05/10/2018    CO2 28 05/10/2018    BUN 19 05/10/2018    CREATININE 0 94 05/10/2018    GLUF 88 02/22/2018    CALCIUM 9 0 05/10/2018    AST 14 05/10/2018    ALT 24 05/10/2018    ALKPHOS 55 05/10/2018    EGFR 74 05/10/2018         Lab Results   Component Value Date    IRON 91 08/18/2017    TIBC 354 08/18/2017    FERRITIN 28 02/22/2018       Lab Results   Component Value Date    OZFVOTYQ31 463 06/15/2017       Lab Results   Component Value Date    FOLATE 9 2 06/15/2017         Current Medications: Reviewed  Allergies: Reviewed  PMH/FH/SH:  Reviewed      Vital Sign:    There is no height or weight on file to calculate BSA      Wt Readings from Last 3 Encounters:   05/10/18 74 4 kg (164 lb)   05/21/18 74 4 kg (164 lb)   04/25/18 76 7 kg (169 lb)        Temp Readings from Last 3 Encounters:   09/11/18 (!) 96 1 °F (35 6 °C) (Tympanic)   06/05/18 97 6 °F (36 4 °C) (Tympanic)   05/31/18 98 °F (36 7 °C) (Oral)        BP Readings from Last 3 Encounters:   09/11/18 108/62   06/05/18 98/70   05/31/18 118/78         Pulse Readings from Last 3 Encounters:   09/11/18 82   06/05/18 89   05/31/18 72     @LASTSAO2(3)@

## 2018-09-11 NOTE — PROGRESS NOTES
Hematology / Oncology Outpatient Follow Up Note    Manuel President 41 y o  female :1970 [facility]:[id number]         Date:  2018    Assessment / Plan:  A 38 year old premenopausal woman with clinical T2 N0 left breast cancer, grade 1, ER positive, [place] negative, HER-2 Fish negative disease  She was treated by Dr Malik Starkey at the [facility] with neoadjuvant chemotherapy with dose dense AC x4  She only had minor clinical response  Subsequently, she switch her care at [place] [address], where she underwent left mastectomy with sentinel lymph node biopsy, resulting in SIMRAN  She had a 1 6 cm of invasive ductal carcinoma, grade 2  Currently, she is on adjuvant hormonal therapy with tamoxifen with no side effects  She underwent endometrial biopsy x2, both of which showed no evidence of carcinoma  One biopsy showed hyperplasia  She underwent ablation which regulate her menstrual bleeding  Clinically, she has no evidence recurrence of breast cancer  She is going to see Dr Herve Hickman in 2018  I told her that if Dr Herve Hickman recommends hysterectomy and oophorectomy, I will strongly consider switching hormonal therapy to aromatase inhibitor  If he has no recommendation of hysterectomy, I recommend continue with tamoxifen 20 mg daily  I do not agree with Dr Joshua John recommendation regarding her breast cancer treatment which I think is over-treatment  She understood  I will see her again in 6 months for routine follow-up                                                          Subjective:      HPI: [de-identified] 39year old premenopausal woman who underwent first screening mammography which was abnormal in her left breast  First FNA was inconclusive  Core biopsy showed invasive ductal carcinoma at the [facility]  MRI showed T2 lesion, with no lymph node abnormalities  This was grade 1, % positive, [place] negative, HER-2 Fish negative disease  However, Ki-67 was 50-60%   She was seen by Dr Malik Starkey, who recommended neoadjuvant chemotherapy  She received dose dense AC ?4 with very minor response  She has some toxicity with alopecia, nausea, vomiting, as well as significant fatigue  She gained 30 pounds during the chemotherapy  Subsequently, she was seen by Dr Mitchel Escalante who I have been discussing her case  She subsequently underwent left mastectomy with sentinel lymph node biopsy in August 2016 which showed a 1 6?1 4?1 3 cm of invasive ductal carcinoma, grade 2  One sentinel lymph node was negative for metastatic disease  She is going to have separate tram flap reconstruction next week  She presents today to discuss further systemic treatment  She has no other significant past medical history  She was tested for BRCA gene mutation which was negative  She denied any pain  She has no respiratory symptom  Her performance status is normal  Her menstrual cycle stopped due to chemotherapy  However, in late August 2016, her menstrual cycles resumed             Interval History:  A 38 year old premenopausal woman who was initially diagnosed clinical stage II left breast cancer, grade 1, ER strongly positive, [place] negative, HER-2 negative disease  She was seen by Dr Ranjan Brian, who treated her with neoadjuvant chemotherapy with [facility] x4, resulting in only minor response  She subsequently obtained a second opinion  She underwent left mastectomy with sentinel lymph node biopsy which showed stage IA left breast cancer, grade 2  I saw her, after the mastectomy  I recommended her to have adjuvant hormonal therapy with tamoxifen  She has been on tamoxifen since September 2016 with which she is doing well  She has been extremely anxious regarding her breast cancer care  She recently had heavy menstrual bleeding  She was seen by Dr Armani Blackman who did D&C  Pathology showed benign endometrial polyp  There was no hyperplasia or carcinoma identified  She was then seen by Josh Maravilla, who did endometrial biopsy as well as ablation    After this procedure, her menstrual bleeding was well regulated  Another biopsy showed endometrial hyperplasia with no evidence of carcinoma  She was referred to Dr Minda Osler who recommended additional chemotherapy with Taxine for breast cancer as well as possibly switching to [facility] analogue  She presents today for follow-up  She has very minimal hot flashes  She has no complaint of pain  She continued to be very anxious  She has appointment with Dr Margie Lorenz in October 11, 2018 to discuss further management  Her performance status is normal                                                                              Objective:      Primary Diagnosis:     1  Left breast cancer, stage IA (pT1c, pN0,M0) grade 2, % positive, [place] negative, HER-2 Fish negative disease  Diagnosed in April 2016   2  BRCA gene mutation negative       Cancer Staging:  No matching staging information was found for the patient         Previous Hematologic/ Oncologic Treatment:      Neoadjuvant chemotherapy with AC x4 completed in May 2016, which was given at [facility]       Current Hematologic/ Oncologic Treatment:       Adjuvant hormonal therapy with tamoxifen since September 2016       Disease Status:      SIMRAN status post mastectomy with sentinel lymph node biopsy       Test Results:     Pathology:     Left mastectomy specimen showed 1 6 x1 4 x 1 3 cm of invasive ductal carcinoma, grade 2  One sentinel lymph node was negative for metastatic disease  % positive, [place] negative, HER-2 Fish negative disease  Stage IA(pT1c, pN0,M0)biopsy showed Ki-67 50-60%     Radiology:     CT scan of chest, abdomen pelvis showed no evidence of metastatic disease  Mammography in the right breast in April 2017 was benign  BI-RADS 2     Bone scan in August 2017 showed no evidence of osseous metastasis      Laboratory:           Physical Exam:        General Appearance:    Alert, oriented          Eyes:    PERRL   Ears:    Normal external ear canals, both ears   Nose:   Nares normal, septum midline   Throat:   Mucosa moist  Pharynx without injection  Neck:   Supple         Lungs:     Clear to auscultation bilaterally   Chest Wall:    No tenderness or deformity    Heart:    Regular rate and rhythm         Abdomen:     Soft, non-tender, bowel sounds +, no organomegaly               Extremities:   Extremities no cyanosis or edema         Skin:   no rash or icterus  Lymph nodes:   Cervical, supraclavicular, and axillary nodes normal   Neurologic:   CNII-XII intact, normal strength, sensation and reflexes     Throughout             Breast exam:   status post left mastectomy without reconstruction  No palpable abnormality in her left chest wall  Right breast exam is negative  ROS: Review of Systems   Psychiatric/Behavioral:        Anxiety   All other systems reviewed and are negative  Imaging: No results found  Labs:   Lab Results   Component Value Date    WBC 7 51 05/10/2018    HGB 13 7 05/10/2018    HCT 40 6 05/10/2018    MCV 92 05/10/2018     05/10/2018     Lab Results   Component Value Date     05/10/2018    K 4 1 05/10/2018     (H) 05/10/2018    CO2 28 05/10/2018    BUN 19 05/10/2018    CREATININE 0 94 05/10/2018    GLUF 88 02/22/2018    CALCIUM 9 0 05/10/2018    AST 14 05/10/2018    ALT 24 05/10/2018    ALKPHOS 55 05/10/2018    EGFR 74 05/10/2018         Lab Results   Component Value Date    IRON 91 08/18/2017    TIBC 354 08/18/2017    FERRITIN 28 02/22/2018       Lab Results   Component Value Date    [id number] 478 06/15/2017       Lab Results   Component Value Date    FOLATE 9 2 06/15/2017         Current Medications: Reviewed  Allergies: Reviewed  PMH/FH/SH:  Reviewed      Vital Sign:    There is no height or weight on file to calculate BSA      Wt Readings from Last 3 Encounters:   05/10/18 74 4 kg (164 lb)   05/21/18 74 4 kg (164 lb)   04/25/18 76 7 kg (169 lb)        Temp Readings from Last 3 Encounters:   09/11/18 (!) 96 1 °F (35 6 °C) (Tympanic)   06/05/18 97 6 °F (36 4 °C) (Tympanic)   05/31/18 98 °F (36 7 °C) (Oral)        BP Readings from Last 3 Encounters:   09/11/18 108/62   06/05/18 98/70   05/31/18 118/78         Pulse Readings from Last 3 Encounters:   09/11/18 82   06/05/18 89   05/31/18 72     @LASTSAO2(3)@

## 2018-10-11 ENCOUNTER — OFFICE VISIT (OUTPATIENT)
Dept: GYNECOLOGIC ONCOLOGY | Facility: CLINIC | Age: 48
End: 2018-10-11
Payer: COMMERCIAL

## 2018-10-11 VITALS — TEMPERATURE: 98 F | DIASTOLIC BLOOD PRESSURE: 70 MMHG | SYSTOLIC BLOOD PRESSURE: 100 MMHG | HEART RATE: 89 BPM

## 2018-10-11 DIAGNOSIS — N85.00 ENDOMETRIAL HYPERPLASIA: Primary | ICD-10-CM

## 2018-10-11 PROCEDURE — 99215 OFFICE O/P EST HI 40 MIN: CPT | Performed by: OBSTETRICS & GYNECOLOGY

## 2018-10-11 NOTE — PROGRESS NOTES
Assessment/Plan:    Problem List Items Addressed This Visit        Genitourinary    Endometrial hyperplasia - Primary       53-year-old with estrogen receptor positive breast cancer who is currently taking tamoxifen  She underwent an endometrial ablation in July of 2018 that revealed simple hyperplasia  The ablation helped her dysmenorrhea  Her performance status is 0     1  She understands that tamoxifen is contributing to her simple hyperplasia and that as long as she is taking tamoxifen, there is a risk her hyperplasia will persist and could develop into endometrial cancer  She understands that at baseline, there is a 6 fold increase in risk of developing endometrial cancer while taking tamoxifen therapy  2  She understands that now that  Ultrasound is unreliable as a means of following hyperplasia while taking tamoxifen therapy  3  She also understands that surveillance endometrial biopsy will also be unreliable as she is now status post endometrial ablation  4   Without adequate means of surveillance or screening, I do not recommend continuing with her current treatment regimen  5   I recommended 1 of 2 surgical approaches  The 1st approach would be to perform laparoscopic bilateral salpingo-oophorectomy and start aromatase inhibitor therapy  This would not be as optimal as performing total laparoscopic hysterectomy and bilateral salpingo-oophorectomy followed by aromatase inhibitor therapy  6   She remains anxious regarding side effects of menopause and feels that menopause will significantly impact her quality of life in a negative way  She has an appointment scheduled with Dr Alma Johnson in November  I encouraged her to follow up here after that appointment to discuss surgical options  I spent 45 minutes with the patient    More than half the time was spent in counseling and coordination of care regarding treatment options for her hormone receptor positive breast cancer and simple hyperplasia of the endometrium  CHIEF COMPLAINT:       Problem:  Cancer Staging  Malignant neoplasm of upper-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 [facility])  Staging form: Breast, AJCC 7th Edition  - Pathologic stage from 8/10/2016: Stage IA (yT1c, N0, cM0) - Signed by Anel Mera MD on 4/25/2018        Previous therapy:     Malignant neoplasm of upper-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 [facility])     - 6/2016 Chemotherapy     Theron Adjuvant chemo at [facility] for four cycles  Patient declined further chemo and came to [place] [address] for second opinion         4/25/2016 Initial Diagnosis     Left lumpectomy  [facility]  Invasive ductal carcinoma  Grade 1  1 86 on utrasound    [place] 0  Her 2 2+  Fish negative  Stage IIA per [facility]         6/21/2016 Genetic Testing     BRCA negative  Myriad-Done through [facility]         8/10/2016 Surgery     Left mastectomy with sentinel lymph node biopsy  Invasive ductal carcinoma  Grade 2  1 6 cm  0/1 lymph node  Stage 1A    Co surgery with Dr Elena Early  Failed STEVE flap         10/2016 -  Hormone Therapy     Tamoxifen 20 mg daily  Dr Valente Hankins         12/22/2017 Surgery     Revision of left breast scar/local flap  Dr Charles Quiroz              Patient ID: Ignacio Dykes is a 41 y o  female    51-year-old returns to discuss treatment options  She had D&C and endometrial ablation in July of 2018 at [facility]   The ablation helped her dysmenorrhea  She is not having vaginal bleeding  Pathology from that surgery revealed simple hyperplasia          The following portions of the patient's history were reviewed and updated as appropriate: allergies, current medications, past family history, past medical history, past social history, past surgical history and problem list     Review of Systems    Current Outpatient Prescriptions   Medication Sig Dispense Refill    BuPROPion HCl (WELLBUTRIN XL PO) Take 150 mg by mouth 2 (two) times a day        CLONAZEPAM PO Take 1 tablet by mouth daily at bedtime      tamoxifen (NOLVADEX) 20 mg tablet Take 1 tablet (20 mg total) by mouth daily 90 tablet 1    topiramate (TOPAMAX) 50 MG tablet Take 25 mg by mouth every 12 (twelve) hours      diazepam (VALIUM) 2 mg tablet Take 5 mg by mouth every 6 (six) hours as needed for anxiety       No current facility-administered medications for this visit  Objective:    Blood pressure 100/70, pulse 89, temperature 98 °F (36 7 °C), temperature source Oral, not currently breastfeeding  There is no height or weight on file to calculate BMI  There is no height or weight on file to calculate BSA  Physical Exam   Constitutional: She is oriented to person, place, and time  She appears well-developed and well-nourished  No distress  Pulmonary/Chest: Effort normal    Neurological: She is alert and oriented to person, place, and time  Skin: She is not diaphoretic     Psychiatric: Her behavior is normal  Judgment and thought content normal      Anxious

## 2018-10-11 NOTE — ASSESSMENT & PLAN NOTE
82-year-old with estrogen receptor positive breast cancer who is currently taking tamoxifen  She underwent an endometrial ablation in July of 2018 that revealed simple hyperplasia  The ablation helped her dysmenorrhea  Her performance status is 0     1  She understands that tamoxifen is contributing to her simple hyperplasia and that as long as she is taking tamoxifen, there is a risk her hyperplasia will persist and could develop into endometrial cancer  She understands that at baseline, there is a 6 fold increase in risk of developing endometrial cancer while taking tamoxifen therapy  2  She understands that now that  Ultrasound is unreliable as a means of following hyperplasia while taking tamoxifen therapy  3  She also understands that surveillance endometrial biopsy will also be unreliable as she is now status post endometrial ablation  4   Without adequate means of surveillance or screening, I do not recommend continuing with her current treatment regimen  5   I recommended 1 of 2 surgical approaches  The 1st approach would be to perform laparoscopic bilateral salpingo-oophorectomy and start aromatase inhibitor therapy  This would not be as optimal as performing total laparoscopic hysterectomy and bilateral salpingo-oophorectomy followed by aromatase inhibitor therapy  6   She remains anxious regarding side effects of menopause and feels that menopause will significantly impact her quality of life in a negative way  She has an appointment scheduled with Dr Peng Every in November  I encouraged her to follow up here after that appointment to discuss surgical options  I spent 45 minutes with the patient  More than half the time was spent in counseling and coordination of care regarding treatment options for her hormone receptor positive breast cancer and simple hyperplasia of the endometrium

## 2018-10-12 ENCOUNTER — TELEPHONE (OUTPATIENT)
Dept: HEMATOLOGY ONCOLOGY | Facility: CLINIC | Age: 48
End: 2018-10-12

## 2018-10-29 ENCOUNTER — OFFICE VISIT (OUTPATIENT)
Dept: HEMATOLOGY ONCOLOGY | Facility: CLINIC | Age: 48
End: 2018-10-29
Payer: COMMERCIAL

## 2018-10-29 VITALS
RESPIRATION RATE: 16 BRPM | OXYGEN SATURATION: 100 % | DIASTOLIC BLOOD PRESSURE: 78 MMHG | HEART RATE: 92 BPM | TEMPERATURE: 97 F | SYSTOLIC BLOOD PRESSURE: 102 MMHG

## 2018-10-29 DIAGNOSIS — C50.412 MALIGNANT NEOPLASM OF UPPER-OUTER QUADRANT OF LEFT BREAST IN FEMALE, ESTROGEN RECEPTOR POSITIVE (HCC): Primary | ICD-10-CM

## 2018-10-29 DIAGNOSIS — Z17.0 MALIGNANT NEOPLASM OF UPPER-OUTER QUADRANT OF LEFT BREAST IN FEMALE, ESTROGEN RECEPTOR POSITIVE (HCC): Primary | ICD-10-CM

## 2018-10-29 PROBLEM — Z98.890 POSTOPERATIVE STATE: Status: RESOLVED | Noted: 2018-05-30 | Resolved: 2018-10-29

## 2018-10-29 PROCEDURE — 99214 OFFICE O/P EST MOD 30 MIN: CPT | Performed by: INTERNAL MEDICINE

## 2018-10-29 NOTE — PROGRESS NOTES
Hematology / Oncology Outpatient Follow Up Note    Primitivo Gonzalez 41 y o  female :1970 [facility]:[id number]         Date:  10/29/2018    Assessment / Plan:  A 41year old premenopausal woman with clinical T2 N0 left breast cancer, grade 1, ER positive, MD negative, HER-2 Fish negative disease  She was treated by Dr Andreina Kwan at the [facility] with neoadjuvant chemotherapy with dose dense AC x4  She only had minor clinical response  Subsequently, she switch her care at [place] [address], where she underwent left mastectomy with sentinel lymph node biopsy, resulting in SIMRAN  She had a 1 6 cm of invasive ductal carcinoma, grade 2  Currently, she is on adjuvant hormonal therapy with tamoxifen with no side effects  She underwent endometrial biopsy x2, both of which showed no evidence of carcinoma  One biopsy showed hyperplasia  She underwent ablation which regulate her menstrual bleeding  Clinically, she has no evidence recurrence of breast cancer  Because of the effective screening measure for endometrial cancer on tamoxifen, Dr Chavez Art recommended hysterectomy with bilateral oophorectomy or bilateral oophorectomy followed by aromatase inhibitors  She does not wish to have any surgical procedure  She would like to have natural menopause in the future  Because of her young age, risk of endometrial cancer on tamoxifen is very small  Previous breast cancer prevention trial showed no statistical difference in the risk of breast cancer was other patient took tamoxifen or placebo for patient younger than [address]  Therefore, I respect her decision of not having hysterectomy or oophorectomy  If she still concerned, she may have an option to be on [facility] with aromatase inhibitor which may be slightly better than tamoxifen alone  She does not wish to do that  She wished to stay on tamoxifen 20 mg daily which is completely acceptable decision  All the patient questions were answered to her satisfaction    I will see her again in March 2019 for follow-up                                                                        Subjective:      HPI: [de-identified] 39year old premenopausal woman who underwent first screening mammography which was abnormal in her left breast  First FNA was inconclusive  Core biopsy showed invasive ductal carcinoma at the [facility]  MRI showed T2 lesion, with no lymph node abnormalities  This was grade 1, % positive, [place] negative, HER-2 Fish negative disease  However, Ki-67 was 50-60%  She was seen by Dr Emeka Brewer, who recommended neoadjuvant chemotherapy  She received dose dense AC ?4 with very minor response  She has some toxicity with alopecia, nausea, vomiting, as well as significant fatigue  She gained 30 pounds during the chemotherapy  Subsequently, she was seen by Dr Garry Ramirez who I have been discussing her case  She subsequently underwent left mastectomy with sentinel lymph node biopsy in August 2016 which showed a 1 6?1 4?1 3 cm of invasive ductal carcinoma, grade 2  One sentinel lymph node was negative for metastatic disease  She is going to have separate tram flap reconstruction next week  She presents today to discuss further systemic treatment  She has no other significant past medical history  She was tested for BRCA gene mutation which was negative  She denied any pain  She has no respiratory symptom  Her performance status is normal  Her menstrual cycle stopped due to chemotherapy  However, in late August 2016, her menstrual cycles resumed             Interval History:  A 38 year old premenopausal woman who was initially diagnosed clinical stage II left breast cancer, grade 1, ER strongly positive, W[place] negative, HER-2 negative disease  She was seen by Dr Emeka Brewer, who treated her with neoadjuvant chemotherapy with [facility] x4, resulting in only minor response  She subsequently obtained a second opinion   She underwent left mastectomy with sentinel lymph node biopsy which showed stage IA left breast cancer, grade 2  I saw her, after the mastectomy  I recommended her to have adjuvant hormonal therapy with tamoxifen  She has been on tamoxifen since September 2016 with which she is doing well  She has been extremely anxious regarding her breast cancer care  She recently had heavy menstrual bleeding  She was seen by Dr Keshia Huerta who did D&C  Pathology showed benign endometrial polyp  There was no hyperplasia or carcinoma identified  She was then seen by Phani Solis, who did endometrial biopsy as well as ablation  After this procedure, her menstrual bleeding was well regulated  Biopsy showed simple hyperplasia  She was seen by Dr Keshia Huerta again who recommended either hysterectomy and bilateral oophorectomy or simple bilateral oophorectomy followed by aromatase inhibitors  She does not wish to have abrupt menopause  She did not want to have surgery  She presents today to discuss breast cancer, tamoxifen as well as gynecologic issue  She continued to be quite anxious  Physically, she is doing well  She has no complaint of pain  She denied any respiratory symptoms  Her performance status is normal                                                                                                                           Objective:      Primary Diagnosis:     1  Left breast cancer, stage IA (pT1c, pN0,M0) grade 2, % positive, GA negative, HER-2 Fish negative disease  Diagnosed in April 2016   2   BRCA gene mutation negative       Cancer Staging:  No matching staging information was found for the patient         Previous Hematologic/ Oncologic Treatment:      Neoadjuvant chemotherapy with AC x4 completed in May 2016, which was given at [facility]       Current Hematologic/ Oncologic Treatment:       Adjuvant hormonal therapy with tamoxifen since September 2016       Disease Status:      SIMRAN status post mastectomy with sentinel lymph node biopsy       Test Results:     Pathology:     Left mastectomy specimen showed 1 6 x1 4 x 1 3 cm of invasive ductal carcinoma, grade 2  One sentinel lymph node was negative for metastatic disease  % positive, SD negative, HER-2 Fish negative disease  Stage IA(pT1c, pN0,M0)biopsy showed Ki-67 50-60%     Radiology:     CT scan of chest, abdomen pelvis showed no evidence of metastatic disease  Mammography in the right breast in April 2017 was benign  BI-RADS 2  Bone scan in August 2017 showed no evidence of osseous metastasis      Laboratory:           Physical Exam:        General Appearance:    Alert, oriented          Eyes:    PERRL   Ears:    Normal external ear canals, both ears   Nose:   Nares normal, septum midline   Throat:   Mucosa moist  Pharynx without injection  Neck:   Supple         Lungs:     Clear to auscultation bilaterally   Chest Wall:    No tenderness or deformity    Heart:    Regular rate and rhythm         Abdomen:     Soft, non-tender, bowel sounds +, no organomegaly               Extremities:   Extremities no cyanosis or edema         Skin:   no rash or icterus  Lymph nodes:   Cervical, supraclavicular, and axillary nodes normal   Neurologic:   CNII-XII intact, normal strength, sensation and reflexes     Throughout             Breast exam:   status post left mastectomy without reconstruction  No palpable abnormality in her left chest wall  Right breast exam is negative  ROS: Review of Systems        Imaging: No results found        Labs:   Lab Results   Component Value Date    WBC 7 51 05/10/2018    HGB 13 7 05/10/2018    HCT 40 6 05/10/2018    MCV 92 05/10/2018     05/10/2018     Lab Results   Component Value Date     05/10/2018    K 4 1 05/10/2018     (H) 05/10/2018    CO2 28 05/10/2018    BUN 19 05/10/2018    CREATININE 0 94 05/10/2018    GLUF 88 02/22/2018    CALCIUM 9 0 05/10/2018    AST 14 05/10/2018    ALT 24 05/10/2018    ALKPHOS 55 05/10/2018    EGFR 74 05/10/2018       Lab Results   Component Value Date    IRON 91 08/18/2017    TIBC 354 08/18/2017    FERRITIN 28 02/22/2018       Lab Results   Component Value Date    WUVFSPDJ22 991 06/15/2017       Lab Results   Component Value Date    FOLATE 9 2 06/15/2017         Current Medications: Reviewed  Allergies: Reviewed  PMH/FH/SH:  Reviewed      Vital Sign:    There is no height or weight on file to calculate BSA      Wt Readings from Last 3 Encounters:   05/10/18 74 4 kg (164 lb)   05/21/18 74 4 kg (164 lb)   04/25/18 76 7 kg (169 lb)        Temp Readings from Last 3 Encounters:   10/29/18 (!) 97 °F (36 1 °C) (Tympanic)   10/11/18 98 °F (36 7 °C) (Oral)   09/11/18 (!) 96 1 °F (35 6 °C) (Tympanic)        BP Readings from Last 3 Encounters:   10/29/18 102/78   10/11/18 100/70   09/11/18 108/62         Pulse Readings from Last 3 Encounters:   10/29/18 92   10/11/18 89   09/11/18 82     @LASTSAO2(3)@

## 2018-11-01 ENCOUNTER — HOSPITAL ENCOUNTER (EMERGENCY)
Facility: HOSPITAL | Age: 48
Discharge: HOME | End: 2018-11-01
Attending: EMERGENCY MEDICINE | Admitting: EMERGENCY MEDICINE
Payer: COMMERCIAL

## 2018-11-01 ENCOUNTER — OFFICE VISIT (OUTPATIENT)
Dept: SURGICAL ONCOLOGY | Facility: CLINIC | Age: 48
End: 2018-11-01
Payer: COMMERCIAL

## 2018-11-01 ENCOUNTER — APPOINTMENT (EMERGENCY)
Dept: RADIOLOGY | Facility: HOSPITAL | Age: 48
End: 2018-11-01
Payer: COMMERCIAL

## 2018-11-01 ENCOUNTER — APPOINTMENT (EMERGENCY)
Dept: CT IMAGING | Facility: HOSPITAL | Age: 48
End: 2018-11-01
Payer: COMMERCIAL

## 2018-11-01 VITALS
BODY MASS INDEX: 31.8 KG/M2 | DIASTOLIC BLOOD PRESSURE: 68 MMHG | HEART RATE: 96 BPM | RESPIRATION RATE: 16 BRPM | HEIGHT: 60 IN | SYSTOLIC BLOOD PRESSURE: 90 MMHG | WEIGHT: 162 LBS | TEMPERATURE: 97.8 F

## 2018-11-01 VITALS
TEMPERATURE: 98.2 F | HEART RATE: 84 BPM | OXYGEN SATURATION: 99 % | DIASTOLIC BLOOD PRESSURE: 68 MMHG | SYSTOLIC BLOOD PRESSURE: 119 MMHG | RESPIRATION RATE: 18 BRPM

## 2018-11-01 DIAGNOSIS — G44.59 COMPLICATED HEADACHE SYNDROMES: Primary | ICD-10-CM

## 2018-11-01 DIAGNOSIS — Z17.0 MALIGNANT NEOPLASM OF UPPER-OUTER QUADRANT OF LEFT BREAST IN FEMALE, ESTROGEN RECEPTOR POSITIVE (HCC): Primary | ICD-10-CM

## 2018-11-01 DIAGNOSIS — B34.9 ACUTE VIRAL SYNDROME: ICD-10-CM

## 2018-11-01 DIAGNOSIS — C50.412 MALIGNANT NEOPLASM OF UPPER-OUTER QUADRANT OF LEFT BREAST IN FEMALE, ESTROGEN RECEPTOR POSITIVE (HCC): Primary | ICD-10-CM

## 2018-11-01 DIAGNOSIS — Z79.810 USE OF TAMOXIFEN (NOLVADEX): ICD-10-CM

## 2018-11-01 DIAGNOSIS — R06.02 SHORTNESS OF BREATH ON EXERTION: ICD-10-CM

## 2018-11-01 LAB
ALBUMIN SERPL BCP-MCNC: 3.1 G/DL (ref 3.5–5)
ALP SERPL-CCNC: 60 U/L (ref 46–116)
ALT SERPL W P-5'-P-CCNC: 74 U/L (ref 12–78)
ANION GAP SERPL CALCULATED.3IONS-SCNC: 11 MMOL/L (ref 4–13)
APTT PPP: 27 SECONDS (ref 24–36)
AST SERPL W P-5'-P-CCNC: 41 U/L (ref 5–45)
ATRIAL RATE: 90 BPM
BASOPHILS # BLD AUTO: 0.02 THOUSANDS/ΜL (ref 0–0.1)
BASOPHILS NFR BLD AUTO: 0 % (ref 0–1)
BILIRUB DIRECT SERPL-MCNC: 0.07 MG/DL (ref 0–0.2)
BILIRUB SERPL-MCNC: 0.1 MG/DL (ref 0.2–1)
BUN SERPL-MCNC: 19 MG/DL (ref 5–25)
CALCIUM SERPL-MCNC: 8.5 MG/DL (ref 8.3–10.1)
CHLORIDE SERPL-SCNC: 109 MMOL/L (ref 100–108)
CO2 SERPL-SCNC: 21 MMOL/L (ref 21–32)
CREAT SERPL-MCNC: 0.68 MG/DL (ref 0.6–1.3)
DEPRECATED D DIMER PPP: 645 NG/ML (FEU) (ref 0–424)
EOSINOPHIL # BLD AUTO: 0.09 THOUSAND/ΜL (ref 0–0.61)
EOSINOPHIL NFR BLD AUTO: 2 % (ref 0–6)
ERYTHROCYTE [DISTWIDTH] IN BLOOD BY AUTOMATED COUNT: 13 % (ref 11.6–15.1)
GFR SERPL CREATININE-BSD FRML MDRD: 106 ML/MIN/1.73SQ M
GLUCOSE SERPL-MCNC: 94 MG/DL (ref 65–140)
HCT VFR BLD AUTO: 44.5 % (ref 34.8–46.1)
HGB BLD-MCNC: 14.5 G/DL (ref 11.5–15.4)
IMM GRANULOCYTES # BLD AUTO: 0.01 THOUSAND/UL (ref 0–0.2)
IMM GRANULOCYTES NFR BLD AUTO: 0 % (ref 0–2)
INR PPP: 1.1 (ref 0.86–1.17)
LYMPHOCYTES # BLD AUTO: 1.49 THOUSANDS/ΜL (ref 0.6–4.47)
LYMPHOCYTES NFR BLD AUTO: 24 % (ref 14–44)
MCH RBC QN AUTO: 30.8 PG (ref 26.8–34.3)
MCHC RBC AUTO-ENTMCNC: 32.6 G/DL (ref 31.4–37.4)
MCV RBC AUTO: 95 FL (ref 82–98)
MONOCYTES # BLD AUTO: 0.52 THOUSAND/ΜL (ref 0.17–1.22)
MONOCYTES NFR BLD AUTO: 9 % (ref 4–12)
NEUTROPHILS # BLD AUTO: 4.01 THOUSANDS/ΜL (ref 1.85–7.62)
NEUTS SEG NFR BLD AUTO: 65 % (ref 43–75)
NRBC BLD AUTO-RTO: 0 /100 WBCS
NT-PROBNP SERPL-MCNC: 71 PG/ML
P AXIS: 65 DEGREES
PLATELET # BLD AUTO: 234 THOUSANDS/UL (ref 149–390)
PMV BLD AUTO: 8.8 FL (ref 8.9–12.7)
POTASSIUM SERPL-SCNC: 3.8 MMOL/L (ref 3.5–5.3)
PR INTERVAL: 124 MS
PROT SERPL-MCNC: 6.5 G/DL (ref 6.4–8.2)
PROTHROMBIN TIME: 13.9 SECONDS (ref 11.8–14.2)
QRS AXIS: 6 DEGREES
QRSD INTERVAL: 98 MS
QT INTERVAL: 364 MS
QTC INTERVAL: 445 MS
RBC # BLD AUTO: 4.71 MILLION/UL (ref 3.81–5.12)
SODIUM SERPL-SCNC: 141 MMOL/L (ref 136–145)
T WAVE AXIS: 34 DEGREES
TROPONIN I SERPL-MCNC: <0.02 NG/ML
VENTRICULAR RATE: 90 BPM
WBC # BLD AUTO: 6.14 THOUSAND/UL (ref 4.31–10.16)

## 2018-11-01 PROCEDURE — 84484 ASSAY OF TROPONIN QUANT: CPT | Performed by: EMERGENCY MEDICINE

## 2018-11-01 PROCEDURE — 93010 ELECTROCARDIOGRAM REPORT: CPT | Performed by: INTERNAL MEDICINE

## 2018-11-01 PROCEDURE — 96374 THER/PROPH/DIAG INJ IV PUSH: CPT

## 2018-11-01 PROCEDURE — 93005 ELECTROCARDIOGRAM TRACING: CPT

## 2018-11-01 PROCEDURE — 99214 OFFICE O/P EST MOD 30 MIN: CPT | Performed by: SURGERY

## 2018-11-01 PROCEDURE — 83880 ASSAY OF NATRIURETIC PEPTIDE: CPT | Performed by: EMERGENCY MEDICINE

## 2018-11-01 PROCEDURE — 80048 BASIC METABOLIC PNL TOTAL CA: CPT | Performed by: EMERGENCY MEDICINE

## 2018-11-01 PROCEDURE — 99285 EMERGENCY DEPT VISIT HI MDM: CPT

## 2018-11-01 PROCEDURE — 36415 COLL VENOUS BLD VENIPUNCTURE: CPT | Performed by: EMERGENCY MEDICINE

## 2018-11-01 PROCEDURE — 85730 THROMBOPLASTIN TIME PARTIAL: CPT | Performed by: EMERGENCY MEDICINE

## 2018-11-01 PROCEDURE — 96361 HYDRATE IV INFUSION ADD-ON: CPT

## 2018-11-01 PROCEDURE — 85025 COMPLETE CBC W/AUTO DIFF WBC: CPT | Performed by: EMERGENCY MEDICINE

## 2018-11-01 PROCEDURE — 70450 CT HEAD/BRAIN W/O DYE: CPT

## 2018-11-01 PROCEDURE — 85379 FIBRIN DEGRADATION QUANT: CPT | Performed by: EMERGENCY MEDICINE

## 2018-11-01 PROCEDURE — 85610 PROTHROMBIN TIME: CPT | Performed by: EMERGENCY MEDICINE

## 2018-11-01 PROCEDURE — 71275 CT ANGIOGRAPHY CHEST: CPT

## 2018-11-01 PROCEDURE — 80076 HEPATIC FUNCTION PANEL: CPT | Performed by: EMERGENCY MEDICINE

## 2018-11-01 PROCEDURE — 71046 X-RAY EXAM CHEST 2 VIEWS: CPT

## 2018-11-01 RX ORDER — KETOROLAC TROMETHAMINE 30 MG/ML
15 INJECTION, SOLUTION INTRAMUSCULAR; INTRAVENOUS ONCE
Status: COMPLETED | OUTPATIENT
Start: 2018-11-01 | End: 2018-11-01

## 2018-11-01 RX ORDER — CLONAZEPAM 1 MG/1
TABLET ORAL
Refills: 0 | COMMUNITY
Start: 2018-10-21 | End: 2019-03-13

## 2018-11-01 RX ORDER — NAPROXEN 500 MG/1
500 TABLET ORAL 2 TIMES DAILY PRN
Qty: 20 TABLET | Refills: 0 | Status: SHIPPED | OUTPATIENT
Start: 2018-11-01 | End: 2019-03-13

## 2018-11-01 RX ORDER — ONDANSETRON 4 MG/1
4 TABLET, ORALLY DISINTEGRATING ORAL EVERY 8 HOURS PRN
Qty: 20 TABLET | Refills: 0 | Status: SHIPPED | OUTPATIENT
Start: 2018-11-01 | End: 2019-03-13

## 2018-11-01 RX ADMIN — IOHEXOL 85 ML: 350 INJECTION, SOLUTION INTRAVENOUS at 15:05

## 2018-11-01 RX ADMIN — KETOROLAC TROMETHAMINE 15 MG: 30 INJECTION, SOLUTION INTRAMUSCULAR at 13:49

## 2018-11-01 RX ADMIN — SODIUM CHLORIDE 1000 ML: 0.9 INJECTION, SOLUTION INTRAVENOUS at 12:34

## 2018-11-01 NOTE — DISCHARGE INSTRUCTIONS
Shortness of Breath   AMBULATORY CARE:   Shortness of breath  is a feeling that you cannot get enough air when you breathe in  You may have this feeling only during activity, or all the time  Your symptoms can range from mild to severe  Shortness of breath may be a sign of a serious health condition that needs immediate care  Seek care immediately if:   · Your signs and symptoms are the same or worse within 24 hours of treatment  · The skin over your ribs or on your neck sinks in when you breathe  · You feel confused or dizzy  Contact your healthcare provider if:   · You have new or worsening symptoms  · You have questions or concerns about your condition or care  Treatment:   · Medicines  may be used to treat the cause of your symptoms  You may need medicine to treat a bacterial infection or reduce anxiety  Other medicines may be used to open your airway, reduce swelling, or remove extra fluid  If you have a heart condition, you may need medicine to help your heart beat more strongly or regularly  · Oxygen  may be given to help you breathe more easily  Manage shortness of breath:   · Create an action plan  You and your healthcare provider can work together to create a plan for how to handle shortness of breath  The plan can include daily activities, treatment changes, and what to do if you have severe breathing problems  · Lean forward on your elbows when you sit  This helps your lungs expand and may make it easier to breathe  · Use pursed-lip breathing any time you feel short of breath  Breathe in through your nose and then slowly breathe out through your mouth with your lips slightly puckered  It should take you twice as long to breathe out as it did to breathe in  · Do not smoke  Nicotine and other chemicals in cigarettes and cigars can cause lung damage and make shortness of breath worse   Ask your healthcare provider for information if you currently smoke and need help to quit  E-cigarettes or smokeless tobacco still contain nicotine  Talk to your healthcare provider before you use these products  · Reach or maintain a healthy weight  Your healthcare provider can help you create a safe weight loss plan if you are overweight  · Exercise as directed  Exercise can help your lungs work more easily  Exercise can also help you lose weight if needed  Try to get at least 30 minutes of exercise most days of the week  Follow up with your healthcare provider or specialist as directed:  Write down your questions so you remember to ask them during your visits  © 2017 [employer]  Information is for End User's use only and may not be sold, redistributed or otherwise used for commercial purposes  All illustrations and images included in CareNotes® are the copyrighted property of A [employer] M [employer] Inc  or Derek Montalvo  The above information is an  only  It is not intended as medical advice for individual conditions or treatments  Talk to your doctor, nurse or pharmacist before following any medical regimen to see if it is safe and effective for you  Viral Syndrome   WHAT YOU NEED TO KNOW:   Viral syndrome is a term used for a viral infection that has no clear cause  Viruses are spread easily from person to person through the air and on shared items  DISCHARGE INSTRUCTIONS:   Call 911 for the following:   · You have a seizure  · You cannot be woken  · You have chest pain or trouble breathing  Return to the emergency department if:   · You have a stiff neck, a bad headache, and sensitivity to light  · You feel weak, dizzy, or confused  · You stop urinating or urinate a lot less than normal      · You cough up blood or thick, yellow or green, mucus       · You have severe abdominal pain or your abdomen is larger than usual   Contact your healthcare provider if:   · Your symptoms do not get better with treatment, or get worse, after 3 days      · You have a rash or ear pain  · You have burning when you urinate  · You have questions or concerns about your condition or care  Medicines: You may  need any of the following:  · Acetaminophen  decreases pain and fever  It is available without a doctor's order  Ask how much medicine to take and how often to take it  Follow directions  Acetaminophen can cause liver damage if not taken correctly  · NSAIDs , such as ibuprofen, help decrease swelling, pain, and fever  NSAIDs can cause stomach bleeding or kidney problems in certain people  If you take blood thinner medicine, always ask your healthcare provider if NSAIDs are safe for you  Always read the medicine label and follow directions  · Cold medicine  helps decrease swelling, control a cough, and relieve chest or nasal congestion  · Saline nasal spray  helps decrease nasal congestion  · Take your medicine as directed  Contact your healthcare provider if you think your medicine is not helping or if you have side effects  Tell him of her if you are allergic to any medicine  Keep a list of the medicines, vitamins, and herbs you take  Include the amounts, and when and why you take them  Bring the list or the pill bottles to follow-up visits  Carry your medicine list with you in case of an emergency  Manage your symptoms:   · Drink liquids as directed  to prevent dehydration  Ask how much liquid to drink each day and which liquids are best for you  Ask if you should drink an oral rehydration solution (ORS)  An ORS has the right amounts of water, salts, and sugar you need to replace body fluids  This may help prevent dehydration caused by vomiting or diarrhea  Do not drink liquids with caffeine  Drinks with caffeine can make dehydration worse  · Get plenty of rest  to help your body heal  Take naps throughout the day  Ask your healthcare provider when you can return to work and your normal activities       · Use a cool mist humidifier  to help you breathe easier if you have nasal or chest congestion  Ask your healthcare provider how to use a cool mist humidifier  · Eat honey or use cough drops  to help decrease throat discomfort  Ask your healthcare provider how much honey you should eat each day  Cough drops are available without a doctor's order  Follow directions for taking cough drops  · Do not smoke and stay away from others who smoke  Nicotine and other chemicals in cigarettes and cigars can cause lung damage  Smoking can also delay healing  Ask your healthcare provider for information if you currently smoke and need help to quit  E-cigarettes or smokeless tobacco still contain nicotine  Talk to your healthcare provider before you use these products  · Wash your hands frequently  to prevent the spread of germs to others  Use soap and water  Use gel hand  when soap and water are not available  Wash your hands after you use the bathroom, cough, or sneeze  Wash your hands before you prepare or eat food  Follow up with your healthcare provider as directed:  Write down your questions so you remember to ask them during your visits  © 2017 [facility] [facility] Information is for End User's use only and may not be sold, redistributed or otherwise used for commercial purposes  All illustrations and images included in CareNotes® are the copyrighted property of [employer] M [employer] Inc  or [employer] Montalvo  The above information is an  only  It is not intended as medical advice for individual conditions or treatments  Talk to your doctor, nurse or pharmacist before following any medical regimen to see if it is safe and effective for you

## 2018-11-01 NOTE — ED PROVIDER NOTES
History  Chief Complaint   Patient presents with    Headache     Pt stated it started saturday along with body aches and SOB  She had a f/u with hem/onc today and Dr Sara Jain sent her here to be checked   Dizziness     Pt stated she "feels like a space cadet"    Shortness of Breath     pt becomes sob with talking, o2 sat stays the same     51-year-old female, history of breast cancer, currently on tamoxifen, presents with worsening headache since Saturday, generalized body aches, and shortness of breath particularly with exertion  Patient was at her oncologist's office today and was sent here for further evaluation        History provided by:  Patient  Headache   Pain location:  Generalized  Quality:  Dull  Radiates to:  Does not radiate  Severity currently:  4/10  Severity at highest:  8/10  Onset quality:  Gradual  Timing:  Constant  Progression:  Waxing and waning  Chronicity:  New  Context: activity    Relieved by:  None tried  Worsened by:  Nothing  Ineffective treatments:  None tried  Associated symptoms: no abdominal pain, no congestion, no cough, no diarrhea, no dizziness, no eye pain, no fever, no nausea, no neck pain, no neck stiffness, no numbness, no sinus pressure, no sore throat and no vomiting    Shortness of Breath   Onset quality:  Gradual  Duration:  1 week  Timing:  Constant  Progression:  Worsening  Chronicity:  New  Context: activity    Relieved by:  Rest  Worsened by:  Exertion and movement  Ineffective treatments:  None tried  Associated symptoms: headaches    Associated symptoms: no abdominal pain, no chest pain, no cough, no diaphoresis, no fever, no neck pain, no rash, no sore throat, no vomiting and no wheezing        Prior to Admission Medications   Prescriptions Last Dose Informant Patient Reported? Taking?    BuPROPion HCl (WELLBUTRIN XL PO)  Self Yes No   Sig: Take 150 mg by mouth 2 (two) times a day     CLONAZEPAM PO  Self Yes No   Sig: Take 1 tablet by mouth daily at bedtime clonazePAM (KlonoPIN) 1 mg tablet   Yes No   Sig: TAKE 1/2 TABLET TWICE A DAY AND 1 TABLET AT BEDTIME   diazepam (VALIUM) 2 mg tablet   Yes No   Sig: Take 5 mg by mouth every 6 (six) hours as needed for anxiety   tamoxifen (NOLVADEX) 20 mg tablet   No No   Sig: Take 1 tablet (20 mg total) by mouth daily   topiramate (TOPAMAX) 50 MG tablet   Yes No   Sig: Take 25 mg by mouth every 12 (twelve) hours      Facility-Administered Medications: None       Past Medical History:   Diagnosis Date    Anxiety     Headache     History of transfusion 06/2017    no adverse reaction    Malignant neoplasm of upper-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 [facility])     s/p chemotherapy       Past Surgical History:   Procedure Laterality Date    ABDOMINAL WALL SURGERY Left 9/21/2016    Procedure: DEBRIDEMENT OF LEFT ABDOMINAL TISSUE AND CLOSURE; DEBRIDEMENT OF LEFT BREAST FLAP; SUBMUSCULAR TISSUE EXPANDER PLACEMENT WITH ADM (ACELLULAR DERMAL MATRIX); Surgeon: Trinidad Smith MD;  [facility];  Service:     BREAST BIOPSY  04/2016    with sentinel node biospy    BREAST RECONSTRUCTION Left 12/22/2017    Procedure: REVISION OF LEFT BREAST SCAR, LOCAL FLAP;  Surgeon: Victor Manuel Cartwright MD;  [facility];  Service: Plastics    [address] OF UTERUS      LAPAROSCOPIC CHOLECYSTECTOMY  2000    LAPAROSCOPIC GASTRIC BANDING  2008    removed 2013    LAPAROSCOPIC GASTRIC BANDING  2013    removal    LIPOSUCTION W/ FAT INJECTION Left 6/8/2017    Procedure: BREAST FAT GRAFTING ;  Surgeon: Trinidad Smith MD;  [facility];  Service:     MASTECTOMY Left 2016    MEDIPORT INSERTION, SINGLE  2016    MI BIOPSY/EXCISION, LYMPH NODE(S) Left 8/10/2016    Procedure: LYMPHOSCINTIGRAPHY; SENTINAL LYMPH NODE BIOPSY (INJECT AT 1100);   Surgeon: Oni Harvey MD;  [facility];  Service: Surgical Oncology    MI BREAST RECONSTRUC W FREE FLAP Left 9/19/2016    Procedure: BREAST DELAYED RECONSTRUCTION; DEIP FREE FLAP removal of port-a -cath;  Surgeon: Trinidad Smith MD;  [facility];  Service: Plastics    TX DELAY BREAST PROS AFTER BREAST SURG Left 12/19/2016    Procedure: BREAST TISSUE EXPANDER REMOVAL; BREAST IMPLANT PLACEMENT;  Surgeon: Trinidad Smith MD;  [facility];  Service: Plastics    TX HYSTEROSCOPY,W/ENDO [place] [address] N/A 5/21/2018    Procedure: DILATATION AND CURETTAGE (D&C), HYSTEROSCOPY;  Surgeon: Vasile Pendleton MD;  [facility];  Service: Gynecology Oncology    TX MASTECTOMY, SIMPLE, COMPLETE Left 8/10/2016    Procedure: BREAST MASTECTOMY; FROZEN SECTION ;  Surgeon: Oni Harvey MD;  [facility];  Service: Surgical Oncology    TX REDUCTION OF LARGE BREAST Right 12/19/2016    Procedure: BREAST REDUCTION/MASTOPEXY ;  Surgeon: Trinidad Smith MD;  [facility];  Service: Plastics    TX REMOVE TISSUE EXPANDER(S) Left 1/23/2017    Procedure: BREAST IMPLANT REMOVAL; WASHOUT AND DEBRIDEMENT;  Surgeon: Trinidad Smith MD;  [facility];  Service: Plastics    REVISION OF SCAR ON TORSO N/A 12/19/2016    Procedure: ABDOMINAL SCAR REVISION ;  Surgeon: Trinidad Smith MD;  [facility];  Service:    Abdiaziz Pert WISDOM TOOTH EXTRACTION         Family History   Problem Relation Age of Onset    Diabetes Mother     Heart disease Mother     Kidney disease Mother     Other Father      I have reviewed and agree with the history as documented  Social History   Substance Use Topics    Smoking status: Former Smoker     Packs/day: 1 00     Years: 15 00     Quit date: 2002    Smokeless tobacco: Never Used    Alcohol use No        Review of Systems   Constitutional: Negative for activity change, chills, diaphoresis and fever  HENT: Negative for congestion, sinus pressure and sore throat  Eyes: Negative for pain and visual disturbance  Respiratory: Positive for shortness of breath  Negative for cough, chest tightness, wheezing and stridor  Cardiovascular: Negative for chest pain and palpitations  Gastrointestinal: Negative for abdominal distention, abdominal pain, constipation, diarrhea, nausea and vomiting  Genitourinary: Negative for dysuria and frequency  Musculoskeletal: Negative for neck pain and neck stiffness  Skin: Negative for rash  Neurological: Positive for headaches  Negative for dizziness, speech difficulty, light-headedness and numbness  Physical Exam  Physical Exam   Constitutional: She is oriented to person, place, and time  She appears well-developed  No distress  HENT:   Head: Normocephalic and atraumatic  Eyes: Pupils are equal, round, and reactive to light  Neck: Normal range of motion  Neck supple  No tracheal deviation present  Cardiovascular: Regular rhythm, normal heart sounds and intact distal pulses  No murmur heard  Heart rate  my time of examination   Pulmonary/Chest: Effort normal and breath sounds normal  No stridor  No respiratory distress  Abdominal: Soft  She exhibits no distension  There is no tenderness  There is no rebound and no guarding  Musculoskeletal: Normal range of motion  Neurological: She is alert and oriented to person, place, and time  Skin: Skin is warm and dry  She is not diaphoretic  No erythema  No pallor  Psychiatric: She has a normal mood and affect  Vitals reviewed        Vital Signs  ED Triage Vitals [11/01/18 1101]   Temperature Pulse Respirations Blood Pressure SpO2   98 2 °F (36 8 °C) 100 16 108/65 100 %      Temp Source Heart Rate Source Patient Position - Orthostatic VS BP Location FiO2 (%)   Oral Monitor Sitting Right arm --      Pain Score       No Pain           Vitals:    11/01/18 1101 11/01/18 1145 11/01/18 1330 11/01/18 1351   BP: 108/65 109/56  119/68   Pulse: 100 90 86 84   Patient Position - Orthostatic VS: Sitting   Lying       Visual Acuity      ED Medications  Medications   sodium chloride 0 9 % bolus 1,000 mL (1,000 mL Intravenous New Bag 11/1/18 1234)   ketorolac (TORADOL) injection 15 mg (15 mg Intravenous Given 11/1/18 1349)   iohexol (OMNIPAQUE) 350 MG/ML injection (MULTI-DOSE) 85 mL (85 mL Intravenous Given 11/1/18 1505)       Diagnostic Studies  Results Reviewed     Procedure Component Value Units Date/Time    D-Dimer [[id number]]  (Abnormal) Collected:  11/01/18 1233    Lab Status:  Final result Specimen:  Blood from Arm, Right Updated:  11/01/18 1354     D-Dimer, Quant 645 (H) ng/ml (FEU)     Hepatic function panel [[id number]]  (Abnormal) Collected:  11/01/18 1233    Lab Status:  Final result Specimen:  Blood from Arm, Right Updated:  11/01/18 1317     Total Bilirubin 0 10 (L) mg/dL      Bilirubin, Direct 0 07 mg/dL      Alkaline Phosphatase 60 U/L      AST 41 U/L      ALT 74 U/L      Total Protein 6 5 g/dL      Albumin 3 1 (L) g/dL     B-type natriuretic peptide [[id number]]  (Normal) Collected:  11/01/18 1233    Lab Status:  Final result Specimen:  Blood from Arm, Right Updated:  11/01/18 1310     NT-proBNP 71 pg/mL     Protime-INR [[id number]]  (Normal) Collected:  11/01/18 1233    Lab Status:  Final result Specimen:  Blood from Arm, Right Updated:  11/01/18 1310     Protime 13 9 seconds      INR 1 10    APTT [[id number]]  (Normal) Collected:  11/01/18 1233    Lab Status:  Final result Specimen:  Blood from Arm, Right Updated:  11/01/18 1310     PTT 27 seconds     Basic metabolic panel [[id number]]  (Abnormal) Collected:  11/01/18 1233    Lab Status:  Final result Specimen:  Blood from Arm, Right Updated:  11/01/18 1307     Sodium 141 mmol/L      Potassium 3 8 mmol/L      Chloride 109 (H) mmol/L      CO2 21 mmol/L      ANION GAP 11 mmol/L      BUN 19 mg/dL      Creatinine 0 68 mg/dL      Glucose 94 mg/dL      Calcium 8 5 mg/dL      eGFR 106 ml/min/1 73sq m     Narrative:         National Kidney Disease Education Program recommendations are as follows:  GFR calculation is accurate only with a steady state creatinine  Chronic Kidney disease less than 60 ml/min/1 73 sq  meters  Kidney failure less than 15 ml/min/1 73 sq  meters  Troponin I [[id number]]  (Normal) Collected:  11/01/18 1233    Lab Status:  Final result Specimen:  Blood from Arm, Right Updated:  11/01/18 1305     Troponin I <0 02 ng/mL     CBC and differential [[id number]]  (Abnormal) Collected:  11/01/18 1233    Lab Status:  Final result Specimen:  Blood from Arm, Right Updated:  11/01/18 1240     WBC 6 14 Thousand/uL      RBC 4 71 Million/uL      Hemoglobin 14 5 g/dL      Hematocrit 44 5 %      MCV 95 fL      MCH 30 8 pg      MCHC 32 6 g/dL      RDW 13 0 %      MPV 8 8 (L) fL      Platelets 731 Thousands/uL      nRBC 0 /100 WBCs      Neutrophils Relative 65 %      Immat GRANS % 0 %      Lymphocytes Relative 24 %      Monocytes Relative 9 %      Eosinophils Relative 2 %      Basophils Relative 0 %      Neutrophils Absolute 4 01 Thousands/µL      Immature Grans Absolute 0 01 Thousand/uL      Lymphocytes Absolute 1 49 Thousands/µL      Monocytes Absolute 0 52 Thousand/µL      Eosinophils Absolute 0 09 Thousand/µL      Basophils Absolute 0 02 Thousands/µL                  CTA ED chest PE study   Final Result by Luis Starkey MD (11/01 1522)      No pulmonary embolism  Workstation performed: [id number]         XR chest 2 views   Final Result by Nawaf Guzmán MD (11/01 1229)      No acute cardiopulmonary disease  Workstation performed: [id number]         CT head without contrast   Final Result by Faby Torres MD (11/01 1228)      No acute intracranial process or evidence of metastatic disease insofar as can be detected on a noncontrast CT              Workstation performed: [id number]                    Procedures  ECG 12 Lead Documentation  Date/Time: 11/1/2018 11:55 AM  Performed by: Sathish Roach  Authorized by: Sathish Roach     ECG reviewed by me, the ED Provider: yes    Patient location:  ED  Previous ECG:     Previous ECG:  Compared to current    Comparison ECG info:  6 13 2017    Similarity:  No change  Interpretation:     Interpretation: non-specific    Rate:     ECG rate:  90    ECG rate assessment: normal    Rhythm:     Rhythm: sinus rhythm    Ectopy:     Ectopy: none    QRS:     QRS axis:  Normal    QRS intervals:  Normal  Conduction:     Conduction: normal    ST segments:     ST segments:  Non-specific  T waves:     T waves: non-specific             Phone Contacts  ED Phone Contact    ED Course                               MDM  Number of Diagnoses or Management Options  Acute viral syndrome: new and requires workup  Complicated headache syndromes: new and requires workup  Shortness of breath on exertion: new and requires workup  Diagnosis management comments:       Initial ED assessment:  74-year-old female history of breast cancer per occurrence with headache and shortness of breath    Initial DDx includes but is not limited to:   Headache, migraine, has history of breast cancer do have to consider possibility of metastases to the brain    As for shortness of breath, pulmonary embolism has to be considered, pneumonia, cardiac etiology less likely, deconditioning    Initial ED plan:   Blood work, start with a D-dimer if elevated will proceed to CTA, CT head, disposition pending ED workup        Final ED summary/disposition:   After evaluation and workup in the emergency department, workup unremarkable, possibly viral syndrome, will DC patient agrees to follow up PCP        Amount and/or Complexity of Data Reviewed  Clinical lab tests: reviewed and ordered  Tests in the radiology section of CPT®: ordered and reviewed  Decide to obtain previous medical records or to obtain history from someone other than the patient: yes  Obtain history from someone other than the patient: yes  Review and summarize past medical records: yes  Independent visualization of images, tracings, or specimens: yes      CritCare Time    Disposition  Final diagnoses:   Complicated headache syndromes   Acute viral syndrome Shortness of breath on exertion     Time reflects when diagnosis was documented in both MDM as applicable and the Disposition within this note     Time User Action Codes Description Comment    11/1/2018  3:37 PM Gia Sibley Add [I03 26] Complicated headache syndromes     11/1/2018  3:37 PM Jonn JEFF Add [B34 9] Acute viral syndrome     11/1/2018  3:37 PM Gia Sibley Add [R06 02] Shortness of breath on exertion       ED Disposition     ED Disposition Condition Comment    Discharge  [facility] discharge to home/self care  Condition at discharge: Good        Follow-up Information    None         Patient's Medications   Discharge Prescriptions    NAPROXEN (NAPROSYN) 500 MG TABLET    Take 1 tablet (500 mg total) by mouth 2 (two) times a day as needed for mild pain       Start Date: 11/1/2018 End Date: --       Order Dose: 500 mg       Quantity: 20 tablet    Refills: 0    ONDANSETRON (ZOFRAN-ODT) 4 MG DISINTEGRATING TABLET    Take 1 tablet (4 mg total) by mouth every 8 (eight) hours as needed for nausea or vomiting for up to 15 doses       Start Date: 11/1/2018 End Date: --       Order Dose: 4 mg       Quantity: 20 tablet    Refills: 0     No discharge procedures on file      ED Provider  Electronically Signed by           Yeni Travis DO  11/01/18 7525

## 2018-11-01 NOTE — PROGRESS NOTES
Surgical Oncology Follow Up       [address] [facility],[address] [place] [address]  [facility] ELIZABETH  [address] [address]    [facility]  1970  [id number]  [address]  [phone] [id number]    Chief Complaint   Patient presents with    Breast Cancer     Pt is here for a six month follow up          Assessment & Plan:   Patient presents today for a follow-up visit  She states that a couple of days ago she had a migraine headache which she rarely gets  It is resolved but she seems to have not of recovered fully from the headache  She also complains of shortness of breath with minimal exertion  She has no complaints referable to her operated breast   Her mammogram from April in the right breast shows no worrisome findings  Will coordinate a mammogram for next April and see her back shortly thereafter  In the meantime I have recommended and she is agreeable to go to the ER to be further evaluated regarding her current complaints  I have also strongly encouraged her to find a primary care physician      Cancer History:        Malignant neoplasm of upper-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 [facility])     - 6/2016 Chemotherapy     Theron Adjuvant chemo at [facility]   Adriamycin and Cytoxan for four cycles  Patient declined further chemo and came to [facility] for second opinion         4/25/2016 Initial Diagnosis     Left lumpectomy  [facility]  Invasive ductal carcinoma  Grade 1  1 86 on utrasound    CA 0  Her 2 2+  Fish negative  Stage IIA per [facility]         6/21/2016 Genetic Testing     BRCA negative  Myriad-Done through [facility]         8/10/2016 Surgery     Left mastectomy with sentinel lymph node biopsy  Invasive ductal carcinoma  Grade 2  1 6 cm  0/1 lymph node  Stage 1A    Co surgery with Dr Ana Tabares  Failed STEVE flap         10/2016 -  Hormone Therapy     Tamoxifen 20 mg daily  Dr Uribe         12/22/2017 Surgery     Revision of left breast scar/local flap  Dr Guilherme Carrillo              Interval History:   See above    Review of Systems:   Review of Systems   Constitutional: Positive for activity change, appetite change and fatigue  HENT: Negative  Eyes: Negative  Respiratory: Positive for shortness of breath  Negative for cough and wheezing  Cardiovascular: Negative for chest pain and leg swelling  Gastrointestinal: Negative  Endocrine: Negative  Genitourinary: Negative  Musculoskeletal:        No new changes or complaints of bone pain   Skin: Negative  Allergic/Immunologic: Negative  Neurological: Positive for weakness and headaches  Negative for tremors, seizures, syncope, facial asymmetry, speech difficulty and numbness  Hematological: Negative  Psychiatric/Behavioral: Positive for decreased concentration  Past Medical History     Patient Active Problem List   Diagnosis    Symptomatic anemia    Malignant neoplasm of upper-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 ([address] )    Depression    Anxiety    Abnormal uterine bleeding (AUB)    Cyst of left ovary    Use of tamoxifen (Nolvadex)    Endometrial hyperplasia     Past Medical History:   Diagnosis Date    Anxiety     Headache     History of transfusion 06/2017    no adverse reaction    Malignant neoplasm of upper-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 [facility])     s/p chemotherapy     Past Surgical History:   Procedure Laterality Date    ABDOMINAL WALL SURGERY Left 9/21/2016    Procedure: DEBRIDEMENT OF LEFT ABDOMINAL TISSUE AND CLOSURE; DEBRIDEMENT OF LEFT BREAST FLAP; SUBMUSCULAR TISSUE EXPANDER PLACEMENT WITH ADM (ACELLULAR DERMAL MATRIX);   Surgeon: Trinidad Smith MD;  Location: [facility] MAIN OR;  Service:     BREAST BIOPSY  04/2016    with sentinel node biospy    BREAST RECONSTRUCTION Left 12/22/2017    Procedure: REVISION OF LEFT BREAST SCAR, LOCAL FLAP;  Surgeon: Victor Manuel Cartwright MD;  [facility]: [facility];  Service: Plastics    DILATION AND CURETTAGE OF UTERUS      LAPAROSCOPIC CHOLECYSTECTOMY  2000    LAPAROSCOPIC GASTRIC BANDING  2008    removed 2013    LAPAROSCOPIC GASTRIC BANDING  2013    removal    LIPOSUCTION W/ FAT INJECTION Left 6/8/2017    Procedure: BREAST FAT GRAFTING ;  Surgeon: Angelique Ortiz MD;  Location: [facility] Main OR;  Service:     MASTECTOMY Left 2016    MEDIPORT INSERTION, SINGLE  2016    NM BIOPSY/EXCISION, LYMPH NODE(S) Left 8/10/2016    Procedure: LYMPHOSCINTIGRAPHY; SENTINAL LYMPH NODE BIOPSY (INJECT AT 1100);   Surgeon: Angie Pace MD;  Location: [facility] OR;  Service: Surgical Oncology    NM BREAST RECONSTRUC W FREE FLAP Left 9/19/2016    Procedure: BREAST DELAYED RECONSTRUCTION; DEIP FREE FLAP removal of port-a -cath;  Surgeon: Angelique Ortiz MD;  Location: [facility] MA[facility] OR;  Service: Plastics    NM DELAY BREAST PROS AFTER BREAST SURG Left 12/19/2016    Procedure: BREAST TISSUE EXPANDER REMOVAL; BREAST IMPLANT PLACEMENT;  Surgeon: Angelique Ortiz MD;  Location: [facility];  Service: Plastics    NM HYSTEROSCOPY,W/ENDO [address] N/A 5/21/2018    Procedure: DILATATION AND CURETTAGE (D&C), HYSTEROSCOPY;  Surgeon: Roseline Abdul MD;  Location: [facility];  Service: Gynecology Oncology    NM MASTECTOMY, SIMPLE, COMPLETE Left 8/10/2016    Procedure: BREAST MASTECTOMY; FROZEN SECTION ;  Surgeon: Angie Pace MD;  Location: [facility];  Service: Surgical Oncology    NM REDUCTION OF LARGE BREAST Right 12/19/2016    Procedure: BREAST REDUCTION/MASTOPEXY ;  Surgeon: Angelique Ortiz MD;  Location: [facility];  Service: Plastics    NM REMOVE TISSUE EXPANDER(S) Left 1/23/2017    Procedure: BREAST IMPLANT REMOVAL; WASHOUT AND DEBRIDEMENT;  Surgeon: Angelique Ortiz MD;  [facility]: [facility];  Service: Plastics    REVISION OF SCAR ON TORSO N/A 12/19/2016    Procedure: ABDOMINAL SCAR REVISION ;  Surgeon: Angelique Ortiz MD;  Location: [facility] OR;  Service:    [facility] WISDOM TOOTH EXTRACTION       Family History   Problem Relation Age of Onset    Diabetes Mother     Heart disease Mother     Kidney disease Mother     Other Father      Social History     Social History    Marital status: /Civil Union     Spouse name: N/A    Number of children: N/A    Years of education: N/A     Occupational History    Not on file  Social History Main Topics    Smoking status: Former Smoker     Packs/day: 1 00     Years: 15 00     Quit date: 2002    Smokeless tobacco: Never Used    Alcohol use No    Drug use: No    Sexual activity: Not on file     Other Topics Concern    Not on file     Social History Narrative    No narrative on file       Current Outpatient Prescriptions:     BuPROPion HCl (WELLBUTRIN XL PO), Take 150 mg by mouth 2 (two) times a day  , Disp: , Rfl:     clonazePAM (KlonoPIN) 1 mg tablet, TAKE 1/2 TABLET TWICE A DAY AND 1 TABLET AT BEDTIME, Disp: , Rfl: 0    CLONAZEPAM PO, Take 1 tablet by mouth daily at bedtime, Disp: , Rfl:     tamoxifen (NOLVADEX) 20 mg tablet, Take 1 tablet (20 mg total) by mouth daily, Disp: 90 tablet, Rfl: 1    topiramate (TOPAMAX) 50 MG tablet, Take 25 mg by mouth every 12 (twelve) hours, Disp: , Rfl:     diazepam (VALIUM) 2 mg tablet, Take 5 mg by mouth every 6 (six) hours as needed for anxiety, Disp: , Rfl:   Allergies   Allergen Reactions    Ambien [Zolpidem] Hallucinations    Effexor [Venlafaxine] GI Intolerance    Bactrim [Sulfamethoxazole-Trimethoprim] Itching    Morphine Other (See Comments)     Itching, crying, short of breath       Physical Exam:     Vitals:    11/01/18 0934   BP: 90/68   Pulse: 96   Resp: 16   Temp: 97 8 °F (36 6 °C)     Physical Exam   Constitutional: She is oriented to person, place, and time  She appears well-developed and well-nourished  HENT:   Head: Normocephalic and atraumatic  Mouth/Throat: Oropharynx is clear and moist    Eyes: Pupils are equal, round, and reactive to light  EOM are normal    Neck: Normal range of motion  Neck supple  No JVD present  No tracheal deviation present  No thyromegaly present  Cardiovascular: Normal rate, regular rhythm, normal heart sounds and intact distal pulses  Exam reveals no gallop and no friction rub  No murmur heard  Pulmonary/Chest: Effort normal and breath sounds normal  No respiratory distress  She has no wheezes  She has no rales  Right breast exhibits no inverted nipple, no mass, no nipple discharge, no skin change and no tenderness  Left breast exhibits no inverted nipple, no mass, no nipple discharge, no skin change and no tenderness  Breasts are symmetrical    Examination of the left mastectomy site with reconstructed flap shows no worrisome skin findings no dominant masses no axillary adenopathy  Examination of the reduced right breast demonstrates no worrisome skin findings dominant masses supraclavicular axillary or infraclavicular adenopathy  Abdominal: Soft  She exhibits no distension and no mass  There is no hepatomegaly  There is no tenderness  There is no rebound and no guarding  Musculoskeletal: Normal range of motion  She exhibits no edema or tenderness  Lymphadenopathy:     She has no cervical adenopathy  Neurological: She is alert and oriented to person, place, and time  No cranial nerve deficit  Skin: Skin is warm and dry  No rash noted  No erythema  Psychiatric: She has a normal mood and affect  Her behavior is normal    Vitals reviewed  Results/discussion:   The patient has no focal neurologic signs  However she is slightly tachycardic and possibly dehydrated  I recommended she go to the ER to be further evaluated  On clinical exam I do not think she has any evidence of local regional disease  She is agreeable to go to the ER to be evaluated    Presuming there are no worrisome findings we will see her back in 6 months with a right mammogram           Advance Care Planning/Advance Directives:  I discussed the disease status, treatment plans and follow-up with the patient

## 2018-11-01 NOTE — LETTER
November 1, 2018     Eric Marte, DO  [address]  [facility] [address]    Patient: Luis Vaca   YOB: 1970   Date of Visit: 11/1/2018       Dear Dr Yessi Camargo:    Thank you for referring Luis Vaca to me for evaluation  Below are my notes for this consultation  If you have questions, please do not hesitate to call me  I look forward to following your patient along with you  Sincerely,        Josephine Rushing MD        CC: No Recipients  Josephine Rushing MD  11/1/2018 10:07 AM  Sign at close encounter     Surgical Oncology Follow Up       [address] [place] [address]  1970  [id number]  [address] [facility] SURGICAL ONCOLOGY [place] [phone]    Chief Complaint   Patient presents with    Breast Cancer     Pt is here for a six month follow up          Assessment & Plan:   Patient presents today for a follow-up visit  She states that a couple of days ago she had a migraine headache which she rarely gets  It is resolved but she seems to have not of recovered fully from the headache  She also complains of shortness of breath with minimal exertion  She has no complaints referable to her operated breast   Her mammogram from April in the right breast shows no worrisome findings  Will coordinate a mammogram for next April and see her back shortly thereafter  In the meantime I have recommended and she is agreeable to go to the ER to be further evaluated regarding her current complaints  I have also strongly encouraged her to find a primary care physician      Cancer History:        Malignant neoplasm of upper-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 [facility])     - 6/2016 Chemotherapy     Theron Adjuvant chemo at [facility]   Adriamycin and Cytoxan for four cycles  Patient declined further chemo and came to [place] [address] for second opinion         4/25/2016 Initial Diagnosis     Left lumpectomy  [facility]  Invasive ductal carcinoma  Grade 1  1 86 on utrasound    NC 0  Her 2 2+  Fish negative  Stage IIA per [facility]         6/21/2016 Genetic Testing     BRCA negative  Myriad-Done through [facility]         8/10/2016 Surgery     Left mastectomy with sentinel lymph node biopsy  Invasive ductal carcinoma  Grade 2  1 6 cm  0/1 lymph node  Stage 1A    Co surgery with Dr Anthony Gonzales  Failed STEVE flap         10/2016 -  Hormone Therapy     Tamoxifen 20 mg daily  Dr Mekhi Zelaya         12/22/2017 Surgery     Revision of left breast scar/local flap  Dr Francie Brantley              Interval History:   See above    Review of Systems:   Review of Systems   Constitutional: Positive for activity change, appetite change and fatigue  HENT: Negative  Eyes: Negative  Respiratory: Positive for shortness of breath  Negative for cough and wheezing  Cardiovascular: Negative for chest pain and leg swelling  Gastrointestinal: Negative  Endocrine: Negative  Genitourinary: Negative  Musculoskeletal:        No new changes or complaints of bone pain   Skin: Negative  Allergic/Immunologic: Negative  Neurological: Positive for weakness and headaches  Negative for tremors, seizures, syncope, facial asymmetry, speech difficulty and numbness  Hematological: Negative  Psychiatric/Behavioral: Positive for decreased concentration         Past Medical History     Patient Active Problem List   Diagnosis    Symptomatic anemia    Malignant neoplasm of upper-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 ([address] )    Depression    Anxiety    Abnormal uterine bleeding (AUB)    Cyst of left ovary    Use of tamoxifen (Nolvadex)    Endometrial hyperplasia     Past Medical History:   Diagnosis Date    Anxiety     Headache     History of transfusion 06/2017    no adverse reaction    Malignant neoplasm of upper-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 ([address] ) s/p chemotherapy     Past Surgical History:   Procedure Laterality Date    ABDOMINAL WALL SURGERY Left 9/21/2016    Procedure: DEBRIDEMENT OF LEFT ABDOMINAL TISSUE AND CLOSURE; DEBRIDEMENT OF LEFT BREAST FLAP; SUBMUSCULAR TISSUE EXPANDER PLACEMENT WITH ADM (ACELLULAR DERMAL MATRIX); Surgeon: Samira Phillip MD;  Location: [facility];  Service:     BREAST BIOPSY  04/2016    with sentinel node biospy    BREAST RECONSTRUCTION Left 12/22/2017    Procedure: REVISION OF LEFT BREAST SCAR, LOCAL FLAP;  Surgeon: Naz Heaton MD;  [facility];  Service: Plastics    [address] OF UTERUS      LAPAROSCOPIC CHOLECYSTECTOMY  2000    LAPAROSCOPIC GASTRIC BANDING  2008    removed 2013    LAPAROSCOPIC GASTRIC BANDING  2013    removal    LIPOSUCTION W/ FAT INJECTION Left 6/8/2017    Procedure: BREAST FAT GRAFTING ;  Surgeon: Samira Phillip MD;  [facility];  Service:     MASTECTOMY Left 2016    MEDIPORT INSERTION, SINGLE  2016    MS BIOPSY/EXCISION, LYMPH NODE(S) Left 8/10/2016    Procedure: LYMPHOSCINTIGRAPHY; SENTINAL LYMPH NODE BIOPSY (INJECT AT 1100);   Surgeon: Slava Ahumada MD;  [facility];  Service: Surgical Oncology    MS BREAST RECONSTRUC W FREE FLAP Left 9/19/2016    Procedure: BREAST DELAYED RECONSTRUCTION; DEIP FREE FLAP removal of port-a -cath;  Surgeon: Samira Phillip MD;  Location: [facility];  Service: Plastics    MS DELAY BREAST PROS AFTER BREAST SURG Left 12/19/2016    Procedure: BREAST TISSUE EXPANDER REMOVAL; BREAST IMPLANT PLACEMENT;  Surgeon: Samira Phillip MD;  [facility]: [facility];  Service: Plastics    MS HYSTEROSCOPY,W/ENDO [address] N/A 5/21/2018    Procedure: DILATATION AND CURETTAGE (D&C), HYSTEROSCOPY;  Surgeon: Brittney William MD;  [facility];  Service: Gynecology Oncology    MS MASTECTOMY, SIMPLE, COMPLETE Left 8/10/2016    Procedure: BREAST MASTECTOMY; FROZEN SECTION ;  Surgeon: Slava Ahumada MD;  [facility];  Service: Surgical Oncology    MS REDUCTION OF LARGE BREAST Right 12/19/2016    Procedure: BREAST REDUCTION/MASTOPEXY ;  Surgeon: Emily Reed MD;  [facility]: [facility];  Service: Plastics    SC REMOVE TISSUE EXPANDER(S) Left 1/23/2017    Procedure: BREAST IMPLANT REMOVAL; WASHOUT AND DEBRIDEMENT;  Surgeon: Emily Reed MD;  [facility]: [facility];  Service: Plastics    REVISION OF SCAR ON TORSO N/A 12/19/2016    Procedure: ABDOMINAL SCAR REVISION ;  Surgeon: Emily Reed MD;  Location: [facility];  Service:    [facility] WISDOM TOOTH EXTRACTION       Family History   Problem Relation Age of Onset    Diabetes Mother     Heart disease Mother     Kidney disease Mother     Other Father      Social History     Social History    Marital status: /Civil Union     Spouse name: N/A    Number of children: N/A    Years of education: N/A     Occupational History    Not on file       Social History Main Topics    Smoking status: Former Smoker     Packs/day: 1 00     Years: 15 00     Quit date: 2002    Smokeless tobacco: Never Used    Alcohol use No    Drug use: No    Sexual activity: Not on file     Other Topics Concern    Not on file     Social History Narrative    No narrative on file       Current Outpatient Prescriptions:     BuPROPion HCl (WELLBUTRIN XL PO), Take 150 mg by mouth 2 (two) times a day  , Disp: , Rfl:     clonazePAM (KlonoPIN) 1 mg tablet, TAKE 1/2 TABLET TWICE A DAY AND 1 TABLET AT BEDTIME, Disp: , Rfl: 0    CLONAZEPAM PO, Take 1 tablet by mouth daily at bedtime, Disp: , Rfl:     tamoxifen (NOLVADEX) 20 mg tablet, Take 1 tablet (20 mg total) by mouth daily, Disp: 90 tablet, Rfl: 1    topiramate (TOPAMAX) 50 MG tablet, Take 25 mg by mouth every 12 (twelve) hours, Disp: , Rfl:     diazepam (VALIUM) 2 mg tablet, Take 5 mg by mouth every 6 (six) hours as needed for anxiety, Disp: , Rfl:   Allergies   Allergen Reactions    Ambien [Zolpidem] Hallucinations    Effexor [Venlafaxine] GI Intolerance    Bactrim [Sulfamethoxazole-Trimethoprim] Itching    Morphine Other (See Comments)     Itching, crying, short of breath       Physical Exam:     Vitals:    11/01/18 0934   BP: 90/68   Pulse: 96   Resp: 16   Temp: 97 8 °F (36 6 °C)     Physical Exam   Constitutional: She is oriented to person, place, and time  She appears well-developed and well-nourished  HENT:   Head: Normocephalic and atraumatic  Mouth/Throat: Oropharynx is clear and moist    Eyes: Pupils are equal, round, and reactive to light  EOM are normal    Neck: Normal range of motion  Neck supple  No JVD present  No tracheal deviation present  No thyromegaly present  Cardiovascular: Normal rate, regular rhythm, normal heart sounds and intact distal pulses  Exam reveals no gallop and no friction rub  No murmur heard  Pulmonary/Chest: Effort normal and breath sounds normal  No respiratory distress  She has no wheezes  She has no rales  Right breast exhibits no inverted nipple, no mass, no nipple discharge, no skin change and no tenderness  Left breast exhibits no inverted nipple, no mass, no nipple discharge, no skin change and no tenderness  Breasts are symmetrical    Examination of the left mastectomy site with reconstructed flap shows no worrisome skin findings no dominant masses no axillary adenopathy  Examination of the reduced right breast demonstrates no worrisome skin findings dominant masses supraclavicular axillary or infraclavicular adenopathy  Abdominal: Soft  She exhibits no distension and no mass  There is no hepatomegaly  There is no tenderness  There is no rebound and no guarding  Musculoskeletal: Normal range of motion  She exhibits no edema or tenderness  Lymphadenopathy:     She has no cervical adenopathy  Neurological: She is alert and oriented to person, place, and time  No cranial nerve deficit  Skin: Skin is warm and dry  No rash noted  No erythema  Psychiatric: She has a normal mood and affect   Her behavior is normal  Vitals reviewed  Results/discussion:   The patient has no focal neurologic signs  However she is slightly tachycardic and possibly dehydrated  I recommended she go to the ER to be further evaluated  On clinical exam I do not think she has any evidence of local regional disease  She is agreeable to go to the ER to be evaluated  Presuming there are no worrisome findings we will see her back in 6 months with a right mammogram           Advance Care Planning/Advance Directives:  I discussed the disease status, treatment plans and follow-up with the patient

## 2018-12-14 ENCOUNTER — HOSPITAL ENCOUNTER (OUTPATIENT)
Facility: HOSPITAL | Age: 48
Discharge: HOME | End: 2018-12-14
Attending: SURGERY | Admitting: SURGERY
Payer: COMMERCIAL

## 2018-12-14 ENCOUNTER — ANESTHESIA EVENT (OUTPATIENT)
Dept: OPERATING ROOM | Facility: HOSPITAL | Age: 48
Setting detail: HOSPITAL OUTPATIENT SURGERY
End: 2018-12-14
Payer: COMMERCIAL

## 2018-12-14 VITALS
HEART RATE: 72 BPM | OXYGEN SATURATION: 95 % | DIASTOLIC BLOOD PRESSURE: 69 MMHG | TEMPERATURE: 97.7 F | SYSTOLIC BLOOD PRESSURE: 146 MMHG | RESPIRATION RATE: 16 BRPM

## 2018-12-14 DIAGNOSIS — Z85.3 PERSONAL HISTORY OF MALIGNANT NEOPLASM OF BREAST: ICD-10-CM

## 2018-12-14 LAB
B-HCG UR QL: NEGATIVE
POCT TEST: NORMAL

## 2018-12-14 PROCEDURE — 71000001 HC PACU PHASE 1 INITIAL 30MIN: Performed by: SURGERY

## 2018-12-14 PROCEDURE — 36000014 HC OR LEVEL 4 EA ADDL MIN: Performed by: SURGERY

## 2018-12-14 PROCEDURE — 63600000 HC DRUGS/DETAIL CODE

## 2018-12-14 PROCEDURE — 63600000 HC DRUGS/DETAIL CODE: Performed by: SURGERY

## 2018-12-14 PROCEDURE — 63600000 HC DRUGS/DETAIL CODE: Performed by: NURSE ANESTHETIST, CERTIFIED REGISTERED

## 2018-12-14 PROCEDURE — 37000001 HC ANESTHESIA GENERAL: Performed by: SURGERY

## 2018-12-14 PROCEDURE — 25800000 HC PHARMACY IV SOLUTIONS: Performed by: NURSE ANESTHETIST, CERTIFIED REGISTERED

## 2018-12-14 PROCEDURE — 63700000 HC SELF-ADMINISTRABLE DRUG

## 2018-12-14 PROCEDURE — 63700000 HC SELF-ADMINISTRABLE DRUG: Performed by: SURGERY

## 2018-12-14 PROCEDURE — 63600000 HC DRUGS/DETAIL CODE: Performed by: SPECIALIST

## 2018-12-14 PROCEDURE — 36000004 HC OR LEVEL 4 INITIAL 30MIN: Performed by: SURGERY

## 2018-12-14 PROCEDURE — 88305 TISSUE EXAM BY PATHOLOGIST: CPT | Performed by: SURGERY

## 2018-12-14 PROCEDURE — 63600000 HC DRUGS/DETAIL CODE: Performed by: ANESTHESIOLOGY

## 2018-12-14 PROCEDURE — 27200000 HC STERILE SUPPLY: Performed by: SURGERY

## 2018-12-14 PROCEDURE — 25800000 HC PHARMACY IV SOLUTIONS: Performed by: SURGERY

## 2018-12-14 PROCEDURE — 71000011 HC PACU PHASE 1 EA ADDL MIN: Performed by: SURGERY

## 2018-12-14 PROCEDURE — 0HBT0ZZ EXCISION OF RIGHT BREAST, OPEN APPROACH: ICD-10-PCS | Performed by: SURGERY

## 2018-12-14 PROCEDURE — 0HRU37Z REPLACEMENT OF LEFT BREAST WITH AUTOLOGOUS TISSUE SUBSTITUTE, PERCUTANEOUS APPROACH: ICD-10-PCS | Performed by: SURGERY

## 2018-12-14 PROCEDURE — 25000000 HC PHARMACY GENERAL: Performed by: SURGERY

## 2018-12-14 RX ORDER — MIDAZOLAM HYDROCHLORIDE 2 MG/2ML
INJECTION, SOLUTION INTRAMUSCULAR; INTRAVENOUS AS NEEDED
Status: DISCONTINUED | OUTPATIENT
Start: 2018-12-14 | End: 2018-12-14 | Stop reason: SURG

## 2018-12-14 RX ORDER — GABAPENTIN 300 MG/1
900 CAPSULE ORAL
Status: COMPLETED | OUTPATIENT
Start: 2018-12-14 | End: 2018-12-14

## 2018-12-14 RX ORDER — GABAPENTIN 300 MG/1
CAPSULE ORAL
Status: COMPLETED
Start: 2018-12-14 | End: 2018-12-14

## 2018-12-14 RX ORDER — ACETAMINOPHEN 325 MG/1
TABLET ORAL
Status: COMPLETED
Start: 2018-12-14 | End: 2018-12-14

## 2018-12-14 RX ORDER — HYDROMORPHONE HYDROCHLORIDE 2 MG/1
2-4 TABLET ORAL EVERY 4 HOURS PRN
Status: DISCONTINUED | OUTPATIENT
Start: 2018-12-14 | End: 2018-12-14 | Stop reason: HOSPADM

## 2018-12-14 RX ORDER — FENTANYL CITRATE 50 UG/ML
50 INJECTION, SOLUTION INTRAMUSCULAR; INTRAVENOUS
Status: DISCONTINUED | OUTPATIENT
Start: 2018-12-14 | End: 2018-12-14 | Stop reason: HOSPADM

## 2018-12-14 RX ORDER — PROPOFOL 10 MG/ML
INJECTION, EMULSION INTRAVENOUS CONTINUOUS PRN
Status: DISCONTINUED | OUTPATIENT
Start: 2018-12-14 | End: 2018-12-14 | Stop reason: SURG

## 2018-12-14 RX ORDER — ACETAMINOPHEN 325 MG/1
650 TABLET ORAL
Status: DISCONTINUED | OUTPATIENT
Start: 2018-12-14 | End: 2018-12-14 | Stop reason: HOSPADM

## 2018-12-14 RX ORDER — SCOPOLAMINE 1 MG/3D
PATCH, EXTENDED RELEASE TRANSDERMAL
Status: DISCONTINUED
Start: 2018-12-14 | End: 2018-12-14 | Stop reason: HOSPADM

## 2018-12-14 RX ORDER — SCOPOLAMINE 1 MG/3D
1 PATCH, EXTENDED RELEASE TRANSDERMAL ONCE
Status: DISCONTINUED | OUTPATIENT
Start: 2018-12-14 | End: 2018-12-14 | Stop reason: HOSPADM

## 2018-12-14 RX ORDER — DIPHENHYDRAMINE HCL 50 MG/ML
25 VIAL (ML) INJECTION EVERY 6 HOURS PRN
Status: DISCONTINUED | OUTPATIENT
Start: 2018-12-14 | End: 2018-12-14 | Stop reason: HOSPADM

## 2018-12-14 RX ORDER — MIDAZOLAM HYDROCHLORIDE 2 MG/2ML
1 INJECTION, SOLUTION INTRAMUSCULAR; INTRAVENOUS ONCE
Status: COMPLETED | OUTPATIENT
Start: 2018-12-14 | End: 2018-12-14

## 2018-12-14 RX ORDER — HYDROMORPHONE HYDROCHLORIDE 1 MG/ML
.25-.5 INJECTION, SOLUTION INTRAMUSCULAR; INTRAVENOUS; SUBCUTANEOUS
Status: DISCONTINUED | OUTPATIENT
Start: 2018-12-14 | End: 2018-12-14 | Stop reason: HOSPADM

## 2018-12-14 RX ORDER — ONDANSETRON HYDROCHLORIDE 2 MG/ML
4 INJECTION, SOLUTION INTRAVENOUS
Status: DISCONTINUED | OUTPATIENT
Start: 2018-12-14 | End: 2018-12-14 | Stop reason: HOSPADM

## 2018-12-14 RX ORDER — PROPOFOL 10 MG/ML
INJECTION, EMULSION INTRAVENOUS AS NEEDED
Status: DISCONTINUED | OUTPATIENT
Start: 2018-12-14 | End: 2018-12-14 | Stop reason: SURG

## 2018-12-14 RX ORDER — ACETAMINOPHEN 325 MG/1
975 TABLET ORAL
Status: COMPLETED | OUTPATIENT
Start: 2018-12-14 | End: 2018-12-14

## 2018-12-14 RX ORDER — DEXTROSE 50 % IN WATER (D50W) INTRAVENOUS SYRINGE
25 AS NEEDED
Status: DISCONTINUED | OUTPATIENT
Start: 2018-12-14 | End: 2018-12-14 | Stop reason: HOSPADM

## 2018-12-14 RX ORDER — ONDANSETRON HYDROCHLORIDE 2 MG/ML
4 INJECTION, SOLUTION INTRAVENOUS EVERY 8 HOURS PRN
Status: DISCONTINUED | OUTPATIENT
Start: 2018-12-14 | End: 2018-12-14 | Stop reason: HOSPADM

## 2018-12-14 RX ORDER — IBUPROFEN 200 MG
16-32 TABLET ORAL AS NEEDED
Status: DISCONTINUED | OUTPATIENT
Start: 2018-12-14 | End: 2018-12-14 | Stop reason: HOSPADM

## 2018-12-14 RX ORDER — DEXAMETHASONE SODIUM PHOSPHATE 4 MG/ML
INJECTION, SOLUTION INTRA-ARTICULAR; INTRALESIONAL; INTRAMUSCULAR; INTRAVENOUS; SOFT TISSUE
Status: COMPLETED
Start: 2018-12-14 | End: 2018-12-14

## 2018-12-14 RX ORDER — DEXAMETHASONE SODIUM PHOSPHATE 4 MG/ML
10 INJECTION, SOLUTION INTRA-ARTICULAR; INTRALESIONAL; INTRAMUSCULAR; INTRAVENOUS; SOFT TISSUE ONCE
Status: COMPLETED | OUTPATIENT
Start: 2018-12-14 | End: 2018-12-14

## 2018-12-14 RX ORDER — FENTANYL CITRATE 50 UG/ML
INJECTION, SOLUTION INTRAMUSCULAR; INTRAVENOUS AS NEEDED
Status: DISCONTINUED | OUTPATIENT
Start: 2018-12-14 | End: 2018-12-14 | Stop reason: SURG

## 2018-12-14 RX ORDER — SODIUM CHLORIDE 9 MG/ML
INJECTION, SOLUTION INTRAVENOUS CONTINUOUS
Status: DISCONTINUED | OUTPATIENT
Start: 2018-12-14 | End: 2018-12-14 | Stop reason: HOSPADM

## 2018-12-14 RX ORDER — ONDANSETRON HYDROCHLORIDE 2 MG/ML
INJECTION, SOLUTION INTRAVENOUS AS NEEDED
Status: DISCONTINUED | OUTPATIENT
Start: 2018-12-14 | End: 2018-12-14 | Stop reason: SURG

## 2018-12-14 RX ORDER — DEXTROSE 40 %
15-30 GEL (GRAM) ORAL AS NEEDED
Status: DISCONTINUED | OUTPATIENT
Start: 2018-12-14 | End: 2018-12-14 | Stop reason: HOSPADM

## 2018-12-14 RX ORDER — EPHEDRINE SULFATE/0.9% NACL/PF 50 MG/5 ML
10 SYRINGE (ML) INTRAVENOUS AS NEEDED
Status: DISCONTINUED | OUTPATIENT
Start: 2018-12-14 | End: 2018-12-14 | Stop reason: HOSPADM

## 2018-12-14 RX ORDER — DIAZEPAM 2 MG/1
5 TABLET ORAL EVERY 6 HOURS PRN
COMMUNITY

## 2018-12-14 RX ORDER — SODIUM CHLORIDE, SODIUM GLUCONATE, SODIUM ACETATE, POTASSIUM CHLORIDE AND MAGNESIUM CHLORIDE 30; 37; 368; 526; 502 MG/100ML; MG/100ML; MG/100ML; MG/100ML; MG/100ML
INJECTION, SOLUTION INTRAVENOUS CONTINUOUS PRN
Status: DISCONTINUED | OUTPATIENT
Start: 2018-12-14 | End: 2018-12-14 | Stop reason: SURG

## 2018-12-14 RX ORDER — LABETALOL HCL 20 MG/4 ML
5 SYRINGE (ML) INTRAVENOUS AS NEEDED
Status: DISCONTINUED | OUTPATIENT
Start: 2018-12-14 | End: 2018-12-14 | Stop reason: HOSPADM

## 2018-12-14 RX ADMIN — PROPOFOL 150 MG: 10 INJECTION, EMULSION INTRAVENOUS at 15:37

## 2018-12-14 RX ADMIN — FENTANYL CITRATE 25 MCG: 50 INJECTION INTRAMUSCULAR; INTRAVENOUS at 15:45

## 2018-12-14 RX ADMIN — PROPOFOL 25 MCG/KG/MIN: 10 INJECTION, EMULSION INTRAVENOUS at 15:42

## 2018-12-14 RX ADMIN — FENTANYL CITRATE 50 MCG: 50 INJECTION INTRAMUSCULAR; INTRAVENOUS at 15:37

## 2018-12-14 RX ADMIN — MIDAZOLAM HYDROCHLORIDE 2 MG: 1 INJECTION, SOLUTION INTRAMUSCULAR; INTRAVENOUS at 15:31

## 2018-12-14 RX ADMIN — ONDANSETRON 4 MG: 2 INJECTION INTRAMUSCULAR; INTRAVENOUS at 16:40

## 2018-12-14 RX ADMIN — PROPOFOL 50 MG: 10 INJECTION, EMULSION INTRAVENOUS at 15:31

## 2018-12-14 RX ADMIN — SODIUM CHLORIDE, SODIUM GLUCONATE, SODIUM ACETATE, POTASSIUM CHLORIDE AND MAGNESIUM CHLORIDE: 526; 502; 368; 37; 30 INJECTION, SOLUTION INTRAVENOUS at 16:40

## 2018-12-14 RX ADMIN — FENTANYL CITRATE 25 MCG: 50 INJECTION INTRAMUSCULAR; INTRAVENOUS at 16:35

## 2018-12-14 RX ADMIN — GABAPENTIN 900 MG: 300 CAPSULE ORAL at 14:58

## 2018-12-14 RX ADMIN — SODIUM CHLORIDE 2 G: 9 INJECTION, SOLUTION INTRAVENOUS at 15:37

## 2018-12-14 RX ADMIN — FENTANYL CITRATE 50 MCG: 50 INJECTION, SOLUTION INTRAMUSCULAR; INTRAVENOUS at 17:34

## 2018-12-14 RX ADMIN — SCOPALAMINE 1 PATCH: 1 PATCH, EXTENDED RELEASE TRANSDERMAL at 14:50

## 2018-12-14 RX ADMIN — DEXAMETHASONE SODIUM PHOSPHATE 10 MG: 4 INJECTION, SOLUTION INTRA-ARTICULAR; INTRALESIONAL; INTRAMUSCULAR; INTRAVENOUS; SOFT TISSUE at 14:59

## 2018-12-14 RX ADMIN — ACETAMINOPHEN 975 MG: 325 TABLET, FILM COATED ORAL at 14:56

## 2018-12-14 RX ADMIN — ACETAMINOPHEN 975 MG: 325 TABLET ORAL at 14:56

## 2018-12-14 RX ADMIN — HYDROMORPHONE HYDROCHLORIDE 2 MG: 2 TABLET ORAL at 19:42

## 2018-12-14 RX ADMIN — MIDAZOLAM HYDROCHLORIDE 1 MG: 1 INJECTION, SOLUTION INTRAMUSCULAR; INTRAVENOUS at 17:45

## 2018-12-14 RX ADMIN — DEXAMETHASONE SODIUM PHOSPHATE 10 MG: 4 INJECTION, SOLUTION INTRAMUSCULAR; INTRAVENOUS at 14:59

## 2018-12-14 RX ADMIN — SODIUM CHLORIDE: 900 INJECTION, SOLUTION INTRAVENOUS at 15:25

## 2018-12-14 ASSESSMENT — PAIN SCALES - GENERAL: PAIN_LEVEL: 2

## 2018-12-14 ASSESSMENT — ENCOUNTER SYMPTOMS: HEADACHES: 1

## 2018-12-14 NOTE — ANESTHESIA PROCEDURE NOTES
Airway  Start Time: 12/14/2018 3:39 PM  Airway not difficult    General Information and Staff    Patient location during procedure: OR  Anesthesiologist: JONATHAN GRUBBS  Resident/CRNA: JONATHAN SANTILLAN  Performed: resident/CRNA     Indications and Patient Condition  Indications for airway management: anesthesia  Sedation level: general  Preoxygenated: yes  Patient position: sniffing  MILS not maintained throughout  Mask difficulty assessment: 1 - vent by mask    Final Airway Details  Final airway type: supraglottic airway (LMA)      Successful airway: classic  Size 3    Number of other approaches attempted: 0  Atraumatic airway insertion

## 2018-12-14 NOTE — OR SURGEON
Pre-Procedure patient identification:  I am the primary operating surgeon/proceduralist and I have identified the patient on 12/14/18 at 3:25 PM Reymundo Pete MD  Phone Number: [phone]

## 2018-12-14 NOTE — PERIOPERATIVE NURSING NOTE
"17:45  Pt crying, anxious, requesting \"medicine to help me stop crying\". Dr. Forbes advised. Midazolam given.  "

## 2018-12-14 NOTE — DISCHARGE INSTRUCTIONS
May remove bra to shower in 2 days.  Does not need gauze under bra after takes first shower.  Put antibiotic ointment and bandaid over each liposuction site after each shower

## 2018-12-14 NOTE — BRIEF OP NOTE
Right Breast Reduction (R), Left Breast Reconstruction (L), Fat Grafts Left Breast (L) Procedure Note    Procedure:    Right Breast Reduction  CPT(R) Code:  34896 - NC REDUCTION OF LARGE BREAST    Procedure:    Left Breast Reconstruction  CPT(R) Code:  24109 - NC BREAST RECONSTRUC W OTHR TECHNIQ    Procedure:    Fat Grafts Left Breast  CPT(R) Code:  75104 - NC REMV TISSUE FOR GRAFT OTHR      Pre-op Diagnosis     * Personal history of malignant neoplasm of breast [Z85.3]       Post-op Diagnosis     * Personal history of malignant neoplasm of breast [Z85.3]    Surgeon(s) and Role:     * Reymundo Pete MD - Primary    Anesthesia: General    Staff:   No surgical staff documented.    Procedure Details   -    Estimated Blood Loss: No blood loss documented.    Specimens:                No specimens collected during this procedure.      Drains:      Implants: * No implants in log *           Complications:  None; patient tolerated the procedure well.           Disposition: PACU - hemodynamically stable.           Condition: stable    Reymundo Pete MD  Phone Number: [phone]

## 2018-12-14 NOTE — ANESTHESIA PREPROCEDURE EVALUATION
Anesthesia ROS/MED HX    Anesthesia History - neg  Pulmonary    Pneumonia  Neuro/Psych    Headaches  Cardiovascular- neg  GI/Hepatic   GERD  Musculoskeletal- neg  Endo/Other- neg  Body Habitus: Overweight      Past Surgical History:   Procedure Laterality Date   • BREAST RECONSTRUCTION     • BREAST RECONSTRUCTION     • BREAST SURGERY      implant removed   • CHOLECYSTECTOMY     • LAPAROSCOPIC GASTRIC BANDING  2013   • LAPAROSCOPY ROBOTIC     • MASTECTOMY Left     left   • PORTACATH PLACEMENT     • PORTACATH PLACEMENT      and removed       Physical Exam    Airway   Mallampati: III   TM distance: >3 FB   Neck ROM: full  Cardiovascular - normal   Rhythm: regular   Rate: normal  Pulmonary - normal   clear to auscultation  Dental - normal        Anesthesia Plan    Plan: general    Technique: general LMA     Lines and Monitors: PIV   ASA 2  Blood Products:   Use of Blood Products Discussed: No   Anesthetic plan and risks discussed with: patient  Induction:    intravenous   Postop Plan:   Pain Management: IV analgesics

## 2018-12-15 NOTE — ANESTHESIA POSTPROCEDURE EVALUATION
Patient: Rehana Melvin    Procedure Summary     Date:  12/14/18 Room / Location:  [facility] OR 1 / [facility] OR    Anesthesia Start:  1528 Anesthesia Stop:  1714    Procedures:       Right Breast Reduction (Right )      Left Breast Reconstruction (Left )      Fat Grafts Left Breast (Left ) Diagnosis:       Personal history of malignant neoplasm of breast      (Hx Malignant Neoplasm Breast)    Surgeon:  Reymundo Pete MD Responsible Provider:  Sarai Sr MD    Anesthesia Type:  general ASA Status:  2          Anesthesia Type: general  PACU Vitals  12/14/2018 1709 - 12/14/2018 1809      12/14/2018 1713 12/14/2018 1714 12/14/2018 1715 12/14/2018 1720    BP: 121/66 - - 113/63    Temp: - 36.8 °C (98.2 °F) - -    Pulse: 78 - 78 85    Resp: - - (!)  21 (!)  47    SpO2: 100 % - 100 % 100 %              12/14/2018 1730 12/14/2018 1740 12/14/2018 1750 12/14/2018 1800    BP: 116/70 115/67 128/71 127/66    Temp: - - - -    Pulse: 81 73 64 72    Resp: (!)  35 13 (!)  22 15    SpO2: 100 % 100 % 100 % 100 %            Anesthesia Post Evaluation    Pain score: 2  Pain management: adequate  Patient location during evaluation: PACU  Patient participation: complete - patient participated  Level of consciousness: awake and alert  Cardiovascular status: acceptable  Airway Patency: adequate  Respiratory status: acceptable  Hydration status: acceptable  Anesthetic complications: no

## 2018-12-15 NOTE — NURSING NOTE
Discharge instructions reviewed with patient and , med-lock pulled. Pt voided prior to d/c reported adequate pain control.

## 2018-12-16 NOTE — OP NOTE
REPORT TYPE:  Operative Note    DATE OF OPERATION:  12/14/2018      PREOPERATIVE DIAGNOSIS:  History of left breast cancer, mastectomy with failed multiple reconstructions, right macromastia, and breast asymmetry.    POSTOPERATIVE DIAGNOSIS:  History of left breast cancer, mastectomy with failed multiple reconstructions, right macromastia, and breast asymmetry.    PROCEDURE:  Right breast reduction, left breast reconstruction with autologous fat grafting.    ATTENDING SURGEON:  Reymundo Pete MD    ANESTHESIA:  General.    ESTIMATED BLOOD LOSS:  Minimal.    100% of the case is insurance.    DESCRIPTION OF PROCEDURE:  The patient was marked in the preoperative area and taken to the operating room.  The patient was placed supine on the OR table.  Perioperative antibiotics were given.  Venodyne boots were placed and checked for functionality   and general anesthesia initiated.  The trunk was prepped and draped in standard fashion.  The right breast was addressed first where a superomedial pedicle with a base width of 7 cm was used and deepithelialized.  Parenchyma was resected inferiorly,   laterally and, to a lesser degree, superiorly, being careful to maintain the patient's base of her pedicle to avoid devascularization in the setting of a previous procedure and scarring on that side.  Old evidence of seroma cavities were noted and   resected.  Hemostasis was achieved.  The resected parenchyma was oriented for pathology and sent.  The vertical parenchymal pillars were created using 2-0 PDS suture and the patient was placed in a sitting position.  The skin was tailor tacked.  After   confirming adequate reduction lift and shape, the patient was returned to supine and the skin closed in layers.  It should be noted that preoperatively, a discussion regarding the use of drains occurred and the patient felt adamant about avoiding drains   and managing any fluid collections postoperatively.  This wish was respected.  The  trunk was injected with 1 liter of tumescent solution and a 4 mm cannula under 15 mmHg of suction was used to lipoaspirate from the axillary and abdominal regions.  The   fat was allowed to decant.  250 mL of fat was then injected into the left breast across all layers from submuscular to subcutaneous to create shape and projection in the breast with prior mastectomy.  The patient was placed in a sitting position to    final adequacy of size, shape and symmetry, returned to supine.  In this staged procedure, the maximum amount of left breast volume was achieved prior to creating soft tissue pressure or loss of native tissue integrity.  The patient was then cleaned and   dressed with surgical glue, sterile gauze, and a bra, awoken from anesthesia and taken to the recovery room uneventfully.      DEVIN BERRY MD        CC:     DD: 12/16/2018 13:40  DT: 12/16/2018 16:10  Voice ID: [id number]/Report ID: [id number]  jeannie

## 2018-12-17 ENCOUNTER — TELEPHONE (OUTPATIENT)
Dept: HEMATOLOGY ONCOLOGY | Facility: CLINIC | Age: 48
End: 2018-12-17

## 2018-12-17 NOTE — TELEPHONE ENCOUNTER
Per Dr Homero Carmona patient should hold treatment 7 days prior and a couple days after    Called to speak to Yousif Gilbert to inform her of this and she stated patient had the surgery on Friday, and only stopped taking the medication 3 days prior

## 2018-12-17 NOTE — TELEPHONE ENCOUNTER
Bora Michelle is calling from Dr Jossie Dominguez' office about mutual patient [facility]  She is having a right-sided breast reduction and a left breast reconstruction with fat graft  Bora Michelle was wanting to know if Dr Eddie Whitlock wants to hold her tamoxifen for 7 days after surgery  Bora Michelle can be reached at [phone]

## 2018-12-18 LAB
CASE RPRT: NORMAL
CLINICAL INFO: NORMAL
PATH REPORT.FINAL DX SPEC: NORMAL
PATH REPORT.GROSS SPEC: NORMAL

## 2019-03-13 ENCOUNTER — OFFICE VISIT (OUTPATIENT)
Dept: HEMATOLOGY ONCOLOGY | Facility: CLINIC | Age: 49
End: 2019-03-13
Payer: COMMERCIAL

## 2019-03-13 VITALS
SYSTOLIC BLOOD PRESSURE: 102 MMHG | DIASTOLIC BLOOD PRESSURE: 78 MMHG | TEMPERATURE: 99.1 F | OXYGEN SATURATION: 99 % | HEART RATE: 84 BPM | RESPIRATION RATE: 16 BRPM

## 2019-03-13 DIAGNOSIS — Z17.0 MALIGNANT NEOPLASM OF UPPER-OUTER QUADRANT OF LEFT BREAST IN FEMALE, ESTROGEN RECEPTOR POSITIVE (HCC): Primary | ICD-10-CM

## 2019-03-13 DIAGNOSIS — C50.412 MALIGNANT NEOPLASM OF UPPER-OUTER QUADRANT OF LEFT BREAST IN FEMALE, ESTROGEN RECEPTOR POSITIVE (HCC): Primary | ICD-10-CM

## 2019-03-13 PROCEDURE — 99214 OFFICE O/P EST MOD 30 MIN: CPT | Performed by: INTERNAL MEDICINE

## 2019-03-13 RX ORDER — TAMOXIFEN CITRATE 20 MG/1
20 TABLET ORAL DAILY
Qty: 90 TABLET | Refills: 1 | Status: SHIPPED | OUTPATIENT
Start: 2019-03-13 | End: 2019-09-10 | Stop reason: SDUPTHER

## 2019-03-13 NOTE — PROGRESS NOTES
Hematology / Oncology Outpatient Follow Up Note    Britt Campbell 57 y o  female :1970 [id number]         Date:  3/13/2019    Assessment / Plan:  A 51 year old premenopausal woman with clinical T2 N0 left breast cancer, grade 1, ER positive, IL negative, HER-2 Fish negative disease  She was treated by Dr Urbano Quintana at the [facility] with neoadjuvant chemotherapy with dose dense AC x4  She only had minor clinical response  Subsequently, she switch her care at [place] [address], where she underwent left mastectomy with sentinel lymph node biopsy, resulting in SIMRAN  She had a 1 6 cm of invasive ductal carcinoma, grade 2  Currently, she is on adjuvant hormonal therapy with tamoxifen with no side effects    She underwent endometrial biopsy x2, both of which showed no evidence of carcinoma   One biopsy showed hyperplasia  She decided not to undergo hysterectomy  She stayed on tamoxifen with no side effects  Clinically, she has no evidence recurrent disease  I recommended her to continue with tamoxifen 20 mg daily  I will see her again in 6 months for routine follow-up  She is in agreement with my recommendations                                                                   Subjective:      HPI: [de-identified] 39year old premenopausal woman who underwent first screening mammography which was abnormal in her left breast  First FNA was inconclusive  Core biopsy showed invasive ductal carcinoma at the [facility]  MRI showed T2 lesion, with no lymph node abnormalities  This was grade 1, % positive, IL negative, HER-2 Fish negative disease  However, Ki-67 was 50-60%  She was seen by Dr Urbano Quintana, who recommended neoadjuvant chemotherapy  She received dose dense AC ?4 with very minor response  She has some toxicity with alopecia, nausea, vomiting, as well as significant fatigue  She gained 30 pounds during the chemotherapy  Subsequently, she was seen by Dr Halima Gordon who I have been discussing her case   She subsequently underwent left mastectomy with sentinel lymph node biopsy in August 2016 which showed a 1 6?1 4?1 3 cm of invasive ductal carcinoma, grade 2  One sentinel lymph node was negative for metastatic disease  She is going to have separate tram flap reconstruction next week  She presents today to discuss further systemic treatment  She has no other significant past medical history  She was tested for BRCA gene mutation which was negative  She denied any pain  She has no respiratory symptom  Her performance status is normal  Her menstrual cycle stopped due to chemotherapy  However, in late August 2016, her menstrual cycles resumed             Interval History:  A 51 year old premenopausal woman who was initially diagnosed clinical stage II left breast cancer, grade 1, ER strongly positive, UT negative, HER-2 negative disease  She was seen by Dr Josh Mendes, who treated her with neoadjuvant chemotherapy with [facility] x4, resulting in only minor response  She subsequently obtained a second opinion  She underwent left mastectomy with sentinel lymph node biopsy which showed stage IA left breast cancer, grade 2  I saw her, after the mastectomy  I recommended her to have adjuvant hormonal therapy with tamoxifen  She has been on tamoxifen since September 2016 with which she is doing well  She has been extremely anxious regarding her breast cancer care   She recently had heavy menstrual bleeding  She was seen by Dr Jessica Lopez who did D&C   Pathology showed benign endometrial polyp  Cristofer Sullivan was no hyperplasia or carcinoma identified   She was then seen by Aguilar Mendoza, who did endometrial biopsy as well as ablation   After this procedure, her menstrual bleeding was well regulated  Biopsy showed simple hyperplasia  She was seen by Dr Jessica Lopez again who recommended either hysterectomy and bilateral oophorectomy or simple bilateral oophorectomy followed by aromatase inhibitors  She finally decided not to undergo hysterectomy    She stayed on tamoxifen which she has no complaint with  She denied hot flashes  She has no swelling in the lower extremities  Her weight is stable  She has no respiratory symptoms  Her performance status is normal  She still has regular but light menstrual cycle                                                                                                                                       Objective:      Primary Diagnosis:     1  Left breast cancer, stage IA (pT1c, pN0,M0) grade 2, % positive, RI negative, HER-2 Fish negative disease  Diagnosed in April 2016   2  BRCA gene mutation negative       Cancer Staging:  No matching staging information was found for the patient         Previous Hematologic/ Oncologic Treatment:      Neoadjuvant chemotherapy with AC x4 completed in May 2016, which was given at [facility]       Current Hematologic/ Oncologic Treatment:       Adjuvant hormonal therapy with tamoxifen since September 2016       Disease Status:      SIMRAN status post mastectomy with sentinel lymph node biopsy       Test Results:     Pathology:     Left mastectomy specimen showed 1 6 x1 4 x 1 3 cm of invasive ductal carcinoma, grade 2  One sentinel lymph node was negative for metastatic disease  % positive, RI negative, HER-2 Fish negative disease  Stage IA(pT1c, pN0,M0)biopsy showed Ki-67 50-60%     Radiology:     CT scan of chest, abdomen pelvis showed no evidence of metastatic disease  Mammography in the right breast in April 2017 was benign  BI-RADS 2  Bone scan in August 2017 showed no evidence of osseous metastasis      Laboratory:           Physical Exam:        General Appearance:    Alert, oriented          Eyes:    PERRL   Ears:    Normal external ear canals, both ears   Nose:   Nares normal, septum midline   Throat:   Mucosa moist  Pharynx without injection      Neck:   Supple         Lungs:     Clear to auscultation bilaterally   Chest Wall:    No tenderness or deformity    Heart:    Regular rate and rhythm         Abdomen:     Soft, non-tender, bowel sounds +, no organomegaly               Extremities:   Extremities no cyanosis or edema         Skin:   no rash or icterus  Lymph nodes:   Cervical, supraclavicular, and axillary nodes normal   Neurologic:   CNII-XII intact, normal strength, sensation and reflexes     Throughout             Breast exam:   status post left mastectomy without reconstruction  No palpable abnormality in her left chest wall  Right breast exam is negative                  ROS: Review of Systems   All other systems reviewed and are negative  Imaging: No results found  Labs:   Lab Results   Component Value Date    WBC 6 14 11/01/2018    HGB 14 5 11/01/2018    HCT 44 5 11/01/2018    MCV 95 11/01/2018     11/01/2018     Lab Results   Component Value Date    K 3 8 11/01/2018     (H) 11/01/2018    CO2 21 11/01/2018    BUN 19 11/01/2018    CREATININE 0 68 11/01/2018    GLUF 88 02/22/2018    CALCIUM 8 5 11/01/2018    AST 41 11/01/2018    ALT 74 11/01/2018    ALKPHOS 60 11/01/2018    EGFR 106 11/01/2018         Lab Results   Component Value Date    IRON 91 08/18/2017    TIBC 354 08/18/2017    FERRITIN 28 02/22/2018       Lab Results   Component Value Date    XHNJRCGC60 478 06/15/2017       Lab Results   Component Value Date    FOLATE 9 2 06/15/2017         Current Medications: Reviewed  Allergies: Reviewed  PMH/FH/SH:  Reviewed      Vital Sign:    There is no height or weight on file to calculate BSA      Wt Readings from Last 3 Encounters:   11/01/18 73 5 kg (162 lb)   05/10/18 74 4 kg (164 lb)   05/21/18 74 4 kg (164 lb)        Temp Readings from Last 3 Encounters:   03/13/19 99 1 °F (37 3 °C) (Tympanic)   11/01/18 98 2 °F (36 8 °C) (Oral)   11/01/18 97 8 °F (36 6 °C)        BP Readings from Last 3 Encounters:   03/13/19 102/78   11/01/18 119/68   11/01/18 90/68         Pulse Readings from Last 3 Encounters:   03/13/19 84   11/01/18 84   11/01/18 96 @QCUEKVS6(5)@

## 2019-04-16 ENCOUNTER — TRANSCRIBE ORDERS (OUTPATIENT)
Dept: ADMINISTRATIVE | Facility: HOSPITAL | Age: 49
End: 2019-04-16

## 2019-04-16 DIAGNOSIS — C50.919 MALIGNANT NEOPLASM OF FEMALE BREAST, UNSPECIFIED ESTROGEN RECEPTOR STATUS, UNSPECIFIED LATERALITY, UNSPECIFIED SITE OF BREAST (HCC): Primary | ICD-10-CM

## 2019-04-25 ENCOUNTER — HOSPITAL ENCOUNTER (OUTPATIENT)
Dept: ULTRASOUND IMAGING | Facility: CLINIC | Age: 49
Discharge: HOME | End: 2019-04-25
Payer: COMMERCIAL

## 2019-04-25 ENCOUNTER — HOSPITAL ENCOUNTER (OUTPATIENT)
Dept: MAMMOGRAPHY | Facility: CLINIC | Age: 49
Discharge: HOME | End: 2019-04-25
Payer: COMMERCIAL

## 2019-04-25 VITALS — HEIGHT: 60 IN | WEIGHT: 162 LBS | BODY MASS INDEX: 31.8 KG/M2

## 2019-04-25 DIAGNOSIS — C50.919 MALIGNANT NEOPLASM OF FEMALE BREAST, UNSPECIFIED ESTROGEN RECEPTOR STATUS, UNSPECIFIED LATERALITY, UNSPECIFIED SITE OF BREAST (HCC): ICD-10-CM

## 2019-04-25 DIAGNOSIS — C50.412 MALIGNANT NEOPLASM OF UPPER-OUTER QUADRANT OF LEFT BREAST IN FEMALE, ESTROGEN RECEPTOR POSITIVE (HCC): ICD-10-CM

## 2019-04-25 DIAGNOSIS — Z17.0 MALIGNANT NEOPLASM OF UPPER-OUTER QUADRANT OF LEFT BREAST IN FEMALE, ESTROGEN RECEPTOR POSITIVE (HCC): ICD-10-CM

## 2019-04-25 PROCEDURE — 77065 DX MAMMO INCL CAD UNI: CPT

## 2019-04-25 PROCEDURE — G0279 TOMOSYNTHESIS, MAMMO: HCPCS

## 2019-04-25 PROCEDURE — 76642 ULTRASOUND BREAST LIMITED: CPT

## 2019-05-08 ENCOUNTER — OFFICE VISIT (OUTPATIENT)
Dept: SURGICAL ONCOLOGY | Facility: CLINIC | Age: 49
End: 2019-05-08
Payer: COMMERCIAL

## 2019-05-08 VITALS
DIASTOLIC BLOOD PRESSURE: 80 MMHG | BODY MASS INDEX: 33.57 KG/M2 | TEMPERATURE: 98.5 F | SYSTOLIC BLOOD PRESSURE: 118 MMHG | HEART RATE: 81 BPM | WEIGHT: 171 LBS | HEIGHT: 60 IN | RESPIRATION RATE: 16 BRPM

## 2019-05-08 DIAGNOSIS — C50.412 MALIGNANT NEOPLASM OF UPPER-OUTER QUADRANT OF LEFT BREAST IN FEMALE, ESTROGEN RECEPTOR POSITIVE (HCC): Primary | ICD-10-CM

## 2019-05-08 DIAGNOSIS — Z17.0 MALIGNANT NEOPLASM OF UPPER-OUTER QUADRANT OF LEFT BREAST IN FEMALE, ESTROGEN RECEPTOR POSITIVE (HCC): Primary | ICD-10-CM

## 2019-05-08 DIAGNOSIS — Z79.810 USE OF TAMOXIFEN (NOLVADEX): ICD-10-CM

## 2019-05-08 PROCEDURE — 99213 OFFICE O/P EST LOW 20 MIN: CPT | Performed by: SURGERY

## 2019-06-12 ENCOUNTER — TELEPHONE (OUTPATIENT)
Dept: SURGICAL ONCOLOGY | Facility: CLINIC | Age: 49
End: 2019-06-12

## 2019-06-12 ENCOUNTER — OFFICE VISIT (OUTPATIENT)
Dept: SURGICAL ONCOLOGY | Facility: CLINIC | Age: 49
End: 2019-06-12
Payer: COMMERCIAL

## 2019-06-12 VITALS
BODY MASS INDEX: 31.79 KG/M2 | HEIGHT: 62 IN | HEART RATE: 112 BPM | DIASTOLIC BLOOD PRESSURE: 80 MMHG | TEMPERATURE: 98.2 F | SYSTOLIC BLOOD PRESSURE: 120 MMHG

## 2019-06-12 DIAGNOSIS — Z17.0 MALIGNANT NEOPLASM OF UPPER-OUTER QUADRANT OF LEFT BREAST IN FEMALE, ESTROGEN RECEPTOR POSITIVE (HCC): ICD-10-CM

## 2019-06-12 DIAGNOSIS — N63.20 LEFT BREAST LUMP: Primary | ICD-10-CM

## 2019-06-12 DIAGNOSIS — Z79.810 USE OF TAMOXIFEN (NOLVADEX): ICD-10-CM

## 2019-06-12 DIAGNOSIS — C50.412 MALIGNANT NEOPLASM OF UPPER-OUTER QUADRANT OF LEFT BREAST IN FEMALE, ESTROGEN RECEPTOR POSITIVE (HCC): ICD-10-CM

## 2019-06-12 PROCEDURE — 99213 OFFICE O/P EST LOW 20 MIN: CPT | Performed by: SURGERY

## 2019-09-10 ENCOUNTER — OFFICE VISIT (OUTPATIENT)
Dept: HEMATOLOGY ONCOLOGY | Facility: CLINIC | Age: 49
End: 2019-09-10
Payer: COMMERCIAL

## 2019-09-10 VITALS
HEIGHT: 61 IN | DIASTOLIC BLOOD PRESSURE: 68 MMHG | OXYGEN SATURATION: 97 % | HEART RATE: 85 BPM | TEMPERATURE: 95.7 F | RESPIRATION RATE: 18 BRPM | SYSTOLIC BLOOD PRESSURE: 132 MMHG | BODY MASS INDEX: 32.31 KG/M2

## 2019-09-10 DIAGNOSIS — C50.412 MALIGNANT NEOPLASM OF UPPER-OUTER QUADRANT OF LEFT BREAST IN FEMALE, ESTROGEN RECEPTOR POSITIVE (HCC): Primary | ICD-10-CM

## 2019-09-10 DIAGNOSIS — Z17.0 MALIGNANT NEOPLASM OF UPPER-OUTER QUADRANT OF LEFT BREAST IN FEMALE, ESTROGEN RECEPTOR POSITIVE (HCC): Primary | ICD-10-CM

## 2019-09-10 PROCEDURE — 99214 OFFICE O/P EST MOD 30 MIN: CPT | Performed by: INTERNAL MEDICINE

## 2019-09-10 RX ORDER — TAMOXIFEN CITRATE 20 MG/1
20 TABLET ORAL DAILY
Qty: 90 TABLET | Refills: 3 | Status: SHIPPED | OUTPATIENT
Start: 2019-09-10 | End: 2020-09-15 | Stop reason: SDUPTHER

## 2019-09-10 NOTE — PROGRESS NOTES
Hematology / Oncology Outpatient Follow Up Note    Sudha Monae 57 y o  female :1970 [id number]         Date:  9/10/2019    Assessment / Plan:  A 51 year old premenopausal woman with clinical T2 N0 left breast cancer, grade 1, ER positive, DE negative, HER-2 Fish negative disease  She was treated by Dr Claudene Birch at the [facility] with neoadjuvant chemotherapy with dose dense AC x4  She only had minor clinical response  Subsequently, she switch her care at [facility], where she underwent left mastectomy with sentinel lymph node biopsy, resulting in SIMRAN  She had a 1 6 cm of invasive ductal carcinoma, grade 2  Currently, she is on adjuvant hormonal therapy with tamoxifen with no side effects  Clinically, she has no evidence recurrent disease  I recommended her to continue with tamoxifen 20 mg daily  She is in agreement  I will see her again in a year for routine follow-up                                  Subjective:      HPI:   A 46 year old premenopausal woman who underwent first screening mammography which was abnormal in her left breast  First FNA was inconclusive  Core biopsy showed invasive ductal carcinoma at the [facility]  MRI showed T2 lesion, with no lymph node abnormalities  This was grade 1, % positive, DE negative, HER-2 Fish negative disease  However, Ki-67 was 50-60%  She was seen by Dr Claudene Birch, who recommended neoadjuvant chemotherapy  She received dose dense AC ?4 with very minor response  She has some toxicity with alopecia, nausea, vomiting, as well as significant fatigue  She gained 30 pounds during the chemotherapy  Subsequently, she was seen by Dr Jovanny Cerda who I have been discussing her case  She subsequently underwent left mastectomy with sentinel lymph node biopsy in 2016 which showed a 1 6?1 4?1 3 cm of invasive ductal carcinoma, grade 2  One sentinel lymph node was negative for metastatic disease   She is going to have separate tram flap reconstruction next week  She presents today to discuss further systemic treatment  She has no other significant past medical history  She was tested for BRCA gene mutation which was negative  She denied any pain  She has no respiratory symptom  Her performance status is normal  Her menstrual cycle stopped due to chemotherapy  However, in late August 2016, her menstrual cycles resumed             Interval History:  A 51 year old premenopausal woman who was initially diagnosed clinical stage II left breast cancer, grade 1, ER strongly positive, SD negative, HER-2 negative disease  She was seen by Dr Ibeth Cabrera, who treated her with neoadjuvant chemotherapy with [facility] x4, resulting in only minor response  She subsequently obtained a second opinion  She underwent left mastectomy with sentinel lymph node biopsy which showed stage IA left breast cancer, grade 2  I saw her, after the mastectomy  I recommended her to have adjuvant hormonal therapy with tamoxifen  She has been on tamoxifen since September 2016 with which she is doing well  She presents today for routine follow-up  She continued to do well with no complaint hot flashes or musculoskeletal symptoms  Since she had endometrial ablation, she has very minimal spotting  She denied any bone pain  She has no respiratory symptoms  She has mild constipation  She has some mole for which she is going to see plastic surgeon  She is going to see GI specialist for constipation  Her performance status is normal   She has no respiratory symptoms or swelling lower extremity                                                                                                             Objective:      Primary Diagnosis:     1  Left breast cancer, stage IA (pT1c, pN0,M0) grade 2, % positive, SD negative, HER-2 Fish negative disease  Diagnosed in April 2016   2   BRCA gene mutation negative       Cancer Staging:  No matching staging information was found for the patient         Previous Hematologic/ Oncologic Treatment:      Neoadjuvant chemotherapy with AC x4 completed in May 2016, which was given at [facility]       Current Hematologic/ Oncologic Treatment:       Adjuvant hormonal therapy with tamoxifen since September 2016       Disease Status:      SIMRAN status post mastectomy with sentinel lymph node biopsy       Test Results:     Pathology:     Left mastectomy specimen showed 1 6 x1 4 x 1 3 cm of invasive ductal carcinoma, grade 2  One sentinel lymph node was negative for metastatic disease  % positive, AK negative, HER-2 Fish negative disease  Stage IA(pT1c, pN0,M0)biopsy showed Ki-67 50-60%     Radiology:     CT scan of chest, abdomen pelvis showed no evidence of metastatic disease  Mammography in the right breast in April 2017 was benign  BI-RADS 2  Bone scan in August 2017 showed no evidence of osseous metastasis      Laboratory:           Physical Exam:        General Appearance:    Alert, oriented          Eyes:    PERRL   Ears:    Normal external ear canals, both ears   Nose:   Nares normal, septum midline   Throat:   Mucosa moist  Pharynx without injection  Neck:   Supple         Lungs:     Clear to auscultation bilaterally   Chest Wall:    No tenderness or deformity    Heart:    Regular rate and rhythm         Abdomen:     Soft, non-tender, bowel sounds +, no organomegaly               Extremities:   Extremities no cyanosis or edema         Skin:   no rash or icterus  Lymph nodes:   Cervical, supraclavicular, and axillary nodes normal   Neurologic:   CNII-XII intact, normal strength, sensation and reflexes     Throughout             Breast exam:   status post left mastectomy without reconstruction  No palpable abnormality in her left chest wall  Right breast exam is negative  ROS: Review of Systems   All other systems reviewed and are negative  Imaging: No results found        Labs:   Lab Results   Component Value Date WBC 6 14 11/01/2018    HGB 14 5 11/01/2018    HCT 44 5 11/01/2018    MCV 95 11/01/2018     11/01/2018     Lab Results   Component Value Date    K 3 8 11/01/2018     (H) 11/01/2018    CO2 21 11/01/2018    BUN 19 11/01/2018    CREATININE 0 68 11/01/2018    GLUF 88 02/22/2018    CALCIUM 8 5 11/01/2018    AST 41 11/01/2018    ALT 74 11/01/2018    ALKPHOS 60 11/01/2018    EGFR 106 11/01/2018         Lab Results   Component Value Date    IRON 91 08/18/2017    TIBC 354 08/18/2017    FERRITIN 28 02/22/2018       Lab Results   Component Value Date    [id number] 478 06/15/2017       Lab Results   Component Value Date    FOLATE 9 2 06/15/2017         Current Medications: Reviewed  Allergies: Reviewed  PMH/FH/SH:  Reviewed      Vital Sign:    Body surface area is 1 77 meters squared      Wt Readings from Last 3 Encounters:   05/08/19 77 6 kg (171 lb)   04/25/19 73 5 kg (162 lb)   11/01/18 73 5 kg (162 lb)        Temp Readings from Last 3 Encounters:   09/10/19 (!) 95 7 °F (35 4 °C) (Tympanic Core)   06/12/19 98 2 °F (36 8 °C)   05/08/19 98 5 °F (36 9 °C) (Oral)        BP Readings from Last 3 Encounters:   09/10/19 132/68   06/12/19 120/80   05/08/19 118/80         Pulse Readings from Last 3 Encounters:   09/10/19 85   06/12/19 (!) 112   05/08/19 81     @LASTSAO2(3)@

## 2019-09-30 ENCOUNTER — TRANSCRIBE ORDERS (OUTPATIENT)
Dept: ADMINISTRATIVE | Facility: HOSPITAL | Age: 49
End: 2019-09-30

## 2019-09-30 DIAGNOSIS — K56.609 INTESTINAL OBSTRUCTION, UNSPECIFIED CAUSE, UNSPECIFIED WHETHER PARTIAL OR COMPLETE (HCC): Primary | ICD-10-CM

## 2019-10-01 ENCOUNTER — HOSPITAL ENCOUNTER (OUTPATIENT)
Dept: CT IMAGING | Facility: HOSPITAL | Age: 49
Discharge: HOME | End: 2019-10-01
Payer: COMMERCIAL

## 2019-10-01 DIAGNOSIS — K56.609 INTESTINAL OBSTRUCTION, UNSPECIFIED CAUSE, UNSPECIFIED WHETHER PARTIAL OR COMPLETE (HCC): ICD-10-CM

## 2019-10-01 PROCEDURE — 74177 CT ABD & PELVIS W/CONTRAST: CPT

## 2019-10-01 RX ADMIN — IOHEXOL 50 ML: 240 INJECTION, SOLUTION INTRATHECAL; INTRAVASCULAR; INTRAVENOUS; ORAL at 16:13

## 2019-10-01 RX ADMIN — IOHEXOL 100 ML: 350 INJECTION, SOLUTION INTRAVENOUS at 16:13

## 2019-10-09 ENCOUNTER — TRANSCRIBE ORDERS (OUTPATIENT)
Dept: ADMINISTRATIVE | Facility: HOSPITAL | Age: 49
End: 2019-10-09

## 2019-10-09 ENCOUNTER — OFFICE VISIT (OUTPATIENT)
Dept: SURGICAL ONCOLOGY | Facility: CLINIC | Age: 49
End: 2019-10-09
Payer: COMMERCIAL

## 2019-10-09 VITALS
DIASTOLIC BLOOD PRESSURE: 80 MMHG | RESPIRATION RATE: 16 BRPM | BODY MASS INDEX: 31.15 KG/M2 | WEIGHT: 165 LBS | TEMPERATURE: 97.2 F | HEART RATE: 85 BPM | SYSTOLIC BLOOD PRESSURE: 120 MMHG | HEIGHT: 61 IN

## 2019-10-09 DIAGNOSIS — C50.412 MALIGNANT NEOPLASM OF UPPER-OUTER QUADRANT OF LEFT BREAST IN FEMALE, ESTROGEN RECEPTOR POSITIVE (HCC): Primary | ICD-10-CM

## 2019-10-09 DIAGNOSIS — Z12.31 SCREENING MAMMOGRAM, ENCOUNTER FOR: ICD-10-CM

## 2019-10-09 DIAGNOSIS — Z79.810 USE OF TAMOXIFEN (NOLVADEX): ICD-10-CM

## 2019-10-09 DIAGNOSIS — Z17.0 MALIGNANT NEOPLASM OF UPPER-OUTER QUADRANT OF LEFT BREAST IN FEMALE, ESTROGEN RECEPTOR POSITIVE (HCC): Primary | ICD-10-CM

## 2019-10-09 DIAGNOSIS — Z12.31 ENCOUNTER FOR SCREENING MAMMOGRAM FOR BREAST CANCER: Primary | ICD-10-CM

## 2019-10-09 PROCEDURE — 99214 OFFICE O/P EST MOD 30 MIN: CPT | Performed by: SURGERY

## 2019-10-09 NOTE — PROGRESS NOTES
Patient presents for a six-month follow-up visit  Surgical Oncology Follow Up       [address] [facility]  [facility] [place]  [address]  [place] [address]    Hailee Espinoza   1970  [id number]  [address]  [facility] [place]  [address] [facility] [phone]    Chief Complaint   Patient presents with    Breast Cancer     Pt is here for 6 month f/u           Assessment & Plan:   The patient presents for a six-month follow-up visit  She has had no significant complaints referable to her breast   She is having alternating constipation and diarrhea as had essentially negative CT scan she has follow with a gastroenterologist and is planned to undergo colonoscopy in the near future  Clinical examination shows no evidence of local regional or distant recurrence disease  She is due for a mammogram in April which will coordinate for her  She has been having a significant pill from her diagnostic studies we will drop this down to a screening test   We will see her back in 6 months      Cancer History:        Malignant neoplasm of upper-outer quadrant of left breast in female, estrogen receptor positive ([facility] [address] )     - 6/2016 Chemotherapy     Theron Adjuvant chemo at [facility]   Adriamycin and Cytoxan for four cycles  Patient declined further chemo and came to [facility] for second opinion      4/25/2016 Initial Diagnosis     Left lumpectomy  [facility]  Invasive ductal carcinoma  Grade 1  1 86 on utrasound    CT 0  Her 2 2+  Fish negative  Stage IIA per [facility]      6/21/2016 Genetic Testing     BRCA negative  Myriad-Done through [facility]      8/10/2016 Surgery     Left mastectomy with sentinel lymph node biopsy  Invasive ductal carcinoma  Grade 2  1 6 cm  0/1 lymph node  Stage 1A    Co surgery with Dr Madi Cisneros  Failed STEVE flap      10/2016 -  Hormone Therapy     Tamoxifen 20 mg daily  Dr Carissa Gilliam 12/22/2017 Surgery     Revision of left breast scar/local flap  Dr Yanet Holbrook           Interval History:   See Assessment & Plan    Review of Systems:   Review of Systems   Gastrointestinal: Positive for constipation and diarrhea  All other systems reviewed and are negative  Past Medical History     Patient Active Problem List   Diagnosis    Symptomatic anemia    Malignant neoplasm of upper-outer quadrant of left breast in female, estrogen receptor positive ([address] )    Depression    Anxiety    Abnormal uterine bleeding (AUB)    Cyst of left ovary    Use of tamoxifen (Nolvadex)    Endometrial hyperplasia     Past Medical History:   Diagnosis Date    Anxiety     BRCA negative 06/2016    Myriad    Headache     History of chemotherapy 2016    [facility] [address] [facility] History of transfusion 06/2017    no adverse reaction    Malignant neoplasm of upper-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 [facility])     s/p chemotherapy     Past Surgical History:   Procedure Laterality Date    ABDOMINAL WALL SURGERY Left 9/21/2016    Procedure: DEBRIDEMENT OF LEFT ABDOMINAL TISSUE AND CLOSURE; DEBRIDEMENT OF LEFT BREAST FLAP; SUBMUSCULAR TISSUE EXPANDER PLACEMENT WITH ADM (ACELLULAR DERMAL MATRIX);   Surgeon: Edwin Esposito MD;  [facility];  Service:     BREAST BIOPSY  04/2016    with sentinel node biospy    BREAST RECONSTRUCTION Left 12/22/2017    Procedure: REVISION OF LEFT BREAST SCAR, LOCAL FLAP;  Surgeon: Jhoan Mayer MD;  [facility];  Service: Plastics    [address] OF UTERUS      LAPAROSCOPIC CHOLECYSTECTOMY  2000    LAPAROSCOPIC GASTRIC BANDING  2008    removed 2013    LAPAROSCOPIC GASTRIC BANDING  2013    removal    LIPOSUCTION W/ FAT INJECTION Left 6/8/2017    Procedure: BREAST FAT GRAFTING ;  Surgeon: Edwin Esposito MD;  [facility];  Service:     MASTECTOMY Left 2016    MEDIPORT INSERTION, SINGLE  2016    CA BIOPSY/EXCISION, LYMPH NODE(S) Left 8/10/2016    Procedure: LYMPHOSCINTIGRAPHY; SENTINAL LYMPH NODE BIOPSY (INJECT AT 1100);   Surgeon: Charles Giang MD;  [facility];  Service: Surgical Oncology    VT BREAST [address] Left 9/19/2016    Procedure: BREAST DELAYED RECONSTRUCTION; DEIP FREE FLAP removal of port-a -cath;  Surgeon: Tino Sy MD;  [facility];  Service: Plastics    VT DELAY BREAST PROS AFTER BREAST SURG Left 12/19/2016    Procedure: BREAST TISSUE EXPANDER REMOVAL; BREAST IMPLANT PLACEMENT;  Surgeon: Tino Sy MD;  [facility];  Service: Plastics    VT HYSTEROSCOPY,W/ENDO [address] N/A 5/21/2018    Procedure: DILATATION AND CURETTAGE (D&C), HYSTEROSCOPY;  Surgeon: Iqra Salazar MD;  [facility];  Service: Gynecology Oncology    VT MASTECTOMY, SIMPLE, COMPLETE Left 8/10/2016    Procedure: BREAST MASTECTOMY; FROZEN SECTION ;  Surgeon: Charles Giang MD;  [facility];  Service: Surgical Oncology    VT REDUCTION OF LARGE BREAST Right 12/19/2016    Procedure: BREAST REDUCTION/MASTOPEXY ;  Surgeon: Tino Sy MD;  [facility];  Service: Plastics    VT REMOVE TISSUE EXPANDER(S) Left 1/23/2017    Procedure: BREAST IMPLANT REMOVAL; WASHOUT AND DEBRIDEMENT;  Surgeon: Tino Sy MD;  [facility];  Service: Plastics    REVISION OF SCAR ON TORSO N/A 12/19/2016    Procedure: ABDOMINAL SCAR REVISION ;  Surgeon: Tino Sy MD;  [facility];  Service:    Chris Flower WISDOM TOOTH EXTRACTION       Family History   Problem Relation Age of Onset    Diabetes Mother     Heart disease Mother     Kidney disease Mother     Other Father      Social History     Socioeconomic History    Marital status: /Civil Union     Spouse name: Not on file    Number of children: Not on file    Years of education: Not on file    Highest education level: Not on file   Occupational History    Not on file   Social Needs    Financial resource strain: Not on file    Food insecurity:     Worry: Not on file Inability: Not on file    Transportation needs:     Medical: Not on file     Non-medical: Not on file   Tobacco Use    Smoking status: Former Smoker     Packs/day: 1 00     Years: 15 00     Pack years: 15 00     Last attempt to quit:      Years since quittin 7    Smokeless tobacco: Never Used   Substance and Sexual Activity    Alcohol use: No    Drug use: No    Sexual activity: Not on file   Lifestyle    Physical activity:     Days per week: Not on file     Minutes per session: Not on file    Stress: Not on file   Relationships    Social connections:     Talks on phone: Not on file     Gets together: Not on file     Attends Shinto service: Not on file     Active member of club or organization: Not on file     Attends meetings of clubs or organizations: Not on file     Relationship status: Not on file    Intimate partner violence:     Fear of current or ex partner: Not on file     Emotionally abused: Not on file     Physically abused: Not on file     Forced sexual activity: Not on file   Other Topics Concern    Not on file   Social History Narrative    Not on file       Current Outpatient Medications:     BuPROPion HCl (WELLBUTRIN XL PO), Take 150 mg by mouth 2 (two) times a day  , Disp: , Rfl:     linaCLOtide (LINZESS PO), Take by mouth, Disp: , Rfl:     tamoxifen (NOLVADEX) 20 mg tablet, Take 1 tablet (20 mg total) by mouth daily, Disp: 90 tablet, Rfl: 3    topiramate (TOPAMAX) 50 MG tablet, Take 50 mg by mouth every 12 (twelve) hours , Disp: , Rfl:   Allergies   Allergen Reactions    Ambien [Zolpidem] Hallucinations    Effexor [Venlafaxine] GI Intolerance    Bactrim [Sulfamethoxazole-Trimethoprim] Itching    Morphine Other (See Comments)     Itching, crying, short of breath       Physical Exam:   There were no vitals filed for this visit  Physical Exam   Constitutional: She is oriented to person, place, and time  She appears well-developed and well-nourished     HENT:   Head: Normocephalic and atraumatic  Mouth/Throat: Oropharynx is clear and moist    Eyes: Pupils are equal, round, and reactive to light  EOM are normal    Neck: Normal range of motion  Neck supple  No JVD present  No tracheal deviation present  No thyromegaly present  Cardiovascular: Normal rate, regular rhythm, normal heart sounds and intact distal pulses  Exam reveals no gallop and no friction rub  No murmur heard  Pulmonary/Chest: Effort normal and breath sounds normal  No respiratory distress  She has no wheezes  She has no rales  Right breast exhibits no inverted nipple, no mass, no nipple discharge, no skin change and no tenderness  Left breast exhibits no inverted nipple, no mass, no nipple discharge, no skin change and no tenderness  Breasts are symmetrical    Examination of the right reduced breast demonstrates no worrisome skin findings dominant masses or axillary adenopathy  Examination of the left mastectomy site with reconstruction shows no worrisome skin findings dominant masses axillary adenopathy  Abdominal: Soft  She exhibits no distension and no mass  There is no hepatomegaly  There is no tenderness  There is no rebound and no guarding  Musculoskeletal: Normal range of motion  She exhibits no edema or tenderness  Lymphadenopathy:     She has no cervical adenopathy  Neurological: She is alert and oriented to person, place, and time  No cranial nerve deficit  Skin: Skin is warm and dry  No rash noted  No erythema  Psychiatric: She has a normal mood and affect  Her behavior is normal    Vitals reviewed  Results & Discussion:   The patient is free of any clinical or radiographic evidence of local regional or distant recurrence disease  We will see her back in 6 months  She remains on her tamoxifen          Advance Care Planning/Advance Directives:  I discussed the disease status, treatment plans and follow-up with the patient

## 2019-10-10 ENCOUNTER — TELEPHONE (OUTPATIENT)
Dept: SURGICAL ONCOLOGY | Facility: CLINIC | Age: 49
End: 2019-10-10

## 2019-10-10 ENCOUNTER — TELEPHONE (OUTPATIENT)
Dept: HEMATOLOGY ONCOLOGY | Facility: CLINIC | Age: 49
End: 2019-10-10

## 2019-10-10 NOTE — TELEPHONE ENCOUNTER
Spoke with patient about her concerns  She has had many complications since she was diagnosed with breast cancer, which are not common  She also states that she never had the flu shot and had switched to a new employer ([employer]) who requieres the flu shot  If she does not get it, she will not have her job there anymore  Her dad apparently never had the flu shot, then had the flu shot for the first time, and  from the flu immediatly after  She does not want to introduce the flu shot into her body  I offered to speak with her employer/HR if needed  Emailed this information to the patient

## 2019-10-10 NOTE — TELEPHONE ENCOUNTER
Patient called in stating that she needs documentation for work because she does not want to get the flu shot and would like a letter from Dr Low Rod  Please contact her A S A P

## 2019-10-10 NOTE — TELEPHONE ENCOUNTER
Spoke with dr leon he stated from his end there is no reason for patient to NOT have the shot  Therefore dr leon will NOT be writing any kind of note  Stated that patient should call her PCP regarding this matter    Called and left voice mail on 10/10

## 2020-03-30 ENCOUNTER — TELEPHONE (OUTPATIENT)
Dept: SURGICAL ONCOLOGY | Facility: CLINIC | Age: 50
End: 2020-03-30

## 2020-03-30 NOTE — TELEPHONE ENCOUNTER
Left a message for patient to call back  She has new insurance and had questions in regards to her upcoming appointments

## 2020-04-08 ENCOUNTER — TELEPHONE (OUTPATIENT)
Dept: SURGICAL ONCOLOGY | Facility: CLINIC | Age: 50
End: 2020-04-08

## 2020-04-08 DIAGNOSIS — C50.412 MALIGNANT NEOPLASM OF UPPER-OUTER QUADRANT OF LEFT BREAST IN FEMALE, ESTROGEN RECEPTOR POSITIVE (HCC): Primary | ICD-10-CM

## 2020-04-08 DIAGNOSIS — Z17.0 MALIGNANT NEOPLASM OF UPPER-OUTER QUADRANT OF LEFT BREAST IN FEMALE, ESTROGEN RECEPTOR POSITIVE (HCC): Primary | ICD-10-CM

## 2020-04-09 ENCOUNTER — DOCUMENTATION (OUTPATIENT)
Dept: HEMATOLOGY ONCOLOGY | Facility: CLINIC | Age: 50
End: 2020-04-09

## 2020-04-10 ENCOUNTER — DOCUMENTATION (OUTPATIENT)
Dept: HEMATOLOGY ONCOLOGY | Facility: CLINIC | Age: 50
End: 2020-04-10

## 2020-04-29 ENCOUNTER — HOSPITAL ENCOUNTER (OUTPATIENT)
Dept: MAMMOGRAPHY | Facility: HOSPITAL | Age: 50
Discharge: HOME | End: 2020-04-29
Attending: SURGERY

## 2020-05-01 DIAGNOSIS — Z17.0 MALIGNANT NEOPLASM OF UPPER-OUTER QUADRANT OF LEFT BREAST IN FEMALE, ESTROGEN RECEPTOR POSITIVE (HCC): ICD-10-CM

## 2020-05-01 DIAGNOSIS — C50.412 MALIGNANT NEOPLASM OF UPPER-OUTER QUADRANT OF LEFT BREAST IN FEMALE, ESTROGEN RECEPTOR POSITIVE (HCC): ICD-10-CM

## 2020-06-11 ENCOUNTER — OFFICE VISIT (OUTPATIENT)
Dept: SURGICAL ONCOLOGY | Facility: CLINIC | Age: 50
End: 2020-06-11
Payer: COMMERCIAL

## 2020-06-11 VITALS
RESPIRATION RATE: 18 BRPM | DIASTOLIC BLOOD PRESSURE: 98 MMHG | BODY MASS INDEX: 31.34 KG/M2 | HEART RATE: 68 BPM | WEIGHT: 166 LBS | HEIGHT: 61 IN | SYSTOLIC BLOOD PRESSURE: 142 MMHG | TEMPERATURE: 98 F

## 2020-06-11 DIAGNOSIS — C50.412 MALIGNANT NEOPLASM OF UPPER-OUTER QUADRANT OF LEFT BREAST IN FEMALE, ESTROGEN RECEPTOR POSITIVE (HCC): Primary | ICD-10-CM

## 2020-06-11 DIAGNOSIS — Z79.810 USE OF TAMOXIFEN (NOLVADEX): ICD-10-CM

## 2020-06-11 DIAGNOSIS — Z17.0 MALIGNANT NEOPLASM OF UPPER-OUTER QUADRANT OF LEFT BREAST IN FEMALE, ESTROGEN RECEPTOR POSITIVE (HCC): Primary | ICD-10-CM

## 2020-06-11 PROCEDURE — 99214 OFFICE O/P EST MOD 30 MIN: CPT | Performed by: SURGERY

## 2020-07-30 DIAGNOSIS — Z87.42 HISTORY OF ABNORMAL UTERINE BLEEDING: Primary | ICD-10-CM

## 2020-07-30 NOTE — PROGRESS NOTES
Patient wanted to schedule Annual with our practice   Patient requested a pelvic U/s due to uterine thickening in the past  Stated she always gets them before her annual  Order was placed per patient request

## 2020-08-24 ENCOUNTER — TELEPHONE (OUTPATIENT)
Dept: HEMATOLOGY ONCOLOGY | Facility: CLINIC | Age: 50
End: 2020-08-24

## 2020-08-24 NOTE — TELEPHONE ENCOUNTER
Patient having a change to her insurance  She may not be able to keep her follow up appointment scheduled for 9/15/20  Patient wondering if it would be ok for her to wait until January to see him? Will he refill her Tamoxifen until January when her insurance is up to date? Patient also has questions about birth control - she does not have a gyn for the moment - needed to find a new one and could not get in until October        I will forward this to RN covering Dr Eddie Whitlock

## 2020-08-24 NOTE — TELEPHONE ENCOUNTER
Do you know when she will have this change in her insurance  She is a yearly follow up that we may be able to see a little bit sooner since she was last seen in Sep last year   I would rather do that than to push off until Rahul

## 2020-08-25 ENCOUNTER — TELEPHONE (OUTPATIENT)
Dept: HEMATOLOGY ONCOLOGY | Facility: CLINIC | Age: 50
End: 2020-08-25

## 2020-08-25 NOTE — TELEPHONE ENCOUNTER
Patient had to change insurance due to job and Dr Pancho Onofre is no longer a participating MD  Insurance changed March 1st  Will update Dr Virginia Llamas covering RN  Patient will change in insurance in January to one he participates in

## 2020-09-02 ENCOUNTER — TELEPHONE (OUTPATIENT)
Dept: HEMATOLOGY ONCOLOGY | Facility: CLINIC | Age: 50
End: 2020-09-02

## 2020-09-02 ENCOUNTER — DOCUMENTATION (OUTPATIENT)
Dept: HEMATOLOGY ONCOLOGY | Facility: CLINIC | Age: 50
End: 2020-09-02

## 2020-09-02 NOTE — TELEPHONE ENCOUNTER
Patient called asking if we accept her Insurance plan  Patient states she called Insurance and they stated Homero Carmona was not covered  Please call patient to confirm if we are in network or not  Patient can be reached at [phone]   Oncology [facility] emailed

## 2020-09-02 NOTE — PROGRESS NOTES
recvd email from Marianne that pt has a horizon bc/bs [employer] & wanting to [place] if [place] come [place]  called the ins & spoke to alpesh call ref# [id number]  Pt has an acti ve plan effective 03/01/20  She sd that [facility] is a tier 1 & we have a direct contract  w/then so p can go there  [facility] are tier 2 facilities & since [place] the [place] continuous county benefit which allows her to go to those facilities    the office visit benefit is deduct $1500 met -0-  co-ins 80/20 out of pocket is $4500 met $750    called the pt to go over this info got her voicemail I left her a message to call me back  Lloyd Mccarthy  emailed Marianne

## 2020-09-15 ENCOUNTER — TELEPHONE (OUTPATIENT)
Dept: HEMATOLOGY ONCOLOGY | Facility: CLINIC | Age: 50
End: 2020-09-15

## 2020-09-15 ENCOUNTER — OFFICE VISIT (OUTPATIENT)
Dept: HEMATOLOGY ONCOLOGY | Facility: CLINIC | Age: 50
End: 2020-09-15
Payer: COMMERCIAL

## 2020-09-15 VITALS
RESPIRATION RATE: 18 BRPM | DIASTOLIC BLOOD PRESSURE: 82 MMHG | TEMPERATURE: 98.4 F | SYSTOLIC BLOOD PRESSURE: 122 MMHG | BODY MASS INDEX: 32.42 KG/M2 | HEIGHT: 60 IN | HEART RATE: 70 BPM | OXYGEN SATURATION: 99 %

## 2020-09-15 DIAGNOSIS — Z17.0 MALIGNANT NEOPLASM OF UPPER-OUTER QUADRANT OF LEFT BREAST IN FEMALE, ESTROGEN RECEPTOR POSITIVE (HCC): ICD-10-CM

## 2020-09-15 DIAGNOSIS — C50.412 MALIGNANT NEOPLASM OF UPPER-OUTER QUADRANT OF LEFT BREAST IN FEMALE, ESTROGEN RECEPTOR POSITIVE (HCC): ICD-10-CM

## 2020-09-15 PROCEDURE — 99214 OFFICE O/P EST MOD 30 MIN: CPT | Performed by: INTERNAL MEDICINE

## 2020-09-15 RX ORDER — TAMOXIFEN CITRATE 20 MG/1
20 TABLET ORAL DAILY
Qty: 90 TABLET | Refills: 3 | Status: SHIPPED | OUTPATIENT
Start: 2020-09-15 | End: 2021-12-13 | Stop reason: ALTCHOICE

## 2020-09-15 RX ORDER — LORAZEPAM 0.5 MG/1
0.5 TABLET ORAL 2 TIMES DAILY PRN
COMMUNITY
Start: 2020-07-24 | End: 2020-10-21 | Stop reason: ALTCHOICE

## 2020-09-15 RX ORDER — CYCLOBENZAPRINE HCL 10 MG
10 TABLET ORAL 3 TIMES DAILY
COMMUNITY
Start: 2020-07-01 | End: 2020-10-21 | Stop reason: ALTCHOICE

## 2020-09-15 NOTE — PROGRESS NOTES
Hematology / Oncology Outpatient Follow Up Note    Jose E Palumbo 54 y o  female :1970 [id number]         Date:  9/15/2020    Assessment / Plan:  A 57 year old premenopausal woman with clinical T2 N0 left breast cancer, grade 1, ER positive, OH negative, HER-2 Fish negative disease  She was treated by Dr Mejia Petersen at the [facility] with neoadjuvant chemotherapy with dose dense AC x4  She only had minor clinical response  Subsequently, she switch her care at [address] where she underwent left mastectomy with sentinel lymph node biopsy, resulting in SIMRAN  She had a 1 6 cm of invasive ductal carcinoma, grade 2  Currently, she is on adjuvant hormonal therapy with tamoxifen with no side effects  Based on her symptomatology and physical examinations, she has no evidence recurrent disease  I recommended her to continue with tamoxifen 20 mg daily  She is in agreement with my recommendation  I will see her again in a year for routine follow-up                                     Subjective:      HPI:   A 46 year old premenopausal woman who underwent first screening mammography which was abnormal in her left breast  First FNA was inconclusive  Core biopsy showed invasive ductal carcinoma at the [facility]  MRI showed T2 lesion, with no lymph node abnormalities  This was grade 1, % positive, OH negative, HER-2 Fish negative disease  However, Ki-67 was 50-60%  She was seen by Dr Mejia Petersen, who recommended neoadjuvant chemotherapy  She received dose dense AC ?4 with very minor response  She has some toxicity with alopecia, nausea, vomiting, as well as significant fatigue  She gained 30 pounds during the chemotherapy  Subsequently, she was seen by Dr Candelaria Alvarado who I have been discussing her case  She subsequently underwent left mastectomy with sentinel lymph node biopsy in 2016 which showed a 1 6?1 4?1 3 cm of invasive ductal carcinoma, grade 2   One sentinel lymph node was negative for metastatic disease  She is going to have separate tram flap reconstruction next week  She presents today to discuss further systemic treatment  She has no other significant past medical history  She was tested for BRCA gene mutation which was negative  She denied any pain  She has no respiratory symptom  Her performance status is normal  Her menstrual cycle stopped due to chemotherapy  However, in late August 2016, her menstrual cycles resumed             Interval History:  A 57 year old premenopausal woman who was initially diagnosed clinical stage II left breast cancer, grade 1, ER strongly positive, IA negative, HER-2 negative disease  She was seen by Dr Andreina Kwan, who treated her with neoadjuvant chemotherapy with [facility] x4, resulting in only minor response  She subsequently obtained a second opinion  She underwent left mastectomy with sentinel lymph node biopsy which showed stage IA left breast cancer, grade 2  I saw her, after the mastectomy  I recommended her to have adjuvant hormonal therapy with tamoxifen  She has been on tamoxifen since September 2016 with which she is doing well  She presents today for routine follow-up  She continued to have somewhat irregular menstrual cycle  She has no complaint hot flashes  She denied respiratory symptoms  She has no complaint of pain  Her weight is stable  Her performance status is normal                                                       Objective:      Primary Diagnosis:     1  Left breast cancer, stage IA (pT1c, pN0,M0) grade 2, % positive, IA negative, HER-2 Fish negative disease  Diagnosed in April 2016   2   BRCA gene mutation negative       Cancer Staging:  No matching staging information was found for the patient         Previous Hematologic/ Oncologic Treatment:      Neoadjuvant chemotherapy with AC x4 completed in May 2016, which was given at [facility]       Current Hematologic/ Oncologic Treatment:       Adjuvant hormonal therapy with tamoxifen since September 2016       Disease Status:      SIMRAN status post mastectomy with sentinel lymph node biopsy       Test Results:     Pathology:     Left mastectomy specimen showed 1 6 x1 4 x 1 3 cm of invasive ductal carcinoma, grade 2  One sentinel lymph node was negative for metastatic disease  % positive, SD negative, HER-2 Fish negative disease  Stage IA(pT1c, pN0,M0)biopsy showed Ki-67 50-60%     Radiology:     CT scan of chest, abdomen pelvis showed no evidence of metastatic disease  Mammography in the right breast in April 2020 was benign  BI-RADS 2  Bone scan in August 2017 showed no evidence of osseous metastasis      Laboratory:           Physical Exam:        General Appearance:    Alert, oriented          Eyes:    PERRL   Ears:    Normal external ear canals, both ears   Nose:   Nares normal, septum midline   Throat:   Mucosa moist  Pharynx without injection  Neck:   Supple         Lungs:     Clear to auscultation bilaterally   Chest Wall:    No tenderness or deformity    Heart:    Regular rate and rhythm         Abdomen:     Soft, non-tender, bowel sounds +, no organomegaly               Extremities:   Extremities no cyanosis or edema         Skin:   no rash or icterus  Lymph nodes:   Cervical, supraclavicular, and axillary nodes normal   Neurologic:   CNII-XII intact, normal strength, sensation and reflexes     Throughout             Breast exam:   status post left mastectomy without reconstruction  No palpable abnormality in her left chest wall  Right breast exam is negative  ROS: Review of Systems   All other systems reviewed and are negative  Imaging: No results found        Labs:   Lab Results   Component Value Date    WBC 6 14 11/01/2018    HGB 14 5 11/01/2018    HCT 44 5 11/01/2018    MCV 95 11/01/2018     11/01/2018     Lab Results   Component Value Date    K 3 8 11/01/2018     (H) 11/01/2018    CO2 21 11/01/2018    BUN 19 11/01/2018    CREATININE 0 68 11/01/2018    GLUF 88 02/22/2018    CALCIUM 8 5 11/01/2018    AST 41 11/01/2018    ALT 74 11/01/2018    ALKPHOS 60 11/01/2018    EGFR 106 11/01/2018         Lab Results   Component Value Date    IRON 91 08/18/2017    TIBC 354 08/18/2017    FERRITIN 28 02/22/2018       Lab Results   Component Value Date    RXCIARVR93 794 06/15/2017       Lab Results   Component Value Date    FOLATE 9 2 06/15/2017         Current Medications: Reviewed  Allergies: Reviewed  PMH/FH/SH:  Reviewed      Vital Sign:    Body surface area is 1 72 meters squared      Wt Readings from Last 3 Encounters:   06/11/20 75 3 kg (166 lb)   10/09/19 74 8 kg (165 lb)   05/08/19 77 6 kg (171 lb)        Temp Readings from Last 3 Encounters:   09/15/20 98 4 °F (36 9 °C) (Tympanic)   06/11/20 98 °F (36 7 °C)   10/09/19 (!) 97 2 °F (36 2 °C) (Temporal)        BP Readings from Last 3 Encounters:   09/15/20 122/82   06/11/20 142/98   10/09/19 120/80         Pulse Readings from Last 3 Encounters:   09/15/20 70   06/11/20 68   10/09/19 85     @LASTSAO2(3)@

## 2020-10-21 ENCOUNTER — OFFICE VISIT (OUTPATIENT)
Dept: OBGYN CLINIC | Facility: CLINIC | Age: 50
End: 2020-10-21
Payer: COMMERCIAL

## 2020-10-21 VITALS — DIASTOLIC BLOOD PRESSURE: 70 MMHG | TEMPERATURE: 97.8 F | SYSTOLIC BLOOD PRESSURE: 100 MMHG

## 2020-10-21 DIAGNOSIS — Z01.419 WOMEN'S ANNUAL ROUTINE GYNECOLOGICAL EXAMINATION: Primary | ICD-10-CM

## 2020-10-21 DIAGNOSIS — Z12.4 PAP SMEAR FOR CERVICAL CANCER SCREENING: ICD-10-CM

## 2020-10-21 PROBLEM — N83.202 CYST OF LEFT OVARY: Status: RESOLVED | Noted: 2018-03-27 | Resolved: 2020-10-21

## 2020-10-21 PROBLEM — N85.00 ENDOMETRIAL HYPERPLASIA: Status: RESOLVED | Noted: 2018-10-11 | Resolved: 2020-10-21

## 2020-10-21 PROBLEM — N93.9 ABNORMAL UTERINE BLEEDING (AUB): Status: RESOLVED | Noted: 2018-03-27 | Resolved: 2020-10-21

## 2020-10-21 PROCEDURE — 99386 PREV VISIT NEW AGE 40-64: CPT | Performed by: OBSTETRICS & GYNECOLOGY

## 2020-10-21 RX ORDER — BUPROPION HYDROCHLORIDE 300 MG/1
300 TABLET ORAL DAILY
COMMUNITY
Start: 2020-09-20 | End: 2021-09-16 | Stop reason: DRUGHIGH

## 2020-10-26 ENCOUNTER — TELEPHONE (OUTPATIENT)
Dept: OBGYN CLINIC | Facility: CLINIC | Age: 50
End: 2020-10-26

## 2020-10-27 LAB
CYTOLOGIST CVX/VAG CYTO: NORMAL
DX ICD CODE: NORMAL
HPV I/H RISK 1 DNA CVX QL PROBE+SIG AMP: NEGATIVE
HPV LOW RISK DNA CVX QL PROBE+SIG AMP: NEGATIVE
OTHER STN SPEC: NORMAL
PATH REPORT.FINAL DX SPEC: NORMAL
SL AMB NOTE:: NORMAL
SL AMB SPECIMEN ADEQUACY: NORMAL
SL AMB TEST METHODOLOGY: NORMAL

## 2020-10-30 ENCOUNTER — TELEPHONE (OUTPATIENT)
Dept: OBGYN CLINIC | Facility: CLINIC | Age: 50
End: 2020-10-30

## 2020-11-09 ENCOUNTER — TELEPHONE (OUTPATIENT)
Dept: BARIATRICS | Facility: CLINIC | Age: 50
End: 2020-11-09

## 2020-11-10 ENCOUNTER — OFFICE VISIT (OUTPATIENT)
Dept: BARIATRICS | Facility: CLINIC | Age: 50
End: 2020-11-10
Payer: COMMERCIAL

## 2020-11-10 VITALS
SYSTOLIC BLOOD PRESSURE: 108 MMHG | WEIGHT: 178 LBS | BODY MASS INDEX: 34.95 KG/M2 | TEMPERATURE: 98.3 F | DIASTOLIC BLOOD PRESSURE: 68 MMHG | HEART RATE: 75 BPM | HEIGHT: 60 IN

## 2020-11-10 DIAGNOSIS — F32.A DEPRESSION: ICD-10-CM

## 2020-11-10 DIAGNOSIS — C50.412 MALIGNANT NEOPLASM OF UPPER-OUTER QUADRANT OF LEFT BREAST IN FEMALE, ESTROGEN RECEPTOR POSITIVE (HCC): ICD-10-CM

## 2020-11-10 DIAGNOSIS — E66.9 CLASS 1 OBESITY: ICD-10-CM

## 2020-11-10 DIAGNOSIS — Z17.0 MALIGNANT NEOPLASM OF UPPER-OUTER QUADRANT OF LEFT BREAST IN FEMALE, ESTROGEN RECEPTOR POSITIVE (HCC): ICD-10-CM

## 2020-11-10 DIAGNOSIS — R63.5 ABNORMAL WEIGHT GAIN: Primary | ICD-10-CM

## 2020-11-10 PROCEDURE — 99204 OFFICE O/P NEW MOD 45 MIN: CPT | Performed by: PHYSICIAN ASSISTANT

## 2020-11-13 LAB
ALBUMIN SERPL-MCNC: 4.2 G/DL (ref 3.8–4.8)
ALBUMIN/GLOB SERPL: 1.7 {RATIO} (ref 1.2–2.2)
ALP SERPL-CCNC: 48 IU/L (ref 39–117)
ALT SERPL-CCNC: 14 IU/L (ref 0–32)
AST SERPL-CCNC: 16 IU/L (ref 0–40)
BILIRUB SERPL-MCNC: 0.3 MG/DL (ref 0–1.2)
BUN SERPL-MCNC: 23 MG/DL (ref 6–24)
BUN/CREAT SERPL: 34 (ref 9–23)
CALCIUM SERPL-MCNC: 9.6 MG/DL (ref 8.7–10.2)
CHLORIDE SERPL-SCNC: 105 MMOL/L (ref 96–106)
CHOLEST SERPL-MCNC: 183 MG/DL (ref 100–199)
CHOLEST/HDLC SERPL: 2.2 RATIO (ref 0–4.4)
CO2 SERPL-SCNC: 24 MMOL/L (ref 20–29)
CREAT SERPL-MCNC: 0.68 MG/DL (ref 0.57–1)
GLOBULIN SER-MCNC: 2.5 G/DL (ref 1.5–4.5)
GLUCOSE SERPL-MCNC: 89 MG/DL (ref 65–99)
HDLC SERPL-MCNC: 84 MG/DL
LDLC SERPL CALC-MCNC: 89 MG/DL (ref 0–99)
POTASSIUM SERPL-SCNC: 4.7 MMOL/L (ref 3.5–5.2)
PROT SERPL-MCNC: 6.7 G/DL (ref 6–8.5)
SL AMB EGFR AFRICAN AMERICAN: 120 ML/MIN/1.73
SL AMB EGFR NON AFRICAN AMERICAN: 105 ML/MIN/1.73
SL AMB VLDL CHOLESTEROL CALC: 10 MG/DL (ref 5–40)
SODIUM SERPL-SCNC: 141 MMOL/L (ref 134–144)
TRIGL SERPL-MCNC: 49 MG/DL (ref 0–149)
TSH SERPL DL<=0.005 MIU/L-ACNC: 1.96 UIU/ML (ref 0.45–4.5)

## 2020-11-17 ENCOUNTER — TELEPHONE (OUTPATIENT)
Dept: BARIATRICS | Facility: CLINIC | Age: 50
End: 2020-11-17

## 2020-11-18 ENCOUNTER — OFFICE VISIT (OUTPATIENT)
Dept: BARIATRICS | Facility: CLINIC | Age: 50
End: 2020-11-18

## 2020-11-18 ENCOUNTER — CLINICAL SUPPORT (OUTPATIENT)
Dept: BARIATRICS | Facility: CLINIC | Age: 50
End: 2020-11-18
Payer: COMMERCIAL

## 2020-11-18 VITALS — BODY MASS INDEX: 36.16 KG/M2 | TEMPERATURE: 96.6 F | WEIGHT: 184.2 LBS | HEIGHT: 60 IN

## 2020-11-18 DIAGNOSIS — Z79.899 MEDICATION MANAGEMENT: Primary | ICD-10-CM

## 2020-11-18 DIAGNOSIS — R63.5 ABNORMAL WEIGHT GAIN: Primary | ICD-10-CM

## 2020-11-18 LAB — SL AMB POCT URINE HCG: NEGATIVE

## 2020-11-18 PROCEDURE — 81025 URINE PREGNANCY TEST: CPT

## 2020-11-18 PROCEDURE — VLCD

## 2020-11-18 PROCEDURE — RECHECK

## 2020-11-20 ENCOUNTER — PATIENT OUTREACH (OUTPATIENT)
Dept: BARIATRICS | Facility: CLINIC | Age: 50
End: 2020-11-20

## 2020-12-01 ENCOUNTER — TELEPHONE (OUTPATIENT)
Dept: BARIATRICS | Facility: CLINIC | Age: 50
End: 2020-12-01

## 2020-12-02 ENCOUNTER — OFFICE VISIT (OUTPATIENT)
Dept: BARIATRICS | Facility: CLINIC | Age: 50
End: 2020-12-02

## 2020-12-02 VITALS — HEIGHT: 60 IN | TEMPERATURE: 95.6 F | BODY MASS INDEX: 34.77 KG/M2 | WEIGHT: 177.1 LBS

## 2020-12-02 DIAGNOSIS — R63.5 ABNORMAL WEIGHT GAIN: Primary | ICD-10-CM

## 2020-12-02 PROCEDURE — VLCD

## 2020-12-02 PROCEDURE — RECHECK

## 2020-12-15 ENCOUNTER — TELEPHONE (OUTPATIENT)
Dept: BARIATRICS | Facility: CLINIC | Age: 50
End: 2020-12-15

## 2020-12-29 ENCOUNTER — TELEPHONE (OUTPATIENT)
Dept: BARIATRICS | Facility: CLINIC | Age: 50
End: 2020-12-29

## 2021-01-04 ENCOUNTER — TELEPHONE (OUTPATIENT)
Dept: GYNECOLOGIC ONCOLOGY | Facility: CLINIC | Age: 51
End: 2021-01-04

## 2021-01-04 NOTE — TELEPHONE ENCOUNTER
Patient cannot make the appointment on wednesday with Dr Maximilian Roger  She said she left a message already for someone but I do not see it documented  She would like to reschedule her appointment sooner rather then later   She can be reached at [phone]

## 2021-01-13 ENCOUNTER — SOCIAL WORK (OUTPATIENT)
Dept: BEHAVIORAL/MENTAL HEALTH CLINIC | Facility: CLINIC | Age: 51
End: 2021-01-13
Payer: COMMERCIAL

## 2021-01-13 DIAGNOSIS — F41.9 ANXIETY: Primary | ICD-10-CM

## 2021-01-13 PROCEDURE — 90834 PSYTX W PT 45 MINUTES: CPT | Performed by: SOCIAL WORKER
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
## 2021-01-20 ENCOUNTER — SOCIAL WORK (OUTPATIENT)
Dept: BEHAVIORAL/MENTAL HEALTH CLINIC | Facility: CLINIC | Age: 51
End: 2021-01-20
Payer: COMMERCIAL

## 2021-01-20 ENCOUNTER — OFFICE VISIT (OUTPATIENT)
Dept: SURGICAL ONCOLOGY | Facility: CLINIC | Age: 51
End: 2021-01-20
Payer: COMMERCIAL

## 2021-01-20 VITALS
HEIGHT: 60 IN | SYSTOLIC BLOOD PRESSURE: 118 MMHG | BODY MASS INDEX: 34.59 KG/M2 | RESPIRATION RATE: 16 BRPM | DIASTOLIC BLOOD PRESSURE: 78 MMHG | TEMPERATURE: 98 F | HEART RATE: 70 BPM

## 2021-01-20 DIAGNOSIS — Z17.0 MALIGNANT NEOPLASM OF UPPER-OUTER QUADRANT OF LEFT BREAST IN FEMALE, ESTROGEN RECEPTOR POSITIVE (HCC): Primary | ICD-10-CM

## 2021-01-20 DIAGNOSIS — F41.9 ANXIETY: Primary | ICD-10-CM

## 2021-01-20 DIAGNOSIS — Z79.810 USE OF TAMOXIFEN (NOLVADEX): ICD-10-CM

## 2021-01-20 DIAGNOSIS — C50.412 MALIGNANT NEOPLASM OF UPPER-OUTER QUADRANT OF LEFT BREAST IN FEMALE, ESTROGEN RECEPTOR POSITIVE (HCC): Primary | ICD-10-CM

## 2021-01-20 PROCEDURE — 90834 PSYTX W PT 45 MINUTES: CPT | Performed by: SOCIAL WORKER

## 2021-01-20 PROCEDURE — 99214 OFFICE O/P EST MOD 30 MIN: CPT | Performed by: SURGERY
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
## 2021-01-20 NOTE — PROGRESS NOTES
Surgical Oncology Follow Up       [address]  CANCER [facility]  [address]  [facility] [address]    Hailee Espinoza  1970  [id number]  [address]  CANCER [facility] SURGICAL ONCOLOGY [facility]  [address] [address]    Chief Complaint   Patient presents with    Breast Cancer     Pt is here for a six month follow up          Assessment & Plan:   Patient presents for a follow-up visit  She has no complaints referable to her breast   She has had fat grafting left side with an excellent cosmetic outcome  She still remains anxious about recurrence disease  She does complain of some bone pain but is talked to her PCP is given her some medications and is resolved  I did explain that if it recurs she should call Dr Moses Randall  She is due for a mammogram in April we will coordinate that for her      Cancer History:     Oncology History   Malignant neoplasm of upper-outer quadrant of left breast in female, estrogen receptor positive ([facility] [address] )    - 6/2016 Chemotherapy    Theron Adjuvant chemo at [facility]   Adriamycin and Cytoxan for four cycles  Patient declined further chemo and came to [place] [id number] for second opinion     4/25/2016 Initial Diagnosis    Left lumpectomy  [facility]  Invasive ductal carcinoma  Grade 1  1 86 on utrasound    LA 0  Her 2 2+  Fish negative  Stage IIA per [facility]     6/21/2016 Genetic Testing    BRCA negative  Myriad-Done through [facility]     8/10/2016 Surgery    Left mastectomy with sentinel lymph node biopsy  Invasive ductal carcinoma  Grade 2  1 6 cm  0/1 lymph node  Stage 1A    Co surgery with Dr Nishant Aragon  Failed STEVE flap     10/2016 -  Hormone Therapy    Tamoxifen 20 mg daily  Dr Moses Randall     12/22/2017 Surgery    Revision of left breast scar/local flap  Dr Cristofer Greene           Interval History:   Patient has no complaints referable to her breast   She has other complaints see above  Review of Systems:   Review of Systems   All other systems reviewed and are negative  Past Medical History     Patient Active Problem List   Diagnosis    Symptomatic anemia    Malignant neoplasm of upper-outer quadrant of left breast in female, estrogen receptor positive ([address] )    Depression    Anxiety    Use of tamoxifen (Nolvadex)    Class 1 obesity     Past Medical History:   Diagnosis Date    Anxiety     BRCA negative 06/2016    Myriad    Headache     History of chemotherapy 2016    Neoadjuvant; at [address] History of transfusion 06/2017    no adverse reaction    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)     s/p chemotherapy    Obesity (BMI 30-39  9)      Past Surgical History:   Procedure Laterality Date    ABDOMINAL WALL SURGERY Left 9/21/2016    Procedure: DEBRIDEMENT OF LEFT ABDOMINAL TISSUE AND CLOSURE; DEBRIDEMENT OF LEFT BREAST FLAP; SUBMUSCULAR TISSUE EXPANDER PLACEMENT WITH ADM (ACELLULAR DERMAL MATRIX); Surgeon: Blanca June MD;  [facility];  Service:     BREAST BIOPSY  04/2016    with sentinel node biospy    BREAST RECONSTRUCTION Left 12/22/2017    Procedure: REVISION OF LEFT BREAST SCAR, LOCAL FLAP;  Surgeon: Dylan Todd MD;  [facility];  Service: Plastics    [address] [place] [address] Drive OF [facility]      LAPAROSCOPIC CHOLECYSTECTOMY  2000    LAPAROSCOPIC GASTRIC BANDING  2008    removed 2013    LAPAROSCOPIC GASTRIC BANDING  2013    removal    LIPOSUCTION W/ FAT INJECTION Left 6/8/2017    Procedure: BREAST FAT GRAFTING ;  Surgeon: Blanca June MD;  [facility];  Service:     MASTECTOMY Left 2016    MEDIPORT INSERTION, SINGLE  2016    MT BIOPSY/EXCISION, LYMPH NODE(S) Left 8/10/2016    Procedure: LYMPHOSCINTIGRAPHY; SENTINAL LYMPH NODE BIOPSY (INJECT AT 1100);   Surgeon: Marizol Gonzalez MD;  [facility];  Service: Surgical Oncology    MT BREAST RECONSTRUC W FREE FLAP Left 9/19/2016    Procedure: BREAST DELAYED RECONSTRUCTION; DEIP FREE FLAP removal of port-a -cath;  Surgeon: Stefan Grimaldo MD;  [facility];  Service: Plastics    WI DELAY BREAST PROS AFTER BREAST SURG Left 12/19/2016    Procedure: BREAST TISSUE EXPANDER REMOVAL; BREAST IMPLANT PLACEMENT;  Surgeon: Stefan Grimaldo MD;  [facility];  Service: Plastics    WI HYSTEROSCOPY,W/ENDO [address] N/A 5/21/2018    Procedure: DILATATION AND CURETTAGE (D&C), HYSTEROSCOPY;  Surgeon: Madi Poole MD;  [facility];  Service: Gynecology Oncology    WI MASTECTOMY, SIMPLE, COMPLETE Left 8/10/2016    Procedure: BREAST MASTECTOMY; FROZEN SECTION ;  Surgeon: Yesenia Patrick MD;  [facility];  Service: Surgical Oncology    WI REDUCTION OF LARGE BREAST Right 12/19/2016    Procedure: BREAST REDUCTION/MASTOPEXY ;  Surgeon: Stefan Grimaldo MD;  [facility];  Service: Plastics    WI REMOVE TISSUE EXPANDER(S) Left 1/23/2017    Procedure: BREAST IMPLANT REMOVAL; WASHOUT AND DEBRIDEMENT;  Surgeon: Stefan Grimaldo MD;  [facility];  Service: Plastics    REVISION OF SCAR ON TORSO N/A 12/19/2016    Procedure: ABDOMINAL SCAR REVISION ;  Surgeon: Stefan Grimaldo MD;  [facility];  Service:    Eugenio Bones WISDOM TOOTH EXTRACTION       Family History   Problem Relation Age of Onset    Diabetes Mother     Heart disease Mother     Kidney disease Mother     Obesity Mother     Other Father     Cancer Father         melanoma    Obesity Father     Diabetes Maternal Aunt     Stroke Neg Hx      Social History     Socioeconomic History    Marital status: /Civil Union     Spouse name: Not on file    Number of children: Not on file    Years of education: Not on file    Highest education level: Not on file   Occupational History    Not on file   Social Needs    Financial resource strain: Not on file    Food insecurity     Worry: Not on file     Inability: Not on file    Transportation needs     Medical: Not on file     Non-medical: Not on file   Tobacco Use    Smoking status: Former Smoker     Packs/day: 1 00     Years: 15 00     Pack years: 15 00     Quit date:      Years since quittin 0    Smokeless tobacco: Never Used   Substance and Sexual Activity    Alcohol use: No    Drug use: No    Sexual activity: Not on file   Lifestyle    Physical activity     Days per week: Not on file     Minutes per session: Not on file    Stress: Not on file   Relationships    Social connections     Talks on phone: Not on file     Gets together: Not on file     Attends Shinto service: Not on file     Active member of club or organization: Not on file     Attends meetings of clubs or organizations: Not on file     Relationship status: Not on file    Intimate partner violence     Fear of current or ex partner: Not on file     Emotionally abused: Not on file     Physically abused: Not on file     Forced sexual activity: Not on file   Other Topics Concern    Not on file   Social History Narrative    Not on file       Current Outpatient Medications:     buPROPion (WELLBUTRIN XL) 300 mg 24 hr tablet, Take 300 mg by mouth daily, Disp: , Rfl:     tamoxifen (NOLVADEX) 20 mg tablet, Take 1 tablet (20 mg total) by mouth daily, Disp: 90 tablet, Rfl: 3  Allergies   Allergen Reactions    Ambien [Zolpidem] Hallucinations    Effexor [Venlafaxine] GI Intolerance    Bactrim [Sulfamethoxazole-Trimethoprim] Itching    Morphine Other (See Comments)     Itching, crying, short of breath       Physical Exam:     Vitals:    21 0949   BP: 118/78   Pulse: 70   Resp: 16   Temp: 98 °F (36 7 °C)     Physical Exam  Vitals signs reviewed  Constitutional:       Appearance: She is well-developed  HENT:      Head: Normocephalic and atraumatic  Eyes:      Pupils: Pupils are equal, round, and reactive to light  Neck:      Musculoskeletal: Normal range of motion and neck supple  Thyroid: No thyromegaly  Vascular: No JVD        Trachea: No tracheal deviation  Cardiovascular:      Rate and Rhythm: Normal rate and regular rhythm  Heart sounds: Normal heart sounds  No murmur  No friction rub  No gallop  Pulmonary:      Effort: Pulmonary effort is normal  No respiratory distress  Breath sounds: Normal breath sounds  No wheezing or rales  Chest:      Comments: Examination the right breast demonstrate postsurgical scars but no worrisome skin findings there are no dominant masses and no axillary adenopathy  Examination of the left reconstructed breast with fat grafting only has an excellent cosmetic outcome she has no dominant masses or axillary or paraclavicular adenopathy  Abdominal:      General: There is no distension  Palpations: Abdomen is soft  There is no hepatomegaly or mass  Tenderness: There is no abdominal tenderness  There is no guarding or rebound  Musculoskeletal: Normal range of motion  General: No tenderness  Lymphadenopathy:      Cervical: No cervical adenopathy  Skin:     General: Skin is warm and dry  Findings: No erythema or rash  Neurological:      Mental Status: She is alert and oriented to person, place, and time  Cranial Nerves: No cranial nerve deficit  Psychiatric:         Behavior: Behavior normal            Results & Discussion:   Patient complains of some bone pain which is currently resolved on medication  However I have recommended she contact Medical Oncology if this recurs  We will coordinate her mammogram and see her back in 6 months  Patient remains on her tamoxifen without difficulty  Advance Care Planning/Advance Directives:  I discussed the disease status, treatment plans and follow-up with the patient

## 2021-01-21 ENCOUNTER — PATIENT OUTREACH (OUTPATIENT)
Dept: CASE MANAGEMENT | Facility: HOSPITAL | Age: 51
End: 2021-01-21

## 2021-01-21 NOTE — PROGRESS NOTES
LSW received DT and problem list via referral process  Pt self scored 9/10 and noted concerns with insurance/financial, dealing with children and partner, depression, fears, nervousness, sadness, worry, loss of interest in normal activities and physical issues  LSW attempted to contact pt via telephone, no answer  LSW left detailed information including contact information and requested a call back  LSW will attempt to contact pt again at a later date

## 2021-01-26 ENCOUNTER — TELEPHONE (OUTPATIENT)
Dept: BARIATRICS | Facility: CLINIC | Age: 51
End: 2021-01-26

## 2021-01-29 ENCOUNTER — PATIENT OUTREACH (OUTPATIENT)
Dept: CASE MANAGEMENT | Facility: HOSPITAL | Age: 51
End: 2021-01-29

## 2021-01-29 NOTE — PROGRESS NOTES
LSW attempted 2nd outreach to pt via telephone, no answer  LSW left detailed message containing contact information and requested call back    LSW will attempt one more outreach prior to closing this referral

## 2021-02-08 ENCOUNTER — PATIENT OUTREACH (OUTPATIENT)
Dept: CASE MANAGEMENT | Facility: HOSPITAL | Age: 51
End: 2021-02-08

## 2021-02-08 NOTE — PROGRESS NOTES
LSW reached out to pt via telephone  Pt stated she is depressed and struggles with anxiety , she shared she is taking medication but is not sure it is working  LSW asked if pt has appointments with physchiatrist to manage medication, pt stated no  LSW offered that  pt  May do best to see a phychiatrist to manage her medication, pt stated "maybe"  LSW offered short term counseling pt declined and stated she will be fine  LSW will place pt on sw/himanshu to check in on periodically

## 2021-02-09 ENCOUNTER — TELEPHONE (OUTPATIENT)
Dept: BARIATRICS | Facility: CLINIC | Age: 51
End: 2021-02-09

## 2021-04-21 ENCOUNTER — HOSPITAL ENCOUNTER (OUTPATIENT)
Dept: RADIOLOGY | Facility: HOSPITAL | Age: 51
Discharge: HOME | End: 2021-04-21
Attending: SURGERY
Payer: COMMERCIAL

## 2021-04-21 VITALS — BODY MASS INDEX: 37.3 KG/M2 | WEIGHT: 190 LBS | HEIGHT: 60 IN

## 2021-04-21 DIAGNOSIS — Z17.0 MALIGNANT NEOPLASM OF UPPER-OUTER QUADRANT OF LEFT BREAST IN FEMALE, ESTROGEN RECEPTOR POSITIVE (HCC): ICD-10-CM

## 2021-04-21 DIAGNOSIS — C50.412 MALIGNANT NEOPLASM OF UPPER-OUTER QUADRANT OF LEFT BREAST IN FEMALE, ESTROGEN RECEPTOR POSITIVE (HCC): ICD-10-CM

## 2021-04-21 PROCEDURE — G0279 TOMOSYNTHESIS, MAMMO: HCPCS

## 2021-04-21 PROCEDURE — 77065 DX MAMMO INCL CAD UNI: CPT

## 2021-04-27 RX ORDER — METHYLPREDNISOLONE 4 MG/1
TABLET ORAL
COMMUNITY
Start: 2021-03-31 | End: 2021-09-16 | Stop reason: ALTCHOICE

## 2021-04-27 RX ORDER — KETOROLAC TROMETHAMINE 10 MG/1
TABLET, FILM COATED ORAL
COMMUNITY
Start: 2021-03-31 | End: 2021-09-16 | Stop reason: ALTCHOICE

## 2021-04-27 RX ORDER — OXYCODONE HYDROCHLORIDE 5 MG/1
CAPSULE ORAL
COMMUNITY
Start: 2021-04-02 | End: 2021-09-16 | Stop reason: ALTCHOICE

## 2021-04-27 RX ORDER — CARISOPRODOL 350 MG/1
TABLET ORAL
COMMUNITY
Start: 2021-04-01 | End: 2021-09-16 | Stop reason: ALTCHOICE

## 2021-04-28 ENCOUNTER — OFFICE VISIT (OUTPATIENT)
Dept: BARIATRICS | Facility: CLINIC | Age: 51
End: 2021-04-28
Payer: COMMERCIAL

## 2021-04-28 VITALS
SYSTOLIC BLOOD PRESSURE: 118 MMHG | HEIGHT: 60 IN | TEMPERATURE: 98.8 F | HEART RATE: 82 BPM | DIASTOLIC BLOOD PRESSURE: 80 MMHG | WEIGHT: 200.6 LBS | BODY MASS INDEX: 39.38 KG/M2

## 2021-04-28 DIAGNOSIS — K95.89 COMPLICATION OF BARIATRIC PROCEDURE: ICD-10-CM

## 2021-04-28 DIAGNOSIS — K21.9 GERD (GASTROESOPHAGEAL REFLUX DISEASE): ICD-10-CM

## 2021-04-28 DIAGNOSIS — E66.01 MORBID (SEVERE) OBESITY DUE TO EXCESS CALORIES (HCC): Primary | ICD-10-CM

## 2021-04-28 DIAGNOSIS — Z17.0 MALIGNANT NEOPLASM OF UPPER-OUTER QUADRANT OF LEFT BREAST IN FEMALE, ESTROGEN RECEPTOR POSITIVE (HCC): ICD-10-CM

## 2021-04-28 DIAGNOSIS — F41.9 ANXIETY: ICD-10-CM

## 2021-04-28 DIAGNOSIS — C50.412 MALIGNANT NEOPLASM OF UPPER-OUTER QUADRANT OF LEFT BREAST IN FEMALE, ESTROGEN RECEPTOR POSITIVE (HCC): ICD-10-CM

## 2021-04-28 DIAGNOSIS — E66.01 MORBID OBESITY (HCC): Primary | ICD-10-CM

## 2021-04-28 PROCEDURE — 99204 OFFICE O/P NEW MOD 45 MIN: CPT | Performed by: SURGERY

## 2021-04-28 NOTE — PROGRESS NOTES
BARIATRIC INITIAL CONSULT - BARIATRIC SURGERY    Hailee Espinoza 52 y o  female MRN: [id number]  Unit/Bed#:  Encounter: [id number]      HPI:  Ashley Hutchison is a very pleasant 52 y o  female who presents with a longstanding history of morbid obesity and inability to sustain a meaningful weight loss  She previously underwent a laparoscopic adjustable band placement at [facility] in 2008  This was complicated by daily vomiting and regurgitation of food contents thereafter and a later slippage of the band  She underwent a repair of the slippage at [facility] with Dr Andriy Bonilla and eventual removal with Dr Andriy Bonilla in 2013  She has continued with severe GERD and regurgitation 3-4 times weekly since then  Here today to discuss bariatric options  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 [place]  Body mass index is 39 18 kg/m²  ++Suffers from h/o breast cancer on Tamoxifen s/p chemotx, DDD, depression, anxiety, MAREK suspected, GERD  S/p L mastectomy, L breast reconstruction complicated by flap failure and multiple washouts and eventual recon in [place], lap gustavo, LAGB & removal  **Tamoxifen use    Visit type: consultation     Symptoms: excess weight, weight increase, inability to loss weight, dysnea, fatigue and back pain    Associated Symptoms: depressed mood and anxiety    Associated Conditions: none  Disease Complications: osteoarthritis and cholelithiasis  Weight Loss Interest: high  Previous Diet Trials: low carb    Exercise Frequency:infrequency  Types of Exercise: walking      Review of Systems   Constitutional: Negative  Respiratory: Negative  Cardiovascular: Negative  Gastrointestinal: Negative  Musculoskeletal: Negative  Neurological: Negative  All other systems reviewed and are negative        Historical Information   Past Medical History:   Diagnosis Date    Anxiety     Back pain     BRCA negative 06/2016    Myriad    Headache     History of chemotherapy 2016 Neoadjuvant; at [address] History of transfusion 06/2017    no adverse reaction    Malignant neoplasm of upper-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 [facility])     s/p chemotherapy    Obesity (BMI 30-39  9)      Past Surgical History:   Procedure Laterality Date    ABDOMINAL WALL SURGERY Left 9/21/2016    Procedure: DEBRIDEMENT OF LEFT ABDOMINAL TISSUE AND CLOSURE; DEBRIDEMENT OF LEFT BREAST FLAP; SUBMUSCULAR TISSUE EXPANDER PLACEMENT WITH ADM (ACELLULAR DERMAL MATRIX); Surgeon: Edwin De MD;  [facility];  Service:     BREAST BIOPSY  04/2016    with sentinel node biospy    BREAST RECONSTRUCTION Left 12/22/2017    Procedure: REVISION OF LEFT BREAST SCAR, LOCAL FLAP;  Surgeon: Andrew Huerta MD;  [facility];  Service: Plastics    [address] [place] [address] Drive OF UTERUS      LAPAROSCOPIC CHOLECYSTECTOMY  2000    LAPAROSCOPIC GASTRIC BANDING  2008    removed 2013    LAPAROSCOPIC GASTRIC BANDING  2013    removal    LIPOSUCTION W/ FAT INJECTION Left 6/8/2017    Procedure: BREAST FAT GRAFTING ;  Surgeon: Edwin De MD;  [facility];  Service:     MASTECTOMY Left 2016    MEDIPORT INSERTION, SINGLE  2016    AR BIOPSY/EXCISION, LYMPH NODE(S) Left 8/10/2016    Procedure: LYMPHOSCINTIGRAPHY; SENTINAL LYMPH NODE BIOPSY (INJECT AT 1100);   Surgeon: Juan Padron MD;  [facility];  Service: Surgical Oncology    AR BREAST RECONSTRUCTION W/FREE FLAP Left 9/19/2016    Procedure: BREAST DELAYED RECONSTRUCTION; DEIP FREE FLAP removal of port-a -cath;  Surgeon: Edwin De MD;  [facility];  Service: Plastics    AR BREAST REDUCTION Right 12/19/2016    Procedure: BREAST REDUCTION/MASTOPEXY ;  Surgeon: Edwin De MD;  [facility];  Service: Plastics    AR HYSTEROSCOPY,W/ENDO [address] N/A 5/21/2018    Procedure: DILATATION AND CURETTAGE (D&C), HYSTEROSCOPY;  Surgeon: Johnny Campos MD;  [facility];  Service: Gynecology Oncology    AR INSJ/RPLCMT BREAST IMPLANT SEP DAY MASTECTOMY Left 2016    Procedure: BREAST TISSUE EXPANDER REMOVAL; BREAST IMPLANT PLACEMENT;  Surgeon: Mario Huertas MD;  [facility];  Service: Plastics    NJ MASTECTOMY, SIMPLE, COMPLETE Left 8/10/2016    Procedure: BREAST MASTECTOMY; FROZEN SECTION ;  Surgeon: Franco Doan MD;  [facility];  Service: Surgical Oncology    NJ REMOVAL TISSUE EXPANDER W/O INSERTION IMPLANT Left 2017    Procedure: BREAST IMPLANT REMOVAL; WASHOUT AND DEBRIDEMENT;  Surgeon: Mario Huertas MD;  [facility];  Service: Plastics    REVISION OF SCAR ON TORSO N/A 2016    Procedure: ABDOMINAL SCAR REVISION ;  Surgeon: Mario Huertas MD;  [facility];  Service:    Indira Cisneros WISDOM TOOTH EXTRACTION       Social History   Social History     Substance and Sexual Activity   Alcohol Use No     Social History     Substance and Sexual Activity   Drug Use No     Social History     Tobacco Use   Smoking Status Former Smoker    Packs/day: 1 00    Years: 15 00    Pack years: 15 00    Quit date:     Years since quittin 3   Smokeless Tobacco Never Used     Family History:   Family History   Problem Relation Age of Onset    Diabetes Mother     Heart disease Mother     Kidney disease Mother     Obesity Mother     Other Father     Cancer Father         melanoma    Obesity Father     Diabetes Maternal Aunt     Stroke Neg Hx        Meds/Allergies   all medications and allergies reviewed  Allergies   Allergen Reactions    Ambien [Zolpidem] Hallucinations    Effexor [Venlafaxine] GI Intolerance    Bactrim [Sulfamethoxazole-Trimethoprim] Itching    Morphine Other (See Comments)     Itching, crying, short of breath       Objective       Current Vitals:   /80 (BP Location: Left arm, Patient Position: Sitting, Cuff Size: Large)   Pulse 82   Temp 98 8 °F (37 1 °C)   Ht 5' (1 524 m)   Wt 91 kg (200 lb 9 6 oz)   BMI 39 18 kg/m²       Physical Exam  Constitutional:       Appearance: She is well-developed  HENT:      Head: Normocephalic  Eyes:      Extraocular Movements: Extraocular movements intact  Neck:      Musculoskeletal: Normal range of motion  Cardiovascular:      Rate and Rhythm: Normal rate  Pulmonary:      Effort: Pulmonary effort is normal    Abdominal:      General: There is no distension  Musculoskeletal: Normal range of motion  Skin:     General: Skin is warm and dry  Neurological:      Mental Status: She is alert and oriented to person, place, and time  Psychiatric:         Mood and Affect: Mood normal          Behavior: Behavior normal          Thought Content: Thought content normal          Judgment: Judgment normal          Lab Results: I have personally reviewed pertinent lab results  Imaging: I have personally reviewed pertinent reports  EKG, Pathology, and Other Studies: I have personally reviewed pertinent reports  Assessment/PLAN:    52 y o  yo female with a long standing h/o of obesity and inability to sustain any meaningful weight loss on her own despite several attempts  She is interested in the Laparoscopic chuckie-en-y gastric bypass  ++Suffers from h/o breast cancer on Tamoxifen s/p chemotx, DDD, depression, anxiety, MAREK suspected, GERD  S/p L mastectomy, L breast reconstruction complicated by flap failure and multiple washouts and eventual recon in [place], lap gustavo, LAGB & removal  **Tamoxifen use    I have ordered an UGI for her to delineate her anatomy s/p band removal as she continues with a poor quality of life secondary to the almost daily episodes of intractable GERD and regurgitation of food contents    I have explained our Enhanced Recovery After Bariatric Surgery (ERABS) protocol and benefits including preoperative, intraoperative and postoperative elements  As a part of his pre op process, she will undergo evaluation appointments with out dietician, licensed care , and    After the evaluation, she may be referred to a cardiologist and for a sleep evaluation and consult  She needs an EGD to evaluate the anatomy of her GI tract prior to the operation  I have spent over 45 minutes with her face to face in the office today discussing her options and details of the surgery  We have seen an animation of the surgery on the computer that illustrates how the operation is done and how the anatomy will be altered with the procedure  Over 50% of this was coordinating care  She was given the opportunity to ask questions and I have answered all of them  I have discussed and educated the patient with regards to the components of our multidisciplinary program and the importance of compliance and follow up in the post operative period  The patient was also instructed with regards to the importance of behavior modification, nutritional counseling, support meeting attendance and lifestyle changes that are important to ensure success  Although there is a great statistical chance of improvement or even resolution of most of her associated comorbidities, the results vary from patient to patient and they largely depend on her commitment and compliance         Harish Leigh MD, FACS, [facility]  4/28/2021  3:15 PM

## 2021-04-28 NOTE — LETTER
April 28, 2021     Zhang Kumar MD  [address] [place] [address]    Patient: Angella Leon   YOB: 1970   Date of Visit: 4/28/2021       Dear Dr Homa Larsen:    Thank you for referring Angella Leon to me for evaluation for bariatric surgery  Below are my notes for this consultation  If you have questions, please do not hesitate to call me on my cell phone at [phone]  I look forward to following your patient along with you  Sincerely,    Len Arevalo MD, [facility]  Metabolic & Bariatric Surgery Director  [facility] [place]   4/28/2021  3:28 PM          CC: No Recipients  Corinne Blake, MD  4/28/2021  3:27 PM  Sign when Signing Visit      BARIATRIC INITIAL CONSULT - Carlene Espinoza 52 y o  female MRN: [id number]  Unit/Bed#:  Encounter: [id number]      HPI:  Angella Leon is a very pleasant 52 y o  female who presents with a longstanding history of morbid obesity and inability to sustain a meaningful weight loss  She previously underwent a laparoscopic adjustable band placement at [facility] in 2008  This was complicated by daily vomiting and regurgitation of food contents thereafter and a later slippage of the band  She underwent a repair of the slippage at [facility] with Dr Trang Atkinson and eventual removal with Dr Trang Atkinson in 2013  She has continued with severe GERD and regurgitation 3-4 times weekly since then  Here today to discuss bariatric options  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 in [place]  Body mass index is 39 18 kg/m²      ++Suffers from h/o breast cancer on Tamoxifen s/p chemotx, DDD, depression, anxiety, MAREK suspected, GERD  S/p L mastectomy, L breast reconstruction complicated by flap failure and multiple washouts and eventual recon in [place], lap gustavo, LAGB & removal  **Tamoxifen use    Visit type: consultation     Symptoms: excess weight, weight increase, inability to loss weight, dysnea, fatigue and back pain    Associated Symptoms: depressed mood and anxiety    Associated Conditions: none  Disease Complications: osteoarthritis and cholelithiasis  Weight Loss Interest: high  Previous Diet Trials: low carb    Exercise Frequency:infrequency  Types of Exercise: walking      Review of Systems   Constitutional: Negative  Respiratory: Negative  Cardiovascular: Negative  Gastrointestinal: Negative  Musculoskeletal: Negative  Neurological: Negative  All other systems reviewed and are negative  Historical Information   Past Medical History:   Diagnosis Date    Anxiety     Back pain     BRCA negative 06/2016    Myriad    Headache     History of chemotherapy 2016    Neoadjuvant; at [address] History of transfusion 06/2017    no adverse reaction    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)     s/p chemotherapy    Obesity (BMI 30-39  9)      Past Surgical History:   Procedure Laterality Date    ABDOMINAL WALL SURGERY Left 9/21/2016    Procedure: DEBRIDEMENT OF LEFT ABDOMINAL TISSUE AND CLOSURE; DEBRIDEMENT OF LEFT BREAST FLAP; SUBMUSCULAR TISSUE EXPANDER PLACEMENT WITH ADM (ACELLULAR DERMAL MATRIX);   Surgeon: Tristin Strauss MD;  [facility];  Service:     BREAST BIOPSY  04/2016    with sentinel node biospy    BREAST RECONSTRUCTION Left 12/22/2017    Procedure: REVISION OF LEFT BREAST SCAR, LOCAL FLAP;  Surgeon: Lois White MD;  [facility];  Service: Plastics    [address] OF UTERUS      LAPAROSCOPIC CHOLECYSTECTOMY  2000    LAPAROSCOPIC GASTRIC BANDING  2008    removed 2013    LAPAROSCOPIC GASTRIC BANDING  2013    removal    LIPOSUCTION W/ FAT INJECTION Left 6/8/2017    Procedure: BREAST FAT GRAFTING ;  Surgeon: Tristin Strauss MD;  [facility];  Service:     MASTECTOMY Left 2016    MEDIPORT INSERTION, SINGLE  2016    MO BIOPSY/EXCISION, LYMPH NODE(S) Left 8/10/2016    Procedure: LYMPHOSCINTIGRAPHY; SENTINAL LYMPH NODE BIOPSY (INJECT AT 1100);   Surgeon: Kyung Madsen MD;  [facility];  Service: Surgical Oncology    ME BREAST RECONSTRUCTION W/FREE FLAP Left 2016    Procedure: BREAST DELAYED RECONSTRUCTION; DEIP FREE FLAP removal of port-a -cath;  Surgeon: Celi Jernigan MD;  [facility];  Service: Plastics    ME BREAST REDUCTION Right 2016    Procedure: BREAST REDUCTION/MASTOPEXY ;  Surgeon: Celi Jernigan MD;  [facility];  Service: Plastics    ME HYSTEROSCOPY,W/ENDO [address] N/A 2018    Procedure: DILATATION AND CURETTAGE (D&C), HYSTEROSCOPY;  Surgeon: Danyel Bertrand MD;  [facility];  Service: Gynecology Oncology    ME INSJ/RPLCMT BREAST IMPLANT SEP DAY MASTECTOMY Left 2016    Procedure: BREAST TISSUE EXPANDER REMOVAL; BREAST IMPLANT PLACEMENT;  Surgeon: Celi Jernigan MD;  [facility];  Service: Plastics    ME MASTECTOMY, SIMPLE, COMPLETE Left 8/10/2016    Procedure: BREAST MASTECTOMY; FROZEN SECTION ;  Surgeon: Kyung Madsen MD;  [facility];  Service: Surgical Oncology    ME REMOVAL TISSUE EXPANDER W/O INSERTION IMPLANT Left 2017    Procedure: BREAST IMPLANT REMOVAL; WASHOUT AND DEBRIDEMENT;  Surgeon: Celi Jernigan MD;  [facility];  Service: Plastics    REVISION OF SCAR ON TORSO N/A 2016    Procedure: ABDOMINAL SCAR REVISION ;  Surgeon: Celi Jernigan MD;  [facility];  Service:    Roshan Shaan WISDOM TOOTH EXTRACTION       Social History   Social History     Substance and Sexual Activity   Alcohol Use No     Social History     Substance and Sexual Activity   Drug Use No     Social History     Tobacco Use   Smoking Status Former Smoker    Packs/day: 1 00    Years: 15 00    Pack years: 15 00    Quit date:     Years since quittin 3   Smokeless Tobacco Never Used     Family History:   Family History   Problem Relation Age of Onset    Diabetes Mother     Heart disease Mother     Kidney disease Mother     Obesity Mother     Other Father     Cancer Father         melanoma    Obesity Father     Diabetes Maternal Aunt     Stroke Neg Hx        Meds/Allergies   all medications and allergies reviewed  Allergies   Allergen Reactions    Ambien [Zolpidem] Hallucinations    Effexor [Venlafaxine] GI Intolerance    Bactrim [Sulfamethoxazole-Trimethoprim] Itching    Morphine Other (See Comments)     Itching, crying, short of breath       Objective       Current Vitals:   /80 (BP Location: Left arm, Patient Position: Sitting, Cuff Size: Large)   Pulse 82   Temp 98 8 °F (37 1 °C)   Ht 5' (1 524 m)   Wt 91 kg (200 lb 9 6 oz)   BMI 39 18 kg/m²       Physical Exam  Constitutional:       Appearance: She is well-developed  HENT:      Head: Normocephalic  Eyes:      Extraocular Movements: Extraocular movements intact  Neck:      Musculoskeletal: Normal range of motion  Cardiovascular:      Rate and Rhythm: Normal rate  Pulmonary:      Effort: Pulmonary effort is normal    Abdominal:      General: There is no distension  Musculoskeletal: Normal range of motion  Skin:     General: Skin is warm and dry  Neurological:      Mental Status: She is alert and oriented to person, place, and time  Psychiatric:         Mood and Affect: Mood normal          Behavior: Behavior normal          Thought Content: Thought content normal          Judgment: Judgment normal          Lab Results: I have personally reviewed pertinent lab results  Imaging: I have personally reviewed pertinent reports  EKG, Pathology, and Other Studies: I have personally reviewed pertinent reports  Assessment/PLAN:    52 y o  yo female with a long standing h/o of obesity and inability to sustain any meaningful weight loss on her own despite several attempts  She is interested in the Laparoscopic chuckie-en-y gastric bypass      ++Suffers from h/o breast cancer on Tamoxifen s/p chemotx, DDD, depression, anxiety, MAREK suspected, GERD  S/p L mastectomy, L breast reconstruction complicated by flap failure and multiple washouts and eventual recon in [facility], lap gustavo, LAGB & removal  **Tamoxifen use    I have ordered an UGI for her to delineate her anatomy s/p band removal as she continues with a poor quality of life secondary to the almost daily episodes of intractable GERD and regurgitation of food contents    I have explained our Enhanced Recovery After Bariatric Surgery (ERABS) protocol and benefits including preoperative, intraoperative and postoperative elements  As a part of his pre op process, she will undergo evaluation appointments with out dietician, licensed care , and   After the evaluation, she may be referred to a cardiologist and for a sleep evaluation and consult  She needs an EGD to evaluate the anatomy of her GI tract prior to the operation  I have spent over 45 minutes with her face to face in the office today discussing her options and details of the surgery  We have seen an animation of the surgery on the computer that illustrates how the operation is done and how the anatomy will be altered with the procedure  Over 50% of this was coordinating care  She was given the opportunity to ask questions and I have answered all of them  I have discussed and educated the patient with regards to the components of our multidisciplinary program and the importance of compliance and follow up in the post operative period  The patient was also instructed with regards to the importance of behavior modification, nutritional counseling, support meeting attendance and lifestyle changes that are important to ensure success  Although there is a great statistical chance of improvement or even resolution of most of her associated comorbidities, the results vary from patient to patient and they largely depend on her commitment and compliance         Myla Gonzalez MD, FACS, FASMBS  4/28/2021  3:15 PM

## 2021-05-05 ENCOUNTER — CLINICAL SUPPORT (OUTPATIENT)
Dept: BARIATRICS | Facility: CLINIC | Age: 51
End: 2021-05-05

## 2021-05-05 VITALS
BODY MASS INDEX: 39.38 KG/M2 | HEART RATE: 71 BPM | HEIGHT: 60 IN | TEMPERATURE: 97.3 F | WEIGHT: 200.6 LBS | SYSTOLIC BLOOD PRESSURE: 120 MMHG | DIASTOLIC BLOOD PRESSURE: 82 MMHG

## 2021-05-05 DIAGNOSIS — E66.01 MORBID (SEVERE) OBESITY DUE TO EXCESS CALORIES (HCC): Primary | ICD-10-CM

## 2021-05-05 DIAGNOSIS — Z01.818 PRE-OP TESTING: ICD-10-CM

## 2021-05-05 DIAGNOSIS — E66.01 MORBID OBESITY (HCC): Primary | ICD-10-CM

## 2021-05-05 DIAGNOSIS — E66.9 OBESITY, CLASS II, BMI 35-39.9: ICD-10-CM

## 2021-05-05 PROCEDURE — RECHECK

## 2021-05-05 NOTE — PROGRESS NOTES
Bariatric Nutrition Assessment Note    Type of surgery    Preop (3 + 1 weight check)  Surgery Date: TBD  S/p lap band in 2008, removed in 2013 now looking to have RYGB due to severe GERD and regurgitation  Getting UGI tomorrow with Dr Mt Mirza  Surgeon: Dr Zuñiga Bis  52 y o   female     Wt with BMI of 25: 127 3lbs  Pre-Op Excess Wt: 73 6lbs  Blood pressure 120/82, pulse 71, temperature (!) 97 3 °F (36 3 °C), height 5' (1 524 m), weight 91 kg (200 lb 9 6 oz), not currently breastfeeding  Body mass index is 39 18 kg/m²  Band in 2008 weight was  220lbs  Ramirze:  140lbs  Average maintain:  150-160lbs    Initial weight with surgeon on 4/28:  200 6lbs    Weight History   Onset of Obesity: Childhood  Family history of obesity: Yes  Wt Loss Attempts: Commercial Programs ([employer], [employer], etc )  Exercise  High Protein/Low CHO diets (Atkins, Union, etc )  Meal Replacements ([employer] Ke[employer] Fast, etc )  Nutrition Counseling with RD  Self Created Diets (Portion Control, Healthy Food Choices, etc )  VLCD  Lap Band  Contrave, phentermine, topamax    Maximum Wt Lost: 80lbs with the band (was 220lbs when got band, got down to 140lbs, average weight maintained 150-160lbs)    Review of History and Medications   Past Medical History:   Diagnosis Date    Anxiety     Back pain     BRCA negative 06/2016    [facility]    Headache     History of chemotherapy 2016    Neoadjuvant; at [address] History of transfusion 06/2017    no adverse reaction    Malignant neoplasm of upper-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 ([address] )     s/p chemotherapy    Obesity (BMI 30-39  9)      Past Surgical History:   Procedure Laterality Date    ABDOMINAL WALL SURGERY Left 9/21/2016    Procedure: DEBRIDEMENT OF LEFT ABDOMINAL TISSUE AND CLOSURE; DEBRIDEMENT OF LEFT BREAST FLAP; SUBMUSCULAR TISSUE EXPANDER PLACEMENT WITH ADM (ACELLULAR DERMAL MATRIX);   Surgeon: Vidya Bennett MD; [facility];  Service:     BREAST BIOPSY  04/2016    with sentinel node biospy    BREAST RECONSTRUCTION Left 12/22/2017    Procedure: REVISION OF LEFT BREAST SCAR, LOCAL FLAP;  Surgeon: Brandie Marie MD;  [facility];  Service: Plastics    [address] [place] [address] Drive OF UTERUS      LAPAROSCOPIC CHOLECYSTECTOMY  2000    LAPAROSCOPIC GASTRIC BANDING  2008    removed 2013    LAPAROSCOPIC GASTRIC BANDING  2013    removal    LIPOSUCTION W/ FAT INJECTION Left 6/8/2017    Procedure: BREAST FAT GRAFTING ;  Surgeon: Isma Espinoza MD;  [facility];  Service:     MASTECTOMY Left 2016    MEDIPORT INSERTION, SINGLE  2016    MO BIOPSY/EXCISION, LYMPH NODE(S) Left 8/10/2016    Procedure: LYMPHOSCINTIGRAPHY; SENTINAL LYMPH NODE BIOPSY (INJECT AT 1100);   Surgeon: Elvira Garcia MD;  [facility];  Service: Surgical Oncology    MO BREAST RECONSTRUCTION W/FREE FLAP Left 9/19/2016    Procedure: BREAST DELAYED RECONSTRUCTION; DEIP FREE FLAP removal of port-a -cath;  Surgeon: Isma Espinoza MD;  [facility];  Service: Plastics    MO BREAST REDUCTION Right 12/19/2016    Procedure: BREAST REDUCTION/MASTOPEXY ;  Surgeon: Isma Espinoza MD;  [facility];  Service: Plastics    MO HYSTEROSCOPY,W/ENDO [address] N/A 5/21/2018    Procedure: DILATATION AND CURETTAGE (D&C), HYSTEROSCOPY;  Surgeon: Symone Rahman MD;  [facility];  Service: Gynecology Oncology    MO INSJ/RPLCMT BREAST IMPLANT SEP DAY MASTECTOMY Left 12/19/2016    Procedure: BREAST TISSUE EXPANDER REMOVAL; BREAST IMPLANT PLACEMENT;  Surgeon: Isma Espinoza MD;  [facility];  Service: Plastics    MO MASTECTOMY, SIMPLE, COMPLETE Left 8/10/2016    Procedure: BREAST MASTECTOMY; FROZEN SECTION ;  Surgeon: Elvira Garcia MD;  [facility];  Service: Surgical Oncology    MO REMOVAL TISSUE EXPANDER W/O INSERTION IMPLANT Left 1/23/2017    Procedure: BREAST IMPLANT REMOVAL; WASHOUT AND DEBRIDEMENT;  Surgeon: Isma Espinoza MD; [facility];  Service: Plastics    REVISION OF SCAR ON TORSO N/A 2016    Procedure: ABDOMINAL SCAR REVISION ;  Surgeon: Laisha Quiroz MD;  [facility];  Service:    Mary lElen Marte WISDOM TOOTH EXTRACTION       Social History     Socioeconomic History    Marital status: /Civil Union     Spouse name: None    Number of children: None    Years of education: None    Highest education level: None   Occupational History    None   Social Needs    Financial resource strain: None    Food insecurity     Worry: None     Inability: None    Transportation needs     Medical: None     Non-medical: None   Tobacco Use    Smoking status: Former Smoker     Packs/day: 1 00     Years: 15 00     Pack years: 15 00     Quit date:      Years since quittin 3    Smokeless tobacco: Never Used   Substance and Sexual Activity    Alcohol use: No    Drug use: No    Sexual activity: None   Lifestyle    Physical activity     Days per week: None     Minutes per session: None    Stress: None   Relationships    Social connections     Talks on phone: None     Gets together: None     Attends Restorationism service: None     Active member of club or organization: None     Attends meetings of clubs or organizations: None     Relationship status: None    Intimate partner violence     Fear of current or ex partner: None     Emotionally abused: None     Physically abused: None     Forced sexual activity: None   Other Topics Concern    None   Social History Narrative    None       Current Outpatient Medications:     buPROPion (WELLBUTRIN XL) 300 mg 24 hr tablet, Take 300 mg by mouth daily, Disp: , Rfl:     carisoprodol (SOMA) 350 mg tablet, TAKE 1 TABLET BY MOUTH 3 TIMES A DAY AND AT BEDTIME AS NEEDED FOR MUSCLE SPASM, Disp: , Rfl:     ketorolac (TORADOL) 10 mg tablet, TAKE 1 TABLET BY ORAL ROUTE EVERY 6 HOURS AS NEEDED FOR UP TO 5 DAYS TOTAL USE AS NEEDED FOR PAIN, Disp: , Rfl:     methylPREDNISolone 4 MG tablet therapy pack, TAKE 6 TABLETS ON DAY 1 AS DIRECTED ON PACKAGE AND DECREASE BY 1 TAB EACH DAY FOR A TOTAL OF 6 DAYS, Disp: , Rfl:     oxyCODONE (OXY-IR) 5 MG capsule, TAKE 1 CAPSULE BY ORAL ROUTE EVERY 8 HOURS AS NEEDED FOR PAIN FOR PAIN, Disp: , Rfl:     tamoxifen (NOLVADEX) 20 mg tablet, Take 1 tablet (20 mg total) by mouth daily, Disp: 90 tablet, Rfl: 3    Food Intake and Lifestyle Assessment   Food Intake Assessment completed via usual diet recall  Works from [employer] to be at [employer], wakes around 10:30, in bed, until 11:3am  Lunch: 12pm-oatmeal, apple and PB (2-3 tbsp) or greek yogurt with PB and cheeries  Snack: --  Dinner: ground beef and cauliflower rice in crock pot and salad OR port chops and sauerkraut in crockpot OR burgers--most often eats while working (works from home)  Snack: Feels this time is her weakness:  pretzels w/mustard and last night had 3/4 pint of light ice cream with PB   Beverage intake: water and sugar free beverages  Protein supplement: none at this tiem  Estimated protein intake per day: 50-60gm  Estimated fluid intake per day: 32-40oz per day water or crystal light  Meals eaten away from home: restaurants on [employer] or farmers market, cheese steak  Will do take out once a month  Typical meal pattern: 2 meals per day and grazes on snacks per day  Eating Behaviors: Consumption of high calorie/ high fat foods, Large portion sizes, Frequent snacking/ grazing, Mindless eating and Emotional eating    Food allergies or intolerances:    Allergies   Allergen Reactions    Ambien [Zolpidem] Hallucinations    Effexor [Venlafaxine] GI Intolerance    Bactrim [Sulfamethoxazole-Trimethoprim] Itching    Morphine Other (See Comments)     Itching, crying, short of breath     Cultural or Confucianist considerations: none    Physical Assessment  Physical Activity  Types of exercise: None  Current physical limitations: has aches and pains    Psychosocial Assessment   Support systems: none  Socioeconomic factors: none  Lives in house with 12year old son--she does the cooking and shopping    Nutrition Diagnosis  Diagnosis: Overweight / Obesity (NC-3 3)  Related to: Physical inactivity and Excessive energy intake  As Evidenced by: BMI >25     Nutrition Prescription: Recommend the following diet  Regular    Interventions and Teaching   Discussed pre-op and post-op nutrition guidelines  Patient educated and handouts provided  Surgical changes to stomach / GI  Capacity of post-surgery stomach  Diet progression  Adequate hydration  Sugar and fat restriction to decrease "dumping syndrome"  Fat restriction to decrease steatorrhea  Expected weight loss  Weight loss plateaus/ possibility of weight regain  Exercise  Suggestions for pre-op diet  Nutrition considerations after surgery  Protein supplements  Meal planning and preparation  Appropriate carbohydrate, protein, and fat intake, and food/fluid choices to maximize safe weight loss, nutrient intake, and tolerance   Dietary and lifestyle changes  Possible problems with poor eating habits  Intuitive eating  Techniques for self monitoring and keeping daily food journal  Potential for food intolerance after surgery, and ways to deal with them including: lactose intolerance, nausea, reflux, vomiting, diarrhea, food intolerance, appetite changes, gas  Vitamin / Mineral supplementation of Multivitamin with minerals and Vitamin D--started taking a general multi and n-3 last week  Education provided to: patient    Barriers to learning: No barriers identified  Readiness to change: preparation    Prior research on procedure: books, internet, discussed with provider, friends or family and previous wt  loss surgery (had lap band in 2008, out in 2013)    Comprehension: verbalizes understanding     Expected Compliance: good  Recommendations  Pt is an appropriate candidate for surgery   Yes    Evaluation / Monitoring  Dietitian to Monitor: Eating pattern as discussed Tolerance of nutrition prescription Body weight Lab values Physical activity    Goals  Food journal via [employer]  Exercise 30 minutes 5 times per week--talk to her physical therapist to get idea of other activities she can do  Complete lesson plans 1-6  Eat 3 meals per day with protien at each meal  Can use protein shake as a meal replacement  Eliminate mindless snacking  Pre-op weight loss:  Will assess when time comes closer and get carnification from insurance     Time Spent:   1 Hour

## 2021-05-05 NOTE — PROGRESS NOTES
Bariatric Behavioral Health Evaluation    Presenting Problem: Ishaan Zamora,   Patient previously had a gastric band placed in  and was eventally removed in   Patient was able to maintain a lower weight right after removal but found that she had more complications such as nausea, vomiting and GI issues  More recently she has put on more weight and doesn't feel she can get it off on her own  Patient has tried to reduce calories to lose weight on her own but it wasn't working  Is the patient seeking Bariatric Surgery Eval? Yes  If yes how long have you researched this surgery option  Patient has been considering surgery for the past two years, she is very motivated to get the weight off to also keep her chances of reoccurring breast cancer low  Realizes Post- Op Requirements? Yes     Pre-morbid level of function and history of present illness:  Patient experiences acid reflux and regurgitation    Psychiatric/Psychological Treatment Diagnosis: Patient has a diagnosis of anxiety and depression, she is currently on medication and feels her symptoms are managed  Patient denies any substance use disorder, patient drinks alcohol very rarely and is a former smoker  Outpatient Counselor: Not currently, was seeing a therapist and got when she felt she needed from it  Psychiatrist No     Have you had Inpatient Treatment? No    Family Constellation (include relationship with each and Psych/Med HX)    Mother  obesity, Father  obesity and Other  obesity    Domestic Violence No    Abuse History:  None    Social situations: patient lives with her son who is 13, she has a 15year old son who is also living outside of the home  Additional comments/stressors related to family/relationships/peer support: Patient struggles with weight and the stress it puts on their health, she is a single monther and her youngest son has autism which requires a lot of assistance that is physically demanding   He is staying in a residential facility at this time, patient doesn't feel she can care for him with her current weight and health issues but she is hoping that once she has surgery and loses more weight he can return home  Patient reports that she doesn't have any family or friends that are her supports  She and her   a year ago, he has not been available as a support to her and their children  She is currently dating someone who is supportive but he has five children of his own and she doesn't identify herself as someone who confides in others  Physical/Psychological Assessment:     Appearance: appropriate  Sociability: guarded  Affect: appropriate  Mood: calm and guarded  Thought Process: coherent  Speech: normal  Content: no impairment  Orientation: person  Yes , place  Yes , time  Yes , normal attention span  Yes , normal memory  Yes  , decreased in concentration ability  No and normal judgement  Yes   Insight: emotional  good    Risk Assessment:     none    Recommendations: Recommended for surgery  yes and Patient meets the criteria to be a member of [facility] Bariatric surgery program      Risk of Harm to Self or Others:     Observation:     Access to weapons no     Based on the previous information, the client presents the following risk of harm to self or others: low    BARIATRIC SURGERY EDUCATION CHECKLIST    I have received education related to my bariatric surgery process and understand:    Patients may be required to complete a psychiatric evaluation and receive clearance for surgery from their psychiatrist     Patients who undergo weight loss surgery are at higher risk of increased mental health concerns and suicide attempts  Patients may be required to complete a full substance abuse evaluation and then complete all treatment recommendations prior to surgery      If diagnosis of abuse/dependence results, patient may be required to remain sober for one (1) year before having bariatric surgery  Patients on psychiatric medications should check with their provider to discuss psychiatric medications and the changes in absorption  Patient should discuss all time release medications with provider and take all medications as prescribed  The recommendation is that there is no use of  any tobacco products, Hookah or  vapes for the bariatric post-operation patient  Bariatric surgery patients should not consume alcohol as a post-operative patient as it may increase risk of numerous health conditions including but not limited to alcohol abuse and ulcers  There is a possibility of weight regain if patient does not follow all program guidelines and recommendations  Bariatric surgery patients should exercise thirty (30) to sixty (60) minutes per day to maintain post-surgical weight loss  Research indicates that bariatric patients are more successful when they see a therapist for up to two (2) years post-op  Patients will follow all medical and dietary recommendations provided  Patient will keep all scheduled appointments and follow up with their physician for a minimum of five (5) years  Patient will take all vitamins as recommended  Post-operative vitamins are life-long  Patient reviewed Bariatric Surgery Education Checklist and agrees they have received education on these issues   Note: Patient has a diagnosis of anxiety and depression that she feels is well controlled with her medication  She does have some stressors with having a child with special needs and being a single parent, she may consider reconnecting with a therapist for support  Patient denies any substance use disorder, she drinks alcohol on very rare occasions and she is a former smoker of 20 years  Risk of alcohol and tobacco use post op were discussed  Impact of hormones on female reproduction post-op were discussed    Patient is not currently pregnant and doesn't plan to become pregnant within one year of surgery  Patient is appropriate for surgery and recommended to meet with surgeon    Adelaida Carmona LCSW

## 2021-05-06 ENCOUNTER — HOSPITAL ENCOUNTER (OUTPATIENT)
Dept: RADIOLOGY | Facility: HOSPITAL | Age: 51
Discharge: HOME | End: 2021-05-06
Attending: SURGERY
Payer: COMMERCIAL

## 2021-05-06 DIAGNOSIS — F41.9 ANXIETY: ICD-10-CM

## 2021-05-06 DIAGNOSIS — E66.01 MORBID (SEVERE) OBESITY DUE TO EXCESS CALORIES (HCC): ICD-10-CM

## 2021-05-06 DIAGNOSIS — Z17.0 MALIGNANT NEOPLASM OF UPPER-OUTER QUADRANT OF LEFT BREAST IN FEMALE, ESTROGEN RECEPTOR POSITIVE (HCC): ICD-10-CM

## 2021-05-06 DIAGNOSIS — K21.9 GERD (GASTROESOPHAGEAL REFLUX DISEASE): ICD-10-CM

## 2021-05-06 DIAGNOSIS — C50.412 MALIGNANT NEOPLASM OF UPPER-OUTER QUADRANT OF LEFT BREAST IN FEMALE, ESTROGEN RECEPTOR POSITIVE (HCC): ICD-10-CM

## 2021-05-06 PROCEDURE — 74240 X-RAY XM UPR GI TRC 1CNTRST: CPT

## 2021-05-10 ENCOUNTER — TELEPHONE (OUTPATIENT)
Dept: BARIATRICS | Facility: CLINIC | Age: 51
End: 2021-05-10

## 2021-05-12 ENCOUNTER — OFFICE VISIT (OUTPATIENT)
Dept: SLEEP CENTER | Facility: CLINIC | Age: 51
End: 2021-05-12
Payer: COMMERCIAL

## 2021-05-12 VITALS
HEIGHT: 60 IN | SYSTOLIC BLOOD PRESSURE: 120 MMHG | DIASTOLIC BLOOD PRESSURE: 80 MMHG | BODY MASS INDEX: 39.42 KG/M2 | WEIGHT: 200.8 LBS

## 2021-05-12 DIAGNOSIS — G47.33 OSA (OBSTRUCTIVE SLEEP APNEA): Primary | ICD-10-CM

## 2021-05-12 DIAGNOSIS — E66.01 MORBID OBESITY (HCC): ICD-10-CM

## 2021-05-12 PROCEDURE — 99243 OFF/OP CNSLTJ NEW/EST LOW 30: CPT | Performed by: INTERNAL MEDICINE

## 2021-05-12 NOTE — PROGRESS NOTES
Consultation - Alexis Ralph : 1970  MRN: [id number]      Assessment:  The patient has symptoms consistent with obstructive sleep apnea  She is undergoing evaluation for bariatric surgery  Home sleep apnea testing should be sufficient and is likely favored by her insurance company  The patient is concerned about taking the COVID vaccine given her prior history of breast cancer  I told her that I would check with Dr Dianne Stanley for her, since she has a difficult time navigating My Chart  Plan:    Home sleep apnea testing    Follow up: After testing    History of Present Illness:   52 y  o female with a longstanding history of snoring and excessive daytime sleepiness  Things seemed to get worse over the last year, associated with her recovering from COVID infection  She also has a history of breast cancer and has been on tamoxifen, which may be contributing to her weight gain  She denies any history of cardiovascular disease  She also has difficulty initiating and maintaining sleep  She currently takes Benadryl  I suspect that her MAREK may be contributing to that problem        Review of Systems      Genitourinary need to urinate more than twice a night and hot flashes at night   Cardiology none   Gastrointestinal frequent heartburn/acid reflux   Neurology frequent headaches, awaken with headache, numbness/tingling of an extremity, loss of consciousness, forgetfulness, poor concentration or confusion,  and difficulty with memory   Constitutional claustrophobia, fatigue and weight change   Integumentary none   Psychiatry anxiety   Musculoskeletal back pain   Pulmonary snoring   ENT none   Endocrine none   Hematological none         I have reviewed and updated the review of systems as necessary    Historical Information    Past Medical History:    Breast cancer, overweight, depression, anxiety    Family History: non-contributory    Social History     Socioeconomic History    Marital status: /Civil Union     Spouse name: None    Number of children: None    Years of education: None    Highest education level: None   Occupational History    None   Social Needs    Financial resource strain: None    Food insecurity     Worry: None     Inability: None    Transportation needs     Medical: None     Non-medical: None   Tobacco Use    Smoking status: Former Smoker     Packs/day: 1 00     Years: 15 00     Pack years: 15 00     Quit date:      Years since quittin 3    Smokeless tobacco: Never Used   Substance and Sexual Activity    Alcohol use: No    Drug use: No    Sexual activity: None   Lifestyle    Physical activity     Days per week: None     Minutes per session: None    Stress: None   Relationships    Social connections     Talks on phone: None     Gets together: None     Attends Anabaptism service: None     Active member of club or organization: None     Attends meetings of clubs or organizations: None     Relationship status: None    Intimate partner violence     Fear of current or ex partner: None     Emotionally abused: None     Physically abused: None     Forced sexual activity: None   Other Topics Concern    None   Social History Narrative    None         Sleep Schedule: unremarkable    Snoring:   yes    Witnessed Apnea:   yes    Medications/Allergies:    Current Outpatient Medications:     buPROPion (WELLBUTRIN XL) 300 mg 24 hr tablet, Take 300 mg by mouth daily, Disp: , Rfl:     carisoprodol (SOMA) 350 mg tablet, TAKE 1 TABLET BY MOUTH 3 TIMES A DAY AND AT BEDTIME AS NEEDED FOR MUSCLE SPASM, Disp: , Rfl:     ketorolac (TORADOL) 10 mg tablet, TAKE 1 TABLET BY ORAL ROUTE EVERY 6 HOURS AS NEEDED FOR UP TO 5 DAYS TOTAL USE AS NEEDED FOR PAIN, Disp: , Rfl:     methylPREDNISolone 4 MG tablet therapy pack, TAKE 6 TABLETS ON DAY 1 AS DIRECTED ON PACKAGE AND DECREASE BY 1 TAB EACH DAY FOR A TOTAL OF 6 DAYS, Disp: , Rfl:     oxyCODONE (OXY-IR) 5 MG capsule, TAKE 1 CAPSULE BY ORAL ROUTE EVERY 8 HOURS AS NEEDED FOR PAIN FOR PAIN, Disp: , Rfl:     tamoxifen (NOLVADEX) 20 mg tablet, Take 1 tablet (20 mg total) by mouth daily, Disp: 90 tablet, Rfl: 3        No notes on file                  Objective:    Vital Signs:   Vitals:    05/12/21 1402   BP: 120/80   Weight: 91 1 kg (200 lb 12 8 oz)   Height: 5' (1 524 m)     Neck Circumference: 14      [place] Sleepiness Scale: Total score: 12    Physical Exam:    General: Alert, appropriate, cooperative,  overweight    Head: NC/AT,  moderate retrognathia    Nose: No septal deviation, nares  not obstructed, mucosa normal    Throat: Airway diminished, tongue base thickened, no tonsils visualized    Neck: Normal, no thyromegaly or lymphadenopathy, no JVD    Heart: RR, normal S1 and S2, no murmurs    Chest: Clear bilaterally    Extremity: No clubbing, cyanosis,  no edema    Skin: Warm, dry    Neuro: No motor abnormalities, cranial nerves appear intact      Counseling / Coordination of Care  A description of the counseling / coordination of care: We discussed the pathophysiology of obstructive sleep apnea as well as the possible treatment options  We also discussed the rationale for positive airway pressure therapy  Board Certified Sleep Specialist    Portions of the record may have been created with voice recognition software  Occasional wrong word or "sound a like" substitutions may have occurred due to the inherent limitations of voice recognition software  Read the chart carefully and recognize, using context, where substitutions have occurred

## 2021-05-17 ENCOUNTER — HOSPITAL ENCOUNTER (OUTPATIENT)
Dept: SLEEP CENTER | Facility: CLINIC | Age: 51
Discharge: HOME | End: 2021-05-17
Payer: COMMERCIAL

## 2021-05-17 DIAGNOSIS — G47.33 OSA (OBSTRUCTIVE SLEEP APNEA): ICD-10-CM

## 2021-05-17 PROCEDURE — G0399 HOME SLEEP TEST/TYPE 3 PORTA: HCPCS

## 2021-05-18 NOTE — PROGRESS NOTES
Home Sleep Study Documentation    Pre-Sleep Home Study:    Set-up and instructions performed by: JOSE Abernathy RPSGT    Technician performed demonstration for Patient: yes    Return demonstration performed by Patient: yes    Written instructions provided to Patient: yes    Patient signed consent form: yes        Post-Sleep Home Study:    Additional comments by Patient: none    Home Sleep Study Failed:no:    Failure reason: N/A    Reported or Detected: N/A    Scored by: JOSE Abernathy RPSGT

## 2021-05-21 ENCOUNTER — TELEPHONE (OUTPATIENT)
Dept: BARIATRICS | Facility: CLINIC | Age: 51
End: 2021-05-21

## 2021-05-21 ENCOUNTER — TELEPHONE (OUTPATIENT)
Dept: SLEEP CENTER | Facility: CLINIC | Age: 51
End: 2021-05-21

## 2021-05-21 NOTE — TELEPHONE ENCOUNTER
Attempted to call patient to review sleep study results  No answer and no voicemail set up  Unable to leave message  Sent email to patient advising she call office for sleep study results  Mild MAREK  Patient needs to schedule follow up with Dr Kimberly Bravo to discuss treatment options

## 2021-05-21 NOTE — TELEPHONE ENCOUNTER
Sent an email because I was unable to leave a voicemail  Hi Hailee     I tried giving you a call, a message came on that you were unavailable but did not allow me to leave a voicemail  Give me a call when you get a chance I will be here until about 3 (I have to leave for a dr appointment) [phone]   On Monday Ill will be in [facility] all day the number there is [phone]      Thanks Roseline Braswell

## 2021-05-25 ENCOUNTER — CONSULT (OUTPATIENT)
Dept: CARDIOLOGY CLINIC | Facility: CLINIC | Age: 51
End: 2021-05-25
Payer: COMMERCIAL

## 2021-05-25 ENCOUNTER — TELEPHONE (OUTPATIENT)
Dept: BARIATRICS | Facility: CLINIC | Age: 51
End: 2021-05-25

## 2021-05-25 VITALS
HEART RATE: 82 BPM | DIASTOLIC BLOOD PRESSURE: 80 MMHG | WEIGHT: 204 LBS | RESPIRATION RATE: 18 BRPM | SYSTOLIC BLOOD PRESSURE: 116 MMHG | TEMPERATURE: 98.1 F | OXYGEN SATURATION: 100 % | HEIGHT: 60 IN | BODY MASS INDEX: 40.05 KG/M2

## 2021-05-25 DIAGNOSIS — E66.01 MORBID OBESITY (HCC): Primary | ICD-10-CM

## 2021-05-25 DIAGNOSIS — Z01.810 PREOP CARDIOVASCULAR EXAM: ICD-10-CM

## 2021-05-25 PROCEDURE — 99202 OFFICE O/P NEW SF 15 MIN: CPT | Performed by: INTERNAL MEDICINE

## 2021-05-25 PROCEDURE — 93000 ELECTROCARDIOGRAM COMPLETE: CPT | Performed by: INTERNAL MEDICINE

## 2021-05-25 NOTE — PROGRESS NOTES
Consultation - Cardiology Office  [facility]  Delvis Padron 52 y o  female MRN: [id number]  : 1970  Unit/Bed#:  Encounter: [id number]      ASSESSMENT:  Perioperative cardiac risk assessment for bariatric surgery:    The patient does limited physical activity, but thinks she can walk up 2 flights of stairs or walk 4 blocks on level ground without any significant symptoms  Would prefer objective evaluation with an echocardiogram to check LV function    Obesity: BMI 39 84    MAREK, mild    History of COVID infection    History of breast cancer on tamoxifen    Abnormal EKG:  Low-voltage QRS, iRBBB, prolonged QT    RECOMMENDATIONS:  Echocardiogram to evaluate LV function and regional wall motion and to rule out pericardial effusion, prior to determining perioperative cardiac risk      Thank you for your consultation  If you have any question please call me at [phone]      Primary Care Physician Requesting Consult: Tom Tuttle MD      Reason for Consult / Principal Problem:  Perioperative cardiac risk assessment        HPI :     Delvis Padron is a 52y o  year old female who was referred by primary care doctor for evaluation of perioperative cardiac risk  Patient has morbid obesity and is planning to have bariatric surgery, rate still undecided  She also has history of sleep apnea, COVID infection, breast cancer on tamoxifen and mildly abnormal EKG  As mentioned above her amount of activity is not substantial   No  Therefore would recommend doing an echocardiogram to evaluate her LV function prior to determining perioperative cardiac risk    REVIEW OF SYSTEMS:    Constitutional: Negative for activity change, appetite change, chills, fatigue, fever and unexpected weight change  HENT: Negative for congestion, sore throat and trouble swallowing  Eyes: Negative for discharge and redness     Respiratory: Negative for apnea, cough, chest tightness, shortness of breath and wheezing  Cardiovascular: Negative for chest pain, shortness of breath, palpitations and leg swelling  Gastrointestinal: Negative for abdominal distention, abdominal pain, anal bleeding, blood in stool, constipation, diarrhea, nausea and vomiting  Endocrine: Negative for polydipsia, polyphagia and polyuria  Genitourinary: Negative for difficulty urinating, dysuria, flank pain and hematuria  Musculoskeletal: Negative for arthralgias, myalgias and neck stiffness  Skin: Negative for pallor and rash  Allergic/Immunologic: Negative for environmental allergies  Neurological: Negative for dizziness, syncope, light-headedness, numbness and headaches  Hematological: Negative for adenopathy  Does not bruise/bleed easily  Psychiatric/Behavioral: Negative for confusion and hallucinations  The patient is not nervous/anxious  Historical Information   Past Medical History:   Diagnosis Date    Anxiety     Back pain     BRCA negative 06/2016    [facility]    Headache     History of chemotherapy 2016    Neoadjuvant; at [address] History of transfusion 06/2017    no adverse reaction    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)     s/p chemotherapy    Obesity (BMI 30-39  9)      Past Surgical History:   Procedure Laterality Date    ABDOMINAL WALL SURGERY Left 9/21/2016    Procedure: DEBRIDEMENT OF LEFT ABDOMINAL TISSUE AND CLOSURE; DEBRIDEMENT OF LEFT BREAST FLAP; SUBMUSCULAR TISSUE EXPANDER PLACEMENT WITH ADM (ACELLULAR DERMAL MATRIX);   Surgeon: Amanda López MD;  [facility];  Service:     BREAST BIOPSY  04/2016    with sentinel node biospy    BREAST RECONSTRUCTION Left 12/22/2017    Procedure: REVISION OF LEFT BREAST SCAR, LOCAL FLAP;  Surgeon: Tatianna Atkins MD;  [facility];  Service: Plastics    [address] [place] [address] GASTRIC BANDING  2008    removed 2013    LAPAROSCOPIC GASTRIC BANDING  2013    removal    LIPOSUCTION W/ FAT INJECTION Left 6/8/2017    Procedure: BREAST FAT GRAFTING ;  Surgeon: Julia Quijano MD;  [facility];  Service:     MASTECTOMY Left 2016    MEDIPORT INSERTION, SINGLE  2016    DC BIOPSY/EXCISION, LYMPH NODE(S) Left 8/10/2016    Procedure: LYMPHOSCINTIGRAPHY; SENTINAL LYMPH NODE BIOPSY (INJECT AT 1100);   Surgeon: Washington Fairchild MD;  [facility];  Service: Surgical Oncology    DC BREAST RECONSTRUCTION W/FREE FLAP Left 9/19/2016    Procedure: BREAST DELAYED RECONSTRUCTION; DEIP FREE FLAP removal of port-a -cath;  Surgeon: Julia Quijano MD;  [facility];  Service: Plastics    DC BREAST REDUCTION Right 12/19/2016    Procedure: BREAST REDUCTION/MASTOPEXY ;  Surgeon: Julia Quijano MD;  [facility];  Service: Plastics    DC HYSTEROSCOPY,W/ENDO [address] N/A 5/21/2018    Procedure: DILATATION AND CURETTAGE (D&C), HYSTEROSCOPY;  Surgeon: Salome Padgett MD;  [facility];  Service: Gynecology Oncology    DC INSJ/RPLCMT BREAST IMPLANT SEP DAY MASTECTOMY Left 12/19/2016    Procedure: BREAST TISSUE EXPANDER REMOVAL; BREAST IMPLANT PLACEMENT;  Surgeon: Julia Quijano MD;  [facility];  Service: Plastics    DC MASTECTOMY, SIMPLE, COMPLETE Left 8/10/2016    Procedure: BREAST MASTECTOMY; FROZEN SECTION ;  Surgeon: Washington Fairchild MD;  [facility];  Service: Surgical Oncology    DC REMOVAL TISSUE EXPANDER W/O INSERTION IMPLANT Left 1/23/2017    Procedure: BREAST IMPLANT REMOVAL; WASHOUT AND DEBRIDEMENT;  Surgeon: Julia Quijano MD;  [facility];  Service: Plastics    REVISION OF SCAR ON TORSO N/A 12/19/2016    Procedure: ABDOMINAL SCAR REVISION ;  Surgeon: Julia Quijano MD;  [facility];  Service:    [facility] WISDOM TOOTH EXTRACTION       Social History     Substance and Sexual Activity   Alcohol Use No     Social History     Substance and Sexual Activity   Drug Use No     Social History     Tobacco Use   Smoking Status Former Smoker  Packs/day: 1 00    Years: 15 00    Pack years: 15     Quit date:     Years since quittin 4   Smokeless Tobacco Never Used     Family History:   Family History   Problem Relation Age of Onset    Diabetes Mother     Heart disease Mother     Kidney disease Mother     Obesity Mother     Other Father     Cancer Father         melanoma    Obesity Father     Diabetes Maternal Aunt     Stroke Neg Hx        Meds/Allergies     Allergies   Allergen Reactions    Ambien [Zolpidem] Hallucinations    Effexor [Venlafaxine] GI Intolerance    Bactrim [Sulfamethoxazole-Trimethoprim] Itching    Morphine Other (See Comments)     Itching, crying, short of breath       Current Outpatient Medications:     buPROPion (WELLBUTRIN XL) 300 mg 24 hr tablet, Take 300 mg by mouth daily, Disp: , Rfl:     carisoprodol (SOMA) 350 mg tablet, TAKE 1 TABLET BY MOUTH 3 TIMES A DAY AND AT BEDTIME AS NEEDED FOR MUSCLE SPASM, Disp: , Rfl:     ketorolac (TORADOL) 10 mg tablet, TAKE 1 TABLET BY ORAL ROUTE EVERY 6 HOURS AS NEEDED FOR UP TO 5 DAYS TOTAL USE AS NEEDED FOR PAIN, Disp: , Rfl:     methylPREDNISolone 4 MG tablet therapy pack, TAKE 6 TABLETS ON DAY 1 AS DIRECTED ON PACKAGE AND DECREASE BY 1 TAB EACH DAY FOR A TOTAL OF 6 DAYS, Disp: , Rfl:     oxyCODONE (OXY-IR) 5 MG capsule, TAKE 1 CAPSULE BY ORAL ROUTE EVERY 8 HOURS AS NEEDED FOR PAIN FOR PAIN, Disp: , Rfl:     tamoxifen (NOLVADEX) 20 mg tablet, Take 1 tablet (20 mg total) by mouth daily, Disp: 90 tablet, Rfl: 3    Vitals: Height 5' (1 524 m), not currently breastfeeding  /80 mmHg  HR 82 per minute  Body mass index is 39 22 kg/m²  There were no vitals filed for this visit    BP Readings from Last 3 Encounters:   21 120/80   21 120/82   21 118/80         PHYSICAL EXAMINATION:  Neurologic:  Alert & oriented x 3, no new focal deficits, Not in any acute distress,  Constitutional:  Well developed, well nourished, non-toxic appearance   Eyes: Pupil equal and reacting to light, conjunctiva normal, No JVP, No LNP   HENT:  Atraumatic, oropharynx moist, Neck- normal range of motion, no tenderness,  Neck supple   Respiratory:  Bilateral air entry, mostly clear to auscultation  Cardiovascular: S1-S2 regular with a I/VI systolic murmur   GI:  Soft, nondistended, normal bowel sounds, nontender, no hepatosplenomegaly appreciated  Musculoskeletal:  No edema, no tenderness, no deformities  Skin:  Well hydrated, no rash   Lymphatic:  No lymphadenopathy noted   Extremities:  No edema and distal pulses are present    Diagnostic Studies Review Cardio:      EKG:  Normal sinus rhythm, heart rate 82 per minute, low-voltage QRS, iRBBB, prolonged QT    Cardiac testing:   No results found for this or any previous visit  Imaging:  Chest X-Ray:   No Chest XR results available for this patient  CT-scan of the chest:     No CTA results available for this patient    Lab Review   Lab Results   Component Value Date    WBC 6 14 11/01/2018    HGB 14 5 11/01/2018    HCT 44 5 11/01/2018    MCV 95 11/01/2018    RDW 13 0 11/01/2018     11/01/2018     BMP:  Lab Results   Component Value Date    SODIUM 141 11/12/2020    K 4 7 11/12/2020     11/12/2020    CO2 24 11/12/2020    BUN 23 11/12/2020    CREATININE 0 68 11/12/2020    GLUC 89 11/12/2020    GLUF 88 02/22/2018    CALCIUM 8 5 11/01/2018    EGFR 106 11/01/2018    MG 1 8 06/14/2017     LFT:  Lab Results   Component Value Date    AST 16 11/12/2020    ALT 14 11/12/2020    ALKPHOS 60 11/01/2018    TP 6 7 11/12/2020    ALB 4 2 11/12/2020      Lab Results   Component Value Date    IJO3LHWFJDWI 5 056 (H) 06/13/2017     No components found for: TSH3  No results found for: HGBA1C  Lipid Profile:   Lab Results   Component Value Date    CHOLESTEROL 183 11/12/2020    HDL 84 11/12/2020    LDLCALC 89 11/12/2020    TRIG 49 11/12/2020     Lab Results   Component Value Date    CHOLESTEROL 183 11/12/2020     Lab Results   Component Value Date    TROPONINI <0 02 11/01/2018     Lab Results   Component Value Date    NTBNP 71 11/01/2018      No results found for this or any previous visit (from the past 672 hour(s))  Dr Garrett Toribio MD, [facility]      "This note has been constructed using a voice recognition system  Therefore there may be syntax, spelling, and/or grammatical errors  Please call if you have any questions  "    ADDENDUM:  06/09/2021:    Patient's echocardiogram was denied by the insurance company  In view of above mentioned evaluation performed on 05/25/2021, patient's risk of perioperative cardiac complications with bariatric surgery is probably intermediate/acceptable      Dr Garrett Toribio MD, [facility]

## 2021-05-25 NOTE — TELEPHONE ENCOUNTER
Spoke with [facility] she is having difficulty with support group, told her I can email her the pod cast  Needed clarification for the pcp letter  She will  labslips Friday  Also met with cardio they are sending her for an echo which is scheduled in June  Met with sleep and had a study dx of mild MAREK

## 2021-05-26 NOTE — TELEPHONE ENCOUNTER
Returned the patient's call  Advised patient sleep study results  Patient states he insurance will only allow her to be seen in [place]     Scheduled patient with Dr Nanda Chou at the [facility]monary office

## 2021-05-28 ENCOUNTER — OFFICE VISIT (OUTPATIENT)
Dept: BARIATRICS | Facility: CLINIC | Age: 51
End: 2021-05-28

## 2021-05-28 VITALS — HEIGHT: 60 IN | BODY MASS INDEX: 39.78 KG/M2 | WEIGHT: 202.6 LBS

## 2021-05-28 DIAGNOSIS — E66.9 OBESITY, CLASS II, BMI 35-39.9: Primary | ICD-10-CM

## 2021-05-28 PROCEDURE — RECHECK

## 2021-05-28 NOTE — PROGRESS NOTES
Bariatric Nutrition Follow-up     Type of surgery    Preop (3 + 1 weight check)--today 2 of 3  Surgery Date: TBD  S/p lap band in 2008, removed in 2013 now looking to have RYGB due to severe GERD and regurgitation  Had UGI 5/6 with Dr Bibiana Ruggiero  Surgeon: Dr John Petty  52 y o   female     Wt with BMI of 25: 127 3lbs  Pre-Op Excess Wt: 73 6lbs  Height 5' (1 524 m), weight 91 9 kg (202 lb 9 6 oz), not currently breastfeeding  Body mass index is 39 57 kg/m²  Band in 2008 weight was  220lbs  Ramirez:  140lbs  Average maintain:  150-160lbs  Weight change: +2lbs since went away    Initial weight with surgeon on 4/28:  200 6lbs    Maximum Wt Lost: 80lbs with the band (was 220lbs when got band, got down to 140lbs, average weight maintained 150-160lbs)    Review of History and Medications   Past Medical History:   Diagnosis Date    Anxiety     Back pain     BRCA negative 06/2016    [facility]    Headache     History of chemotherapy 2016    [facility]oad[facility]; at [address] [facility] History of transfusion 06/2017    no adverse reaction    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)     s/p chemotherapy    Obesity (BMI 30-39  9)      Past Surgical History:   Procedure Laterality Date    ABDOMINAL WALL SURGERY Left 9/21/2016    Procedure: DEBRIDEMENT OF LEFT ABDOMINAL TISSUE AND CLOSURE; DEBRIDEMENT OF LEFT BREAST FLAP; SUBMUSCULAR TISSUE EXPANDER PLACEMENT WITH ADM (ACELLULAR DERMAL MATRIX);   Surgeon: Marie Kohler MD;  [facility];  Service:     BREAST BIOPSY  04/2016    with sentinel node biospy    BREAST RECONSTRUCTION Left 12/22/2017    Procedure: REVISION OF LEFT BREAST SCAR, LOCAL FLAP;  Surgeon: Marilyn Black MD;  [facility];  Service: Plastics    [address] OF UTERUS      LAPAROSCOPIC CHOLECYSTECTOMY  2000    LAPAROSCOPIC GASTRIC BANDING  2008    removed 2013    LAPAROSCOPIC GASTRIC BANDING  2013    removal    LIPOSUCTION W/ FAT INJECTION Left 6/8/2017    Procedure: BREAST FAT GRAFTING ;  Surgeon: Dk Nicholas MD;  [facility];  Service:     MASTECTOMY Left 2016    MEDIPORT INSERTION, SINGLE  2016    MT BIOPSY/EXCISION, LYMPH NODE(S) Left 8/10/2016    Procedure: LYMPHOSCINTIGRAPHY; SENTINAL LYMPH NODE BIOPSY (INJECT AT 1100);   Surgeon: Anel Mera MD;  [facility];  Service: Surgical Oncology    MT BREAST RECONSTRUCTION W/FREE FLAP Left 9/19/2016    Procedure: BREAST DELAYED RECONSTRUCTION; DEIP FREE FLAP removal of port-a -cath;  Surgeon: Dk Nicholas MD;  [facility];  Service: Plastics    MT BREAST REDUCTION Right 12/19/2016    Procedure: BREAST REDUCTION/MASTOPEXY ;  Surgeon: Dk Nicholas MD;  [facility];  Service: Plastics    MT HYSTEROSCOPY,W/ENDO [address] N/A 5/21/2018    Procedure: DILATATION AND CURETTAGE (D&C), HYSTEROSCOPY;  Surgeon: Queen Matt MD;  [facility];  Service: Gynecology Oncology    MT INSJ/RPLCMT BREAST IMPLANT SEP DAY MASTECTOMY Left 12/19/2016    Procedure: BREAST TISSUE EXPANDER REMOVAL; BREAST IMPLANT PLACEMENT;  Surgeon: Dk Nicholas MD;  [facility];  Service: Plastics    MT MASTECTOMY, SIMPLE, COMPLETE Left 8/10/2016    Procedure: BREAST MASTECTOMY; FROZEN SECTION ;  Surgeon: Anel Mera MD;  [facility];  Service: Surgical Oncology    MT REMOVAL TISSUE EXPANDER W/O INSERTION IMPLANT Left 1/23/2017    Procedure: BREAST IMPLANT REMOVAL; WASHOUT AND DEBRIDEMENT;  Surgeon: Dk Nicholas MD;  [facility];  Service: Plastics    REVISION OF SCAR ON TORSO N/A 12/19/2016    Procedure: ABDOMINAL SCAR REVISION ;  Surgeon: Dk Nicholas MD;  [facility];  Service:    [facility] WISDOM TOOTH EXTRACTION       Social History     Socioeconomic History    Marital status: /Civil Union     Spouse name: Not on file    Number of children: Not on file    Years of education: Not on file    Highest education level: Not on file Occupational History    Not on file   Social Needs    Financial resource strain: Not on file    Food insecurity     Worry: Not on file     Inability: Not on file    Transportation needs     Medical: Not on file     Non-medical: Not on file   Tobacco Use    Smoking status: Former Smoker     Packs/day:      Years: 15 00     Pack years: 15      Quit date:      Years since quittin 4    Smokeless tobacco: Never Used   Substance and Sexual Activity    Alcohol use: No    Drug use: No    Sexual activity: Not on file   Lifestyle    Physical activity     Days per week: Not on file     Minutes per session: Not on file    Stress: Not on file   Relationships    Social connections     Talks on phone: Not on file     Gets together: Not on file     Attends Adventism service: Not on file     Active member of club or organization: Not on file     Attends meetings of clubs or organizations: Not on file     Relationship status: Not on file    Intimate partner violence     Fear of current or ex partner: Not on file     Emotionally abused: Not on file     Physically abused: Not on file     Forced sexual activity: Not on file   Other Topics Concern    Not on file   Social History Narrative    Not on file       Current Outpatient Medications:     buPROPion (WELLBUTRIN XL) 300 mg 24 hr tablet, Take 300 mg by mouth daily, Disp: , Rfl:     carisoprodol (SOMA) 350 mg tablet, TAKE 1 TABLET BY MOUTH 3 TIMES A DAY AND AT BEDTIME AS NEEDED FOR MUSCLE SPASM, Disp: , Rfl:     ketorolac (TORADOL) 10 mg tablet, TAKE 1 TABLET BY ORAL ROUTE EVERY 6 HOURS AS NEEDED FOR UP TO 5 DAYS TOTAL USE AS NEEDED FOR PAIN, Disp: , Rfl:     methylPREDNISolone 4 MG tablet therapy pack, TAKE 6 TABLETS ON DAY 1 AS DIRECTED ON PACKAGE AND DECREASE BY 1 TAB EACH DAY FOR A TOTAL OF 6 DAYS, Disp: , Rfl:     oxyCODONE (OXY-IR) 5 MG capsule, TAKE 1 CAPSULE BY ORAL ROUTE EVERY 8 HOURS AS NEEDED FOR PAIN FOR PAIN, Disp: , Rfl:     tamoxifen (NOLVADEX) 20 mg tablet, Take 1 tablet (20 mg total) by mouth daily, Disp: 90 tablet, Rfl: 3    Food Intake and Lifestyle Assessment   Food Intake Assessment completed via usual diet recall  Works from [employer] to bed at [employer], wakes around 10:30am in bed, until 11:30am    Plan:  2 [place] shakes  1 protein bar (230cals)  Dinner: 8-9pm:  Plan tonight is salad, sandwich   Snack: Feels this time is her weakness since she is up and working from home  Suggested to limit to a 150 leila snack from list    Beverage intake: water and sugar free beverages  Protein supplement: none at this tiem  Estimated protein intake per day: 50-60gm  Estimated fluid intake per day: 32-40oz per day water or crystal light  Meals eaten away from home: restaurants on weekends--breakfast or farmers market, cheese steak  Will do take out once a month  Typical meal pattern: 2 meals per day and grazes on snacks per day (mostly in the evening)  Eating Behaviors: Consumption of high calorie/ high fat foods, Large portion sizes, Frequent snacking/ grazing, Mindless eating and Emotional eating    Food allergies or intolerances:    Allergies   Allergen Reactions    Ambien [Zolpidem] Hallucinations    Effexor [Venlafaxine] GI Intolerance    Bactrim [Sulfamethoxazole-Trimethoprim] Itching    Morphine Other (See Comments)     Itching, crying, short of breath     Cultural or Latter day considerations: none    Physical Assessment  Physical Activity  Types of exercise: None  Current physical limitations: has aches and pains    Psychosocial Assessment   Support systems: none  Socioeconomic factors: none  Lives in house with 12year old son--she does the cooking and shopping    Nutrition Diagnosis  Diagnosis: Overweight / Obesity (NC-3 3)  Related to: Physical inactivity and Excessive energy intake  As Evidenced by: BMI >25     Nutrition Prescription: Recommend the following diet  Regular    Interventions and Teaching   Discussed pre-op and post-op nutrition guidelines  Patient educated and handouts provided  Surgical changes to stomach / GI  Capacity of post-surgery stomach  Diet progression  Adequate hydration  Expected weight loss  Weight loss plateaus/ possibility of weight regain  Exercise  Suggestions for pre-op diet  Nutrition considerations after surgery  Protein supplements  Meal planning and preparation  Appropriate carbohydrate, protein, and fat intake, and food/fluid choices to maximize safe weight loss, nutrient intake, and tolerance   Dietary and lifestyle changes  Possible problems with poor eating habits  Intuitive eating  Techniques for self monitoring and keeping daily food journal  Vitamin / Mineral supplementation of Multivitamin with minerals and Vitamin D--started taking a general multi and n-3     Education provided to: patient    Barriers to learning: No barriers identified  Readiness to change: preparation    Prior research on procedure: books, internet, discussed with provider, friends or family and previous wt  loss surgery (had lap band in 2008, out in 2013)    Comprehension: verbalizes understanding     Expected Compliance: good    Evaluation / Monitoring  Dietitian to Monitor: Eating pattern as discussed Tolerance of nutrition prescription Body weight Lab values Physical activity    Pt went away so presents with 2lb weight gain  She does qualify to proceed with surgery if her BMI remains under 40 therefore discussed losing at least 10lbs by day of surgery, but can go down to a BMI of 35  Pt has tired some protein sakes and would likely to use them for 1-2 meals and then have the other meals of protein and veggies  Discussed healthy snacks to choose from and provided with a list, especially since she says her weak time is late in the evening when she has to be up to work on the computer at home        Goals  Food journal via baritiastic  Exercise 30 minutes 5 times per week--can break into 10 mins 3x/week  Complete lesson plans 1-6  Can use protein shake as a meal replacement for 1-2 meals per day  Eliminate mindless snacking--choose one planned snack from list in the evening when working  Pre-op radha loss:  -10 from start weight of 200lbs, therefore 190lbs by day of surgery      Time Spent:   30 mins

## 2021-06-09 ENCOUNTER — TELEPHONE (OUTPATIENT)
Dept: CARDIOLOGY CLINIC | Facility: CLINIC | Age: 51
End: 2021-06-09

## 2021-06-09 NOTE — TELEPHONE ENCOUNTER
Fax from [facility] echo was denied   For to discuss with medical director you can call [phone]  Case #[id number]

## 2021-06-10 LAB
ALBUMIN SERPL-MCNC: 4.2 G/DL (ref 3.8–4.8)
ALBUMIN/GLOB SERPL: 1.9 {RATIO} (ref 1.2–2.2)
ALP SERPL-CCNC: 50 IU/L (ref 48–121)
ALT SERPL-CCNC: 16 IU/L (ref 0–32)
AST SERPL-CCNC: 13 IU/L (ref 0–40)
BILIRUB SERPL-MCNC: 0.4 MG/DL (ref 0–1.2)
BUN SERPL-MCNC: 19 MG/DL (ref 6–24)
BUN/CREAT SERPL: 25 (ref 9–23)
CALCIUM SERPL-MCNC: 9.4 MG/DL (ref 8.7–10.2)
CHLORIDE SERPL-SCNC: 106 MMOL/L (ref 96–106)
CHOLEST SERPL-MCNC: 186 MG/DL (ref 100–199)
CHOLEST/HDLC SERPL: 2.9 RATIO (ref 0–4.4)
CO2 SERPL-SCNC: 27 MMOL/L (ref 20–29)
CREAT SERPL-MCNC: 0.76 MG/DL (ref 0.57–1)
ERYTHROCYTE [DISTWIDTH] IN BLOOD BY AUTOMATED COUNT: 13 % (ref 11.7–15.4)
EST. AVERAGE GLUCOSE BLD GHB EST-MCNC: 105 MG/DL
GLOBULIN SER-MCNC: 2.2 G/DL (ref 1.5–4.5)
GLUCOSE SERPL-MCNC: 88 MG/DL (ref 65–99)
HBA1C MFR BLD: 5.3 % (ref 4.8–5.6)
HCT VFR BLD AUTO: 39.8 % (ref 34–46.6)
HDLC SERPL-MCNC: 65 MG/DL
HGB BLD-MCNC: 13.2 G/DL (ref 11.1–15.9)
LDLC SERPL CALC-MCNC: 107 MG/DL (ref 0–99)
MCH RBC QN AUTO: 30.6 PG (ref 26.6–33)
MCHC RBC AUTO-ENTMCNC: 33.2 G/DL (ref 31.5–35.7)
MCV RBC AUTO: 92 FL (ref 79–97)
PLATELET # BLD AUTO: 293 X10E3/UL (ref 150–450)
POTASSIUM SERPL-SCNC: 4.5 MMOL/L (ref 3.5–5.2)
PROT SERPL-MCNC: 6.4 G/DL (ref 6–8.5)
RBC # BLD AUTO: 4.32 X10E6/UL (ref 3.77–5.28)
SL AMB EGFR AFRICAN AMERICAN: 107 ML/MIN/1.73
SL AMB EGFR NON AFRICAN AMERICAN: 93 ML/MIN/1.73
SL AMB VLDL CHOLESTEROL CALC: 14 MG/DL (ref 5–40)
SODIUM SERPL-SCNC: 141 MMOL/L (ref 134–144)
TRIGL SERPL-MCNC: 74 MG/DL (ref 0–149)
TSH SERPL DL<=0.005 MIU/L-ACNC: 2.19 UIU/ML (ref 0.45–4.5)
WBC # BLD AUTO: 7.2 X10E3/UL (ref 3.4–10.8)

## 2021-06-11 ENCOUNTER — TELEPHONE (OUTPATIENT)
Dept: BARIATRICS | Facility: CLINIC | Age: 51
End: 2021-06-11

## 2021-06-11 NOTE — TELEPHONE ENCOUNTER
Called pt and reviewed labs from 6/9  Advised pt that all acceptable for surgery  Pt verbalizes understanding and without questions at this time

## 2021-06-21 ENCOUNTER — TELEPHONE (OUTPATIENT)
Dept: CARDIOLOGY CLINIC | Facility: CLINIC | Age: 51
End: 2021-06-21

## 2021-06-21 NOTE — TELEPHONE ENCOUNTER
Patient called today in regards to reconsideration denial    Would you still be willing to clear patient for surgery?   Surgery bariatric - tbd -

## 2021-06-23 NOTE — TELEPHONE ENCOUNTER
I spoke with patient, made aware  She was inquiring about cancelling echo  Attempted to cancel echo for patient, wasn't aware had to go through central scheduling  I called patient back to make aware and had to leave a message

## 2021-06-28 ENCOUNTER — OFFICE VISIT (OUTPATIENT)
Dept: BARIATRICS | Facility: CLINIC | Age: 51
End: 2021-06-28

## 2021-06-28 VITALS — BODY MASS INDEX: 40.15 KG/M2 | WEIGHT: 205.6 LBS

## 2021-06-28 DIAGNOSIS — E66.9 OBESITY, CLASS II, BMI 35-39.9: Primary | ICD-10-CM

## 2021-06-28 PROCEDURE — RECHECK

## 2021-06-28 NOTE — PROGRESS NOTES
Patient presented to weight check 2 of 3 with a weight gain of 3lbs, patient reports that she is struggling to balance healthy eating habits with her nontraditional work schedule  Patient struggled to identify exactly what she is doing and what needs to change in order to help with weight loss  She kept stating that she would "have to figure it out" while SW offered support and guidance in trying to strategies  She is not having breakfast, usually grabbing something unplanned midday as her first meal   She will then have dinner but couldn't identify exactly what she's having and is still eating out of boredom as she is working  She is not logging her meals but agrees that would probably help with accountability  Workflow was reviewed, patient's cardiology and sleep consults were done, she is scheduled to meet with pulmonology to go over sleep study results  Patient reports that she did have a UGI so she doesn't think she needs an EGD, email sent to  to confirm this  Patient reports she was not aware that she needed to get to surgery weight of 190lbs, she thought she should just work on weight loss  SW reviewed RD notes that clarified this was discussed but SW encouraged patient to just focus on the behaviors she has control over, focus on tracking foods and making healthy choices and the weight loss will come with it  Team will continue to support as well as pre-op diet if patient is appropriate to go on it prior to surgery to help with weight loss  Patient is scheduled to return for weight check 3 of 3 with Dr Angel Gee in July

## 2021-07-16 ENCOUNTER — TELEPHONE (OUTPATIENT)
Dept: BARIATRICS | Facility: CLINIC | Age: 51
End: 2021-07-16

## 2021-07-16 NOTE — TELEPHONE ENCOUNTER
Spoke with patient  She's concerned that she will not reach her weight loss goal before submitting for insurance  Per Dr Carlos Jackson, patient needs to stop gaining weight; liquid diet should help prior to surgery  Pt asked about ordering products to help, so I confirmed with Trace Sibley that she is in our system for products  She has never used the online system before, so she was told she can come in and the  can log in to order for her as long as she pays with a credit card  She was informed to not put herself on VLCD and that she needs to speak with an RD  Pt will come in on Monday 7/19/21 to order product after she speaks to Georgina Koehler ([email or web address]) virtually to discuss further options  She was offered an in office appointment, if willing to travel, to use the scale in the office, but she did not want to travel due to her insurance  I did explain to her that the 7/19/21 visit with Georgina Koehler was free of charge but she did not want to travel  Pt also has follow up scheduled with Dr Carlos Jackson on 7/30/21  This is the last visit before submitting to her insurance

## 2021-07-16 NOTE — TELEPHONE ENCOUNTER
Attempted to reach out to patient to address any questions she may have  Left a message on her voicemail to call back

## 2021-07-16 NOTE — TELEPHONE ENCOUNTER
Patient called stated that she has been trying to reach   since June Called and left a message with Kenneth Chen  to follow up

## 2021-07-19 ENCOUNTER — PATIENT OUTREACH (OUTPATIENT)
Dept: CASE MANAGEMENT | Facility: HOSPITAL | Age: 51
End: 2021-07-19

## 2021-07-19 ENCOUNTER — TELEMEDICINE (OUTPATIENT)
Dept: BARIATRICS | Facility: CLINIC | Age: 51
End: 2021-07-19

## 2021-07-19 DIAGNOSIS — E66.9 OBESITY, CLASS II, BMI 35-39.9: Primary | ICD-10-CM

## 2021-07-19 PROCEDURE — RECHECK

## 2021-07-19 NOTE — PROGRESS NOTES
Pt has refused services, LSW has removed self from care team       A oncology social worker will be assigned to pt if new referral / request is submitted

## 2021-07-19 NOTE — PROGRESS NOTES
Bariatric Nutrition Follow-up     Patient's name and  were verified during visit  Provider explained that [employer] is a HIPPA compliant platform and to further protect their confidentiality the provider was alone and the office door was closed  Patient consented to the virtual video visit  This visit is free  Visit today is request by patient re: "products" - was in [facility] program or plan to help her lose weight    Type of surgery    Preop  Needs 3 visit  Had 2 visits and 3rd is scheduled for  with Dr Corinne Brown  Surgery Date: TBD  S/p lap band in , removed in  now looking to have RYGB due to severe GERD and regurgitation  Had UGI  with Dr Corinne Brown  Surgeon: Dr Erasto Boss  52 y o   female   Height: 5'0"  Current weight (Stated) 201# on home scale few days ago  Wt with BMI of 25: 127 3lbs  Pre-Op Excess Wt: 73 6lbs  not currently breastfeeding  There is no height or weight on file to calculate BMI  Band in  weight was  220lbs  Ramirez:  140lbs  Average maintain:  150-160lbs      Initial weight with surgeon on :  200 6lbs    Maximum Wt Lost: 80lbs with the band (was 220lbs when got band, got down to 140lbs, average weight maintained 150-160lbs)    Review of History and Medications   Past Medical History:   Diagnosis Date    Anxiety     Back pain     BRCA negative 2016    [facility]    Headache     History of chemotherapy 2016    Neoadjuvant; at [address] History of transfusion 2017    no adverse reaction    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)     s/p chemotherapy    Obesity (BMI 30-39  9)      Past Surgical History:   Procedure Laterality Date    ABDOMINAL WALL SURGERY Left 2016    Procedure: DEBRIDEMENT OF LEFT ABDOMINAL TISSUE AND CLOSURE; DEBRIDEMENT OF LEFT BREAST FLAP; SUBMUSCULAR TISSUE EXPANDER PLACEMENT WITH ADM (ACELLULAR DERMAL MATRIX);   Surgeon: Anita Bell MD; [facility];  Service:     BREAST BIOPSY  04/2016    with sentinel node biospy    BREAST RECONSTRUCTION Left 12/22/2017    Procedure: REVISION OF LEFT BREAST SCAR, LOCAL FLAP;  Surgeon: Josie Damon MD;  [facility];  Service: Plastics    [address] UTERUS      LAPAROSCOPIC CHOLECYSTECTOMY  2000    LAPAROSCOPIC GASTRIC BANDING  2008    removed 2013    LAPAROSCOPIC GASTRIC BANDING  2013    removal    LIPOSUCTION W/ FAT INJECTION Left 6/8/2017    Procedure: BREAST FAT GRAFTING ;  Surgeon: Salo Pulido MD;  [facility];  Service:     MASTECTOMY Left 2016    MEDIPORT INSERTION, SINGLE  2016    NJ BIOPSY/EXCISION, LYMPH NODE(S) Left 8/10/2016    Procedure: LYMPHOSCINTIGRAPHY; SENTINAL LYMPH NODE BIOPSY (INJECT AT 1100);   Surgeon: Tessa Cr MD;  [facility];  Service: Surgical Oncology    NJ BREAST RECONSTRUCTION W/FREE FLAP Left 9/19/2016    Procedure: BREAST DELAYED RECONSTRUCTION; DEIP FREE FLAP removal of port-a -cath;  Surgeon: Salo Pulido MD;  [facility];  Service: Plastics    NJ BREAST REDUCTION Right 12/19/2016    Procedure: BREAST REDUCTION/MASTOPEXY ;  Surgeon: Salo Pulido MD;  [facility];  Service: Plastics    NJ HYSTEROSCOPY,W/ENDO [address] N/A 5/21/2018    Procedure: DILATATION AND CURETTAGE (D&C), HYSTEROSCOPY;  Surgeon: Johnny Fremean MD;  [facility];  Service: Gynecology Oncology    NJ INSJ/RPLCMT BREAST IMPLANT SEP DAY MASTECTOMY Left 12/19/2016    Procedure: BREAST TISSUE EXPANDER REMOVAL; BREAST IMPLANT PLACEMENT;  Surgeon: Salo Pulido MD;  [facility];  Service: Plastics    NJ MASTECTOMY, SIMPLE, COMPLETE Left 8/10/2016    Procedure: BREAST MASTECTOMY; FROZEN SECTION ;  Surgeon: Tessa Cr MD;  [facility];  Service: Surgical Oncology    NJ REMOVAL TISSUE EXPANDER W/O INSERTION IMPLANT Left 1/23/2017    Procedure: BREAST IMPLANT REMOVAL; WASHOUT AND DEBRIDEMENT;  Surgeon: Salo Pulido MD; [facility];  Service: Plastics    REVISION OF SCAR ON TORSO N/A 2016    Procedure: ABDOMINAL SCAR REVISION ;  Surgeon: Darrion Moss MD;  [facility];  Service:    Dary Birch WISDOM TOOTH EXTRACTION       Social History     Socioeconomic History    Marital status: /Civil Union     Spouse name: Not on file    Number of children: Not on file    Years of education: Not on file    Highest education level: Not on file   Occupational History    Not on file   Tobacco Use    Smoking status: Former Smoker     Packs/day: 1 00     Years: 15      Pack years: 15      Quit date:      Years since quittin 5    Smokeless tobacco: Never Used   Vaping Use    Vaping Use: Never used   Substance and Sexual Activity    Alcohol use: No    Drug use: No    Sexual activity: Not on file   Other Topics Concern    Not on file   Social History Narrative    Not on file     Social Determinants of Health     Financial Resource Strain:     Difficulty of Paying Living Expenses:    Food Insecurity:     Worried About Running Out of Food in the Last Year:     [address] in the Last Year:    Transportation Needs:     Lack of Transportation (Medical):      Lack of Transportation (Non-Medical):    Physical Activity:     Days of Exercise per Week:     Minutes of Exercise per Session:    Stress:     Feeling of Stress :    Social Connections:     Frequency of Communication with Friends and Family:     Frequency of Social Gatherings with Friends and Family:     Attends Confucianism Services:     Active Member of Clubs or Organizations:     Attends Club or Organization Meetings:     Marital Status:    Intimate Partner Violence:     Fear of Current or Ex-Partner:     Emotionally Abused:     Physically Abused:     Sexually Abused:        Current Outpatient Medications:     buPROPion (WELLBUTRIN XL) 300 mg 24 hr tablet, Take 300 mg by mouth daily, Disp: , Rfl:     carisoprodol (SOMA) 350 mg tablet, TAKE 1 TABLET BY MOUTH 3 TIMES A DAY AND AT BEDTIME AS NEEDED FOR MUSCLE SPASM, Disp: , Rfl:     ketorolac (TORADOL) 10 mg tablet, TAKE 1 TABLET BY ORAL ROUTE EVERY 6 HOURS AS NEEDED FOR UP TO 5 DAYS TOTAL USE AS NEEDED FOR PAIN, Disp: , Rfl:     methylPREDNISolone 4 MG tablet therapy pack, TAKE 6 TABLETS ON DAY 1 AS DIRECTED ON PACKAGE AND DECREASE BY 1 TAB EACH DAY FOR A TOTAL OF 6 DAYS, Disp: , Rfl:     oxyCODONE (OXY-IR) 5 MG capsule, TAKE 1 CAPSULE BY ORAL ROUTE EVERY 8 HOURS AS NEEDED FOR PAIN FOR PAIN, Disp: , Rfl:     tamoxifen (NOLVADEX) 20 mg tablet, Take 1 tablet (20 mg total) by mouth daily, Disp: 90 tablet, Rfl: 3    Food Intake and Lifestyle Assessment   Food Intake Assessment completed via usual diet recall  Works from [employer] to bed at [employer], wakes around 10:30am in bed, until 11:30am    Plan:  2 [place] shakes  1 protein bar (230cals)  Dinner: 8-9pm:  Plan tonight is salad, sandwich   Snack: Feels this time is her weakness since she is up and working from home  Suggested to limit to a 150 leila snack from list    Beverage intake: water and sugar free beverages  Protein supplement: none at this tiem  Estimated protein intake per day: 50-60gm  Estimated fluid intake per day: 32-40oz per day water or crystal light  Meals eaten away from home: restaurants on weekends--breakfast or farmers market, cheese steak  Will do take out once a month  Typical meal pattern: 2 meals per day and grazes on snacks per day (mostly in the evening)  Eating Behaviors: Consumption of high calorie/ high fat foods, Large portion sizes, Frequent snacking/ grazing, Mindless eating and Emotional eating    Food allergies or intolerances:    Allergies   Allergen Reactions    Ambien [Zolpidem] Hallucinations    Effexor [Venlafaxine] GI Intolerance    Bactrim [Sulfamethoxazole-Trimethoprim] Itching    Morphine Other (See Comments)     Itching, crying, short of breath     Cultural or Confucianist considerations: none    Physical Assessment  Physical Activity  Types of exercise: None  Current physical limitations: has aches and pains    Psychosocial Assessment   Support systems: none  Socioeconomic factors: none  Lives in house with 12year old son--she does the cooking and shopping    Nutrition Diagnosis  Diagnosis: Overweight / Obesity (NC-3 3)  Related to: Physical inactivity and Excessive energy intake  As Evidenced by: BMI >25     Nutrition Prescription: Recommend the following diet  Regular    Interventions and Teaching   Discussed pre-op and post-op nutrition guidelines  Patient educated and handouts provided  Surgical changes to stomach / GI  Capacity of post-surgery stomach  Diet progression  Adequate hydration  Expected weight loss  Weight loss plateaus/ possibility of weight regain  Exercise  Suggestions for pre-op diet  Nutrition considerations after surgery  Protein supplements  Meal planning and preparation  Appropriate carbohydrate, protein, and fat intake, and food/fluid choices to maximize safe weight loss, nutrient intake, and tolerance   Dietary and lifestyle changes  Possible problems with poor eating habits  Intuitive eating  Techniques for self monitoring and keeping daily food journal  Vitamin / Mineral supplementation of Multivitamin with minerals and Vitamin D--started taking a general multi and n-3     Education provided to: patient    Barriers to learning: No barriers identified  Readiness to change: preparation    Prior research on procedure: books, internet, discussed with provider, friends or family and previous wt  loss surgery (had lap band in 2008, out in 2013)    Comprehension: verbalizes understanding     Expected Compliance: good    Evaluation / Monitoring  Dietitian to Monitor: Eating pattern as discussed Tolerance of nutrition prescription Body weight Lab values Physical activity    Summary from Virtual Visit  Pt  gained weight from start of revision process   Goal to lose 10# by DOS  Pt should lose this by following 2 week preop diet but since gained, trying to lose that initial weight  Bought muscle milk and premier drinks and has some foods from [facility] program and needed to know how to incorporate  Advised on using the protein drinks as meal replacements for 2 meals and then having the 3rd meal to include just protein and vegetables  Pt receptive but admits difficulty  Struggles on weekends when she tends to overindulge in meals  In new relationship so goes out to eat and doesn't keep to a diet  Pt reports that he is aware of her goal for [facility] and supportive  Advised pt to focus more on doing things in relationship vs focusing on the food  Discussed that pt can still go out and order large salad with grilled chicken or fish  Pt receptive  States she knows what to do but just has to do it to be successful re: WLS  Goals  Food journal via baritiastic  Exercise 30 minutes 5 times per week--can break into 10 mins 3x/week  Complete lesson plans 1-6  Use protein shake as a meal replacement for 2 meals per day; 3rd meal of protein and veggies  Eliminate mindless snacking--choose one planned snack from list in the evening when working  Pre-op radha loss:  -10 from start weight of 200lbs, therefore 190lbs by day of surgery    Appt w/Dr Josselyn Jama on 7/30    Time Spent:   30 mins

## 2021-07-20 ENCOUNTER — TELEPHONE (OUTPATIENT)
Dept: HEMATOLOGY ONCOLOGY | Facility: CLINIC | Age: 51
End: 2021-07-20

## 2021-07-20 NOTE — TELEPHONE ENCOUNTER
Patient canceled the 9/8 appt with Dr Pastor Mcconnell due to personal reason will call back to reschedule

## 2021-07-20 NOTE — TELEPHONE ENCOUNTER
Patient  called to reschedule the 7/21 appt with Dr Prem Vaz requested it be moved to October, scheduled 10/13/2021 11:00 am

## 2021-07-30 ENCOUNTER — OFFICE VISIT (OUTPATIENT)
Dept: BARIATRICS | Facility: CLINIC | Age: 51
End: 2021-07-30
Payer: COMMERCIAL

## 2021-07-30 VITALS
SYSTOLIC BLOOD PRESSURE: 118 MMHG | DIASTOLIC BLOOD PRESSURE: 74 MMHG | BODY MASS INDEX: 39.15 KG/M2 | WEIGHT: 199.4 LBS | HEART RATE: 82 BPM | HEIGHT: 60 IN

## 2021-07-30 DIAGNOSIS — F41.9 ANXIETY: ICD-10-CM

## 2021-07-30 DIAGNOSIS — E66.01 MORBID (SEVERE) OBESITY DUE TO EXCESS CALORIES (HCC): Primary | ICD-10-CM

## 2021-07-30 DIAGNOSIS — G47.33 OSA (OBSTRUCTIVE SLEEP APNEA): ICD-10-CM

## 2021-07-30 DIAGNOSIS — F32.A DEPRESSION: ICD-10-CM

## 2021-07-30 PROCEDURE — 99213 OFFICE O/P EST LOW 20 MIN: CPT | Performed by: SURGERY

## 2021-07-30 RX ORDER — ZOLPIDEM TARTRATE 5 MG/1
5 TABLET ORAL
COMMUNITY
Start: 2021-07-13 | End: 2021-09-17 | Stop reason: SDUPTHER

## 2021-07-30 RX ORDER — PHENTERMINE HYDROCHLORIDE 37.5 MG/1
TABLET ORAL
COMMUNITY
Start: 2021-07-12 | End: 2021-09-16 | Stop reason: ALTCHOICE

## 2021-07-30 RX ORDER — LORAZEPAM 0.5 MG/1
0.5 TABLET ORAL 2 TIMES DAILY PRN
COMMUNITY
Start: 2021-07-13 | End: 2022-02-17 | Stop reason: ALTCHOICE

## 2021-07-30 NOTE — PROGRESS NOTES
Progress Note - Bariatric Surgery   [facility] 52 y o  female MRN: [id number]  Unit/Bed#:  Encounter: [id number]    Assessment:  50F here for a weight check  Continues progressing in the preoperative process well     Plan:  1  Continue with preoperative process  2  Still medically necessary to proceed with surgery  3  Liver shrinking diet 800-1000cals for 2 weeks preoperatively      Subjective/Objective     Subjective: Continues with healthy lifestyle changes  Continues with GERD and regurgitation episodes    Objective:     Height 5' (1 524 m), weight 90 4 kg (199 lb 6 4 oz), not currently breastfeeding  ,Body mass index is 38 94 kg/m²  Physical Exam: NAD, EOMI, ND, full ROM      Lab, Imaging and other studies:I have personally reviewed pertinent lab results and imaging in PACS

## 2021-08-03 ENCOUNTER — TELEPHONE (OUTPATIENT)
Dept: BARIATRICS | Facility: CLINIC | Age: 51
End: 2021-08-03

## 2021-08-09 ENCOUNTER — PREP FOR PROCEDURE (OUTPATIENT)
Dept: BARIATRICS | Facility: CLINIC | Age: 51
End: 2021-08-09

## 2021-08-09 DIAGNOSIS — K21.9 ESOPHAGEAL REFLUX: ICD-10-CM

## 2021-08-09 DIAGNOSIS — E66.01 MORBID OBESITY (HCC): Primary | ICD-10-CM

## 2021-08-09 DIAGNOSIS — G47.33 OBSTRUCTIVE SLEEP APNEA (ADULT) (PEDIATRIC): ICD-10-CM

## 2021-08-09 DIAGNOSIS — Z98.84 BARIATRIC SURGERY STATUS: ICD-10-CM

## 2021-08-20 ENCOUNTER — OFFICE VISIT (OUTPATIENT)
Dept: BARIATRICS | Facility: CLINIC | Age: 51
End: 2021-08-20

## 2021-08-20 VITALS — BODY MASS INDEX: 38.86 KG/M2 | WEIGHT: 199 LBS

## 2021-08-20 DIAGNOSIS — E66.01 MORBID (SEVERE) OBESITY DUE TO EXCESS CALORIES (HCC): Primary | ICD-10-CM

## 2021-08-20 PROCEDURE — RECHECK

## 2021-08-20 NOTE — PROGRESS NOTES
Patient presents for pre-op check in with a weight loss of 0 4lbs since last visit  Eating behaviors/food choices: Patient continues efforts to manage eating habits  Denies any excessive calorie consumption or snacking  Patient is going on a vacation two and a half weeks after surgery so she is figuring out meal plans, pros/cons of sitting with her boyfriend at the restaurant while he eats or him just getting take out so she can stick to her meal plan back at the resort they are staying  EMILY suggested patient bringing all of her foods with her, planning meals for each day and tracking  Activity/Exercise:  Patient plans to be active while on vacation  Mental Health/Wellness:  Patient joined a Bariatric Facebook group outside of [facility] and realizes that they may not be offering the best support for her journey  SW reminded patient of Weight Management Bariatric group that she could join which she did so during visit  SW pointed out that other groups may not be guided by professionals, each person's journey is individual and that following guidelines with her medical team will support her progress  Patient is working through how to talk with her son about the surgery as well as how to respond to people who are not fully supportive of this choice  EMILY encouraged patient to come up with a script of responses for the common questions or comments, offering them education about her choice but ultimately if they don't want to learn she doesn't own them an explanation  Workflow review:  Patient is scheduled for surgery 10/4     Goals:  Patient will continue efforts to manage eating habits and food choices, considering activity and quality rest to help with overall health      Next Appointment:  Final H&P with Dr Mario Aleman 9/17

## 2021-09-03 ENCOUNTER — OFFICE VISIT (OUTPATIENT)
Dept: BARIATRICS | Facility: CLINIC | Age: 51
End: 2021-09-03

## 2021-09-03 DIAGNOSIS — E66.9 OBESITY, CLASS II, BMI 35-39.9: Primary | ICD-10-CM

## 2021-09-03 PROCEDURE — RECHECK

## 2021-09-03 NOTE — PROGRESS NOTES
Weight Management Nutrition Class     Diagnosis: Obesity    Bariatric Surgeon: Dr Jayant Lo     Surgery: Gastric Bypass Laparoscopic    Class:Pre -Op    Pt attended VIRTUAL pre-op education session  Standardized packet of information for bariatric surgery was sent via email and was reviewed with pt  Importance of lifestyle change and development of regular exercise routine stressed  Pt  educated on two-week pre operative liver shrinking diet  Pt understands that the diet needs to be followed for 2 weeks prior to surgery  Handout reviewed  Emphasized the need to drink 80 ounces of fluid per day while on the diet  Reviewed pre-op ERAS drink, post-operative clear liquid, full liquid, and pureed diet, post-operative nutrition rules and facts, and post-operative bariatric multivitamin/mineral recommendations and brand comparison  Contact information provided for any additional questions/concerns  Pt verbalized understanding of all information provided  Pt appeared prepared for upcoming surgery

## 2021-09-16 ENCOUNTER — OFFICE VISIT (OUTPATIENT)
Dept: FAMILY MEDICINE CLINIC | Facility: CLINIC | Age: 51
End: 2021-09-16
Payer: COMMERCIAL

## 2021-09-16 VITALS
BODY MASS INDEX: 38.86 KG/M2 | HEIGHT: 60 IN | SYSTOLIC BLOOD PRESSURE: 122 MMHG | TEMPERATURE: 97.2 F | RESPIRATION RATE: 16 BRPM | DIASTOLIC BLOOD PRESSURE: 72 MMHG

## 2021-09-16 DIAGNOSIS — Z17.0 MALIGNANT NEOPLASM OF UPPER-OUTER QUADRANT OF LEFT BREAST IN FEMALE, ESTROGEN RECEPTOR POSITIVE (HCC): ICD-10-CM

## 2021-09-16 DIAGNOSIS — F32.A DEPRESSION, UNSPECIFIED DEPRESSION TYPE: ICD-10-CM

## 2021-09-16 DIAGNOSIS — G47.33 OSA (OBSTRUCTIVE SLEEP APNEA): ICD-10-CM

## 2021-09-16 DIAGNOSIS — C50.412 MALIGNANT NEOPLASM OF UPPER-OUTER QUADRANT OF LEFT BREAST IN FEMALE, ESTROGEN RECEPTOR POSITIVE (HCC): ICD-10-CM

## 2021-09-16 DIAGNOSIS — F41.9 ANXIETY: ICD-10-CM

## 2021-09-16 DIAGNOSIS — Z00.00 WELL ADULT EXAM: Primary | ICD-10-CM

## 2021-09-16 PROCEDURE — 99386 PREV VISIT NEW AGE 40-64: CPT | Performed by: FAMILY MEDICINE

## 2021-09-16 PROCEDURE — 3725F SCREEN DEPRESSION PERFORMED: CPT | Performed by: FAMILY MEDICINE

## 2021-09-16 RX ORDER — BUPROPION HYDROCHLORIDE 150 MG/1
150 TABLET, EXTENDED RELEASE ORAL 2 TIMES DAILY
Qty: 180 TABLET | Refills: 0 | Status: SHIPPED | OUTPATIENT
Start: 2021-09-16 | End: 2021-12-09

## 2021-09-16 NOTE — PROGRESS NOTES
FAMILY PRACTICE HEALTH MAINTENANCE OFFICE VISIT  [facility]    NAME: Hailee Espinoza  AGE: 52 y o  SEX: female  : 1970     DATE: 2021    Assessment and Plan     1  Well adult exam    2  Malignant neoplasm of upper-outer quadrant of left breast in female, estrogen receptor positive ([facility] [address] )  Comments:  managed by oncology  s/p left mastecomy and chemotherapy  currently on tamoxifen  3  MAREK (obstructive sleep apnea)  Comments:  mild, not on CPAP    4  Depression, unspecified depression type  Comments:  controlled on wellbutrin  Orders:  -     buPROPion (WELLBUTRIN SR) 150 mg 12 hr tablet; Take 1 tablet (150 mg total) by mouth 2 (two) times a day    5  Anxiety  Comments:  continue wellbutrin and prn ativan  Orders:  -     buPROPion (WELLBUTRIN SR) 150 mg 12 hr tablet; Take 1 tablet (150 mg total) by mouth 2 (two) times a day        · Patient Counseling:   · Nutrition: Stressed importance of a well balanced diet, moderation of sodium/saturated fat, caloric balance and sufficient intake of fiber  · Exercise: Stressed the importance of regular exercise with a goal of 150 minutes per week  · Dental Health: Discussed daily flossing and brushing and regular dental visits   · Immunizations reviewed: Up To Date   BMI Counseling: Body mass index is 38 86 kg/m²  Discussed with patient's BMI with her  She will be undergoing bariatric surgery next month  Has been seeing weight management center  No follow-ups on file          Chief Complaint     Chief Complaint   Patient presents with   [address] [facility] [address] Patient Visit     Bariatric surgery 10/4/21 mz cma    Physical Exam       History of Present Illness     HPI    Well Adult Physical   Patient here for a comprehensive physical exam       Diet and Physical Activity  Diet: well balanced diet  Exercise: rarely      Depression Screen  PHQ-9 Depression Screening    PHQ-9:   Frequency of the following problems over the past two weeks: Little interest or pleasure in doing things: 0 - not at all  Feeling down, depressed, or hopeless: 0 - not at all  Trouble falling or staying asleep, or sleeping too much: 2 - more than half the days  Feeling tired or having little energy: 2 - more than half the days  Poor appetite or overeatin - several days  Feeling bad about yourself - or that you are a failure or have let yourself or your family down: 0 - not at all  Trouble concentrating on things, such as reading the newspaper or watching television: 0 - not at all  Moving or speaking so slowly that other people could have noticed  Or the opposite - being so fidgety or restless that you have been moving around a lot more than usual: 0 - not at all  Thoughts that you would be better off dead, or of hurting yourself in some way: 0 - not at all  PHQ-2 Score: 0  PHQ-9 Score: 5          General Health  Hearing: Normal:  bilateral  Vision: legally blind in right eye, no problem with left eye  Dental: regular dental visits, brushes teeth twice daily and flosses teeth occasionally    Reproductive Health  No issues , Irregular Periods and Follows with gynecologist      The following portions of the patient's history were reviewed and updated as appropriate: allergies, current medications, past family history, past medical history, past social history, past surgical history and problem list     Review of Systems     Review of Systems   Constitutional: Negative  HENT: Negative  Eyes: Negative  Respiratory: Negative  Cardiovascular: Negative  Gastrointestinal: Negative  Endocrine: Negative  Genitourinary: Negative  Musculoskeletal: Negative  Skin: Negative  Allergic/Immunologic: Negative  Neurological: Negative  Hematological: Negative  Psychiatric/Behavioral: Negative          Past Medical History     Past Medical History:   Diagnosis Date    Anxiety     Back pain     BRCA negative 2016    [facility]    Headache     History of chemotherapy 2016    Neoadjuvant; at [address] History of transfusion 06/2017    no adverse reaction    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)     s/p chemotherapy    Obesity (BMI 30-39  9)        Past Surgical History     Past Surgical History:   Procedure Laterality Date    ABDOMINAL WALL SURGERY Left 9/21/2016    Procedure: DEBRIDEMENT OF LEFT ABDOMINAL TISSUE AND CLOSURE; DEBRIDEMENT OF LEFT BREAST FLAP; SUBMUSCULAR TISSUE EXPANDER PLACEMENT WITH ADM (ACELLULAR DERMAL MATRIX); Surgeon: Jennifer Joaquin MD;  [facility];  Service:     BREAST BIOPSY  04/2016    with sentinel node biospy    BREAST RECONSTRUCTION Left 12/22/2017    Procedure: REVISION OF LEFT BREAST SCAR, LOCAL FLAP;  Surgeon: Consuelo Dyer MD;  [facility];  Service: Plastics    [address] [place] [address] Drive OF UTERUS      LAPAROSCOPIC CHOLECYSTECTOMY  2000    LAPAROSCOPIC GASTRIC BANDING  2008    removed 2013    LAPAROSCOPIC GASTRIC BANDING  2013    removal    LIPOSUCTION W/ FAT INJECTION Left 6/8/2017    Procedure: BREAST FAT GRAFTING ;  Surgeon: Jennifer Joaquin MD;  [facility];  Service:     MASTECTOMY Left 2016    MEDIPORT INSERTION, SINGLE  2016    LA BIOPSY/EXCISION, LYMPH NODE(S) Left 8/10/2016    Procedure: LYMPHOSCINTIGRAPHY; SENTINAL LYMPH NODE BIOPSY (INJECT AT 1100);   Surgeon: Svetlana Otero MD;  [facility];  Service: Surgical Oncology    LA BREAST RECONSTRUCTION W/FREE FLAP Left 9/19/2016    Procedure: BREAST DELAYED RECONSTRUCTION; DEIP FREE FLAP removal of port-a -cath;  Surgeon: Jennifer Joaquin MD;  [facility];  Service: Plastics    LA BREAST REDUCTION Right 12/19/2016    Procedure: BREAST REDUCTION/MASTOPEXY ;  Surgeon: Jennifer Joaquin MD;  [facility];  Service: Plastics    LA HYSTEROSCOPY,W/ENDO [address] N/A 5/21/2018    Procedure: DILATATION AND CURETTAGE (D&C), HYSTEROSCOPY;  Surgeon: Katrina Sepulveda MD;  [facility];  Service: Gynecology Oncology    MT INSJ/RPLCMT BREAST IMPLANT SEP DAY MASTECTOMY Left 2016    Procedure: BREAST TISSUE EXPANDER REMOVAL; BREAST IMPLANT PLACEMENT;  Surgeon: Tucker Lloyd MD;  [facility];  Service: Plastics    MT MASTECTOMY, SIMPLE, COMPLETE Left 8/10/2016    Procedure: BREAST MASTECTOMY; FROZEN SECTION ;  Surgeon: Parish Nix MD;  [facility];  Service: Surgical Oncology    MT REMOVAL TISSUE EXPANDER W/O INSERTION IMPLANT Left 2017    Procedure: BREAST IMPLANT REMOVAL; WASHOUT AND DEBRIDEMENT;  Surgeon: Tucker Lloyd MD;  [facility];  Service: Plastics    REVISION OF SCAR ON TORSO N/A 2016    Procedure: ABDOMINAL SCAR REVISION ;  Surgeon: Tucker Lloyd MD;  [facility];  Service:    [facility] TOOTH EXTRACTION         Social History     Social History     Socioeconomic History    Marital status: /Civil Union     Spouse name: None    Number of children: None    Years of education: None    Highest education level: None   Occupational History    None   Tobacco Use    Smoking status: Former Smoker     Packs/day: 1 00     Years: 15 00     Pack years: 15 00     Quit date:      Years since quittin 7    Smokeless tobacco: Never Used   Vaping Use    Vaping Use: Never used   Substance and Sexual Activity    Alcohol use: No    Drug use: No    Sexual activity: None   Other Topics Concern    None   Social History Narrative    None     Social Determinants of Health     Financial Resource Strain:     Difficulty of Paying Living Expenses:    Food Insecurity:     Worried About Running Out of Food in the Last Year:     Ran Out of Food in the Last Year:    Transportation Needs:     Lack of Transportation (Medical):      Lack of Transportation (Non-Medical):    Physical Activity:     Days of Exercise per Week:     Minutes of Exercise per Session:    Stress:     Feeling of Stress :    Social Connections:     Frequency of Communication with Friends and Family:     Frequency of Social Gatherings with Friends and Family:     Attends Gnosticist Services:     Active Member of Clubs or Organizations:     Attends Club or Organization Meetings:     Marital Status:    Intimate Partner Violence:     Fear of Current or Ex-Partner:     Emotionally Abused:     Physically Abused:     Sexually Abused:        Family History     Family History   Problem Relation Age of Onset    Diabetes Mother     Heart disease Mother     Kidney disease Mother     Obesity Mother     Other Father     Cancer Father         melanoma    Obesity Father     Diabetes Maternal Aunt     Stroke Neg Hx        Current Medications       Current Outpatient Medications:     LORazepam (ATIVAN) 0 5 mg tablet, Take 0 5 mg by mouth 2 (two) times a day as needed, Disp: , Rfl:     tamoxifen (NOLVADEX) 20 mg tablet, Take 1 tablet (20 mg total) by mouth daily (Patient taking differently: Take 20 mg by mouth daily Stopped on Monday for upcoming surgery ), Disp: 90 tablet, Rfl: 3    zolpidem (AMBIEN) 5 mg tablet, Take 5 mg by mouth daily at bedtime, Disp: , Rfl:     buPROPion (WELLBUTRIN SR) 150 mg 12 hr tablet, Take 1 tablet (150 mg total) by mouth 2 (two) times a day, Disp: 180 tablet, Rfl: 0     Allergies     Allergies   Allergen Reactions    Ambien [Zolpidem] Hallucinations    Effexor [Venlafaxine] GI Intolerance    Bactrim [Sulfamethoxazole-Trimethoprim] Itching    Morphine Other (See Comments)     Itching, crying, short of breath       Objective     /72   Temp (!) 97 2 °F (36 2 °C)   Resp 16   Ht 5' (1 524 m)   BMI 38 86 kg/m²      Physical Exam  Constitutional:       General: She is not in acute distress  Appearance: Normal appearance  She is well-developed  She is not diaphoretic  HENT:      Head: Normocephalic and atraumatic  Right Ear: Tympanic membrane, ear canal and external ear normal  There is no impacted cerumen        Left Ear: Tympanic membrane, ear canal and external ear normal  There is no impacted cerumen  Eyes:      General: No scleral icterus  Right eye: No discharge  Left eye: No discharge  Extraocular Movements: Extraocular movements intact  Conjunctiva/sclera: Conjunctivae normal       Pupils: Pupils are equal, round, and reactive to light  Cardiovascular:      Rate and Rhythm: Normal rate and regular rhythm  Heart sounds: Normal heart sounds  No murmur heard  No friction rub  No gallop  Pulmonary:      Effort: Pulmonary effort is normal  No respiratory distress  Breath sounds: Normal breath sounds  No wheezing or rales  Chest:      Chest wall: No tenderness  Abdominal:      General: Bowel sounds are normal  There is no distension  Palpations: Abdomen is soft  There is no mass  Tenderness: There is no abdominal tenderness  There is no guarding or rebound  Musculoskeletal:         General: No deformity  Normal range of motion  Cervical back: Normal range of motion and neck supple  Skin:     General: Skin is warm and dry  Findings: No erythema or rash  Neurological:      Mental Status: She is alert and oriented to person, place, and time  Psychiatric:         Behavior: Behavior normal          Thought Content: Thought content normal          Judgment: Judgment normal            Vision Screening Comments: Has eye dr Feng Gomes blind in right eye    MD DENIZ Ya [facility]  [facility]

## 2021-09-17 ENCOUNTER — TELEPHONE (OUTPATIENT)
Dept: FAMILY MEDICINE CLINIC | Facility: CLINIC | Age: 51
End: 2021-09-17

## 2021-09-17 ENCOUNTER — OFFICE VISIT (OUTPATIENT)
Dept: BARIATRICS | Facility: CLINIC | Age: 51
End: 2021-09-17
Payer: COMMERCIAL

## 2021-09-17 VITALS
BODY MASS INDEX: 40.01 KG/M2 | SYSTOLIC BLOOD PRESSURE: 112 MMHG | HEIGHT: 60 IN | DIASTOLIC BLOOD PRESSURE: 78 MMHG | HEART RATE: 89 BPM | WEIGHT: 203.8 LBS

## 2021-09-17 DIAGNOSIS — F32.A DEPRESSION, UNSPECIFIED DEPRESSION TYPE: ICD-10-CM

## 2021-09-17 DIAGNOSIS — G47.00 INSOMNIA, UNSPECIFIED TYPE: Primary | ICD-10-CM

## 2021-09-17 DIAGNOSIS — E66.01 MORBID (SEVERE) OBESITY DUE TO EXCESS CALORIES (HCC): Primary | ICD-10-CM

## 2021-09-17 DIAGNOSIS — F41.9 ANXIETY: ICD-10-CM

## 2021-09-17 DIAGNOSIS — K21.9 GERD (GASTROESOPHAGEAL REFLUX DISEASE): ICD-10-CM

## 2021-09-17 DIAGNOSIS — G47.33 OSA (OBSTRUCTIVE SLEEP APNEA): ICD-10-CM

## 2021-09-17 PROCEDURE — 1036F TOBACCO NON-USER: CPT | Performed by: SURGERY

## 2021-09-17 PROCEDURE — 99213 OFFICE O/P EST LOW 20 MIN: CPT | Performed by: SURGERY

## 2021-09-17 RX ORDER — GABAPENTIN 300 MG/1
600 CAPSULE ORAL ONCE
Status: CANCELLED | OUTPATIENT
Start: 2021-10-04 | End: 2021-09-17

## 2021-09-17 RX ORDER — SCOLOPAMINE TRANSDERMAL SYSTEM 1 MG/1
1 PATCH, EXTENDED RELEASE TRANSDERMAL ONCE
Status: CANCELLED | OUTPATIENT
Start: 2021-10-04 | End: 2021-09-17

## 2021-09-17 RX ORDER — HEPARIN SODIUM 5000 [USP'U]/ML
5000 INJECTION, SOLUTION INTRAVENOUS; SUBCUTANEOUS
Status: CANCELLED | OUTPATIENT
Start: 2021-10-04 | End: 2021-10-05

## 2021-09-17 RX ORDER — ZOLPIDEM TARTRATE 5 MG/1
5 TABLET ORAL
Qty: 30 TABLET | Refills: 0 | Status: SHIPPED | OUTPATIENT
Start: 2021-09-17 | End: 2021-12-13 | Stop reason: SDUPTHER

## 2021-09-17 RX ORDER — ACETAMINOPHEN 325 MG/1
975 TABLET ORAL ONCE
Status: CANCELLED | OUTPATIENT
Start: 2021-10-04 | End: 2021-09-17

## 2021-09-17 RX ORDER — CEFAZOLIN SODIUM 2 G/50ML
2000 SOLUTION INTRAVENOUS ONCE
Status: CANCELLED | OUTPATIENT
Start: 2021-10-04 | End: 2021-09-17

## 2021-09-17 NOTE — PROGRESS NOTES
H&P Exam - Bariatric Surgery   Srikanth Venegas 52 y o  female MRN: [id number]   Encounter: [id number]      HPI:    52 y o  yo morbidly obese female found to be a good candidate to undergo a weight loss operation upon being enrolled here at the [facility]   She has been pre certified to undergo a Laparoscopic chuckie-en-y gastric bypass possible sleeve gastrectomy  ++Suffers from h/o breast cancer on Tamoxifen s/p chemotx, DDD, depression, anxiety, MAREK, GERD  S/p L mastectomy, L breast reconstruction complicated by flap failure and multiple washouts and eventual recon in [address] [place] [address], lap gustavo, LAGB & removal  **Tamoxifen use    Here today to review her pre op test results  Has been medically cleared for the procedure  I have explained our Enhanced Recovery After Bariatric Surgery (ERABS) protocol and benefits including preoperative, intraoperative and postoperative elements  I have discussed with her at length the risks and benefits of the operation and reiterated the components of our multidisciplinary program and the importance of compliance and follow up in the post operative period  Although there is a great statistical chance of improvement or even resolution of most of her associated comorbidities, the results vary from patient to patient and they largely depend on his commitment  The patient was also instructed with regards to the importance of behavior modification, nutritional counseling, support meeting attendance and lifestyle changes that are important to ensure success  She was given the opportunity to ask questions and I have answered all of them  I have addressed with the patient the level of CODE STATUS for this hospital stay and after explaining the different options currently she wishes to be a Level I  She understands and wishes to proceed  She has lost all the weight required prior to surgery  Review of Systems   Constitutional: Negative      Respiratory: Negative  Cardiovascular: Negative  Gastrointestinal: Negative  Musculoskeletal: Negative  Neurological: Negative  All other systems reviewed and are negative  Historical Information   Past Medical History:   Diagnosis Date    Anxiety     Back pain     BRCA negative 06/2016    [facility]    Headache     History of chemotherapy 2016    Neoadjuvant; at [address] History of transfusion 06/2017    no adverse reaction    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)     s/p chemotherapy    Obesity (BMI 30-39  9)      Past Surgical History:   Procedure Laterality Date    ABDOMINAL WALL SURGERY Left 9/21/2016    Procedure: DEBRIDEMENT OF LEFT ABDOMINAL TISSUE AND CLOSURE; DEBRIDEMENT OF LEFT BREAST FLAP; SUBMUSCULAR TISSUE EXPANDER PLACEMENT WITH ADM (ACELLULAR DERMAL MATRIX); Surgeon: Yuriy Carrasco MD;  [facility];  Service:     BREAST BIOPSY  04/2016    with sentinel node biospy    BREAST RECONSTRUCTION Left 12/22/2017    Procedure: REVISION OF LEFT BREAST SCAR, LOCAL FLAP;  Surgeon: Concetta Keith MD;  [facility];  Service: Plastics    [address] [facility]      LAPAROSCOPIC CHOLECYSTECTOMY  2000    LAPAROSCOPIC GASTRIC BANDING  2008    removed 2013    LAPAROSCOPIC GASTRIC BANDING  2013    removal    LIPOSUCTION W/ FAT INJECTION Left 6/8/2017    Procedure: BREAST FAT GRAFTING ;  Surgeon: Yuriy Carrasco MD;  [facility];  Service:     MASTECTOMY Left 2016    MEDIPORT INSERTION, SINGLE  2016    NH BIOPSY/EXCISION, LYMPH NODE(S) Left 8/10/2016    Procedure: LYMPHOSCINTIGRAPHY; SENTINAL LYMPH NODE BIOPSY (INJECT AT 1100);   Surgeon: Ashley Pollard MD;  [facility];  Service: Surgical Oncology    NH BREAST RECONSTRUCTION W/FREE FLAP Left 9/19/2016    Procedure: BREAST DELAYED RECONSTRUCTION; DEIP FREE FLAP removal of port-a -cath;  Surgeon: Yuriy Carrasco MD;  [facility];  Service: Plastics    NH BREAST REDUCTION Right 2016    Procedure: BREAST REDUCTION/MASTOPEXY ;  Surgeon: Dashawn Brown MD;  [facility];  Service: Plastics    WI HYSTEROSCOPY,W/ENDO BX N/A 2018    Procedure: DILATATION AND CURETTAGE (D&C), HYSTEROSCOPY;  Surgeon: Nazia Burrell MD;  [facility];  Service: Gynecology Oncology    WI INSJ/RPLCMT BREAST IMPLANT SEP DAY MASTECTOMY Left 2016    Procedure: BREAST TISSUE EXPANDER REMOVAL; BREAST IMPLANT PLACEMENT;  Surgeon: Dashawn Brown MD;  [facility];  Service: Plastics    WI MASTECTOMY, SIMPLE, COMPLETE Left 8/10/2016    Procedure: BREAST MASTECTOMY; FROZEN SECTION ;  Surgeon: Farrah Archer MD;  [facility];  Service: Surgical Oncology    WI REMOVAL TISSUE EXPANDER W/O INSERTION IMPLANT Left 2017    Procedure: BREAST IMPLANT REMOVAL; WASHOUT AND DEBRIDEMENT;  Surgeon: Dashawn Brown MD;  [facility];  Service: Plastics    REVISION OF SCAR ON TORSO N/A 2016    Procedure: ABDOMINAL SCAR REVISION ;  Surgeon: Dashawn Brown MD;  [facility];  [facility]:    [facility]       Social History   Social History     Substance and Sexual Activity   Alcohol Use No     Social History     Substance and Sexual Activity   Drug Use No     Social History     Tobacco Use   Smoking Status Former Smoker    Packs/day: 1 00    Years: 15 00    Pack years: 15 00    Quit date:     Years since quittin 7   Smokeless Tobacco Never Used     Family History: non-contributory    Meds/Allergies   all medications and allergies reviewed  Allergies   Allergen Reactions    Ambien [Zolpidem] Hallucinations    Effexor [Venlafaxine] GI Intolerance    Bactrim [Sulfamethoxazole-Trimethoprim] Itching    Morphine Other (See Comments)     Itching, crying, short of breath       Objective     Current Vitals:   Blood Pressure: 112/78 (21 1339)  Pulse: 89 (21 1339)  Height: 5' (152 4 cm) (21 1339)  Weight - Scale: 92 4 kg (203 lb 12 8 oz) (09/17/21 1339)        Physical Exam  Vitals reviewed  Constitutional:       Appearance: She is well-developed  HENT:      Head: Normocephalic  Eyes:      Extraocular Movements: Extraocular movements intact  Cardiovascular:      Rate and Rhythm: Normal rate  Heart sounds: Normal heart sounds  Pulmonary:      Effort: Pulmonary effort is normal       Breath sounds: Normal breath sounds  Abdominal:      General: There is no distension  Palpations: Abdomen is soft  Musculoskeletal:         General: Normal range of motion  Cervical back: Normal range of motion  Skin:     General: Skin is warm and dry  Neurological:      Mental Status: She is alert and oriented to person, place, and time  Psychiatric:         Mood and Affect: Mood normal          Behavior: Behavior normal          Thought Content: Thought content normal          Judgment: Judgment normal          Lab Results: I have personally reviewed pertinent lab results  Imaging: I have personally reviewed pertinent reports  and I have personally reviewed pertinent films in PACS  EKG, Pathology, and Other Studies: I have personally reviewed pertinent reports  and I have personally reviewed pertinent films in PACS    Code Status: Level 1    Assessment:  52 y o  yo morbidly obese female found to be a good candidate to undergo a weight loss operation upon being enrolled here at the [address] [place] [address]  She has completed all of the preoperative process for bariatric surgery  Plan:  - Plan for laparoscopic possible open RYGB poss SG with intraoperative EGD  - I discussed the risk of rescheduling/canceling the case if goal weight not met    - consent signed  - preop orders placed

## 2021-09-17 NOTE — H&P
H&P Exam - Bariatric Surgery   Srikanth Venegas 52 y o  female MRN: [id number]   Encounter: [id number]      HPI:    52 y o  yo morbidly obese female found to be a good candidate to undergo a weight loss operation upon being enrolled here at the [facility]   She has been pre certified to undergo a Laparoscopic chuckie-en-y gastric bypass possible sleeve gastrectomy  ++Suffers from h/o breast cancer on Tamoxifen s/p chemotx, DDD, depression, anxiety, MAREK, GERD  S/p L mastectomy, L breast reconstruction complicated by flap failure and multiple washouts and eventual recon in [place], lap gustavo, LAGB & removal  **Tamoxifen use    Here today to review her pre op test results  Has been medically cleared for the procedure  I have explained our Enhanced Recovery After Bariatric Surgery (ERABS) protocol and benefits including preoperative, intraoperative and postoperative elements  I have discussed with her at length the risks and benefits of the operation and reiterated the components of our multidisciplinary program and the importance of compliance and follow up in the post operative period  Although there is a great statistical chance of improvement or even resolution of most of her associated comorbidities, the results vary from patient to patient and they largely depend on his commitment  The patient was also instructed with regards to the importance of behavior modification, nutritional counseling, support meeting attendance and lifestyle changes that are important to ensure success  She was given the opportunity to ask questions and I have answered all of them  I have addressed with the patient the level of CODE STATUS for this hospital stay and after explaining the different options currently she wishes to be a Level I  She understands and wishes to proceed  She has lost all the weight required prior to surgery  Review of Systems   Constitutional: Negative      Respiratory: Negative  Cardiovascular: Negative  Gastrointestinal: Negative  Musculoskeletal: Negative  Neurological: Negative  All other systems reviewed and are negative  Historical Information   Past Medical History:   Diagnosis Date    Anxiety     Back pain     BRCA negative 06/2016    [facility]    Headache     History of chemotherapy 2016    Neoadjuvant; at [address] History of transfusion 06/2017    no adverse reaction    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)     s/p chemotherapy    Obesity (BMI 30-39  9)      Past Surgical History:   Procedure Laterality Date    ABDOMINAL WALL SURGERY Left 9/21/2016    Procedure: DEBRIDEMENT OF LEFT ABDOMINAL TISSUE AND CLOSURE; DEBRIDEMENT OF LEFT BREAST FLAP; SUBMUSCULAR TISSUE EXPANDER PLACEMENT WITH ADM (ACELLULAR DERMAL MATRIX); Surgeon: Michael Wayne MD;  [facility];  Service:     BREAST BIOPSY  04/2016    with sentinel node biospy    BREAST RECONSTRUCTION Left 12/22/2017    Procedure: REVISION OF LEFT BREAST SCAR, LOCAL FLAP;  Surgeon: Irvin Aguilera MD;  [facility];  Service: Plastics    [address] [place] [address] Drive OF [facility]      LAPAROSCOPIC CHOLECYSTECTOMY  2000    LAPAROSCOPIC GASTRIC BANDING  2008    removed 2013    LAPAROSCOPIC GASTRIC BANDING  2013    removal    LIPOSUCTION W/ FAT INJECTION Left 6/8/2017    Procedure: BREAST FAT GRAFTING ;  Surgeon: Michael Wayne MD;  [facility];  Service:     MASTECTOMY Left 2016    MEDIPORT INSERTION, SINGLE  2016    DE BIOPSY/EXCISION, LYMPH NODE(S) Left 8/10/2016    Procedure: LYMPHOSCINTIGRAPHY; SENTINAL LYMPH NODE BIOPSY (INJECT AT 1100);   Surgeon: Connie Gaitan MD;  [facility];  Service: Surgical Oncology    DE BREAST RECONSTRUCTION W/FREE FLAP Left 9/19/2016    Procedure: BREAST DELAYED RECONSTRUCTION; DEIP FREE FLAP removal of port-a -cath;  Surgeon: Michael Wayne MD;  [facility];  Service: Plastics    DE BREAST REDUCTION Right 2016    Procedure: BREAST REDUCTION/MASTOPEXY ;  Surgeon: Vibha Krishna MD;  [facility];  Service: Plastics    ND HYSTEROSCOPY,W/ENDO BX N/A 2018    Procedure: DILATATION AND CURETTAGE (D&C), HYSTEROSCOPY;  Surgeon: Stefani Auguste MD;  [facility];  Service: Gynecology Oncology    ND INSJ/RPLCMT BREAST IMPLANT SEP DAY MASTECTOMY Left 2016    Procedure: BREAST TISSUE EXPANDER REMOVAL; BREAST IMPLANT PLACEMENT;  Surgeon: Vibha Kirshna MD;  [facility];  Service: Plastics    ND MASTECTOMY, SIMPLE, COMPLETE Left 8/10/2016    Procedure: BREAST MASTECTOMY; FROZEN SECTION ;  Surgeon: Jennyfer Trotter MD;  [facility];  Service: Surgical Oncology    ND REMOVAL TISSUE EXPANDER W/O INSERTION IMPLANT Left 2017    Procedure: BREAST IMPLANT REMOVAL; WASHOUT AND DEBRIDEMENT;  Surgeon: Vibha Krishna MD;  [facility];  Service: Plastics    REVISION OF SCAR ON TORSO N/A 2016    Procedure: ABDOMINAL SCAR REVISION ;  Surgeon: Vibha Krishna MD;  [facility];  Service:    Nilson Vasques WISDOM TOOTH EXTRACTION       Social History   Social History     Substance and Sexual Activity   Alcohol Use No     Social History     Substance and Sexual Activity   Drug Use No     Social History     Tobacco Use   Smoking Status Former Smoker    Packs/day: 1 00    Years: 15 00    Pack years: 15 00    Quit date:     Years since quittin 7   Smokeless Tobacco Never Used     Family History: non-contributory    Meds/Allergies   all medications and allergies reviewed  Allergies   Allergen Reactions    Ambien [Zolpidem] Hallucinations    Effexor [Venlafaxine] GI Intolerance    Bactrim [Sulfamethoxazole-Trimethoprim] Itching    Morphine Other (See Comments)     Itching, crying, short of breath       Objective     Current Vitals:   Blood Pressure: 112/78 (21 1339)  Pulse: 89 (21 1339)  Height: 5' (152 4 cm) (21 1339)  Weight - Scale: 92 4 kg (203 lb 12 8 oz) (09/17/21 1339)        Physical Exam  Vitals reviewed  Constitutional:       Appearance: She is well-developed  HENT:      Head: Normocephalic  Eyes:      Extraocular Movements: Extraocular movements intact  Cardiovascular:      Rate and Rhythm: Normal rate  Heart sounds: Normal heart sounds  Pulmonary:      Effort: Pulmonary effort is normal       Breath sounds: Normal breath sounds  Abdominal:      General: There is no distension  Palpations: Abdomen is soft  Musculoskeletal:         General: Normal range of motion  Cervical back: Normal range of motion  Skin:     General: Skin is warm and dry  Neurological:      Mental Status: She is alert and oriented to person, place, and time  Psychiatric:         Mood and Affect: Mood normal          Behavior: Behavior normal          Thought Content: Thought content normal          Judgment: Judgment normal          Lab Results: I have personally reviewed pertinent lab results  Imaging: I have personally reviewed pertinent reports  and I have personally reviewed pertinent films in PACS  EKG, Pathology, and Other Studies: I have personally reviewed pertinent reports  and I have personally reviewed pertinent films in PACS    Code Status: Level 1    Assessment:  52 y o  yo morbidly obese female found to be a good candidate to undergo a weight loss operation upon being enrolled here at the [address] [place] [address]  She has completed all of the preoperative process for bariatric surgery  Plan:  - Plan for laparoscopic possible open RYGB poss SG with intraoperative EGD  - I discussed the risk of rescheduling/canceling the case if goal weight not met    - consent signed  - preop orders placed

## 2021-09-23 DIAGNOSIS — E66.01 MORBID (SEVERE) OBESITY DUE TO EXCESS CALORIES (HCC): Primary | ICD-10-CM

## 2021-09-23 RX ORDER — OXYCODONE HYDROCHLORIDE 5 MG/1
5 TABLET ORAL EVERY 4 HOURS PRN
Qty: 10 TABLET | Refills: 0 | Status: SHIPPED | OUTPATIENT
Start: 2021-09-23 | End: 2021-12-13 | Stop reason: ALTCHOICE

## 2021-09-23 RX ORDER — OMEPRAZOLE 20 MG/1
20 CAPSULE, DELAYED RELEASE ORAL DAILY
Qty: 90 CAPSULE | Refills: 1 | Status: SHIPPED | OUTPATIENT
Start: 2021-09-23 | End: 2021-12-13 | Stop reason: ALTCHOICE

## 2021-09-24 ENCOUNTER — RA CDI HCC (OUTPATIENT)
Dept: OTHER | Facility: HOSPITAL | Age: 51
End: 2021-09-24

## 2021-09-27 ENCOUNTER — OFFICE VISIT (OUTPATIENT)
Dept: BARIATRICS | Facility: CLINIC | Age: 51
End: 2021-09-27

## 2021-09-27 VITALS — HEIGHT: 60 IN | WEIGHT: 203.2 LBS | BODY MASS INDEX: 39.89 KG/M2

## 2021-09-27 DIAGNOSIS — E66.9 OBESITY, CLASS II, BMI 35-39.9: Primary | ICD-10-CM

## 2021-09-27 PROCEDURE — 3008F BODY MASS INDEX DOCD: CPT | Performed by: SURGERY

## 2021-09-27 PROCEDURE — RECHECK

## 2021-09-27 NOTE — PROGRESS NOTES
Started weight on 4/28/21:  200 6lbs  Highest was 205# in June 2021  Lowest was 199# in August 2021  Today:  203 2#    Pt in today because she reports she is not losing weight, according to her scale at home, since being on the pre-op diet for one week thus far  Pt has been on the diet (3-4 protien shakes, 80oz calorie free fluids and 2 cups of non-starchy veggies) for one week (started 9/20/21) and has lost 0 6lbs  Pt reports the following:    · Works a late shift from home  · Goes to bed between 12-1am, but her wake time varies based on what is going on that day  · 3-4 premier shakes per day  · Maybe 40oz of calorie free drinks, advised to increase to 80oz to ensure hydration, BM regularity, and assist with weight loss  · 2 cups of non-starchy veggies  · 8000 steps per day  · Last BM was last night, but not significant  Started colace and the miralax yesterday  · Was told to stop tomaxifen and now she feels more bloated and like she will be getting her period  · Has been on the diet for one week (started9/20/21)  and has lost 0 6lbs since her office visit on 9/17/21  · Pt concerned surgery will be cancelled if she doesn't lose adequate weight  Advised pt RD will reach out to surgeon     · Pt will f/u for wt check in office on Friday with EMILY

## 2021-09-27 NOTE — Clinical Note
Hi Dr Gurvinder Reardon, I met with [facility] today because she was concerned she isn't losing significant weight since being on the pre-op diet for the past week  She is only down 0 6lbs  She reports to be following the diet as prescribed but not getting enough water  Advised to increase that  Her concern is that if she doesn't lose enough weight her surgery will be cancelled the day of  I told her I would reach out to you to get your feeling on the situation  Her BMI is 39 7  She is coming back on Friday with Adelaida to check her weight again  Let me know your thoughts   Thanks!    -Roddy Yang

## 2021-09-29 ENCOUNTER — TELEPHONE (OUTPATIENT)
Dept: BARIATRICS | Facility: CLINIC | Age: 51
End: 2021-09-29

## 2021-09-29 NOTE — TELEPHONE ENCOUNTER
Called pt and left message answering her questions she had for the surgeon  Advised her that surgeon would like her to continue the pre-op diet as prescribed but not to over stress about her day of surgery weight, just keep following the diet plan  Requested a call back if questions arise

## 2021-10-03 ENCOUNTER — ANESTHESIA EVENT (OUTPATIENT)
Dept: PERIOP | Facility: HOSPITAL | Age: 51
DRG: 621 | End: 2021-10-03
Payer: COMMERCIAL

## 2021-10-03 PROBLEM — M54.50 LOW BACK PAIN: Status: ACTIVE | Noted: 2021-10-03

## 2021-10-04 ENCOUNTER — HOSPITAL ENCOUNTER (INPATIENT)
Facility: HOSPITAL | Age: 51
LOS: 1 days | Discharge: HOME | DRG: 621 | End: 2021-10-05
Attending: SURGERY | Admitting: SURGERY
Payer: COMMERCIAL

## 2021-10-04 ENCOUNTER — ANESTHESIA (OUTPATIENT)
Dept: PERIOP | Facility: HOSPITAL | Age: 51
DRG: 621 | End: 2021-10-04
Payer: COMMERCIAL

## 2021-10-04 PROBLEM — E66.01 MORBID OBESITY WITH BMI OF 40.0-44.9, ADULT (HCC): Status: ACTIVE | Noted: 2021-10-04

## 2021-10-04 LAB
EXT PREGNANCY TEST URINE: NEGATIVE
EXT. CONTROL: NORMAL

## 2021-10-04 PROCEDURE — 0D164ZA BYPASS STOMACH TO JEJUNUM, PERCUTANEOUS ENDOSCOPIC APPROACH: ICD-10-PCS | Performed by: SURGERY

## 2021-10-04 PROCEDURE — C1781 MESH (IMPLANTABLE): HCPCS | Performed by: SURGERY

## 2021-10-04 PROCEDURE — 43644 LAP GASTRIC BYPASS/ROUX-EN-Y: CPT | Performed by: SURGERY

## 2021-10-04 PROCEDURE — C9290 INJ, BUPIVACAINE LIPOSOME: HCPCS | Performed by: SURGERY

## 2021-10-04 PROCEDURE — C9113 INJ PANTOPRAZOLE SODIUM, VIA: HCPCS | Performed by: SURGERY

## 2021-10-04 PROCEDURE — 81025 URINE PREGNANCY TEST: CPT | Performed by: ANESTHESIOLOGY

## 2021-10-04 PROCEDURE — 0DJ08ZZ INSPECTION OF UPPER INTESTINAL TRACT, VIA NATURAL OR ARTIFICIAL OPENING ENDOSCOPIC: ICD-10-PCS | Performed by: SURGERY

## 2021-10-04 DEVICE — SEAMGUARD STPL REINF ENDO GIA ULTRA UNIV 60 PURPLE: Type: IMPLANTABLE DEVICE | Site: ABDOMEN | Status: FUNCTIONAL

## 2021-10-04 RX ORDER — ONDANSETRON 2 MG/ML
4 INJECTION INTRAMUSCULAR; INTRAVENOUS EVERY 6 HOURS PRN
Status: DISCONTINUED | OUTPATIENT
Start: 2021-10-04 | End: 2021-10-05 | Stop reason: HOSPADM

## 2021-10-04 RX ORDER — MIDAZOLAM HYDROCHLORIDE 2 MG/2ML
INJECTION, SOLUTION INTRAMUSCULAR; INTRAVENOUS AS NEEDED
Status: DISCONTINUED | OUTPATIENT
Start: 2021-10-04 | End: 2021-10-04

## 2021-10-04 RX ORDER — ONDANSETRON 2 MG/ML
INJECTION INTRAMUSCULAR; INTRAVENOUS AS NEEDED
Status: DISCONTINUED | OUTPATIENT
Start: 2021-10-04 | End: 2021-10-04

## 2021-10-04 RX ORDER — HYDROMORPHONE HCL/PF 1 MG/ML
0.2 SYRINGE (ML) INJECTION EVERY 2 HOUR PRN
Status: DISCONTINUED | OUTPATIENT
Start: 2021-10-04 | End: 2021-10-05 | Stop reason: HOSPADM

## 2021-10-04 RX ORDER — HEPARIN SODIUM 5000 [USP'U]/ML
5000 INJECTION, SOLUTION INTRAVENOUS; SUBCUTANEOUS ONCE
Status: COMPLETED | OUTPATIENT
Start: 2021-10-04 | End: 2021-10-04

## 2021-10-04 RX ORDER — GABAPENTIN 300 MG/1
600 CAPSULE ORAL ONCE
Status: COMPLETED | OUTPATIENT
Start: 2021-10-04 | End: 2021-10-04

## 2021-10-04 RX ORDER — HEPARIN SODIUM 5000 [USP'U]/ML
5000 INJECTION, SOLUTION INTRAVENOUS; SUBCUTANEOUS
Status: DISCONTINUED | OUTPATIENT
Start: 2021-10-04 | End: 2021-10-04 | Stop reason: SDUPTHER

## 2021-10-04 RX ORDER — PANTOPRAZOLE SODIUM 40 MG/1
40 INJECTION, POWDER, FOR SOLUTION INTRAVENOUS ONCE
Status: COMPLETED | OUTPATIENT
Start: 2021-10-04 | End: 2021-10-04

## 2021-10-04 RX ORDER — PANTOPRAZOLE SODIUM 40 MG/1
40 TABLET, DELAYED RELEASE ORAL DAILY
Status: DISCONTINUED | OUTPATIENT
Start: 2021-10-04 | End: 2021-10-05 | Stop reason: HOSPADM

## 2021-10-04 RX ORDER — ROCURONIUM BROMIDE 10 MG/ML
INJECTION, SOLUTION INTRAVENOUS AS NEEDED
Status: DISCONTINUED | OUTPATIENT
Start: 2021-10-04 | End: 2021-10-04

## 2021-10-04 RX ORDER — ACETAMINOPHEN 325 MG/1
975 TABLET ORAL ONCE
Status: COMPLETED | OUTPATIENT
Start: 2021-10-04 | End: 2021-10-04

## 2021-10-04 RX ORDER — PROPOFOL 10 MG/ML
INJECTION, EMULSION INTRAVENOUS CONTINUOUS PRN
Status: DISCONTINUED | OUTPATIENT
Start: 2021-10-04 | End: 2021-10-04

## 2021-10-04 RX ORDER — SODIUM CHLORIDE, SODIUM LACTATE, POTASSIUM CHLORIDE, CALCIUM CHLORIDE 600; 310; 30; 20 MG/100ML; MG/100ML; MG/100ML; MG/100ML
100 INJECTION, SOLUTION INTRAVENOUS CONTINUOUS
Status: DISCONTINUED | OUTPATIENT
Start: 2021-10-04 | End: 2021-10-05 | Stop reason: HOSPADM

## 2021-10-04 RX ORDER — HYDROMORPHONE HCL/PF 1 MG/ML
SYRINGE (ML) INJECTION AS NEEDED
Status: DISCONTINUED | OUTPATIENT
Start: 2021-10-04 | End: 2021-10-04

## 2021-10-04 RX ORDER — LIDOCAINE HYDROCHLORIDE 10 MG/ML
INJECTION, SOLUTION EPIDURAL; INFILTRATION; INTRACAUDAL; PERINEURAL AS NEEDED
Status: DISCONTINUED | OUTPATIENT
Start: 2021-10-04 | End: 2021-10-04

## 2021-10-04 RX ORDER — FENTANYL CITRATE/PF 50 MCG/ML
25 SYRINGE (ML) INJECTION
Status: DISCONTINUED | OUTPATIENT
Start: 2021-10-04 | End: 2021-10-04 | Stop reason: HOSPADM

## 2021-10-04 RX ORDER — CEFAZOLIN SODIUM 2 G/50ML
2000 SOLUTION INTRAVENOUS ONCE
Status: COMPLETED | OUTPATIENT
Start: 2021-10-04 | End: 2021-10-04

## 2021-10-04 RX ORDER — ACETAMINOPHEN 325 MG/1
975 TABLET ORAL EVERY 8 HOURS
Status: DISCONTINUED | OUTPATIENT
Start: 2021-10-04 | End: 2021-10-05 | Stop reason: HOSPADM

## 2021-10-04 RX ORDER — SIMETHICONE 80 MG
80 TABLET,CHEWABLE ORAL 4 TIMES DAILY PRN
Status: DISCONTINUED | OUTPATIENT
Start: 2021-10-04 | End: 2021-10-05 | Stop reason: HOSPADM

## 2021-10-04 RX ORDER — PROPOFOL 10 MG/ML
INJECTION, EMULSION INTRAVENOUS AS NEEDED
Status: DISCONTINUED | OUTPATIENT
Start: 2021-10-04 | End: 2021-10-04

## 2021-10-04 RX ORDER — MAGNESIUM HYDROXIDE 1200 MG/15ML
LIQUID ORAL AS NEEDED
Status: DISCONTINUED | OUTPATIENT
Start: 2021-10-04 | End: 2021-10-04 | Stop reason: HOSPADM

## 2021-10-04 RX ORDER — SODIUM CHLORIDE, SODIUM LACTATE, POTASSIUM CHLORIDE, CALCIUM CHLORIDE 600; 310; 30; 20 MG/100ML; MG/100ML; MG/100ML; MG/100ML
75 INJECTION, SOLUTION INTRAVENOUS CONTINUOUS
Status: DISCONTINUED | OUTPATIENT
Start: 2021-10-04 | End: 2021-10-04 | Stop reason: HOSPADM

## 2021-10-04 RX ORDER — SCOLOPAMINE TRANSDERMAL SYSTEM 1 MG/1
1 PATCH, EXTENDED RELEASE TRANSDERMAL ONCE
Status: DISCONTINUED | OUTPATIENT
Start: 2021-10-04 | End: 2021-10-04

## 2021-10-04 RX ORDER — BUPIVACAINE HYDROCHLORIDE 5 MG/ML
INJECTION, SOLUTION PERINEURAL AS NEEDED
Status: DISCONTINUED | OUTPATIENT
Start: 2021-10-04 | End: 2021-10-04 | Stop reason: HOSPADM

## 2021-10-04 RX ORDER — CEFAZOLIN SODIUM 2 G/50ML
2000 SOLUTION INTRAVENOUS EVERY 8 HOURS
Status: COMPLETED | OUTPATIENT
Start: 2021-10-04 | End: 2021-10-05

## 2021-10-04 RX ORDER — DIPHENHYDRAMINE HCL 25 MG
25 TABLET ORAL
Status: DISCONTINUED | OUTPATIENT
Start: 2021-10-04 | End: 2021-10-05 | Stop reason: HOSPADM

## 2021-10-04 RX ORDER — DEXAMETHASONE SODIUM PHOSPHATE 4 MG/ML
INJECTION, SOLUTION INTRA-ARTICULAR; INTRALESIONAL; INTRAMUSCULAR; INTRAVENOUS; SOFT TISSUE AS NEEDED
Status: DISCONTINUED | OUTPATIENT
Start: 2021-10-04 | End: 2021-10-04

## 2021-10-04 RX ORDER — OXYCODONE HCL 5 MG/5 ML
5 SOLUTION, ORAL ORAL EVERY 4 HOURS PRN
Status: DISCONTINUED | OUTPATIENT
Start: 2021-10-04 | End: 2021-10-05 | Stop reason: HOSPADM

## 2021-10-04 RX ORDER — FENTANYL CITRATE 50 UG/ML
INJECTION, SOLUTION INTRAMUSCULAR; INTRAVENOUS AS NEEDED
Status: DISCONTINUED | OUTPATIENT
Start: 2021-10-04 | End: 2021-10-04

## 2021-10-04 RX ORDER — OXYCODONE HCL 5 MG/5 ML
10 SOLUTION, ORAL ORAL EVERY 4 HOURS PRN
Status: DISCONTINUED | OUTPATIENT
Start: 2021-10-04 | End: 2021-10-05 | Stop reason: HOSPADM

## 2021-10-04 RX ORDER — SODIUM CHLORIDE 9 MG/ML
INJECTION, SOLUTION INTRAVENOUS CONTINUOUS PRN
Status: DISCONTINUED | OUTPATIENT
Start: 2021-10-04 | End: 2021-10-04

## 2021-10-04 RX ORDER — ACETAMINOPHEN 160 MG/5ML
975 SUSPENSION, ORAL (FINAL DOSE FORM) ORAL EVERY 8 HOURS
Status: DISCONTINUED | OUTPATIENT
Start: 2021-10-04 | End: 2021-10-05 | Stop reason: HOSPADM

## 2021-10-04 RX ORDER — SCOLOPAMINE TRANSDERMAL SYSTEM 1 MG/1
1 PATCH, EXTENDED RELEASE TRANSDERMAL
Status: DISCONTINUED | OUTPATIENT
Start: 2021-10-04 | End: 2021-10-05 | Stop reason: HOSPADM

## 2021-10-04 RX ORDER — KETOROLAC TROMETHAMINE 30 MG/ML
15 INJECTION, SOLUTION INTRAMUSCULAR; INTRAVENOUS EVERY 6 HOURS SCHEDULED
Status: DISCONTINUED | OUTPATIENT
Start: 2021-10-04 | End: 2021-10-05 | Stop reason: HOSPADM

## 2021-10-04 RX ADMIN — DEXMEDETOMIDINE 0.1 MCG/KG/HR: 100 INJECTION, SOLUTION, CONCENTRATE INTRAVENOUS at 07:39

## 2021-10-04 RX ADMIN — PROPOFOL 80 MCG/KG/MIN: 10 INJECTION, EMULSION INTRAVENOUS at 07:39

## 2021-10-04 RX ADMIN — SCOPALAMINE 1 PATCH: 1 PATCH, EXTENDED RELEASE TRANSDERMAL at 06:40

## 2021-10-04 RX ADMIN — FENTANYL CITRATE 100 MCG: 50 INJECTION, SOLUTION INTRAMUSCULAR; INTRAVENOUS at 07:38

## 2021-10-04 RX ADMIN — DEXAMETHASONE SODIUM PHOSPHATE 4 MG: 4 INJECTION, SOLUTION INTRA-ARTICULAR; INTRALESIONAL; INTRAMUSCULAR; INTRAVENOUS; SOFT TISSUE at 09:12

## 2021-10-04 RX ADMIN — SODIUM CHLORIDE: 9 INJECTION, SOLUTION INTRAVENOUS at 07:46

## 2021-10-04 RX ADMIN — ACETAMINOPHEN 975 MG: 325 TABLET, FILM COATED ORAL at 06:39

## 2021-10-04 RX ADMIN — PROPOFOL 200 MG: 10 INJECTION, EMULSION INTRAVENOUS at 07:38

## 2021-10-04 RX ADMIN — METRONIDAZOLE 500 MG: 500 INJECTION, SOLUTION INTRAVENOUS at 07:47

## 2021-10-04 RX ADMIN — ROCURONIUM BROMIDE 20 MG: 10 INJECTION, SOLUTION INTRAVENOUS at 08:21

## 2021-10-04 RX ADMIN — CEFAZOLIN SODIUM 2000 MG: 2 SOLUTION INTRAVENOUS at 15:43

## 2021-10-04 RX ADMIN — KETOROLAC TROMETHAMINE 15 MG: 30 INJECTION, SOLUTION INTRAMUSCULAR; INTRAVENOUS at 14:13

## 2021-10-04 RX ADMIN — MIDAZOLAM 2 MG: 1 INJECTION INTRAMUSCULAR; INTRAVENOUS at 07:25

## 2021-10-04 RX ADMIN — SUGAMMADEX 100 MG: 100 INJECTION, SOLUTION INTRAVENOUS at 09:46

## 2021-10-04 RX ADMIN — PANTOPRAZOLE SODIUM 40 MG: 40 INJECTION, POWDER, FOR SOLUTION INTRAVENOUS at 10:37

## 2021-10-04 RX ADMIN — HYDROMORPHONE HYDROCHLORIDE 0.5 MG: 1 INJECTION, SOLUTION INTRAMUSCULAR; INTRAVENOUS; SUBCUTANEOUS at 08:00

## 2021-10-04 RX ADMIN — METRONIDAZOLE 500 MG: 500 INJECTION, SOLUTION INTRAVENOUS at 16:07

## 2021-10-04 RX ADMIN — CEFAZOLIN SODIUM 2000 MG: 2 SOLUTION INTRAVENOUS at 07:37

## 2021-10-04 RX ADMIN — SCOPALAMINE 1 PATCH: 1 PATCH, EXTENDED RELEASE TRANSDERMAL at 14:13

## 2021-10-04 RX ADMIN — GABAPENTIN 600 MG: 300 CAPSULE ORAL at 06:39

## 2021-10-04 RX ADMIN — ONDANSETRON 4 MG: 2 INJECTION INTRAMUSCULAR; INTRAVENOUS at 20:31

## 2021-10-04 RX ADMIN — HEPARIN SODIUM 5000 UNITS: 5000 INJECTION INTRAVENOUS; SUBCUTANEOUS at 06:40

## 2021-10-04 RX ADMIN — OXYCODONE HYDROCHLORIDE 5 MG: 5 SOLUTION ORAL at 21:18

## 2021-10-04 RX ADMIN — HYDROMORPHONE HYDROCHLORIDE 0.5 MG: 1 INJECTION, SOLUTION INTRAMUSCULAR; INTRAVENOUS; SUBCUTANEOUS at 08:06

## 2021-10-04 RX ADMIN — ONDANSETRON 4 MG: 2 INJECTION INTRAMUSCULAR; INTRAVENOUS at 08:00

## 2021-10-04 RX ADMIN — SODIUM CHLORIDE, SODIUM LACTATE, POTASSIUM CHLORIDE, AND CALCIUM CHLORIDE 100 ML/HR: .6; .31; .03; .02 INJECTION, SOLUTION INTRAVENOUS at 11:38

## 2021-10-04 RX ADMIN — ROCURONIUM BROMIDE 20 MG: 10 INJECTION, SOLUTION INTRAVENOUS at 08:50

## 2021-10-04 RX ADMIN — SODIUM CHLORIDE, SODIUM LACTATE, POTASSIUM CHLORIDE, AND CALCIUM CHLORIDE: .6; .31; .03; .02 INJECTION, SOLUTION INTRAVENOUS at 06:47

## 2021-10-04 RX ADMIN — DEXAMETHASONE SODIUM PHOSPHATE 4 MG: 4 INJECTION, SOLUTION INTRA-ARTICULAR; INTRALESIONAL; INTRAMUSCULAR; INTRAVENOUS; SOFT TISSUE at 08:00

## 2021-10-04 RX ADMIN — DIPHENHYDRAMINE HYDROCHLORIDE 25 MG: 25 TABLET ORAL at 21:17

## 2021-10-04 RX ADMIN — ROCURONIUM BROMIDE 50 MG: 10 INJECTION, SOLUTION INTRAVENOUS at 07:39

## 2021-10-04 RX ADMIN — LIDOCAINE HYDROCHLORIDE 50 MG: 10 INJECTION, SOLUTION EPIDURAL; INFILTRATION; INTRACAUDAL; PERINEURAL at 07:38

## 2021-10-04 RX ADMIN — ACETAMINOPHEN 975 MG: 650 SUSPENSION ORAL at 19:45

## 2021-10-05 ENCOUNTER — TRANSITIONAL CARE MANAGEMENT (OUTPATIENT)
Dept: FAMILY MEDICINE CLINIC | Facility: CLINIC | Age: 51
End: 2021-10-05

## 2021-10-05 PROCEDURE — NC001 PR NO CHARGE: Performed by: SURGERY

## 2021-10-05 PROCEDURE — 99024 POSTOP FOLLOW-UP VISIT: CPT | Performed by: SURGERY

## 2021-10-05 RX ADMIN — METRONIDAZOLE 500 MG: 500 INJECTION, SOLUTION INTRAVENOUS at 00:11

## 2021-10-05 RX ADMIN — CEFAZOLIN SODIUM 2000 MG: 2 SOLUTION INTRAVENOUS at 00:11

## 2021-10-05 RX ADMIN — KETOROLAC TROMETHAMINE 15 MG: 30 INJECTION, SOLUTION INTRAMUSCULAR; INTRAVENOUS at 06:32

## 2021-10-05 RX ADMIN — KETOROLAC TROMETHAMINE 15 MG: 30 INJECTION, SOLUTION INTRAMUSCULAR; INTRAVENOUS at 11:34

## 2021-10-05 RX ADMIN — KETOROLAC TROMETHAMINE 15 MG: 30 INJECTION, SOLUTION INTRAMUSCULAR; INTRAVENOUS at 00:10

## 2021-10-06 ENCOUNTER — TELEPHONE (OUTPATIENT)
Dept: BARIATRICS | Facility: CLINIC | Age: 51
End: 2021-10-06

## 2021-10-06 VITALS
RESPIRATION RATE: 17 BRPM | BODY MASS INDEX: 40.66 KG/M2 | HEIGHT: 60 IN | OXYGEN SATURATION: 93 % | DIASTOLIC BLOOD PRESSURE: 60 MMHG | SYSTOLIC BLOOD PRESSURE: 115 MMHG | HEART RATE: 78 BPM | TEMPERATURE: 98.3 F | WEIGHT: 207.13 LBS

## 2021-10-07 ENCOUNTER — TELEPHONE (OUTPATIENT)
Dept: SURGICAL ONCOLOGY | Facility: CLINIC | Age: 51
End: 2021-10-07

## 2021-10-08 ENCOUNTER — OFFICE VISIT (OUTPATIENT)
Dept: FAMILY MEDICINE CLINIC | Facility: CLINIC | Age: 51
End: 2021-10-08
Payer: COMMERCIAL

## 2021-10-08 ENCOUNTER — TELEPHONE (OUTPATIENT)
Dept: SURGICAL ONCOLOGY | Facility: CLINIC | Age: 51
End: 2021-10-08

## 2021-10-08 VITALS
SYSTOLIC BLOOD PRESSURE: 104 MMHG | WEIGHT: 203 LBS | DIASTOLIC BLOOD PRESSURE: 78 MMHG | BODY MASS INDEX: 39.85 KG/M2 | HEIGHT: 60 IN | HEART RATE: 87 BPM | RESPIRATION RATE: 16 BRPM | OXYGEN SATURATION: 100 % | TEMPERATURE: 97.7 F

## 2021-10-08 DIAGNOSIS — N63.10 LUMP OF RIGHT BREAST: Primary | ICD-10-CM

## 2021-10-08 DIAGNOSIS — C50.412 MALIGNANT NEOPLASM OF UPPER-OUTER QUADRANT OF LEFT BREAST IN FEMALE, ESTROGEN RECEPTOR POSITIVE (HCC): ICD-10-CM

## 2021-10-08 DIAGNOSIS — Z17.0 MALIGNANT NEOPLASM OF UPPER-OUTER QUADRANT OF LEFT BREAST IN FEMALE, ESTROGEN RECEPTOR POSITIVE (HCC): ICD-10-CM

## 2021-10-08 DIAGNOSIS — Z98.84 S/P BARIATRIC SURGERY: Primary | ICD-10-CM

## 2021-10-08 DIAGNOSIS — N63.10 LUMP OF RIGHT BREAST: ICD-10-CM

## 2021-10-08 DIAGNOSIS — F33.9 DEPRESSION, RECURRENT (HCC): ICD-10-CM

## 2021-10-08 PROCEDURE — 1111F DSCHRG MED/CURRENT MED MERGE: CPT | Performed by: FAMILY MEDICINE

## 2021-10-08 PROCEDURE — 99495 TRANSJ CARE MGMT MOD F2F 14D: CPT | Performed by: FAMILY MEDICINE

## 2021-10-11 ENCOUNTER — TELEPHONE (OUTPATIENT)
Dept: SURGICAL ONCOLOGY | Facility: CLINIC | Age: 51
End: 2021-10-11

## 2021-10-11 ENCOUNTER — HOSPITAL ENCOUNTER (OUTPATIENT)
Dept: RADIOLOGY | Facility: HOSPITAL | Age: 51
Discharge: HOME | End: 2021-10-11
Attending: SURGERY
Payer: COMMERCIAL

## 2021-10-11 VITALS — BODY MASS INDEX: 39.85 KG/M2 | HEIGHT: 60 IN | WEIGHT: 203 LBS

## 2021-10-11 DIAGNOSIS — C50.412 MALIGNANT NEOPLASM OF UPPER-OUTER QUADRANT OF LEFT BREAST IN FEMALE, ESTROGEN RECEPTOR POSITIVE (HCC): ICD-10-CM

## 2021-10-11 DIAGNOSIS — Z17.0 MALIGNANT NEOPLASM OF UPPER-OUTER QUADRANT OF LEFT BREAST IN FEMALE, ESTROGEN RECEPTOR POSITIVE (HCC): ICD-10-CM

## 2021-10-11 DIAGNOSIS — N63.10 LUMP OF RIGHT BREAST: ICD-10-CM

## 2021-10-11 PROCEDURE — 76642 ULTRASOUND BREAST LIMITED: CPT

## 2021-10-11 PROCEDURE — G0279 TOMOSYNTHESIS, MAMMO: HCPCS

## 2021-10-11 PROCEDURE — 77065 DX MAMMO INCL CAD UNI: CPT

## 2021-10-15 ENCOUNTER — OFFICE VISIT (OUTPATIENT)
Dept: BARIATRICS | Facility: CLINIC | Age: 51
End: 2021-10-15

## 2021-10-15 VITALS — BODY MASS INDEX: 38.68 KG/M2 | HEIGHT: 60 IN | WEIGHT: 197 LBS

## 2021-10-15 DIAGNOSIS — K91.2 POSTSURGICAL MALABSORPTION: Primary | ICD-10-CM

## 2021-10-15 PROCEDURE — RECHECK

## 2021-10-18 ENCOUNTER — HOSPITAL ENCOUNTER (OUTPATIENT)
Dept: RADIOLOGY | Facility: HOSPITAL | Age: 51
Discharge: HOME | End: 2021-10-18
Attending: SURGERY
Payer: COMMERCIAL

## 2021-10-18 ENCOUNTER — HOSPITAL ENCOUNTER (OUTPATIENT)
Dept: RADIOLOGY | Facility: HOSPITAL | Age: 51
Discharge: HOME | End: 2021-10-18
Attending: SURGERY

## 2021-10-18 VITALS — DIASTOLIC BLOOD PRESSURE: 74 MMHG | SYSTOLIC BLOOD PRESSURE: 106 MMHG

## 2021-10-18 DIAGNOSIS — R92.8 ABNORMAL FINDINGS ON DIAGNOSTIC IMAGING OF BREAST: ICD-10-CM

## 2021-10-18 PROCEDURE — 88305 TISSUE EXAM BY PATHOLOGIST: CPT | Performed by: PATHOLOGY

## 2021-10-18 PROCEDURE — A4648 IMPLANTABLE TISSUE MARKER: HCPCS

## 2021-10-18 PROCEDURE — 19083 BX BREAST 1ST LESION US IMAG: CPT

## 2021-10-18 RX ORDER — LIDOCAINE HYDROCHLORIDE 10 MG/ML
5 INJECTION, SOLUTION EPIDURAL; INFILTRATION; INTRACAUDAL; PERINEURAL ONCE
Status: COMPLETED | OUTPATIENT
Start: 2021-10-18 | End: 2021-10-18

## 2021-10-18 RX ADMIN — LIDOCAINE HYDROCHLORIDE 5 ML: 10 INJECTION, SOLUTION EPIDURAL; INFILTRATION; INTRACAUDAL; PERINEURAL at 14:07

## 2021-10-19 ENCOUNTER — TELEPHONE (OUTPATIENT)
Dept: RADIOLOGY | Facility: HOSPITAL | Age: 51
End: 2021-10-19

## 2021-10-19 ENCOUNTER — OFFICE VISIT (OUTPATIENT)
Dept: BARIATRICS | Facility: CLINIC | Age: 51
End: 2021-10-19

## 2021-10-19 VITALS
HEART RATE: 78 BPM | HEIGHT: 60 IN | BODY MASS INDEX: 38.32 KG/M2 | DIASTOLIC BLOOD PRESSURE: 72 MMHG | WEIGHT: 195.2 LBS | SYSTOLIC BLOOD PRESSURE: 100 MMHG

## 2021-10-19 DIAGNOSIS — Z48.815 ENCOUNTER FOR SURGICAL AFTERCARE FOLLOWING SURGERY ON THE DIGESTIVE SYSTEM: Primary | ICD-10-CM

## 2021-10-19 DIAGNOSIS — M54.50 LOW BACK PAIN: ICD-10-CM

## 2021-10-19 DIAGNOSIS — F41.9 ANXIETY: ICD-10-CM

## 2021-10-19 DIAGNOSIS — Z79.810 USE OF TAMOXIFEN (NOLVADEX): ICD-10-CM

## 2021-10-19 DIAGNOSIS — E66.9 OBESITY, CLASS II, BMI 35-39.9: ICD-10-CM

## 2021-10-19 DIAGNOSIS — K91.2 POSTSURGICAL MALABSORPTION: ICD-10-CM

## 2021-10-19 DIAGNOSIS — K21.9 GERD (GASTROESOPHAGEAL REFLUX DISEASE): ICD-10-CM

## 2021-10-19 DIAGNOSIS — F32.A DEPRESSION, UNSPECIFIED DEPRESSION TYPE: ICD-10-CM

## 2021-10-19 PROBLEM — E66.01 MORBID OBESITY WITH BMI OF 40.0-44.9, ADULT (HCC): Status: RESOLVED | Noted: 2021-10-04 | Resolved: 2021-10-19

## 2021-10-19 PROCEDURE — 99024 POSTOP FOLLOW-UP VISIT: CPT | Performed by: SURGERY

## 2021-10-19 RX ORDER — MULTIVITAMIN
1 CAPSULE ORAL DAILY
COMMUNITY

## 2021-10-27 ENCOUNTER — OFFICE VISIT (OUTPATIENT)
Dept: SURGICAL ONCOLOGY | Facility: CLINIC | Age: 51
End: 2021-10-27
Payer: COMMERCIAL

## 2021-10-27 VITALS
TEMPERATURE: 97.6 F | WEIGHT: 195.5 LBS | SYSTOLIC BLOOD PRESSURE: 124 MMHG | BODY MASS INDEX: 38.38 KG/M2 | HEART RATE: 80 BPM | HEIGHT: 60 IN | RESPIRATION RATE: 16 BRPM | DIASTOLIC BLOOD PRESSURE: 80 MMHG

## 2021-10-27 DIAGNOSIS — C50.412 MALIGNANT NEOPLASM OF UPPER-OUTER QUADRANT OF LEFT BREAST IN FEMALE, ESTROGEN RECEPTOR POSITIVE (HCC): Primary | ICD-10-CM

## 2021-10-27 DIAGNOSIS — Z12.31 SCREENING MAMMOGRAM, ENCOUNTER FOR: ICD-10-CM

## 2021-10-27 DIAGNOSIS — Z17.0 MALIGNANT NEOPLASM OF UPPER-OUTER QUADRANT OF LEFT BREAST IN FEMALE, ESTROGEN RECEPTOR POSITIVE (HCC): Primary | ICD-10-CM

## 2021-10-27 DIAGNOSIS — Z79.810 USE OF TAMOXIFEN (NOLVADEX): ICD-10-CM

## 2021-10-27 PROCEDURE — 99214 OFFICE O/P EST MOD 30 MIN: CPT | Performed by: SURGERY

## 2021-10-27 PROCEDURE — 3008F BODY MASS INDEX DOCD: CPT | Performed by: SURGERY

## 2021-10-27 PROCEDURE — 1111F DSCHRG MED/CURRENT MED MERGE: CPT | Performed by: SURGERY

## 2021-10-27 PROCEDURE — 1036F TOBACCO NON-USER: CPT | Performed by: SURGERY

## 2021-11-08 ENCOUNTER — CLINICAL SUPPORT (OUTPATIENT)
Dept: BARIATRICS | Facility: CLINIC | Age: 51
End: 2021-11-08

## 2021-11-08 DIAGNOSIS — K91.2 POSTSURGICAL MALABSORPTION: Primary | ICD-10-CM

## 2021-11-08 PROCEDURE — RECHECK

## 2021-12-09 DIAGNOSIS — F32.A DEPRESSION, UNSPECIFIED DEPRESSION TYPE: ICD-10-CM

## 2021-12-09 DIAGNOSIS — F41.9 ANXIETY: ICD-10-CM

## 2021-12-09 RX ORDER — BUPROPION HYDROCHLORIDE 150 MG/1
TABLET, EXTENDED RELEASE ORAL
Qty: 180 TABLET | Refills: 0 | Status: SHIPPED | OUTPATIENT
Start: 2021-12-09 | End: 2022-04-12

## 2021-12-13 ENCOUNTER — OFFICE VISIT (OUTPATIENT)
Dept: FAMILY MEDICINE CLINIC | Facility: CLINIC | Age: 51
End: 2021-12-13
Payer: COMMERCIAL

## 2021-12-13 VITALS
BODY MASS INDEX: 38.18 KG/M2 | HEIGHT: 60 IN | DIASTOLIC BLOOD PRESSURE: 82 MMHG | HEART RATE: 68 BPM | TEMPERATURE: 97.7 F | SYSTOLIC BLOOD PRESSURE: 120 MMHG | RESPIRATION RATE: 16 BRPM

## 2021-12-13 DIAGNOSIS — F33.9 DEPRESSION, RECURRENT (HCC): ICD-10-CM

## 2021-12-13 DIAGNOSIS — G47.00 INSOMNIA, UNSPECIFIED TYPE: ICD-10-CM

## 2021-12-13 DIAGNOSIS — M54.50 ACUTE LEFT-SIDED LOW BACK PAIN WITHOUT SCIATICA: Primary | ICD-10-CM

## 2021-12-13 PROBLEM — F11.20 CONTINUOUS OPIOID DEPENDENCE (HCC): Status: ACTIVE | Noted: 2021-12-13

## 2021-12-13 PROCEDURE — 99214 OFFICE O/P EST MOD 30 MIN: CPT | Performed by: FAMILY MEDICINE

## 2021-12-13 PROCEDURE — 1036F TOBACCO NON-USER: CPT | Performed by: FAMILY MEDICINE

## 2021-12-13 RX ORDER — CYCLOBENZAPRINE HCL 10 MG
10 TABLET ORAL
Qty: 10 TABLET | Refills: 0 | Status: SHIPPED | OUTPATIENT
Start: 2021-12-13 | End: 2022-02-17 | Stop reason: ALTCHOICE

## 2021-12-13 RX ORDER — ZOLPIDEM TARTRATE 5 MG/1
5 TABLET ORAL
Qty: 30 TABLET | Refills: 0 | Status: SHIPPED | OUTPATIENT
Start: 2021-12-13 | End: 2022-02-17 | Stop reason: SDUPTHER

## 2021-12-13 RX ORDER — METHYLPREDNISOLONE 4 MG/1
TABLET ORAL
Qty: 21 EACH | Refills: 0 | Status: SHIPPED | OUTPATIENT
Start: 2021-12-13 | End: 2022-02-17 | Stop reason: ALTCHOICE

## 2022-01-13 ENCOUNTER — TELEPHONE (OUTPATIENT)
Dept: SURGICAL ONCOLOGY | Facility: CLINIC | Age: 52
End: 2022-01-13

## 2022-01-13 DIAGNOSIS — N63.10 LUMP OF RIGHT BREAST: Primary | ICD-10-CM

## 2022-01-13 DIAGNOSIS — Z17.0 MALIGNANT NEOPLASM OF UPPER-OUTER QUADRANT OF LEFT BREAST IN FEMALE, ESTROGEN RECEPTOR POSITIVE (HCC): ICD-10-CM

## 2022-01-13 DIAGNOSIS — C50.412 MALIGNANT NEOPLASM OF UPPER-OUTER QUADRANT OF LEFT BREAST IN FEMALE, ESTROGEN RECEPTOR POSITIVE (HCC): ICD-10-CM

## 2022-01-13 NOTE — TELEPHONE ENCOUNTER
Patient called the office with reports of a new, changing lump in her right breast  States that it is bigger now than when she first noticed it  Discussed with Dr Venus Espinoza who recommended the patient have a right breast ultrasound at the current time  Called the patient back to inform her of his recommendation but there was no answer  Left a detailed message stating that she could call central scheduling to coordinate the ultrasound and our office would follow up if she needed to be seen afterwards

## 2022-01-25 ENCOUNTER — TELEPHONE (OUTPATIENT)
Dept: RADIOLOGY | Facility: HOSPITAL | Age: 52
End: 2022-01-25

## 2022-01-25 LAB
25(OH)D3+25(OH)D2 SERPL-MCNC: 60.9 NG/ML (ref 30–100)
ALBUMIN SERPL-MCNC: 4.7 G/DL (ref 3.8–4.8)
ALBUMIN/GLOB SERPL: 2 {RATIO} (ref 1.2–2.2)
ALP SERPL-CCNC: 85 IU/L (ref 44–121)
ALT SERPL-CCNC: 19 IU/L (ref 0–32)
AST SERPL-CCNC: 21 IU/L (ref 0–40)
BILIRUB SERPL-MCNC: 0.3 MG/DL (ref 0–1.2)
BUN SERPL-MCNC: 20 MG/DL (ref 6–24)
BUN/CREAT SERPL: 26 (ref 9–23)
CALCIUM SERPL-MCNC: 10.4 MG/DL (ref 8.7–10.2)
CHLORIDE SERPL-SCNC: 100 MMOL/L (ref 96–106)
CHOLEST SERPL-MCNC: 181 MG/DL (ref 100–199)
CHOLEST/HDLC SERPL: 2.5 RATIO (ref 0–4.4)
CO2 SERPL-SCNC: 21 MMOL/L (ref 20–29)
COPPER SERPL-MCNC: 111 UG/DL (ref 80–158)
CREAT SERPL-MCNC: 0.76 MG/DL (ref 0.57–1)
ERYTHROCYTE [DISTWIDTH] IN BLOOD BY AUTOMATED COUNT: 12.9 % (ref 11.7–15.4)
FOLATE SERPL-MCNC: >20 NG/ML
GLOBULIN SER-MCNC: 2.3 G/DL (ref 1.5–4.5)
GLUCOSE SERPL-MCNC: 91 MG/DL (ref 65–99)
HCT VFR BLD AUTO: 40 % (ref 34–46.6)
HDLC SERPL-MCNC: 71 MG/DL
HGB BLD-MCNC: 13.7 G/DL (ref 11.1–15.9)
IRON SATN MFR SERPL: 26 % (ref 15–55)
IRON SERPL-MCNC: 89 UG/DL (ref 27–159)
LDLC SERPL CALC-MCNC: 95 MG/DL (ref 0–99)
MCH RBC QN AUTO: 30.9 PG (ref 26.6–33)
MCHC RBC AUTO-ENTMCNC: 34.3 G/DL (ref 31.5–35.7)
MCV RBC AUTO: 90 FL (ref 79–97)
PLATELET # BLD AUTO: 323 X10E3/UL (ref 150–450)
POTASSIUM SERPL-SCNC: 4.4 MMOL/L (ref 3.5–5.2)
PROT SERPL-MCNC: 7 G/DL (ref 6–8.5)
PTH-INTACT SERPL-MCNC: 30 PG/ML (ref 15–65)
RBC # BLD AUTO: 4.43 X10E6/UL (ref 3.77–5.28)
SL AMB EGFR AFRICAN AMERICAN: 107 ML/MIN/1.73
SL AMB EGFR NON AFRICAN AMERICAN: 92 ML/MIN/1.73
SL AMB VLDL CHOLESTEROL CALC: 15 MG/DL (ref 5–40)
SODIUM SERPL-SCNC: 139 MMOL/L (ref 134–144)
TIBC SERPL-MCNC: 344 UG/DL (ref 250–450)
TRIGL SERPL-MCNC: 80 MG/DL (ref 0–149)
UIBC SERPL-MCNC: 255 UG/DL (ref 131–425)
VIT A SERPL-MCNC: 48.6 UG/DL (ref 20.1–62)
VIT B1 BLD-SCNC: 254.6 NMOL/L (ref 66.5–200)
VIT B12 SERPL-MCNC: 1317 PG/ML (ref 232–1245)
WBC # BLD AUTO: 6.4 X10E3/UL (ref 3.4–10.8)
ZINC SERPL-MCNC: 137 UG/DL (ref 44–115)

## 2022-01-26 ENCOUNTER — HOSPITAL ENCOUNTER (OUTPATIENT)
Dept: RADIOLOGY | Facility: HOSPITAL | Age: 52
Discharge: HOME | End: 2022-01-26
Attending: SURGERY
Payer: COMMERCIAL

## 2022-01-26 VITALS — HEIGHT: 60 IN | BODY MASS INDEX: 33.57 KG/M2 | WEIGHT: 171 LBS

## 2022-01-26 DIAGNOSIS — Z17.0 MALIGNANT NEOPLASM OF UPPER-OUTER QUADRANT OF LEFT BREAST IN FEMALE, ESTROGEN RECEPTOR POSITIVE (HCC): ICD-10-CM

## 2022-01-26 DIAGNOSIS — N63.0 BREAST LUMP IN FEMALE: ICD-10-CM

## 2022-01-26 DIAGNOSIS — N63.10 LUMP OF RIGHT BREAST: ICD-10-CM

## 2022-01-26 DIAGNOSIS — C50.412 MALIGNANT NEOPLASM OF UPPER-OUTER QUADRANT OF LEFT BREAST IN FEMALE, ESTROGEN RECEPTOR POSITIVE (HCC): ICD-10-CM

## 2022-01-26 PROCEDURE — 76642 ULTRASOUND BREAST LIMITED: CPT

## 2022-01-27 ENCOUNTER — TELEPHONE (OUTPATIENT)
Dept: SURGICAL ONCOLOGY | Facility: CLINIC | Age: 52
End: 2022-01-27

## 2022-01-27 NOTE — TELEPHONE ENCOUNTER
Called patient to discuss her recent right breast ultrasound results  There was no answer so a detailed voicemail was left  Stated that the ultrasound study recommended a 6 month follow up and that Dr Ebenezer Carrion was in agreement with this  Also stated that the ultrasound should be scheduled prior to her follow up office visit with Dr Ebenezer Carrion in October  Direct call back number provided if the patient had any further questions or concerns

## 2022-01-31 ENCOUNTER — TELEPHONE (OUTPATIENT)
Dept: BARIATRICS | Facility: CLINIC | Age: 52
End: 2022-01-31

## 2022-01-31 NOTE — TELEPHONE ENCOUNTER
Provider reinjured his hand out another 8 weeks  Called pt to advise appt canceled and to call us back to reschedule

## 2022-02-02 ENCOUNTER — TELEPHONE (OUTPATIENT)
Dept: BARIATRICS | Facility: CLINIC | Age: 52
End: 2022-02-02

## 2022-02-02 ENCOUNTER — OFFICE VISIT (OUTPATIENT)
Dept: BARIATRICS | Facility: CLINIC | Age: 52
End: 2022-02-02
Payer: COMMERCIAL

## 2022-02-02 VITALS
HEART RATE: 74 BPM | DIASTOLIC BLOOD PRESSURE: 66 MMHG | SYSTOLIC BLOOD PRESSURE: 112 MMHG | HEIGHT: 60 IN | BODY MASS INDEX: 34.36 KG/M2 | WEIGHT: 175 LBS

## 2022-02-02 DIAGNOSIS — Z48.815 ENCOUNTER FOR SURGICAL AFTERCARE FOLLOWING SURGERY OF DIGESTIVE SYSTEM: Primary | ICD-10-CM

## 2022-02-02 DIAGNOSIS — K91.2 POSTSURGICAL MALABSORPTION: ICD-10-CM

## 2022-02-02 DIAGNOSIS — R79.0 HIGH ZINC LEVEL: ICD-10-CM

## 2022-02-02 DIAGNOSIS — F33.9 DEPRESSION, RECURRENT (HCC): ICD-10-CM

## 2022-02-02 DIAGNOSIS — E83.52 HIGH CALCIUM LEVELS: ICD-10-CM

## 2022-02-02 PROCEDURE — 99214 OFFICE O/P EST MOD 30 MIN: CPT | Performed by: PHYSICIAN ASSISTANT

## 2022-02-02 NOTE — ASSESSMENT & PLAN NOTE
-s/p Napoleon-En-Y Gastric Bypass with Dr Jan Agosto on 10/04/21  Overall weight loss is slightly behind schedule  She agrees to keep food records and f/u with RD, start exercising as able with back pain, increase fluids  Initial: 200 6lbs  Current: 175lbs   EWL: 29%  Ramirez: current  Current BMI is Body mass index is 34 18 kg/m²  · Tolerating a regular diet-yes, but drinks 2 protein shakes daily - discussed diet progression and she will review with RD  · Eating at least 60 grams of protein per day-yes  · Following 30/60 minute rule with liquids-yes  · Drinking at least 64 ounces of fluid per day-no; increase to goal  · Drinking carbonated beverages-no  · Sufficient exercise-no d/t back pain; suggest PT - has upcoming appointment with ortho  She knows to avoid oral steroids in future  · Using NSAIDs regularly-no  · Using nicotine-no  · Using alcohol-no   Advised about the risks of alcohol s/p bariatric surgery and recommend avoiding all alcohol

## 2022-02-02 NOTE — PROGRESS NOTES
Assessment/Plan:    Encounter for surgical aftercare following surgery of digestive system  -s/p Napoleon-En-Y Gastric Bypass with Dr Jv Morillo on 10/04/21  Overall weight loss is slightly behind schedule  She agrees to keep food records and f/u with RD, start exercising as able with back pain, increase fluids  Initial: 200 6lbs  Current: 175lbs   EWL: 29%  Ramirez: current  Current BMI is Body mass index is 34 18 kg/m²  · Tolerating a regular diet-yes, but drinks 2 protein shakes daily - discussed diet progression and she will review with RD  · Eating at least 60 grams of protein per day-yes  · Following 30/60 minute rule with liquids-yes  · Drinking at least 64 ounces of fluid per day-no; increase to goal  · Drinking carbonated beverages-no  · Sufficient exercise-no d/t back pain; suggest PT - has upcoming appointment with ortho  She knows to avoid oral steroids in future  · Using NSAIDs regularly-no  · Using nicotine-no  · Using alcohol-no   Advised about the risks of alcohol s/p bariatric surgery and recommend avoiding all alcohol      Postsurgical malabsorption  -At risk for malabsorption of vitamins/minerals secondary to malabsorption and restriction of intake from bariatric surgery  -Currently taking adequate postop bariatric surgery vitamin supplementation: Bariatric Pal MVI with iron, calcium citrate 500mg TID  -Last set of bariatric labs completed on 01/13/22:  -High calcium - decrease calcium to BID  -High B12 and B1  -High Zinc  -Rest of vitamins WNL    -Next set of bariatric labs ordered for approximately 3 months  -Patient received education about the importance of adhering to a lifelong supplementation regimen to avoid vitamin/mineral deficiencies       Depression, recurrent ([address] )  + hx of anxiety  -On wellbutrin  -Continue monitoring and management with prescribing provider  -Encouraged consistent f/u with therapist/counselor and offered LCSW services here  -Encouraged stress reduction techniques, exercise         Diagnoses and all orders for this visit:    Encounter for surgical aftercare following surgery of digestive system    Postsurgical malabsorption  -     CBC and differential; Future  -     Comprehensive metabolic panel; Future  -     Folate; Future  -     Iron Panel (Includes Ferritin, Iron Sat%, Iron, and TIBC); Future  -     Zinc; Future  -     Vitamin D 25 hydroxy; Future  -     Vitamin B12; Future  -     Vitamin B1, whole blood; Future  -     Vitamin A; Future  -     PTH, intact; Future  -     CBC and differential  -     Comprehensive metabolic panel  -     Folate  -     Zinc  -     Vitamin D 25 hydroxy  -     Vitamin B12  -     Vitamin B1, whole blood  -     Vitamin A  -     PTH, intact    Depression, recurrent (HCC)    High calcium levels  -     Comprehensive metabolic panel; Future  -     Comprehensive metabolic panel    High zinc level  -     Zinc; Future  -     Zinc          Subjective:      Patient ID: Kavon Salmon is a 54 y o  female  -s/p Napoleon-En-Y Gastric Bypass with Dr Sarah Lopez on 10/04/21  Presents to the office today for routine follow up  Tolerating a regular diet; denies N/V, dysphagia, reflux  Works from home until midnight  Snack and graze at 9-10pm - cheese  Usually have 2 shakes daily  Not eating much vegetables, afraid to advance her diet  Has tried salad once  Unable to exercise d/t back pain  Recently moved, took 3 days or oral streoid pack from PCP  Denies any abdominal pain or blood in her stool, dark tarry stools  Stopped PPI after 3 months  The following portions of the patient's history were reviewed and updated as appropriate: allergies, current medications, past family history, past medical history, past social history, past surgical history and problem list     Review of Systems   Constitutional: Negative for chills and fever  Unexpected weight change: planned weight loss  HENT: Negative for trouble swallowing      Respiratory: Negative for cough and shortness of breath  Cardiovascular: Negative for chest pain and palpitations  Gastrointestinal: Negative for abdominal pain, constipation, diarrhea, nausea and vomiting  Musculoskeletal: Positive for back pain  Neurological: Negative for dizziness  Psychiatric/Behavioral:        Denies anxiety and depression         Objective:      /66 (BP Location: Left arm, Patient Position: Sitting, Cuff Size: Standard)   Pulse 74   Ht 5' (1 524 m)   Wt 79 4 kg (175 lb)   BMI 34 18 kg/m²     Colonoscopy-Not applicable       Physical Exam  Vitals reviewed  Constitutional:       General: She is not in acute distress  Appearance: She is well-developed  HENT:      Head: Normocephalic and atraumatic  Eyes:      General: No scleral icterus  Cardiovascular:      Rate and Rhythm: Normal rate and regular rhythm  Pulmonary:      Effort: Pulmonary effort is normal  No respiratory distress  Abdominal:      General: There is no distension  Palpations: Abdomen is soft  Tenderness: There is no abdominal tenderness  Comments: No incisional hernias appreciated   Skin:     General: Skin is warm and dry  Neurological:      Mental Status: She is alert and oriented to person, place, and time  Psychiatric:         Mood and Affect: Mood normal          Behavior: Behavior normal            BARRIERS: none identified    GOALS:   · Continued/Maintain healthy weight loss with good nutrition intakes  · Adequate hydration with at least 64oz  fluid intake  · Normal vitamin and mineral levels  · Exercise as tolerated  · Reduce calcium to twice a day     · Follow-up in 3 months  We kindly ask that your arrive 15 minutes before your scheduled appointment time with your provider to allow our staff to room you, get your vital signs and update your chart  · Follow diet as discussed  · Get lab work done in the next 2 months  You have been given a lab slip today    Please call the office if you need a replacement  It is recommended to check with your insurance BEFORE getting labs done to make sure they are covered by your policy  Also, please check with your PCP and other providers before getting labs to avoid duplicate labs  Make sure to HOLD any multivitamins that may contain biotin and any biotin supplements FOR 5 DAYS before any labs since it can affect the results  · Follow vitamin and mineral recommendations as reviewed with you  · Call our office if you have any problems with abdominal pain especially associated with fever, chills, nausea, vomiting or any other concerns  · All  Post-bariatric surgery patients should be aware that very small quantities of any alcohol can cause impairment and it is very possible not to feel the effect  The effect can be in the system for several hours  It is also a stomach irritant  · It is advised to AVOID alcohol, Nonsteroidal antiinflammatory drugs (NSAIDS) and nicotine of all forms   Any of these can cause stomach irritation/pain

## 2022-02-02 NOTE — ASSESSMENT & PLAN NOTE
-At risk for malabsorption of vitamins/minerals secondary to malabsorption and restriction of intake from bariatric surgery  -Currently taking adequate postop bariatric surgery vitamin supplementation: Bariatric Pal MVI with iron, calcium citrate 500mg TID  -Last set of bariatric labs completed on 01/13/22:  -High calcium - decrease calcium to BID  -High B12 and B1  -High Zinc  -Rest of vitamins WNL    -Next set of bariatric labs ordered for approximately 3 months  -Patient received education about the importance of adhering to a lifelong supplementation regimen to avoid vitamin/mineral deficiencies

## 2022-02-02 NOTE — PATIENT INSTRUCTIONS
GOALS:   · Continued/Maintain healthy weight loss with good nutrition intakes  · Adequate hydration with at least 64oz  fluid intake  · Normal vitamin and mineral levels  · Exercise as tolerated  · Reduce calcium to twice a day     · Follow-up in 3 months  We kindly ask that your arrive 15 minutes before your scheduled appointment time with your provider to allow our staff to room you, get your vital signs and update your chart  · Follow diet as discussed  · Get lab work done in the next 2 months  You have been given a lab slip today  Please call the office if you need a replacement  It is recommended to check with your insurance BEFORE getting labs done to make sure they are covered by your policy  Also, please check with your PCP and other providers before getting labs to avoid duplicate labs  Make sure to HOLD any multivitamins that may contain biotin and any biotin supplements FOR 5 DAYS before any labs since it can affect the results  · Follow vitamin and mineral recommendations as reviewed with you  · Call our office if you have any problems with abdominal pain especially associated with fever, chills, nausea, vomiting or any other concerns  · All  Post-bariatric surgery patients should be aware that very small quantities of any alcohol can cause impairment and it is very possible not to feel the effect  The effect can be in the system for several hours  It is also a stomach irritant  · It is advised to AVOID alcohol, Nonsteroidal antiinflammatory drugs (NSAIDS) and nicotine of all forms   Any of these can cause stomach irritation/pain

## 2022-02-02 NOTE — ASSESSMENT & PLAN NOTE
+ hx of anxiety  -On wellbutrin  -Continue monitoring and management with prescribing provider  -Encouraged consistent f/u with therapist/counselor and offered LCSW services here  -Encouraged stress reduction techniques, exercise

## 2022-02-04 ENCOUNTER — TELEMEDICINE (OUTPATIENT)
Dept: BARIATRICS | Facility: CLINIC | Age: 52
End: 2022-02-04

## 2022-02-04 VITALS — BODY MASS INDEX: 34.36 KG/M2 | WEIGHT: 175 LBS | HEIGHT: 60 IN

## 2022-02-04 DIAGNOSIS — K91.2 POSTSURGICAL MALABSORPTION: Primary | ICD-10-CM

## 2022-02-04 PROCEDURE — RECHECK

## 2022-02-04 NOTE — PROGRESS NOTES
Bariatric Follow Up Nutrition Note    [facility] due to inclement weather    i  Name was verified by patient stating name? yes  ii   verified by patient stating? yes  iii  Verification of patient location yes  iv  Verified patient is in state in which I hold an active license? yes  v  Verified the patient is alone? yes  vi  I would like to verify that you were offered a live visit but are now consenting to this telephone visit? yes  vii  This visit is free yes      Type of surgery  Gastric bypass: laparoscopic  Surgery Date: 10/4/21  4 months  post-op  Surgeon: Dr Kimberly Sousa  54 y o   female  Height 5' (1 524 m), weight 79 4 kg (175 lb), not currently breastfeeding  Body mass index is 34 18 kg/m²  --was this weight in office on 22  On home scale was 171 5lbs, which is often lower than in the office    Initial:  200 6#  Weight on Day of Weight Loss Surgery: 207# (higher than start weight)  Weight in (lb) to have BMI = 25: 127 3#  Pre-Op Excess Wt: 73 3# from initial weight orf 200 6#, but 79 7# from DOS weight  Post-Op Wt Loss: 25 6#/ 35% EBWL in 4 month(s) based off initial weight of 200 6#, however pt gained weight by DOS therefore 32# wt loss/40% from DOS weight of 207#    Initial short term goal:  160lbs  153lbs would get BMI <30lbs    Review of History and Medications   Past Medical History:   Diagnosis Date    Anxiety     Back pain     BRCA negative 2016    [facility]    Cancer ([facility] [address] )     Headache     History of chemotherapy 2016    [address]oad[facility]; [address] History of transfusion 2017    no adverse reaction    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)     s/p chemotherapy    Obesity (BMI 30-39  9)      Past Surgical History:   Procedure Laterality Date    ABDOMINAL ADHESION SURGERY N/A 10/4/2021    Procedure: Lysis Adhesions;  Surgeon: Christina Merritt MD;  [facility];  Service: Bariatrics    ABDOMINAL WALL SURGERY Left 9/21/2016    Procedure: DEBRIDEMENT OF LEFT ABDOMINAL TISSUE AND CLOSURE; DEBRIDEMENT OF LEFT BREAST FLAP; SUBMUSCULAR TISSUE EXPANDER PLACEMENT WITH ADM (ACELLULAR DERMAL MATRIX); Surgeon: Katie Stuart MD;  [facility];  Service:     BREAST BIOPSY Left 04/2016    with sentinel node biospy    BREAST BIOPSY Right 10/18/2021    benign    BREAST RECONSTRUCTION Left 12/22/2017    Procedure: REVISION OF LEFT BREAST SCAR, LOCAL FLAP;  Surgeon: Kamlesh Valdivia MD;  [facility];  Service: Plastics    [address] [place] [address] Drive OF UTERUS      LAPAROSCOPIC CHOLECYSTECTOMY  2000    LAPAROSCOPIC GASTRIC BANDING  2008    removed 2013    LAPAROSCOPIC GASTRIC BANDING  2013    removal    LIPOSUCTION W/ FAT INJECTION Left 6/8/2017    Procedure: BREAST FAT GRAFTING ;  Surgeon: Katie Stuart MD;  [facility];  Service:     MASTECTOMY Left 2016    MEDIPORT INSERTION, SINGLE  2016    NC BIOPSY/EXCISION, LYMPH NODE(S) Left 8/10/2016    Procedure: LYMPHOSCINTIGRAPHY; SENTINAL LYMPH NODE BIOPSY (INJECT AT 1100);   Surgeon: Obi Ferguson MD;  [facility];  Service: Surgical Oncology    NC BREAST RECONSTRUCTION W/FREE FLAP Left 9/19/2016    Procedure: BREAST DELAYED RECONSTRUCTION; DEIP FREE FLAP removal of port-a -cath;  Surgeon: Katie Stuart MD;  [facility];  Service: Plastics    NC BREAST REDUCTION Right 12/19/2016    Procedure: BREAST REDUCTION/MASTOPEXY ;  Surgeon: Katie Stuart MD;  [facility];  Service: Plastics    NC HYSTEROSCOPY,W/ENDO [address] N/A 5/21/2018    Procedure: DILATATION AND CURETTAGE (D&C), HYSTEROSCOPY;  Surgeon: Anca Schroeder MD;  [facility];  Service: Gynecology Oncology    NC INSJ/RPLCMT BREAST IMPLANT SEP DAY MASTECTOMY Left 12/19/2016    Procedure: BREAST TISSUE EXPANDER REMOVAL; BREAST IMPLANT PLACEMENT;  Surgeon: Katie Stuart MD;  [facility];  Service: Plastics    NC LAP GASTRIC BYPASS/RAMIN-EN-Y N/A 10/4/2021    Procedure: LAPAROSCOPIC RAMIN-EN-Y GASTRIC BYPASS AND INTRAOPERATIVE EGD;  Surgeon: Gabby Mustafa MD;  Location: [facility];  Service: Bariatrics    ME MASTECTOMY, SIMPLE, COMPLETE Left 8/10/2016    Procedure: BREAST MASTECTOMY; FROZEN SECTION ;  Surgeon: Melisa Page MD;  [facility];  Service: Surgical Oncology    ME REMOVAL TISSUE EXPANDER W/O INSERTION IMPLANT Left 2017    Procedure: BREAST IMPLANT REMOVAL; WASHOUT AND DEBRIDEMENT;  Surgeon: Adenike Sol MD;  [facility];  Service: Plastics    REDUCTION MAMMAPLASTY  2016    REDUCTION MAMMAPLASTY  2018    REVISION OF SCAR ON TORSO N/A 2016    Procedure: ABDOMINAL SCAR REVISION ;  Surgeon: Adenike Sol MD;  [facility];  Service:    [address] [place] [address]  2016    US GUIDED BREAST BIOPSY RIGHT COMPLETE Right 10/18/2021    WISDOM TOOTH EXTRACTION       Social History     Socioeconomic History    Marital status: Legally      Spouse name: Not on file    Number of children: Not on file    Years of education: Not on file    Highest education level: Not on file   Occupational History    Not on file   Tobacco Use    Smoking status: Former Smoker     Packs/day: 1 00     Years: 15 00     Pack years: 15 00     Quit date:      Years since quittin 1    Smokeless tobacco: Never Used   Vaping Use    Vaping Use: Never used   Substance and Sexual Activity    Alcohol use: Not Currently     Comment: rarely    Drug use: No    Sexual activity: Not on file   Other Topics Concern    Not on file   Social History Narrative    Not on file     Social Determinants of Health     Financial Resource Strain: Not on file   Food Insecurity: Not on file   Transportation Needs: Not on file   Physical Activity: Not on file   Stress: Not on file   Social Connections: Not on file   Intimate Partner Violence: Not on file   Housing Stability: Not on file       Current Outpatient Medications:     buPROPion [facility] 150 mg 12 hr tablet, TAKE 1 TABLET BY MOUTH TWICE A DAY, Disp: 180 tablet, Rfl: 0    Calcium Carbonate-Vit D-Min (CALCIUM 1200 PO), Take by mouth 3 times day geovanni, Disp: , Rfl:     cyclobenzaprine (FLEXERIL) 10 mg tablet, Take 1 tablet (10 mg total) by mouth daily at bedtime, Disp: 10 tablet, Rfl: 0    LORazepam (ATIVAN) 0 5 mg tablet, Take 0 5 mg by mouth 2 (two) times a day as needed , Disp: , Rfl:     methylPREDNISolone 4 MG tablet therapy pack, Use as directed on package, Disp: 21 each, Rfl: 0    Multiple Vitamin (multivitamin) capsule, Take 1 capsule by mouth daily geovanni, Disp: , Rfl:     zolpidem (AMBIEN) 5 mg tablet, Take 1 tablet (5 mg total) by mouth daily at bedtime, Disp: 30 tablet, Rfl: 0    Food Intake and Lifestyle Assessment   Food Intake Assessment completed via usual diet recall  Going to change work hours:  12-8pm M-F OR 2pm-10pm  Now 4:45pm-12am  Wake: wakes at [facility] [employer] to get son on busy, but then goes back to bed until 10:30am   Breakfast: 11:30-12: shake   Snack: -   Lunch: 2pm-Protein bar [employer] protein bar) OR Cheese stick--more snacky foods  Snack: -  Dinner: 1/2 burger + egg muffin but vomited  [place] works well  Encouraged to  Measure! Snack: finds self mindless eating in the later hours, hopefully this will change with new schedule  Some examples are:  Both sweet and salty snacks  Plan is put it in a high cabinet  Protein foods uggested to try: thinly sliced deli meat, tuna pouch, dark meat chicken, turkey    Beverage intake: 16oz water and 2 protein shakes + occasional broth  Diet texture/stage: regular  Protein supplement: at least protein shake, but most often 2 per day    Bought JBN powder and has [employer] OR Pure Protein   Estimated protein intake per day: 60gm from 2 protein sakes  Estimated fluid intake per day: 16oz water max + 2 protien shakes  Meals eaten away from home: not often, only picks off of someone meal  Typical meal pattern: 2-3 meals per day and grazing in evening snacks per day  Eating Behaviors: Appropriate diet advancement, Frequent snacking/ grazing and Mindless eating    Food allergies or intolerances: cottage cheese, some meats and the yogurt doesn't taste good    Physical Assessment  Nutrition Related Findings  Constipation-taking colace    Physical Activity  Types of exercise: None  Current physical limitations: having back issues  Psychosocial Assessment   Support systems: friend(s) relative(s) children  Socioeconomic factors: none    Nutrition Diagnosis  Diagnosis: Inadequate protein intake (NI-5 7  3)  Related to: Limited adherence to nutrition-related recommendations  As Evidenced by: Expected anthropometric outcomes are not achieved     Interventions and Teaching   Patient educated on post-op nutrition guidelines  Patient educated and handouts provided  Surgical changes to stomach / GI  Capacity of post-surgery stomach  Adequate hydration  Expected weight loss  Weight loss plateaus/ possibility of weight regain  Exercise  Nutrition considerations after surgery  Protein supplements  Meal planning and preparation  Appropriate carbohydrate, protein, and fat intake, and food/fluid choices to maximize safe weight loss, nutrient intake, and tolerance   Dietary and lifestyle changes  Possible problems with poor eating habits  Intuitive eating  Techniques for self monitoring and keeping daily food journal  Potential for food intolerance after surgery, and ways to deal with them including: lactose intolerance, nausea, reflux, vomiting, diarrhea, food intolerance, appetite changes, gas  Vitamin / Mineral supplementation of Multivitamin with minerals and Calcium    Education provided to: patient    Barriers to learning: No barriers identified    Readiness to change: action    Comprehension: verbalizes understanding     Expected Compliance: good    Pt presents today with radha loss below average    She has lost between 35-40% of her excess body weight depending on start weight (pt started at 200 6#, but increased to 207# by DOS)  Either way pt is below the average of 50% EBW lost by 3 month s/p RYGB  Pt is happy with weight loss  Reviewed her diet recall and it appears she is lacking protein, may be eating larger portions, and is relying on more protein shakes and bars then would prefer  Encouraged pt to get more protein from her food and limit to one protein shake OR bar per day (right now often taking 2 shakes + 1 bar per day)  Encouraged about 800 cals, 60-70gm protein mostly from food, limit to one shake per day, measure portions and limit to one planned snack  Came up with some food options she can try  Pt feels once her work schedule changes she will snack much less because she won't be up as late        Goals  · Food journal via baritastic  · 800cals, 60-70gm protein  · Limit to one protein shake OR bar per day  · Exercise 30 minutes 5 times per week--start with walking  · Increase hydration to 64oz per day--start morning with water and drink more in the evening instead of snacking to help meet needs  · Eat 3 meals per day with protein at each meal  · Try new protein foods as discussed  · Eliminate mindless snacking--limit to one planned snack per day     Time Spent:   30 Minutes

## 2022-02-05 ENCOUNTER — APPOINTMENT (EMERGENCY)
Dept: RADIOLOGY | Facility: HOSPITAL | Age: 52
DRG: 329 | End: 2022-02-05
Payer: COMMERCIAL

## 2022-02-05 ENCOUNTER — NURSE TRIAGE (OUTPATIENT)
Dept: OTHER | Facility: OTHER | Age: 52
End: 2022-02-05

## 2022-02-05 ENCOUNTER — HOSPITAL ENCOUNTER (INPATIENT)
Facility: HOSPITAL | Age: 52
LOS: 4 days | Discharge: HOME HEALTH | DRG: 329 | End: 2022-02-10
Attending: EMERGENCY MEDICINE | Admitting: SPECIALIST
Payer: COMMERCIAL

## 2022-02-05 DIAGNOSIS — K62.5 BRBPR (BRIGHT RED BLOOD PER RECTUM): ICD-10-CM

## 2022-02-05 DIAGNOSIS — R10.9 ABDOMINAL PAIN: ICD-10-CM

## 2022-02-05 DIAGNOSIS — Z98.84 S/P GASTRIC BYPASS: ICD-10-CM

## 2022-02-05 DIAGNOSIS — K63.1 PERFORATION OF SIGMOID COLON (HCC): Primary | ICD-10-CM

## 2022-02-05 DIAGNOSIS — R55 SYNCOPE: ICD-10-CM

## 2022-02-05 DIAGNOSIS — K65.8 FECAL PERITONITIS (HCC): ICD-10-CM

## 2022-02-05 DIAGNOSIS — K63.1 PERFORATED BOWEL (HCC): ICD-10-CM

## 2022-02-05 DIAGNOSIS — K62.5 RECTAL BLEEDING: ICD-10-CM

## 2022-02-05 LAB
ALBUMIN SERPL BCP-MCNC: 3.9 G/DL (ref 3.5–5)
ALP SERPL-CCNC: 82 U/L (ref 46–116)
ALT SERPL W P-5'-P-CCNC: 33 U/L (ref 12–78)
ANION GAP SERPL CALCULATED.3IONS-SCNC: 5 MMOL/L (ref 4–13)
AST SERPL W P-5'-P-CCNC: 14 U/L (ref 5–45)
BASOPHILS # BLD AUTO: 0.04 THOUSANDS/ΜL (ref 0–0.1)
BASOPHILS NFR BLD AUTO: 0 % (ref 0–1)
BILIRUB SERPL-MCNC: 0.27 MG/DL (ref 0.2–1)
BUN SERPL-MCNC: 28 MG/DL (ref 5–25)
CALCIUM SERPL-MCNC: 9.1 MG/DL (ref 8.3–10.1)
CARDIAC TROPONIN I PNL SERPL HS: 2 NG/L
CHLORIDE SERPL-SCNC: 103 MMOL/L (ref 100–108)
CO2 SERPL-SCNC: 30 MMOL/L (ref 21–32)
CREAT SERPL-MCNC: 0.71 MG/DL (ref 0.6–1.3)
EOSINOPHIL # BLD AUTO: 0.1 THOUSAND/ΜL (ref 0–0.61)
EOSINOPHIL NFR BLD AUTO: 1 % (ref 0–6)
ERYTHROCYTE [DISTWIDTH] IN BLOOD BY AUTOMATED COUNT: 13.6 % (ref 11.6–15.1)
GFR SERPL CREATININE-BSD FRML MDRD: 100 ML/MIN/1.73SQ M
GLUCOSE SERPL-MCNC: 112 MG/DL (ref 65–140)
HCT VFR BLD AUTO: 40.1 % (ref 34.8–46.1)
HGB BLD-MCNC: 12.6 G/DL (ref 11.5–15.4)
IMM GRANULOCYTES # BLD AUTO: 0.01 THOUSAND/UL (ref 0–0.2)
IMM GRANULOCYTES NFR BLD AUTO: 0 % (ref 0–2)
LYMPHOCYTES # BLD AUTO: 2.92 THOUSANDS/ΜL (ref 0.6–4.47)
LYMPHOCYTES NFR BLD AUTO: 32 % (ref 14–44)
MCH RBC QN AUTO: 30.1 PG (ref 26.8–34.3)
MCHC RBC AUTO-ENTMCNC: 31.4 G/DL (ref 31.4–37.4)
MCV RBC AUTO: 96 FL (ref 82–98)
MONOCYTES # BLD AUTO: 0.67 THOUSAND/ΜL (ref 0.17–1.22)
MONOCYTES NFR BLD AUTO: 7 % (ref 4–12)
NEUTROPHILS # BLD AUTO: 5.32 THOUSANDS/ΜL (ref 1.85–7.62)
NEUTS SEG NFR BLD AUTO: 60 % (ref 43–75)
NRBC BLD AUTO-RTO: 0 /100 WBCS
PLATELET # BLD AUTO: 340 THOUSANDS/UL (ref 149–390)
PMV BLD AUTO: 9 FL (ref 8.9–12.7)
POTASSIUM SERPL-SCNC: 3.6 MMOL/L (ref 3.5–5.3)
PROT SERPL-MCNC: 7.5 G/DL (ref 6.4–8.2)
RBC # BLD AUTO: 4.18 MILLION/UL (ref 3.81–5.12)
SODIUM SERPL-SCNC: 138 MMOL/L (ref 136–145)
WBC # BLD AUTO: 9.06 THOUSAND/UL (ref 4.31–10.16)

## 2022-02-05 PROCEDURE — G1004 CDSM NDSC: HCPCS

## 2022-02-05 PROCEDURE — 99285 EMERGENCY DEPT VISIT HI MDM: CPT | Performed by: EMERGENCY MEDICINE

## 2022-02-05 PROCEDURE — 93005 ELECTROCARDIOGRAM TRACING: CPT

## 2022-02-05 PROCEDURE — 36415 COLL VENOUS BLD VENIPUNCTURE: CPT | Performed by: EMERGENCY MEDICINE

## 2022-02-05 PROCEDURE — 96361 HYDRATE IV INFUSION ADD-ON: CPT

## 2022-02-05 PROCEDURE — 74178 CT ABD&PLV WO CNTR FLWD CNTR: CPT

## 2022-02-05 PROCEDURE — 99285 EMERGENCY DEPT VISIT HI MDM: CPT

## 2022-02-05 PROCEDURE — 84703 CHORIONIC GONADOTROPIN ASSAY: CPT | Performed by: EMERGENCY MEDICINE

## 2022-02-05 PROCEDURE — 80053 COMPREHEN METABOLIC PANEL: CPT | Performed by: EMERGENCY MEDICINE

## 2022-02-05 PROCEDURE — 96375 TX/PRO/DX INJ NEW DRUG ADDON: CPT

## 2022-02-05 PROCEDURE — 84484 ASSAY OF TROPONIN QUANT: CPT | Performed by: EMERGENCY MEDICINE

## 2022-02-05 PROCEDURE — 71045 X-RAY EXAM CHEST 1 VIEW: CPT

## 2022-02-05 PROCEDURE — 85025 COMPLETE CBC W/AUTO DIFF WBC: CPT | Performed by: EMERGENCY MEDICINE

## 2022-02-05 PROCEDURE — 70450 CT HEAD/BRAIN W/O DYE: CPT

## 2022-02-05 RX ORDER — ONDANSETRON 2 MG/ML
INJECTION INTRAMUSCULAR; INTRAVENOUS
Status: COMPLETED
Start: 2022-02-05 | End: 2022-02-05

## 2022-02-05 RX ORDER — ONDANSETRON 2 MG/ML
4 INJECTION INTRAMUSCULAR; INTRAVENOUS ONCE
Status: COMPLETED | OUTPATIENT
Start: 2022-02-05 | End: 2022-02-05

## 2022-02-05 RX ORDER — PANTOPRAZOLE SODIUM 40 MG/1
40 INJECTION, POWDER, FOR SOLUTION INTRAVENOUS ONCE
Status: COMPLETED | OUTPATIENT
Start: 2022-02-05 | End: 2022-02-06

## 2022-02-05 RX ORDER — HYDROMORPHONE HCL/PF 1 MG/ML
1 SYRINGE (ML) INJECTION ONCE
Status: COMPLETED | OUTPATIENT
Start: 2022-02-05 | End: 2022-02-05

## 2022-02-05 RX ORDER — PROMETHAZINE HYDROCHLORIDE 25 MG/ML
12.5 INJECTION, SOLUTION INTRAMUSCULAR; INTRAVENOUS ONCE
Status: COMPLETED | OUTPATIENT
Start: 2022-02-05 | End: 2022-02-06

## 2022-02-05 RX ADMIN — IOHEXOL 100 ML: 350 INJECTION, SOLUTION INTRAVENOUS at 23:52

## 2022-02-05 RX ADMIN — ONDANSETRON 4 MG: 2 INJECTION INTRAMUSCULAR; INTRAVENOUS at 22:39

## 2022-02-05 RX ADMIN — SODIUM CHLORIDE 1000 ML: 0.9 INJECTION, SOLUTION INTRAVENOUS at 22:59

## 2022-02-05 RX ADMIN — HYDROMORPHONE HYDROCHLORIDE 1 MG: 1 INJECTION, SOLUTION INTRAMUSCULAR; INTRAVENOUS; SUBCUTANEOUS at 22:59

## 2022-02-06 ENCOUNTER — ANESTHESIA EVENT (INPATIENT)
Dept: PERIOP | Facility: HOSPITAL | Age: 52
DRG: 329 | End: 2022-02-06
Payer: COMMERCIAL

## 2022-02-06 ENCOUNTER — ANESTHESIA (INPATIENT)
Dept: PERIOP | Facility: HOSPITAL | Age: 52
DRG: 329 | End: 2022-02-06
Payer: COMMERCIAL

## 2022-02-06 PROBLEM — K57.20 PERFORATION OF SIGMOID COLON DUE TO DIVERTICULITIS: Status: ACTIVE | Noted: 2022-02-06

## 2022-02-06 PROBLEM — K62.5 RECTAL BLEEDING: Status: ACTIVE | Noted: 2022-02-06

## 2022-02-06 PROBLEM — K63.89 PNEUMATOSIS INTESTINALIS OF LARGE INTESTINE: Status: ACTIVE | Noted: 2022-02-06

## 2022-02-06 PROBLEM — K65.8 FECAL PERITONITIS (HCC): Status: ACTIVE | Noted: 2022-02-06

## 2022-02-06 PROBLEM — K63.1 PERFORATION OF SIGMOID COLON (HCC): Status: ACTIVE | Noted: 2022-02-06

## 2022-02-06 PROBLEM — Z98.84 S/P GASTRIC BYPASS: Status: ACTIVE | Noted: 2022-02-06

## 2022-02-06 PROBLEM — R55 SYNCOPE: Status: ACTIVE | Noted: 2022-02-06

## 2022-02-06 LAB
2HR DELTA HS TROPONIN: 1 NG/L
ABO GROUP BLD: NORMAL
ANION GAP SERPL CALCULATED.3IONS-SCNC: 4 MMOL/L (ref 4–13)
APTT PPP: 27 SECONDS (ref 23–37)
ATRIAL RATE: 64 BPM
BLD GP AB SCN SERPL QL: NEGATIVE
BUN SERPL-MCNC: 17 MG/DL (ref 5–25)
CALCIUM SERPL-MCNC: 8.3 MG/DL (ref 8.3–10.1)
CARDIAC TROPONIN I PNL SERPL HS: 3 NG/L
CHLORIDE SERPL-SCNC: 105 MMOL/L (ref 100–108)
CO2 SERPL-SCNC: 27 MMOL/L (ref 21–32)
CREAT SERPL-MCNC: 0.65 MG/DL (ref 0.6–1.3)
ERYTHROCYTE [DISTWIDTH] IN BLOOD BY AUTOMATED COUNT: 13.6 % (ref 11.6–15.1)
FLUAV RNA RESP QL NAA+PROBE: NEGATIVE
FLUBV RNA RESP QL NAA+PROBE: NEGATIVE
GFR SERPL CREATININE-BSD FRML MDRD: 104 ML/MIN/1.73SQ M
GLUCOSE SERPL-MCNC: 152 MG/DL (ref 65–140)
HCG SERPL QL: NEGATIVE
HCT VFR BLD AUTO: 36.3 % (ref 34.8–46.1)
HGB BLD-MCNC: 11.6 G/DL (ref 11.5–15.4)
INR PPP: 1.08 (ref 0.84–1.19)
LACTATE SERPL-SCNC: 0.8 MMOL/L (ref 0.5–2)
MAGNESIUM SERPL-MCNC: 1.5 MG/DL (ref 1.6–2.6)
MCH RBC QN AUTO: 30.5 PG (ref 26.8–34.3)
MCHC RBC AUTO-ENTMCNC: 32 G/DL (ref 31.4–37.4)
MCV RBC AUTO: 96 FL (ref 82–98)
P AXIS: 67 DEGREES
PHOSPHATE SERPL-MCNC: 3.2 MG/DL (ref 2.7–4.5)
PLATELET # BLD AUTO: 260 THOUSANDS/UL (ref 149–390)
PMV BLD AUTO: 8.9 FL (ref 8.9–12.7)
POTASSIUM SERPL-SCNC: 4.1 MMOL/L (ref 3.5–5.3)
PR INTERVAL: 150 MS
PROTHROMBIN TIME: 13.8 SECONDS (ref 11.6–14.5)
QRS AXIS: 23 DEGREES
QRSD INTERVAL: 108 MS
QT INTERVAL: 422 MS
QTC INTERVAL: 435 MS
RBC # BLD AUTO: 3.8 MILLION/UL (ref 3.81–5.12)
RH BLD: POSITIVE
RSV RNA RESP QL NAA+PROBE: NEGATIVE
SARS-COV-2 RNA RESP QL NAA+PROBE: NEGATIVE
SODIUM SERPL-SCNC: 136 MMOL/L (ref 136–145)
SPECIMEN EXPIRATION DATE: NORMAL
T WAVE AXIS: 67 DEGREES
VENTRICULAR RATE: 64 BPM
WBC # BLD AUTO: 17.27 THOUSAND/UL (ref 4.31–10.16)

## 2022-02-06 PROCEDURE — 93010 ELECTROCARDIOGRAM REPORT: CPT | Performed by: INTERNAL MEDICINE

## 2022-02-06 PROCEDURE — 84484 ASSAY OF TROPONIN QUANT: CPT | Performed by: EMERGENCY MEDICINE

## 2022-02-06 PROCEDURE — 86850 RBC ANTIBODY SCREEN: CPT | Performed by: EMERGENCY MEDICINE

## 2022-02-06 PROCEDURE — C9113 INJ PANTOPRAZOLE SODIUM, VIA: HCPCS | Performed by: EMERGENCY MEDICINE

## 2022-02-06 PROCEDURE — 83605 ASSAY OF LACTIC ACID: CPT | Performed by: EMERGENCY MEDICINE

## 2022-02-06 PROCEDURE — 0241U HB NFCT DS VIR RESP RNA 4 TRGT: CPT | Performed by: EMERGENCY MEDICINE

## 2022-02-06 PROCEDURE — 85027 COMPLETE CBC AUTOMATED: CPT | Performed by: NURSE PRACTITIONER

## 2022-02-06 PROCEDURE — 88307 TISSUE EXAM BY PATHOLOGIST: CPT | Performed by: PATHOLOGY

## 2022-02-06 PROCEDURE — 80048 BASIC METABOLIC PNL TOTAL CA: CPT | Performed by: NURSE PRACTITIONER

## 2022-02-06 PROCEDURE — 44143 PARTIAL REMOVAL OF COLON: CPT | Performed by: SPECIALIST

## 2022-02-06 PROCEDURE — 36415 COLL VENOUS BLD VENIPUNCTURE: CPT | Performed by: EMERGENCY MEDICINE

## 2022-02-06 PROCEDURE — 99223 1ST HOSP IP/OBS HIGH 75: CPT | Performed by: SPECIALIST

## 2022-02-06 PROCEDURE — 0D1M0Z4 BYPASS DESCENDING COLON TO CUTANEOUS, OPEN APPROACH: ICD-10-PCS | Performed by: SPECIALIST

## 2022-02-06 PROCEDURE — 44143 PARTIAL REMOVAL OF COLON: CPT | Performed by: PHYSICIAN ASSISTANT

## 2022-02-06 PROCEDURE — 84100 ASSAY OF PHOSPHORUS: CPT | Performed by: ANESTHESIOLOGY

## 2022-02-06 PROCEDURE — 86901 BLOOD TYPING SEROLOGIC RH(D): CPT | Performed by: EMERGENCY MEDICINE

## 2022-02-06 PROCEDURE — 85730 THROMBOPLASTIN TIME PARTIAL: CPT | Performed by: EMERGENCY MEDICINE

## 2022-02-06 PROCEDURE — 96376 TX/PRO/DX INJ SAME DRUG ADON: CPT

## 2022-02-06 PROCEDURE — 86900 BLOOD TYPING SEROLOGIC ABO: CPT | Performed by: EMERGENCY MEDICINE

## 2022-02-06 PROCEDURE — 99223 1ST HOSP IP/OBS HIGH 75: CPT | Performed by: ANESTHESIOLOGY

## 2022-02-06 PROCEDURE — 87081 CULTURE SCREEN ONLY: CPT | Performed by: PHYSICIAN ASSISTANT

## 2022-02-06 PROCEDURE — 83735 ASSAY OF MAGNESIUM: CPT | Performed by: NURSE PRACTITIONER

## 2022-02-06 PROCEDURE — 0DTN0ZZ RESECTION OF SIGMOID COLON, OPEN APPROACH: ICD-10-PCS | Performed by: SPECIALIST

## 2022-02-06 PROCEDURE — 96375 TX/PRO/DX INJ NEW DRUG ADDON: CPT

## 2022-02-06 PROCEDURE — 96361 HYDRATE IV INFUSION ADD-ON: CPT

## 2022-02-06 PROCEDURE — C1765 ADHESION BARRIER: HCPCS | Performed by: SPECIALIST

## 2022-02-06 PROCEDURE — 85610 PROTHROMBIN TIME: CPT | Performed by: EMERGENCY MEDICINE

## 2022-02-06 PROCEDURE — 96365 THER/PROPH/DIAG IV INF INIT: CPT

## 2022-02-06 RX ORDER — SODIUM CHLORIDE, SODIUM LACTATE, POTASSIUM CHLORIDE, CALCIUM CHLORIDE 600; 310; 30; 20 MG/100ML; MG/100ML; MG/100ML; MG/100ML
125 INJECTION, SOLUTION INTRAVENOUS CONTINUOUS
Status: DISCONTINUED | OUTPATIENT
Start: 2022-02-06 | End: 2022-02-06

## 2022-02-06 RX ORDER — DEXAMETHASONE SODIUM PHOSPHATE 10 MG/ML
INJECTION, SOLUTION INTRAMUSCULAR; INTRAVENOUS AS NEEDED
Status: DISCONTINUED | OUTPATIENT
Start: 2022-02-06 | End: 2022-02-06

## 2022-02-06 RX ORDER — ONDANSETRON 2 MG/ML
INJECTION INTRAMUSCULAR; INTRAVENOUS AS NEEDED
Status: DISCONTINUED | OUTPATIENT
Start: 2022-02-06 | End: 2022-02-06

## 2022-02-06 RX ORDER — METRONIDAZOLE 500 MG/1
500 TABLET ORAL ONCE
Status: COMPLETED | OUTPATIENT
Start: 2022-02-06 | End: 2022-02-06

## 2022-02-06 RX ORDER — CHLORHEXIDINE GLUCONATE 4 G/100ML
SOLUTION TOPICAL DAILY PRN
Status: CANCELLED | OUTPATIENT
Start: 2022-02-06

## 2022-02-06 RX ORDER — SODIUM CHLORIDE, SODIUM LACTATE, POTASSIUM CHLORIDE, CALCIUM CHLORIDE 600; 310; 30; 20 MG/100ML; MG/100ML; MG/100ML; MG/100ML
125 INJECTION, SOLUTION INTRAVENOUS CONTINUOUS
Status: DISPENSED | OUTPATIENT
Start: 2022-02-06 | End: 2022-02-06

## 2022-02-06 RX ORDER — ROCURONIUM BROMIDE 10 MG/ML
INJECTION, SOLUTION INTRAVENOUS AS NEEDED
Status: DISCONTINUED | OUTPATIENT
Start: 2022-02-06 | End: 2022-02-06

## 2022-02-06 RX ORDER — FENTANYL CITRATE 50 UG/ML
50 INJECTION, SOLUTION INTRAMUSCULAR; INTRAVENOUS
Status: DISCONTINUED | OUTPATIENT
Start: 2022-02-06 | End: 2022-02-06 | Stop reason: HOSPADM

## 2022-02-06 RX ORDER — MIDAZOLAM HYDROCHLORIDE 2 MG/2ML
INJECTION, SOLUTION INTRAMUSCULAR; INTRAVENOUS AS NEEDED
Status: DISCONTINUED | OUTPATIENT
Start: 2022-02-06 | End: 2022-02-06

## 2022-02-06 RX ORDER — SODIUM CHLORIDE, SODIUM LACTATE, POTASSIUM CHLORIDE, CALCIUM CHLORIDE 600; 310; 30; 20 MG/100ML; MG/100ML; MG/100ML; MG/100ML
INJECTION, SOLUTION INTRAVENOUS CONTINUOUS PRN
Status: DISCONTINUED | OUTPATIENT
Start: 2022-02-06 | End: 2022-02-06

## 2022-02-06 RX ORDER — FENTANYL CITRATE 50 UG/ML
INJECTION, SOLUTION INTRAMUSCULAR; INTRAVENOUS AS NEEDED
Status: DISCONTINUED | OUTPATIENT
Start: 2022-02-06 | End: 2022-02-06

## 2022-02-06 RX ORDER — ONDANSETRON 2 MG/ML
4 INJECTION INTRAMUSCULAR; INTRAVENOUS ONCE AS NEEDED
Status: DISCONTINUED | OUTPATIENT
Start: 2022-02-06 | End: 2022-02-06 | Stop reason: HOSPADM

## 2022-02-06 RX ORDER — ONDANSETRON 2 MG/ML
4 INJECTION INTRAMUSCULAR; INTRAVENOUS EVERY 6 HOURS PRN
Status: DISCONTINUED | OUTPATIENT
Start: 2022-02-06 | End: 2022-02-10 | Stop reason: HOSPADM

## 2022-02-06 RX ORDER — NEOSTIGMINE METHYLSULFATE 1 MG/ML
INJECTION INTRAVENOUS AS NEEDED
Status: DISCONTINUED | OUTPATIENT
Start: 2022-02-06 | End: 2022-02-06

## 2022-02-06 RX ORDER — MAGNESIUM HYDROXIDE 1200 MG/15ML
LIQUID ORAL AS NEEDED
Status: DISCONTINUED | OUTPATIENT
Start: 2022-02-06 | End: 2022-02-06 | Stop reason: HOSPADM

## 2022-02-06 RX ORDER — BUPROPION HYDROCHLORIDE 150 MG/1
150 TABLET ORAL DAILY
Status: DISCONTINUED | OUTPATIENT
Start: 2022-02-07 | End: 2022-02-10 | Stop reason: HOSPADM

## 2022-02-06 RX ORDER — HYDROMORPHONE HCL/PF 1 MG/ML
0.2 SYRINGE (ML) INJECTION
Status: DISCONTINUED | OUTPATIENT
Start: 2022-02-06 | End: 2022-02-10

## 2022-02-06 RX ORDER — HYDROMORPHONE HCL/PF 1 MG/ML
1 SYRINGE (ML) INJECTION ONCE
Status: COMPLETED | OUTPATIENT
Start: 2022-02-06 | End: 2022-02-06

## 2022-02-06 RX ORDER — SODIUM CHLORIDE, SODIUM LACTATE, POTASSIUM CHLORIDE, CALCIUM CHLORIDE 600; 310; 30; 20 MG/100ML; MG/100ML; MG/100ML; MG/100ML
125 INJECTION, SOLUTION INTRAVENOUS CONTINUOUS
Status: CANCELLED | OUTPATIENT
Start: 2022-02-06

## 2022-02-06 RX ORDER — HYDROMORPHONE HCL/PF 1 MG/ML
0.5 SYRINGE (ML) INJECTION ONCE
Status: COMPLETED | OUTPATIENT
Start: 2022-02-06 | End: 2022-02-06

## 2022-02-06 RX ORDER — HYDROMORPHONE HCL/PF 1 MG/ML
0.5 SYRINGE (ML) INJECTION
Status: DISCONTINUED | OUTPATIENT
Start: 2022-02-06 | End: 2022-02-06 | Stop reason: HOSPADM

## 2022-02-06 RX ORDER — HYDROMORPHONE HCL/PF 1 MG/ML
SYRINGE (ML) INJECTION AS NEEDED
Status: DISCONTINUED | OUTPATIENT
Start: 2022-02-06 | End: 2022-02-06

## 2022-02-06 RX ORDER — FENTANYL CITRATE/PF 50 MCG/ML
50 SYRINGE (ML) INJECTION
Status: DISCONTINUED | OUTPATIENT
Start: 2022-02-06 | End: 2022-02-06 | Stop reason: HOSPADM

## 2022-02-06 RX ORDER — CEFAZOLIN SODIUM 2 G/50ML
2000 SOLUTION INTRAVENOUS ONCE
Status: CANCELLED | OUTPATIENT
Start: 2022-02-06 | End: 2022-02-06

## 2022-02-06 RX ORDER — GLYCOPYRROLATE 0.2 MG/ML
INJECTION INTRAMUSCULAR; INTRAVENOUS AS NEEDED
Status: DISCONTINUED | OUTPATIENT
Start: 2022-02-06 | End: 2022-02-06

## 2022-02-06 RX ORDER — LIDOCAINE HYDROCHLORIDE 10 MG/ML
INJECTION, SOLUTION EPIDURAL; INFILTRATION; INTRACAUDAL; PERINEURAL AS NEEDED
Status: DISCONTINUED | OUTPATIENT
Start: 2022-02-06 | End: 2022-02-06

## 2022-02-06 RX ORDER — PROPOFOL 10 MG/ML
INJECTION, EMULSION INTRAVENOUS AS NEEDED
Status: DISCONTINUED | OUTPATIENT
Start: 2022-02-06 | End: 2022-02-06

## 2022-02-06 RX ADMIN — METRONIDAZOLE 500 MG: 500 TABLET ORAL at 02:07

## 2022-02-06 RX ADMIN — HYDROMORPHONE HYDROCHLORIDE 1 MG: 1 INJECTION, SOLUTION INTRAMUSCULAR; INTRAVENOUS; SUBCUTANEOUS at 01:17

## 2022-02-06 RX ADMIN — PIPERACILLIN AND TAZOBACTAM 3.38 G: 36; 4.5 INJECTION, POWDER, FOR SOLUTION INTRAVENOUS at 14:31

## 2022-02-06 RX ADMIN — ROCURONIUM BROMIDE 50 MG: 10 INJECTION, SOLUTION INTRAVENOUS at 04:29

## 2022-02-06 RX ADMIN — DEXAMETHASONE SODIUM PHOSPHATE 4 MG: 10 INJECTION, SOLUTION INTRAMUSCULAR; INTRAVENOUS at 04:42

## 2022-02-06 RX ADMIN — FENTANYL CITRATE 50 MCG: 50 INJECTION, SOLUTION INTRAMUSCULAR; INTRAVENOUS at 04:29

## 2022-02-06 RX ADMIN — PROMETHAZINE HYDROCHLORIDE 12.5 MG: 25 INJECTION INTRAMUSCULAR; INTRAVENOUS at 00:03

## 2022-02-06 RX ADMIN — PIPERACILLIN AND TAZOBACTAM 3.38 G: 3; .375 INJECTION, POWDER, LYOPHILIZED, FOR SOLUTION INTRAVENOUS at 02:07

## 2022-02-06 RX ADMIN — GLYCOPYRROLATE 0.8 MG: 0.2 INJECTION, SOLUTION INTRAMUSCULAR; INTRAVENOUS at 06:22

## 2022-02-06 RX ADMIN — SODIUM CHLORIDE, SODIUM LACTATE, POTASSIUM CHLORIDE, AND CALCIUM CHLORIDE 125 ML/HR: .6; .31; .03; .02 INJECTION, SOLUTION INTRAVENOUS at 07:46

## 2022-02-06 RX ADMIN — SODIUM CHLORIDE, SODIUM LACTATE, POTASSIUM CHLORIDE, AND CALCIUM CHLORIDE: .6; .31; .03; .02 INJECTION, SOLUTION INTRAVENOUS at 04:17

## 2022-02-06 RX ADMIN — ROCURONIUM BROMIDE 20 MG: 10 INJECTION, SOLUTION INTRAVENOUS at 05:21

## 2022-02-06 RX ADMIN — Medication: at 10:58

## 2022-02-06 RX ADMIN — PANTOPRAZOLE SODIUM 40 MG: 40 INJECTION, POWDER, FOR SOLUTION INTRAVENOUS at 00:02

## 2022-02-06 RX ADMIN — PIPERACILLIN AND TAZOBACTAM 3.38 G: 36; 4.5 INJECTION, POWDER, FOR SOLUTION INTRAVENOUS at 20:36

## 2022-02-06 RX ADMIN — ROCURONIUM BROMIDE 10 MG: 10 INJECTION, SOLUTION INTRAVENOUS at 05:43

## 2022-02-06 RX ADMIN — SODIUM CHLORIDE, SODIUM LACTATE, POTASSIUM CHLORIDE, AND CALCIUM CHLORIDE 125 ML/HR: .6; .31; .03; .02 INJECTION, SOLUTION INTRAVENOUS at 16:27

## 2022-02-06 RX ADMIN — SODIUM CHLORIDE 1000 ML: 0.9 INJECTION, SOLUTION INTRAVENOUS at 01:02

## 2022-02-06 RX ADMIN — FENTANYL CITRATE 50 MCG: 50 INJECTION, SOLUTION INTRAMUSCULAR; INTRAVENOUS at 04:50

## 2022-02-06 RX ADMIN — LIDOCAINE HYDROCHLORIDE 50 MG: 10 INJECTION, SOLUTION EPIDURAL; INFILTRATION; INTRACAUDAL; PERINEURAL at 04:29

## 2022-02-06 RX ADMIN — ONDANSETRON 4 MG: 2 INJECTION INTRAMUSCULAR; INTRAVENOUS at 05:27

## 2022-02-06 RX ADMIN — HYDROMORPHONE HYDROCHLORIDE 0.5 MG: 1 INJECTION, SOLUTION INTRAMUSCULAR; INTRAVENOUS; SUBCUTANEOUS at 09:39

## 2022-02-06 RX ADMIN — PROPOFOL 200 MG: 10 INJECTION, EMULSION INTRAVENOUS at 04:29

## 2022-02-06 RX ADMIN — SODIUM CHLORIDE, SODIUM LACTATE, POTASSIUM CHLORIDE, AND CALCIUM CHLORIDE: .6; .31; .03; .02 INJECTION, SOLUTION INTRAVENOUS at 05:39

## 2022-02-06 RX ADMIN — MIDAZOLAM 2 MG: 1 INJECTION INTRAMUSCULAR; INTRAVENOUS at 04:22

## 2022-02-06 RX ADMIN — HYDROMORPHONE HYDROCHLORIDE 0.2 MG: 1 INJECTION, SOLUTION INTRAMUSCULAR; INTRAVENOUS; SUBCUTANEOUS at 23:31

## 2022-02-06 RX ADMIN — ROCURONIUM BROMIDE 20 MG: 10 INJECTION, SOLUTION INTRAVENOUS at 04:52

## 2022-02-06 RX ADMIN — NEOSTIGMINE METHYLSULFATE 8 MG: 1 INJECTION INTRAVENOUS at 06:22

## 2022-02-06 RX ADMIN — HYDROMORPHONE HYDROCHLORIDE 0.5 MG: 1 INJECTION, SOLUTION INTRAMUSCULAR; INTRAVENOUS; SUBCUTANEOUS at 05:24

## 2022-02-06 RX ADMIN — PIPERACILLIN AND TAZOBACTAM 3.38 G: 36; 4.5 INJECTION, POWDER, FOR SOLUTION INTRAVENOUS at 08:05

## 2022-02-06 RX ADMIN — FENTANYL CITRATE 50 MCG: 50 INJECTION, SOLUTION INTRAMUSCULAR; INTRAVENOUS at 05:00

## 2022-02-06 RX ADMIN — FENTANYL CITRATE 50 MCG: 50 INJECTION, SOLUTION INTRAMUSCULAR; INTRAVENOUS at 04:26

## 2022-02-06 RX ADMIN — FENTANYL CITRATE 50 MCG: 50 INJECTION INTRAMUSCULAR; INTRAVENOUS at 07:03

## 2022-02-06 RX ADMIN — HYDROMORPHONE HYDROCHLORIDE 0.2 MG: 1 INJECTION, SOLUTION INTRAMUSCULAR; INTRAVENOUS; SUBCUTANEOUS at 20:29

## 2022-02-06 RX ADMIN — HYDROMORPHONE HYDROCHLORIDE 0.5 MG: 1 INJECTION, SOLUTION INTRAMUSCULAR; INTRAVENOUS; SUBCUTANEOUS at 05:02

## 2022-02-06 NOTE — CONSULTS
[address] 1970, 54 y o  female MRN: [id number]  Unit/Bed#: ICU 06 Encounter: [id number]  Primary Care Provider: Vasile Thompson MD   Date and time admitted to hospital: 2/5/2022 10:19 PM    Consults    * Perforation of sigmoid colon [facility])  Assessment & Plan  Presented with acute abdominal pain and twan blood per rectum  CT with evidence of abdominal free air and intraperitoneal stool  Emergently taken to the OR overnight 2/5-2/6  · POD #0 s/p exploratory laparotomy and Bushra's procedure  · Ostomy site pink, clean dry and intact  · TONI drain x 2 with sero-sang drainage  · Maintain NPO  · Continue zosyn   · OR cultures pending  · Pain regimen per surgical team, dilaudid PCA  · Encourage pulmonary hygiene, IS use    Rectal bleeding  Assessment & Plan  · Episode of twan blood per rectum prior to arrival  GI bleeding scan negative for GI bleed but did show evidence of perforated sigmoid colon  · Now s/p exploratory lap, Monae's procedure  · Trend hemoglobin  · Consider transfusion for hemoglobin <7 or symptomatic anemia      Syncope  Assessment & Plan  · Episode of syncope after twan blood per rectum prior to admission   Patient reports that she had the urge to go to the bathroom, passed twan blood and then stood up, felt dizzy then syncopized hitting her head  · CT head negative  · Patient non-focal  · Likely in the setting of acute blood loss, hypovolemia  · Continue to monitor on telemetry for now  · Fall precautions     S/P gastric bypass  Assessment & Plan  · Hx of Napoleon-en-y bypass on 10/4/2021, s/p lap band procedure prior     Fecal peritonitis ([facility] [address] )  Assessment & Plan  · S/p ex lap, abdominal washout and  Bushra's procedure  · Continue zosyn  · OR cultures pending     Malignant neoplasm of upper-outer quadrant of left breast in female, estrogen receptor positive ([address] )  Assessment & Plan  · Hx of breast cancer s/p left mastectomy and 4 rounds of chemo in 2016    Depression, recurrent ([facility] [address] )  Assessment & Plan  · Hold home PO medications until cleared by surgical team to resume     -------------------------------------------------------------------------------------------------------------  Chief Complaint: abdominal pain, syncope  History of Present Illness   HX and PE limited by: patient not communicative secondary to pain     Jodie Fisher is a 54 y o  female with past medica history of breast cancer s/p lumpectomy and chemo in 2016, hx of recent (10/4/21) gastric bypass surgery, obesity, anxiety who presented overnight with acute onset of abdominal pain and twan blood per rectum  While using the bathroom, she noticed twan blood and after standing, she felt lightheaded and had a syncopal episode with possible head strike  In the ER, CT head was unremarkable  Initial hemoglobin 12 6 and hemodynamically stable  CT high volume lower GI bleed study was performed that was negative for bleed but did show perforation of the sigmoid colon  She was then taken emergently to the OR for exploratory laparotomy and Bushra's procedure  She arrives post operatively in stable condition  Midline incision dressing clean, dry and intact  TONI drain x 2 with sero-sang drainage  Ostomy site is pink  History obtained from chart review and the patient   -------------------------------------------------------------------------------------------------------------  Dispo: Admit to Stepdown Level 1    Code Status: Level 1 - Full Code  --------------------------------------------------------------------------------------------------------------  Review of Systems    A 12-point, complete review of systems was reviewed and negative except as stated above     Physical Exam  HENT:      Head: Normocephalic and atraumatic  Nose: Nose normal       Mouth/Throat:      Mouth: Mucous membranes are dry  Pharynx: Oropharynx is clear     Eyes:      Extraocular Movements: Extraocular movements intact  Pupils: Pupils are equal, round, and reactive to light  Cardiovascular:      Rate and Rhythm: Normal rate and regular rhythm  Pulses: Normal pulses  Heart sounds: Normal heart sounds  Pulmonary:      Effort: Pulmonary effort is normal  No respiratory distress  Breath sounds: Normal breath sounds  No wheezing  Abdominal:      General: Bowel sounds are normal  There is no distension  Palpations: Abdomen is soft  Tenderness: There is abdominal tenderness  There is no guarding  Comments: Midline incision dressing intact  Ostomy site pink  TONI drain x 2 with sero-sang drainage   Genitourinary:     Comments: Hernadez with clear, yellow urine   Musculoskeletal:         General: Normal range of motion  Cervical back: Normal range of motion  Right lower leg: No edema  Left lower leg: No edema  Skin:     General: Skin is warm and dry  Capillary Refill: Capillary refill takes less than 2 seconds  Neurological:      General: No focal deficit present  Mental Status: She is alert and oriented to person, place, and time  --------------------------------------------------------------------------------------------------------------  Vitals:   Vitals:    02/06/22 0145 02/06/22 0645 02/06/22 0700 02/06/22 0715   BP: 130/62 123/67 106/53 111/55   BP Location:       Pulse: 90 104 84 78   Resp: 22 16 17 17   Temp:  98 5 °F (36 9 °C)     TempSrc:  Temporal     SpO2: 97% 96% 94% 93%     Temp  Min: 97 3 °F (36 3 °C)  Max: 98 5 °F (36 9 °C)        There is no height or weight on file to calculate BMI      Laboratory and Diagnostics:  Results from last 7 days   Lab Units 02/05/22  2240   WBC Thousand/uL 9 06   HEMOGLOBIN g/dL 12 6   HEMATOCRIT % 40 1   PLATELETS Thousands/uL 340   NEUTROS PCT % 60   MONOS PCT % 7     Results from last 7 days   Lab Units 02/05/22  2240   SODIUM mmol/L 138   POTASSIUM mmol/L 3 6   CHLORIDE mmol/L 103   CO2 mmol/L 30   ANION GAP mmol/L 5   BUN mg/dL 28*   CREATININE mg/dL 0 71   CALCIUM mg/dL 9 1   GLUCOSE RANDOM mg/dL 112   ALT U/L 33   AST U/L 14   ALK PHOS U/L 82   ALBUMIN g/dL 3 9   TOTAL BILIRUBIN mg/dL 0 27          Results from last 7 days   Lab Units 02/06/22  0102   INR  1 08   PTT seconds 27          Results from last 7 days   Lab Units 02/06/22  0102   LACTIC ACID mmol/L 0 8     ABG:    VBG:          Micro:        EKG: normal sinus rhythm   Imaging: I have personally reviewed pertinent reports  and I have personally reviewed pertinent films in PACS      Historical Information   Past Medical History:   Diagnosis Date    Anxiety     Back pain     BRCA negative 06/2016    [facility]    Cancer ([address] )     Headache     History of chemotherapy 2016    Neoadjuvant; at [address] History of transfusion 06/2017    no adverse reaction    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)     s/p chemotherapy    Obesity (BMI 30-39  9)      Past Surgical History:   Procedure Laterality Date    ABDOMINAL ADHESION SURGERY N/A 10/4/2021    Procedure: Lysis Adhesions;  Surgeon: Enzo Rosa MD;  Location: [address] [place] [address];  Service: Bariatrics    ABDOMINAL WALL SURGERY Left 9/21/2016    Procedure: DEBRIDEMENT OF LEFT ABDOMINAL TISSUE AND CLOSURE; DEBRIDEMENT OF LEFT BREAST FLAP; SUBMUSCULAR TISSUE EXPANDER PLACEMENT WITH ADM (ACELLULAR DERMAL MATRIX);   Surgeon: Alton Lehman MD;  Location:  MAIN OR;  Service:     BREAST BIOPSY Left 04/2016    with sentinel node biospy    BREAST BIOPSY Right 10/18/2021    benign    BREAST RECONSTRUCTION Left 12/22/2017    Procedure: REVISION OF LEFT BREAST SCAR, LOCAL FLAP;  Surgeon: Angelia Langley MD;  Location: [facility];  Service: Plastics    [address] [facility]      LAPAROSCOPIC CHOLECYSTECTOMY  2000    LAPAROSCOPIC GASTRIC BANDING  2008    removed 2013    LAPAROSCOPIC GASTRIC BANDING  2013    removal    LIPOSUCTION W/ FAT INJECTION Left 6/8/2017    Procedure: BREAST FAT GRAFTING ;  Surgeon: Laisha Quiroz MD;  [facility];  Service:     MASTECTOMY Left 2016    MEDIPORT INSERTION, SINGLE  2016    MA BIOPSY/EXCISION, LYMPH NODE(S) Left 8/10/2016    Procedure: LYMPHOSCINTIGRAPHY; SENTINAL LYMPH NODE BIOPSY (INJECT AT 1100);   Surgeon: Salud Torres MD;  [facility];  Service: Surgical Oncology    MA BREAST RECONSTRUCTION W/FREE FLAP Left 9/19/2016    Procedure: BREAST DELAYED RECONSTRUCTION; DEIP FREE FLAP removal of port-a -cath;  Surgeon: Laisha Quiroz MD;  [facility];  Service: Plastics    MA BREAST REDUCTION Right 12/19/2016    Procedure: BREAST REDUCTION/MASTOPEXY ;  Surgeon: Laisha Quiroz MD;  [facility];  Service: Plastics    MA HYSTEROSCOPY,W/ENDO [address] N/A 5/21/2018    Procedure: DILATATION AND CURETTAGE (D&C), HYSTEROSCOPY;  Surgeon: Tl Marlow MD;  [facility];  Service: Gynecology Oncology    MA INSJ/RPLCMT BREAST IMPLANT SEP DAY MASTECTOMY Left 12/19/2016    Procedure: BREAST TISSUE EXPANDER REMOVAL; BREAST IMPLANT PLACEMENT;  Surgeon: Laisha Quiroz MD;  [facility];  Service: Plastics    MA LAP GASTRIC BYPASS/RAMIN-EN-Y N/A 10/4/2021    Procedure: LAPAROSCOPIC RAMIN-EN-Y GASTRIC BYPASS AND INTRAOPERATIVE EGD;  Surgeon: Marlee Rowe MD;  Location: [address] [place] [address];  Service: [address] [place] [address],  [address], SIMPLE, COMPLETE Left 8/10/2016    Procedure: BREAST MASTECTOMY; FROZEN SECTION ;  Surgeon: Salud Torres MD;  [facility];  Service: Surgical Oncology    MA REMOVAL TISSUE EXPANDER W/O INSERTION IMPLANT Left 1/23/2017    Procedure: BREAST IMPLANT REMOVAL; WASHOUT AND DEBRIDEMENT;  Surgeon: Laisha Quiroz MD;  [facility];  Service: Plastics    REDUCTION MAMMAPLASTY  2016    REDUCTION MAMMAPLASTY  2018    REVISION OF SCAR ON TORSO N/A 12/19/2016    Procedure: ABDOMINAL SCAR REVISION ;  Surgeon: Laisha Quiroz MD;  [facility];  Service:    [address] [place] [address]  4/22/2016    US GUIDED BREAST BIOPSY RIGHT COMPLETE Right 10/18/2021    WISDOM TOOTH EXTRACTION       Social History   Social History     Substance and Sexual Activity   Alcohol Use Not Currently    Comment: rarely     Social History     Substance and Sexual Activity   Drug Use No     Social History     Tobacco Use   Smoking Status Former Smoker    Packs/day: 1 00    Years: 15     Pack years: 15     Quit date:     Years since quittin 1   Smokeless Tobacco Never Used     Exercise History: Na  Family History:   Family History   Problem Relation Age of Onset    Diabetes Mother     Heart disease Mother     Kidney disease Mother     Obesity Mother     Other Father     Cancer Father         melanoma    Obesity Father     Diabetes Maternal Aunt     No Known Problems Paternal Aunt     Breast cancer Cousin      I have reviewed this patient's family history and commented on sigificant items within the HPI      Medications:  Current Facility-Administered Medications   Medication Dose Route Frequency    [START ON 2022] enoxaparin (LOVENOX) subcutaneous injection 40 mg  40 mg Subcutaneous Daily    HYDROmorphone (DILAUDID) 1 mg/mL 50 mL PCA   Intravenous Continuous    lactated ringers infusion  125 mL/hr Intravenous Continuous    naloxone (NARCAN) 0 04 mg/mL syringe 0 04 mg  0 04 mg Intravenous Q1MIN PRN    ondansetron (ZOFRAN) injection 4 mg  4 mg Intravenous Q6H PRN    piperacillin-tazobactam (ZOSYN) 3 375 g in sodium chloride 0 9 % 100 mL IVPB  3 375 g Intravenous Q6H     Home medications:  Prior to Admission Medications   Prescriptions Last Dose Informant Patient Reported? Taking?    Calcium Carbonate-Vit D-Min (CALCIUM 1200 PO)  Self Yes No   Sig: Take by mouth 3 times day geovanni   LORazepam (ATIVAN) 0 5 mg tablet  Self Yes No   Sig: Take 0 5 mg by mouth 2 (two) times a day as needed    Multiple Vitamin (multivitamin) capsule  Self Yes No   Sig: Take 1 capsule by mouth daily geovanni   buPROPion [facility]) 150 mg 12 hr tablet   No No   Sig: TAKE 1 TABLET BY MOUTH TWICE A DAY   cyclobenzaprine (FLEXERIL) 10 mg tablet   No No   Sig: Take 1 tablet (10 mg total) by mouth daily at bedtime   methylPREDNISolone 4 MG tablet therapy pack Not Taking at Unknown time  No No   Sig: Use as directed on package   Patient not taking: Reported on 2/5/2022    zolpidem (AMBIEN) 5 mg tablet   No No   Sig: Take 1 tablet (5 mg total) by mouth daily at bedtime      Facility-Administered Medications: None     Allergies: Allergies   Allergen Reactions    Effexor [Venlafaxine] GI Intolerance    Bactrim [Sulfamethoxazole-Trimethoprim] Itching     ------------------------------------------------------------------------------------------------------------  Advance Directive and Living Will:      Power of :    POLST:    ------------------------------------------------------------------------------------------------------------  Care Time Delivered:   No Critical Care time spent       [facility]        Portions of the record may have been created with voice recognition software  Occasional wrong word or "sound a like" substitutions may have occurred due to the inherent limitations of voice recognition software    Read the chart carefully and recognize, using context, where substitutions have occurred

## 2022-02-06 NOTE — ASSESSMENT & PLAN NOTE
· Episode of twan blood per rectum prior to arrival  GI bleeding scan negative for GI bleed but did show evidence of perforated sigmoid colon  · Now s/p exploratory lap, Monae's procedure  · Trend hemoglobin  · Consider transfusion for hemoglobin <7 or symptomatic anemia

## 2022-02-06 NOTE — ASSESSMENT & PLAN NOTE
· Episode of syncope after twan blood per rectum prior to admission   Patient reports that she had the urge to go to the bathroom, passed twan blood and then stood up, felt dizzy then syncopized hitting her head  · CT head negative  · Patient non-focal  · Likely in the setting of acute blood loss, hypovolemia  · Continue to monitor on telemetry for now  · Fall precautions

## 2022-02-06 NOTE — QUICK NOTE
I called patient's significant other Isadora Mcwilliams for daily update as requested the patient  I was unable to reach Isadora Mcwilliams  Message left on voicemail with call back number

## 2022-02-06 NOTE — ED PROVIDER NOTES
History  Chief Complaint   Patient presents with    Abdominal Pain     c/o severe low abd pain today,    Syncope     had syncopal event tonight while on toilet    Rectal Bleeding     states passed large amt of blood rectally tonight, hx of gastric bypass in [de-identified]     55 yo female c/o severe lower abdominal pain that started about 2 5 hours ago  She had urge to have BM so sat on toilet and passed large amount of blood, no stool  She got lightheaded and passed out, falling off the toilet  She woke up on the floor, thinks she hit her head  She felt disoriented and clammy  She got herself up and called her ex- to come and help her  He brought her in to ER  She denies nausea or vomiting  No sob or chest pain  Pt  Is s/p gastric bypass surgery 5 months ago  History provided by:  Patient   used: No    Abdominal Pain  Associated symptoms: no chest pain, no cough, no diarrhea, no dysuria, no fever, no nausea, no shortness of breath and no vomiting    Syncope  Associated symptoms: weakness    Associated symptoms: no chest pain, no dizziness, no fever, no headaches, no nausea, no shortness of breath and no vomiting        Prior to Admission Medications   Prescriptions Last Dose Informant Patient Reported? Taking?    Calcium Carbonate-Vit D-Min (CALCIUM 1200 PO)  Self Yes No   Sig: Take by mouth 3 times day geovanni   LORazepam (ATIVAN) 0 5 mg tablet  Self Yes No   Sig: Take 0 5 mg by mouth 2 (two) times a day as needed    Multiple Vitamin (multivitamin) capsule  Self Yes No   Sig: Take 1 capsule by mouth daily geovanni   buPROPion (WELLBUTRIN SR) 150 mg 12 hr tablet   No No   Sig: TAKE 1 TABLET BY MOUTH TWICE A DAY   cyclobenzaprine (FLEXERIL) 10 mg tablet   No No   Sig: Take 1 tablet (10 mg total) by mouth daily at bedtime   methylPREDNISolone 4 MG tablet therapy pack Not Taking at Unknown time  No No   Sig: Use as directed on package   Patient not taking: Reported on 2/5/2022    zolpidem (AMBIEN) 5 mg tablet   No No   Sig: Take 1 tablet (5 mg total) by mouth daily at bedtime      Facility-Administered Medications: None       Past Medical History:   Diagnosis Date    Anxiety     Back pain     BRCA negative 06/2016    [facility]    Cancer ([address] )     Headache     History of chemotherapy 2016    Neoadjuvant; at [address] History of transfusion 06/2017    no adverse reaction    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)     s/p chemotherapy    Obesity (BMI 30-39  9)        Past Surgical History:   Procedure Laterality Date    ABDOMINAL ADHESION SURGERY N/A 10/4/2021    Procedure: Lysis Adhesions;  Surgeon: David Strong MD;  Location: [address] [place] [address];  Service: Bariatrics    ABDOMINAL WALL SURGERY Left 9/21/2016    Procedure: DEBRIDEMENT OF LEFT ABDOMINAL TISSUE AND CLOSURE; DEBRIDEMENT OF LEFT BREAST FLAP; SUBMUSCULAR TISSUE EXPANDER PLACEMENT WITH ADM (ACELLULAR DERMAL MATRIX); Surgeon: Loida Owen MD;  [facility];  Service:     BREAST BIOPSY Left 04/2016    with sentinel node biospy    BREAST BIOPSY Right 10/18/2021    benign    BREAST RECONSTRUCTION Left 12/22/2017    Procedure: REVISION OF LEFT BREAST SCAR, LOCAL FLAP;  Surgeon: Sebastian Gray MD;  [facility];  Service: Plastics    [address] [facility]      LAPAROSCOPIC CHOLECYSTECTOMY  2000    LAPAROSCOPIC GASTRIC BANDING  2008    removed 2013    LAPAROSCOPIC GASTRIC BANDING  2013    removal    LIPOSUCTION W/ FAT INJECTION Left 6/8/2017    Procedure: BREAST FAT GRAFTING ;  Surgeon: Loida Owen MD;  [facility];  Service:     MASTECTOMY Left 2016    MEDIPORT INSERTION, SINGLE  2016    NC BIOPSY/EXCISION, LYMPH NODE(S) Left 8/10/2016    Procedure: LYMPHOSCINTIGRAPHY; SENTINAL LYMPH NODE BIOPSY (INJECT AT 1100);   Surgeon: Nicolette Greenberg MD;  [facility];  Service: Surgical Oncology    NC BREAST RECONSTRUCTION W/FREE FLAP Left 9/19/2016    Procedure: BREAST DELAYED RECONSTRUCTION; DEIP FREE FLAP removal of port-a -cath;  Surgeon: Vidya Bennett MD;  [facility];  Service: Plastics    NC BREAST REDUCTION Right 12/19/2016    Procedure: BREAST REDUCTION/MASTOPEXY ;  Surgeon: Viday Bennett MD;  [facility];  Service: Plastics    NC HYSTEROSCOPY,W/ENDO [address] N/A 5/21/2018    Procedure: DILATATION AND CURETTAGE (D&C), HYSTEROSCOPY;  Surgeon: Sharlene Campos MD;  [facility];  Service: Gynecology Oncology    NC INSJ/RPLCMT BREAST IMPLANT SEP DAY MASTECTOMY Left 12/19/2016    Procedure: BREAST TISSUE EXPANDER REMOVAL; BREAST IMPLANT PLACEMENT;  Surgeon: Vidya Bennett MD;  [facility];  Service: Plastics    NC LAP GASTRIC BYPASS/RAMIN-EN-Y N/A 10/4/2021    Procedure: LAPAROSCOPIC RAMIN-EN-Y GASTRIC BYPASS AND INTRAOPERATIVE EGD;  Surgeon: Sue Stewart MD;  Location: [address] [place] [address];  Service: [address] [place] [address] [address], SIMPLE, COMPLETE Left 8/10/2016    Procedure: BREAST MASTECTOMY; FROZEN SECTION ;  Surgeon: Eryn Love MD;  [facility];  Service: Surgical Oncology    NC REMOVAL TISSUE EXPANDER W/O INSERTION IMPLANT Left 1/23/2017    Procedure: BREAST IMPLANT REMOVAL; WASHOUT AND DEBRIDEMENT;  Surgeon: Vidya Bennett MD;  [facility];  Service: Plastics    REDUCTION MAMMAPLASTY  2016    REDUCTION MAMMAPLASTY  2018    REVISION OF SCAR ON TORSO N/A 12/19/2016    Procedure: ABDOMINAL SCAR REVISION ;  Surgeon: Vidya Bennett MD;  [facility];  Service:    [address] [place] [address]  4/22/2016    US GUIDED BREAST BIOPSY RIGHT COMPLETE Right 10/18/2021    WISDOM TOOTH EXTRACTION         Family History   Problem Relation Age of Onset    Diabetes Mother     Heart disease Mother     Kidney disease Mother     Obesity Mother     Other Father     Cancer Father         melanoma    Obesity Father     Diabetes Maternal Aunt     No Known Problems Paternal Aunt     Breast cancer Cousin      I have reviewed and agree with the history as documented  E-Cigarette/Vaping    E-Cigarette Use Never User      E-Cigarette/Vaping Substances    Nicotine No     THC No     CBD No     Flavoring No     Other No     Unknown No      Social History     Tobacco Use    Smoking status: Former Smoker     Packs/day: 1 00     Years: 15      Pack years: 15 00     Quit date:      Years since quittin     Smokeless tobacco: Never Used   Vaping Use    Vaping Use: Never used   Substance Use Topics    Alcohol use: Not Currently     Comment: rarely    Drug use: No       Review of Systems   Constitutional: Negative  Negative for fever  HENT: Negative  Eyes: Negative  Respiratory: Negative  Negative for cough and shortness of breath  Cardiovascular: Positive for syncope  Negative for chest pain  Gastrointestinal: Positive for abdominal pain and anal bleeding  Negative for diarrhea, nausea and vomiting  Genitourinary: Negative  Negative for dysuria and flank pain  Musculoskeletal: Negative  Negative for back pain and myalgias  Skin: Negative  Negative for rash  Neurological: Positive for weakness and light-headedness  Negative for dizziness and headaches  Hematological: Does not bruise/bleed easily  Psychiatric/Behavioral: Negative  All other systems reviewed and are negative  Physical Exam  Physical Exam  Vitals and nursing note reviewed  Constitutional:       General: She is not in acute distress  Appearance: She is well-developed  She is ill-appearing  She is not diaphoretic  HENT:      Head: Normocephalic and atraumatic  Right Ear: External ear normal       Left Ear: External ear normal       Mouth/Throat:      Mouth: Mucous membranes are dry  Eyes:      General: No scleral icterus  Pupils: Pupils are equal, round, and reactive to light  Cardiovascular:      Rate and Rhythm: Normal rate and regular rhythm  Heart sounds: Normal heart sounds  No murmur heard        Pulmonary:      Effort: Pulmonary effort is normal  No respiratory distress  Breath sounds: Normal breath sounds  Abdominal:      General: Bowel sounds are normal  There is no distension  Palpations: Abdomen is soft  Tenderness: There is abdominal tenderness in the right lower quadrant, suprapubic area and left lower quadrant  Genitourinary:     Rectum: Guaiac result positive  Comments:   Red blood on glove, no stool in vault  Musculoskeletal:         General: No deformity  Normal range of motion  Cervical back: Normal range of motion and neck supple  Right lower leg: No edema  Left lower leg: No edema  Skin:     General: Skin is warm and dry  Coloration: Skin is pale  Findings: No rash  Neurological:      General: No focal deficit present  Mental Status: She is alert and oriented to person, place, and time  Cranial Nerves: No cranial nerve deficit  Motor: No weakness        Gait: Gait normal    Psychiatric:         Mood and Affect: Mood normal          Behavior: Behavior normal          Vital Signs  ED Triage Vitals [02/05/22 2218]   Temperature Pulse Respirations Blood Pressure SpO2   97 9 °F (36 6 °C) 63 (!) 24 (!) 174/75 100 %      Temp Source Heart Rate Source Patient Position - Orthostatic VS BP Location FiO2 (%)   Tympanic Monitor Sitting Right arm --      Pain Score       9           Vitals:    02/05/22 2218 02/06/22 0009   BP: (!) 174/75 130/60   Pulse: 63 71   Patient Position - Orthostatic VS: Sitting Lying         Visual Acuity  Visual Acuity      Most Recent Value   L Pupil Size (mm) 2   R Pupil Size (mm) 2          ED Medications  Medications   ondansetron (ZOFRAN) injection 4 mg (4 mg Intravenous Given 2/5/22 2239)   sodium chloride 0 9 % bolus 1,000 mL (1,000 mL Intravenous New Bag 2/5/22 2259)   HYDROmorphone (DILAUDID) injection 1 mg (1 mg Intravenous Given 2/5/22 2259)   promethazine (PHENERGAN) injection 12 5 mg (12 5 mg Intravenous Given 2/6/22 0003) pantoprazole (PROTONIX) injection 40 mg (40 mg Intravenous Given 2/6/22 0002)   iohexol (OMNIPAQUE) 350 MG/ML injection (SINGLE-DOSE) 100 mL (100 mL Intravenous Given 2/5/22 2352)       Diagnostic Studies  Results Reviewed     Procedure Component Value Units Date/Time    HS Troponin I 4hr [[id number]]     Lab Status: No result Specimen: Blood     HS Troponin I 2hr [[id number]]     Lab Status: No result Specimen: Blood     HS Troponin 0hr (reflex protocol) [[id number]]  (Normal) Collected: 02/05/22 2240    Lab Status: Final result Specimen: Blood from Arm, Right Updated: 02/05/22 2308     hs TnI 0hr 2 ng/L     Comprehensive metabolic panel [[id number]]  (Abnormal) Collected: 02/05/22 2240    Lab Status: Final result Specimen: Blood from Arm, Right Updated: 02/05/22 2301     Sodium 138 mmol/L      Potassium 3 6 mmol/L      Chloride 103 mmol/L      CO2 30 mmol/L      ANION GAP 5 mmol/L      BUN 28 mg/dL      Creatinine 0 71 mg/dL      Glucose 112 mg/dL      Calcium 9 1 mg/dL      AST 14 U/L      ALT 33 U/L      Alkaline Phosphatase 82 U/L      Total Protein 7 5 g/dL      Albumin 3 9 g/dL      Total Bilirubin 0 27 mg/dL      eGFR 100 ml/min/1 73sq m     Narrative:      Freda guidelines for Chronic Kidney Disease (CKD):     Stage 1 with normal or high GFR (GFR > 90 mL/min/1 73 square meters)    Stage 2 Mild CKD (GFR = 60-89 mL/min/1 73 square meters)    Stage 3A Moderate CKD (GFR = 45-59 mL/min/1 73 square meters)    Stage 3B Moderate CKD (GFR = 30-44 mL/min/1 73 square meters)    Stage 4 Severe CKD (GFR = 15-29 mL/min/1 73 square meters)    Stage 5 End Stage CKD (GFR <15 mL/min/1 73 square meters)  Note: GFR calculation is accurate only with a steady state creatinine    CBC and differential [[id number]] Collected: 02/05/22 2240    Lab Status: Final result Specimen: Blood from Arm, Right Updated: 02/05/22 2245     WBC 9 06 Thousand/uL      RBC 4 18 Million/uL      Hemoglobin 12 6 g/dL Hematocrit 40 1 %      MCV 96 fL      MCH 30 1 pg      MCHC 31 4 g/dL      RDW 13 6 %      MPV 9 0 fL      Platelets 599 Thousands/uL      nRBC 0 /100 WBCs      Neutrophils Relative 60 %      Immat GRANS % 0 %      Lymphocytes Relative 32 %      Monocytes Relative 7 %      Eosinophils Relative 1 %      Basophils Relative 0 %      Neutrophils Absolute 5 32 Thousands/µL      Immature Grans Absolute 0 01 Thousand/uL      Lymphocytes Absolute 2 92 Thousands/µL      Monocytes Absolute 0 67 Thousand/µL      Eosinophils Absolute 0 10 Thousand/µL      Basophils Absolute 0 04 Thousands/µL                  CT head without contrast   Final Result by Andreas Page MD (02/06 0007)      No acute intracranial abnormality  Workstation performed: [id number]         XR chest 1 view portable   ED Interpretation by Precious Mcintosh MD (30/23 1412)   NAD      CT high volume lower GI bleed    (Results Pending)              Procedures  ECG 12 Lead Documentation Only    Date/Time: 2/5/2022 11:08 PM  Performed by: Precious Mcintosh MD  Authorized by: Precious Mcintosh MD     Indications / Diagnosis:  Syncope  ECG reviewed by me, the ED Provider: yes    Patient location:  ED  Previous ECG:     Previous ECG:  Unavailable  Interpretation:     Interpretation: abnormal    Quality:     Tracing quality:  Limited by artifact  Rate:     ECG rate:  64    ECG rate assessment: normal    Rhythm:     Rhythm: sinus rhythm    Ectopy:     Ectopy: none    QRS:     QRS axis:  Normal  Conduction:     Conduction: normal    ST segments:     ST segments:  Non-specific  T waves:     T waves: non-specific               ED Course                               SBIRT 20yo+      Most Recent Value   SBIRT (24 yo +)    In order to provide better care to our patients, we are screening all of our patients for alcohol and drug use  Would it be okay to ask you these screening questions?  Yes Filed at: 02/06/2022 0003   Initial Alcohol Screen: US AUDIT-C     1  How often do you have a drink containing alcohol? 0 Filed at: 02/06/2022 0003   2  How many drinks containing alcohol do you have on a typical day you are drinking? 0 Filed at: 02/06/2022 0003   3a  Male UNDER 65: How often do you have five or more drinks on one occasion? 0 Filed at: 02/06/2022 0003   3b  FEMALE Any Age, or MALE 65+: How often do you have 4 or more drinks on one occassion? 0 Filed at: 02/06/2022 0003   Audit-C Score 0 Filed at: 02/06/2022 0003   ALINA: How many times in the past year have you    Used an illegal drug or used a prescription medication for non-medical reasons? Never Filed at: 02/06/2022 0003                    MDM  Number of Diagnoses or Management Options  Abdominal pain  BRBPR (bright red blood per rectum)  Syncope  Diagnosis management comments: Signed out to night doctor with CT pending  Plan to admit  Disposition  Final diagnoses:   Abdominal pain   Syncope   BRBPR (bright red blood per rectum)     Time reflects when diagnosis was documented in both MDM as applicable and the Disposition within this note     Time User Action Codes Description Comment    3/3/5690 46:00 PM Kandis Plane A Add [X89 4] Abdominal pain     7/2/2384 00:68 PM Ketty Gandhi Add [V55] Syncope     4/1/3500 99:44 PM Ketty Gandhi Add [D76 1] BRBPR (bright red blood per rectum)       ED Disposition     None      Follow-up Information    None         Patient's Medications   Discharge Prescriptions    No medications on file       No discharge procedures on file      PDMP Review       Value Time User    PDMP Reviewed  Yes 9/23/2021  1:03 PM Salvador Abarca MD          ED Provider  Electronically Signed by           Angela Arguello MD  39/21/68 8094

## 2022-02-06 NOTE — ED NOTES
Pt walked to restroom and then given warm blankets  Resting comfortably with family at beside        Arnold López, [address] [place] [address]  02/06/22 5831

## 2022-02-06 NOTE — TELEPHONE ENCOUNTER
Regarding: Had surgery bleeding from rectum and having stomach pain   ----- Message from Katelyn Santos sent at 2/5/2022  9:43 PM EST -----  '' I had surgery on 10/4and now I'm bleeding from my rectum and having terrible pain in my stomach ''

## 2022-02-06 NOTE — H&P
History and Physical Examination - General Surgery   [facility] 54 y o  female MRN: [id number]  Unit/Bed#: OR POOL Encounter: [id number] PCP: Marylee Shuck, MD    History of Present Illness   Chief Complaint:   sudden-onset abdominal pain  Rectal bleeding  Episode of syncope    HPI:  [facility] is a 54 y o  female who presents with the sudden onset of severe lower abdominal pain started tonight patient has a large amount of blood but no stones  He was feeling dizzy and came to the ER workup in ER with CT scan revealed sigmoid colon perforation pneumatosis with retroperitoneal tracking of air large amount of fecal debris in the colon  outside the bowel lumen and free air  Emergent surgical consult was obtained patient was explained about the possible colectomy colostomy  Patient was taken on emergent basis for exploratory laparotomy resection of colon and the colostomy placement    Patient has multiple surgery in past left breast cancer/gastric bypass abdominoplasty and  Had chemotherapy for breast cancer    Patient had a normal colonoscopy 2 years ago    Patient is not vaccinated for COVID COVID test was done which was negative    Historical Information   Past Medical History:   Diagnosis Date    Anxiety     Back pain     BRCA negative 06/2016    [facility]    Cancer ([address] )     Headache     History of chemotherapy 2016    Neoadjuvant; at [facility] History of transfusion 06/2017    no adverse reaction    Malignant neoplasm of upper-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 ([address] )     s/p chemotherapy    Obesity (BMI 30-39  9)      Past Surgical History:   Procedure Laterality Date    ABDOMINAL ADHESION SURGERY N/A 10/4/2021    Procedure: Lysis Adhesions;  Surgeon: Tiffanie Li MD;  Location: [address] [place] [address];  Service: Bariatrics    ABDOMINAL WALL SURGERY Left 9/21/2016    Procedure: DEBRIDEMENT OF LEFT ABDOMINAL TISSUE AND CLOSURE; DEBRIDEMENT OF LEFT BREAST FLAP; SUBMUSCULAR TISSUE EXPANDER PLACEMENT WITH ADM (ACELLULAR DERMAL MATRIX); Surgeon: Jeffery Duque MD;  [facility];  Service:     BREAST BIOPSY Left 04/2016    with sentinel node biospy    BREAST BIOPSY Right 10/18/2021    benign    BREAST RECONSTRUCTION Left 12/22/2017    Procedure: REVISION OF LEFT BREAST SCAR, LOCAL FLAP;  Surgeon: Benny Dunn MD;  [facility];  Service: Plastics    [address] OF UTERUS      LAPAROSCOPIC CHOLECYSTECTOMY  2000    LAPAROSCOPIC GASTRIC BANDING  2008    removed 2013    LAPAROSCOPIC GASTRIC BANDING  2013    removal    LIPOSUCTION W/ FAT INJECTION Left 6/8/2017    Procedure: BREAST FAT GRAFTING ;  Surgeon: Jeffery Duque MD;  [facility];  Service:     MASTECTOMY Left 2016    MEDIPORT INSERTION, SINGLE  2016    AK BIOPSY/EXCISION, LYMPH NODE(S) Left 8/10/2016    Procedure: LYMPHOSCINTIGRAPHY; SENTINAL LYMPH NODE BIOPSY (INJECT AT 1100);   Surgeon: Judson Brower MD;  [facility];  Service: Surgical Oncology    AK BREAST RECONSTRUCTION W/FREE FLAP Left 9/19/2016    Procedure: BREAST DELAYED RECONSTRUCTION; DEIP FREE FLAP removal of port-a -cath;  Surgeon: Jeffery Duque MD;  [facility];  Service: Plastics    AK BREAST REDUCTION Right 12/19/2016    Procedure: BREAST REDUCTION/MASTOPEXY ;  Surgeon: Jeffery Duque MD;  [facility];  Service: Plastics    AK HYSTEROSCOPY,W/ENDO [address] N/A 5/21/2018    Procedure: DILATATION AND CURETTAGE (D&C), HYSTEROSCOPY;  Surgeon: Cady Galvin MD;  [facility];  Service: Gynecology Oncology    AK INSJ/RPLCMT BREAST IMPLANT SEP DAY MASTECTOMY Left 12/19/2016    Procedure: BREAST TISSUE EXPANDER REMOVAL; BREAST IMPLANT PLACEMENT;  Surgeon: Jeffery Duque MD;  [facility];  Service: Plastics    AK LAP GASTRIC BYPASS/RAMIN-EN-Y N/A 10/4/2021    Procedure: LAPAROSCOPIC RAMIN-EN-Y GASTRIC BYPASS AND INTRAOPERATIVE EGD;  Surgeon: Abdi Milton MD;  [facility];  Service: [facility], SIMPLE, COMPLETE Left 8/10/2016    Procedure: BREAST MASTECTOMY; FROZEN SECTION ;  Surgeon: Abena Castillo MD;  [facility];  Service: Surgical Oncology    DE REMOVAL TISSUE EXPANDER W/O INSERTION IMPLANT Left 2017    Procedure: BREAST IMPLANT REMOVAL; WASHOUT AND DEBRIDEMENT;  Surgeon: Fabrice Diana MD;  [facility];  Service: Plastics    REDUCTION MAMMAPLASTY  2016    REDUCTION MAMMAPLASTY  2018    REVISION OF SCAR ON TORSO N/A 2016    Procedure: ABDOMINAL SCAR REVISION ;  Surgeon: Fabrice Diana MD;  [facility];  Service:     US GUIDANCE  2016    US GUIDED BREAST BIOPSY RIGHT COMPLETE Right 10/18/2021    WISDOM TOOTH EXTRACTION       Social History   Social History     Substance and Sexual Activity   Alcohol Use Not Currently    Comment: rarely     Social History     Substance and Sexual Activity   Drug Use No     Social History     Tobacco Use   Smoking Status Former Smoker    Packs/day: 1 00    Years: 15 00    Pack years: 15 00    Quit date:     Years since quittin    Smokeless Tobacco Never Used     Family History:   Family History   Problem Relation Age of Onset    Diabetes Mother     Heart disease Mother     Kidney disease Mother     Obesity Mother     Other Father     Cancer Father         melanoma    Obesity Father     Diabetes Maternal Aunt     No Known Problems Paternal Aunt     Breast cancer Cousin        Meds/Allergies   Allergies   Allergen Reactions    Effexor [Venlafaxine] GI Intolerance    Bactrim [Sulfamethoxazole-Trimethoprim] Itching     No current facility-administered medications for this encounter       REVIEW OF SYSTEMS  Constitutional:  Denies fever or chills   Eyes:  Denies change in visual acuity   HENT:  Denies nasal congestion or sore throat   Respiratory:  Denies cough or shortness of breath   Cardiovascular:  Denies chest pain or edema   GI:  Sudden-onset severe lower  abdominal pain, no nausea, vomiting, large bloody bowel movement   :  Denies dysuria, frequency, difficulty in micturition and nocturia  Musculoskeletal:  Denies back pain or joint pain   Neurologic:  Denies headache, focal weakness or sensory changes   Endocrine:  Denies polyuria or polydipsia   Lymphatic:  Denies swollen glands   Psychiatric:  Denies depression or anxiety     Objective   Current Vitals:   Blood Pressure: 130/62 (02/06/22 0145)  Pulse: 90 (02/06/22 0145)  Temperature: (!) 97 3 °F (36 3 °C) (02/06/22 0022)  Temp Source: Tympanic (02/06/22 0022)  Respirations: 22 (02/06/22 0145)  SpO2: 97 % (02/06/22 0145)    Intake/Output Summary (Last 24 hours) at 2/6/2022 0407  Last data filed at 2/6/2022 0254  Gross per 24 hour   Intake 1050 ml   Output --   Net 1050 ml     There is no height or weight on file to calculate BMI  PHYSICAL EXAMS  General:  Patient is not in acute distress, laying in the bed comfortably, awake, alert responding to commands,   HEENT:  Both pupils normal-size atraumatic, normocephalic, nonicteric  Neck:  JVP not raised   Trachea central  Respiratory:  normal Breath sounds clear to auscultation,  Cardiovascular:  S1-S2 normal without any murmur   GI:  Abdomen marked tenderness mild distension  Scar from previous surgery  without any cough impulse  Multiple abdominal surgery namely abdominal plasty gastric bypass  Musculoskeletal:  Chronic history of back pain  Integument:  No skin rashes or ulceration  Lymphatic:  No cervical or inguinal lymphadenopathy  Neurologic:  Patient is awake alert, responding to command, well-oriented to time and place and person moving all extremities ambulating well    Lab Results: CBC:   Lab Results   Component Value Date    WBC 9 06 02/05/2022    HGB 12 6 02/05/2022    HCT 40 1 02/05/2022    MCV 96 02/05/2022     02/05/2022    MCH 30 1 02/05/2022    MCHC 31 4 02/05/2022    RDW 13 6 02/05/2022    MPV 9 0 02/05/2022    NRBC 0 02/05/2022   , CMP:   Lab Results   Component Value Date     02/05/2022     01/13/2022    CO2 30 02/05/2022    CO2 21 01/13/2022    BUN 28 (H) 02/05/2022    BUN 20 01/13/2022    CREATININE 0 71 02/05/2022    CALCIUM 9 1 02/05/2022    AST 14 02/05/2022    AST 21 01/13/2022    ALT 33 02/05/2022    ALT 19 01/13/2022    ALKPHOS 82 02/05/2022    EGFR 100 02/05/2022   , Coagulation:   Lab Results   Component Value Date    INR 1 08 02/06/2022   , Urinalysis:   Lab Results   Component Value Date    COLORU Lucrecia 07/02/2017    CLARITYU Clear 07/02/2017    SPECGRAV 1 020 07/02/2017    PHUR 6 0 07/02/2017    LEUKOCYTESUR Negative 07/02/2017    NITRITE Negative 07/02/2017    GLUCOSEU Negative 07/02/2017    KETONESU Negative 07/02/2017    BILIRUBINUR Negative 07/02/2017    BLOODU Large 07/02/2017   , Amylase: No results found for: AMYLASE, Lipase:   Lab Results   Component Value Date    LIPASE 102 07/02/2017        Imaging: CT head without contrast    Result Date: 2/6/2022  Narrative: CT BRAIN - WITHOUT CONTRAST INDICATION:   Head trauma, minor, normal mental status (Age 19-64y) syncope, hit head  COMPARISON:  11/1/2018  TECHNIQUE:  CT examination of the brain was performed  In addition to axial images, sagittal and coronal 2D reformatted images were created and submitted for interpretation  Radiation dose length product (DLP) for this visit:  941 mGy-cm   This examination, like all CT scans performed in the [facility], was performed utilizing techniques to minimize radiation dose exposure, including the use of iterative reconstruction and automated exposure control  IMAGE QUALITY:  Diagnostic  FINDINGS: PARENCHYMA:  No intracranial mass, mass effect or midline shift  No CT signs of acute infarction  No acute parenchymal hemorrhage  Stable bilateral hypodensities in the basal ganglia, possibly prominent perivascular spaces  VENTRICLES AND EXTRA-AXIAL SPACES:  Normal for the patient's age  VISUALIZED ORBITS AND PARANASAL SINUSES:  Unremarkable   CALVARIUM AND EXTRACRANIAL SOFT TISSUES:  Normal      Impression: No acute intracranial abnormality  Workstation performed: [id number]     CT high volume lower GI bleed    Result Date: 2/6/2022  Narrative: CT ABDOMEN AND PELVIS - WITHOUT AND WITH IV CONTRAST INDICATION:   Severe lower abdominal pain, rectal bleeding  History of gastric bypass surgery in October 2021  History of breast carcinoma  COMPARISON: October 1, 2019  TECHNIQUE:  CT examination of the abdomen and pelvis was performed both prior to and after the administration of intravenous contrast   Contrast was injected one time intravenously without immediate complication  Scanning through the abdomen and pelvis was performed in arterial, venous and delayed phases according a protocol specifically designed to evaluate for GI bleeding  Axial, sagittal, and coronal 2D reformatted images were created from the source data and submitted for interpretation  Radiation dose length product (DLP) for this visit:  3904 61 mGy-cm   This examination, like all CT scans performed in the [facility], was performed utilizing techniques to minimize radiation dose exposure, including the use of iterative reconstruction and automated exposure control  IV Contrast:  100 mL of iohexol (OMNIPAQUE) Enteric Contrast:  Enteric contrast was not administered  FINDINGS: ABDOMEN  BOWEL:  There is no evidence of active extravasation of intravenous contrast into the lumen of stomach, small bowel, or large bowel loops  The sigmoid colon is markedly abnormal in appearance  A large amount of stool is present in this region and there are areas of apparent asymmetric bowel wall thickening as well as areas where the bowel wall appears imperceptible  Additionally, there are findings concerning for pneumatosis intestinalis involving this portion of the sigmoid colon  Gas is seen extraluminally tracking up the retroperitoneum and there also appears to be some mesenteric venous gas  Some of the stool in the region of the  sigmoid may possibly be extraluminal  Prior bariatric surgery  LOWER CHEST:  No significant abnormality in the lung bases  LIVER/BILIARY TREE:  Unremarkable  GALLBLADDER:  Gallbladder is surgically absent  SPLEEN:  Unremarkable  PANCREAS:  Unremarkable  ADRENAL GLANDS:  Unremarkable  KIDNEYS/URETERS:  Small right renal cysts  No hydronephrosis bilaterally  PELVIS REPRODUCTIVE ORGANS:  Unremarkable for patient's age  URINARY BLADDER:  Unremarkable  APPENDIX: No findings to suggest appendicitis  ADDITIONAL ABDOMINAL AND PELVIC STRUCTURES ABDOMINOPELVIC CAVITY:  As described above  ABDOMINAL WALL/INGUINAL REGIONS:  Postoperative changes abdominal wall  OSSEOUS STRUCTURES:  No acute fracture or destructive osseous lesion  Impression: Markedly abnormal appearance of the sigmoid colon with findings of perforation of viscus, as described above  Please see discussion  Perforated neoplasm should be excluded  Urgent Surgical consultation and follow-up is recommended  I personally discussed this study with Kathleen Bradley on 2/6/2022 at 12:54 AM  Workstation performed: [id number]     US breast right limited (diagnostic)    Result Date: 1/26/2022  Narrative: DIAGNOSIS: Lump of right breast; Malignant neoplasm of upper-outer quadrant of left breast in female, estrogen receptor positive ([facility] [address] ) TECHNIQUE: Ultrasound of the right breast(s) was performed  COMPARISONS: Prior breast imaging dated: 10/18/2021, 10/18/2021, 10/11/2021, 10/11/2021, 04/21/2021, 04/30/2020, 04/25/2019, 04/25/2019, 04/19/2018, 04/18/2017, 04/18/2017, 01/13/2017, 04/25/2016, 04/25/2016, 04/20/2016, 04/14/2016, 04/14/2016, and 04/12/2016 RELEVANT HISTORY: Family Breast Cancer History: History of breast cancer in [address] [place] [address]  Family Medical History: Family medical history includes breast cancer in cousin  Personal History: Hormone history includes tamoxifen   Surgical history includes breast biopsy, breast reduction, mastectomy, and breast explant  Medical history includes breast cancer and history of chemotherapy  RISK ASSESSMENT: Tyrer-Ancazick risk assessment reporting was suppressed due to the patient's history and/or demographic factors  INDICATION: Trinity Garcia is a 54 y o  female presenting for New changing lump felt on right breast  FINDINGS: RIGHT C) CYST: There is a 7 mm x 7 mm x 10 mm round complicated cyst with circumscribed margins seen in the right breast at 4 o'clock, 7 cm from the nipple  The cyst correlates with the palpable mass reported by the patient  D) CYST: There is a 7 mm x 7 mm x 7 mm round simple cyst with circumscribed margins seen in the right breast at 3 o'clock, 9 cm from the nipple  The cyst correlates with the palpable mass reported by the patient  Both cysts are subcutaneous in location likely postsurgical fat necrosis  Surveillance recommended to confirm stability  Impression:  Suspected multifocal fat necrosis in the right breast at the areas of palpable concern  Surveillance recommended   ASSESSMENT/BI-RADS CATEGORY: Right: 3 - Probably Benign Overall: 3 - Probably Benign RECOMMENDATION:      - Ultrasound in 6 months for the right breast  Workstation ID: [id number]     EKG, Pathology, and Other Studies: * No orders in the log *  VTE Pharmacologic Prophylaxis: Reason for no pharmacologic prophylaxis Patient has a bleeding per rectum  VTE Mechanical Prophylaxis: sequential compression device    Admitting Diagnosis: Syncope [R55]  Rectal bleeding [K62 5]  Abdominal pain [R10 9]  BRBPR (bright red blood per rectum) [K62 5]  Fecal peritonitis ([address] ) [K65 8]  Perforated bowel ([address] ) [K63 1]  Perforation of sigmoid colon (HCC) [K63 1]  Assessment/Plan   Code Status: Prior    Assessment:  Acute the colonic perforation with fecal peritonitis  Bleeding per rectum  S/p gastric bypass surgery/mastectomy  Plan:  NPO  IV fluid  Proper consent was obtained  Emergent laparotomy and colostomy  Hernadez catheter placed  IV antibiotics  Skin prep  Site marked  Care discussed patient in detail All questions answered to her satisfaction  Patient agreed with the present plan of care and signed a permit    Counseling / Coordination of Care  Total floor / unit time spent today 30minutes  Greater than 50% of total time was spent with the patient and / or family counseling and / or coordination of care  A description of the counseling / coordination of care:  I performed an interim history, pertinent images and labs, performed a physical examination to arrive at the plan delineated above with associated thought processes       Family member/primary contact updated - mother at the bedside    Staci Zambrano MD [address] [place] [address] Road:  [email or web address]  [place] [address]  aMdhavi Dozier   Office - [phone]  Fax - [phone]    02/06/22  4:07 AM

## 2022-02-06 NOTE — ED NOTES
Patient was informed of not bending her right arm due to IV access point, and patient continues to bend her arm to use her phone  Patient informed that IVF's and antibiotics need to infuse and since she only has one arm to obtain IV access from that she needs to try not to loose the site  Patient also refused to have a alarcon catheter placed,stated that it can be placed when she is under anesthesia  Patient also refused to allow nurses in the ER to try and obtain another site for IV access  Patient was informed she will need to remove the rest of her clothing for the OR and be cleaned with wipes prior to surgery,and she stated she was not taking her pants or underpants off until she is to go to the OR  Patient will be informed when the OR team has arrived       Mateo Gallardo RN  02/06/22 [id number]

## 2022-02-06 NOTE — ANESTHESIA POSTPROCEDURE EVALUATION
Post-Op Assessment Note    CV Status:  Stable  Pain Score: 1    Pain management: adequate     Mental Status:  Alert and awake   Hydration Status:  Euvolemic and stable   PONV Controlled:  Controlled   Airway Patency:  Patent      Post Op Vitals Reviewed: Yes      Staff: Anesthesiologist         No complications documented      BP   121/73   Temp     Pulse 98   Resp 16   SpO2   99

## 2022-02-06 NOTE — ASSESSMENT & PLAN NOTE
Presented with acute abdominal pain and twan blood per rectum  CT with evidence of abdominal free air and intraperitoneal stool   Emergently taken to the OR overnight 2/5-2/6  · POD #0 s/p exploratory laparotomy and Bushra's procedure  · Ostomy site pink, clean dry and intact  · TONI drain x 2 with sero-sang drainage  · Maintain NPO  · Continue zosyn   · OR cultures pending  · Pain regimen per surgical team, dilaudid PCA  · Encourage pulmonary hygiene, IS use

## 2022-02-06 NOTE — OP NOTE
General SurgeryHARTMANS PROCEDURE WITH SIGMOID COLOSTOMY, LAPAROTOMY EXPLORATORY Op Note    Hailee Espinoza  2/6/2022    Pre-op Diagnosis:   Abdominal pain [R10 9]  Perforated bowel ([address] ) [K63 1]  Perforation of sigmoid colon ([address] ) [K63 1]  Rectal bleeding [K62 5]  Fecal peritonitis ([address] ) [K65 8]  PMH:  Past Medical History:   Diagnosis Date    Anxiety     Back pain     BRCA negative 06/2016    [facility]    Cancer ([address] )     Headache     History of chemotherapy 2016    Neoadjuvant; at [address] History of transfusion 06/2017    no adverse reaction    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)     s/p chemotherapy    Obesity (BMI 30-39  9)        Post-op Diagnosis:  Post-Op Diagnosis Codes:     * Abdominal pain [R10 9]     * Perforated bowel ([address] ) [K63 1]     * Perforation of sigmoid colon ([address] ) [K63 1]     * Rectal bleeding [K62 5]     * Fecal peritonitis ([address] ) [K65 8]    Patient Active Problem List   Diagnosis    Malignant neoplasm of upper-outer quadrant of left breast in female, estrogen receptor positive ([address] )    Depression, recurrent ([address] )    Anxiety    Use of tamoxifen (Nolvadex)    Complication of bariatric procedure    GERD (gastroesophageal reflux disease)    Low back pain    Continuous opioid dependence ([address] )    Encounter for surgical aftercare following surgery of digestive system    Postsurgical malabsorption    Fecal peritonitis ([address] )    Perforation of sigmoid colon ([address] )    Pneumatosis intestinalis of large intestine     Procedure(s):  HARTMANS PROCEDURE WITH SIGMOID COLOSTOMY  LAPAROTOMY EXPLORATORY    Surgeon(s):  MD Gato Ortega PA-C    Anesthesia:  General    Staff:   Circulator: Edvin Decker RN  Scrub Person: Barrett Solorio    Assistant:  Gato Stuart PA-C  Services off [employer] was utilized as a first assistant as there is no surgical residency program at [facility]    Operative Findings:  Markedly scarred anterior abdominal wall secondary to multiple previous surgery  Dilated sigmoid colon large fecal mass with perforation   Pneumatosis free air around the sigmoid colon in the mesentery and wall of the sigmoid colon turbid fluid  Hard fecal matter in descending and transverse colon          OPERATIVE TECHNIQUE    Hailee Espinoza was identified by me  Proper detailed consent was obtained from patient The risks, benefits, alternatives,and probabilities of success were discussed in detail with no guarantee made as to outcome  All questions were answered to the patient's satisfaction  Site was marked prior coming to [facility] was given pre-operative antibiotics  There is no height or weight on file to calculate BMI  Janett Summers is ASA 3E and Fire risk- 3  Hailee Espinoza was re-identified in the OR  Site was identified and detailed timeout was performed with Anesthesia and OR staff  Janett Summers was placed in supine position  Operative area was exposed and painted with the ChloraPrep prepped and draped in the usual sterile fashion  Colon bundle precautions were maintained skin was covered with the IO band    An incision was made lower midline  skin and subcutaneous tissue was cut along the line of incision with scalpel  The linea alba was identified and was cut along the line of incision The peritoneum was identified and was grasped with 2 hemostat and cut in between with the scalpel  Peritoneum was carefully cut air and turbid peritoneal fluid was drained    Small bowel was normal there were packed in the upper abdominal omentum was attached to the left side of the abdomen and into the pelvis from previous pelvic surgery and these were were carefully  and cut with the Ethicon Angio-Seal  Sigmoid colon was inspected hard stool was impacted at the site of perforation Site of perforation was sigmoid colon which was identified over the left paracolic gutter side of the mesentery free air was noted on palpation along the mesentery and over the sigmoid colon secondary to pneumatosis    The sigmoid colon was then mobilized the along the left paracolic gutter  Peritoneum was cut the with the Bovie cautery and sigmoid colon was mobilized patient has very thick indurated the short mesentery  Safe guarded and ureter a site was selected for sigmoid colectomy which was relatively free from inflammation this was cut and stapled with the ROSA 75   Sigmoid colon mesentery was then cut along up to the sacral premonitory the rectosigmoid junction was identified this was staple and cut with ROSA 75  sigmoid colon was the removed in toto and sent for pathology  Rectal stump was then overrun with the 3-0 silk interlocking suture 2 Prolene 3-0 stay sutures were placed on the each end of the transected rectum for later identification of the bowel  A site was selected lateral to the rectus abdominis muscle over the left lower quadrant in between the umbilicus and anterior superior iliac spine the circular disc of skin was incised and a 2 fingerbreadth opening was created descending colon which was mobilized further to gain sufficient length so it can be exteriorized as a descending colostomy without any twist on the mesentery  Proximal bowel hard stool was milked towards the stoma opening and was emptied out  Colon bundle precautions and protocols were followed and the all the OR team has changed gloves and gown a drapes were changed all the instruments were removed the new set of instruments were used     Abdominal cavity was thoroughly lavaged with copious amount of fluid for irrigation approximately 3 L of fluid for irrigation were used to irrigate the left paracolic gutter left pelvis pouch of Arnold and subhepatic subdiaphragmatic and perisplenic area all the fluid was sucked out two TONI drains 10 [place] were 1 placed in the pouch of Arnold and 1 and the left paracolic gutter    The Seprafilm was placed over the exposed bowel near the midline incision and then midline incision was then closed in a single layer with the PDS loop on suture 2-0 three sutures were used one from the upper abdominal incision one from the lower end of the incision and 1 from the middle of the incision  TONI drain were secured with the nylon 3-0 drain suture     Abdominal incision was thoroughly lavaged with copious amount of fluid for irrigation  The skin incision was partially closed and approximated with a skin stapling device, in between skin staple wide gap was left for drainage  The main abdominal incision was covered with a sterile dressing and then stoma was opened staple line was cut and colostomy was matured with the 3-0 Vicryl sutures seromuscular to the skin  Prior to this the colostomy was stitched to the rectus sheath with 4 seromuscular sutures at all 4 quadrant  Stoma was noted to be viable and pink stoma was digitalized adequate opening was noted  A colostomy bag was placed    Hailee Carrizaleski tolerated the procedure well, remain stable during and after the procedure  At the end of the procedure No active bleeding was noted and all the instruments, needle and sponge counts were noted to be correct  Sterile dresing was applied and patient was transfered to recovery area in stable condition  I was present for the entire procedure No qualified resident was available  A physician's assistant was required due to the intensity of the surgery  This no residency program in this hospital no resident was available to assist  Physician assistant was required for, hemostasis retraction and the closure of the surgical wound   Physician assistant was present during the entire procedure    Estimated Blood Loss:  Minimal    Specimens:  Order Name Source Comment Collection Info Order Time   TISSUE EXAM Colon Please set in formalin Collected By: Savannah Wright MD 2/6/2022  5:14 AM     Release to patient through [address] [place] [address] Immediate            Drains:  Closed/Suction Drain RLQ Bulb (Active)       Closed/Suction Drain LLQ Bulb (Active)       Colostomy Descending/sigmoid RUQ (Active)       Urethral Catheter Non-latex 16 Fr   (Active)     Complications:  None    Procedure  Time  Event Time In   Procedure Start 02/06/2022 0449   Procedure Closing 02/06/2022 0556   Procedure Finish 02/06/2022 5071         Moe Grier MD [facility]    Date: 2/6/2022  Time: 6:39 AM    Copy to PCP: Deann Hopkins MD

## 2022-02-06 NOTE — ANESTHESIA PREPROCEDURE EVALUATION
Procedure:  HARTMANS PROCEDURE (N/A Hip)    Relevant Problems   ANESTHESIA (within normal limits)      GI/HEPATIC   (+) GERD (gastroesophageal reflux disease)      GYN   (+) Malignant neoplasm of upper-outer quadrant of left breast in female, estrogen receptor positive (HCC)      MUSCULOSKELETAL   (+) Low back pain      NEURO/PSYCH   (+) Anxiety   (+) Continuous opioid dependence (HCC)   (+) Depression, recurrent (HCC)      PULMONARY (within normal limits)        Physical Exam    Airway    Mallampati score: II  TM Distance: >3 FB  Neck ROM: full     Dental   No notable dental hx     Cardiovascular  Rhythm: regular, Rate: abnormal,     Pulmonary  Breath sounds clear to auscultation,     Other Findings        Anesthesia Plan  ASA Score- 2 Emergent    Anesthesia Type- general with ASA Monitors  Additional Monitors:   Airway Plan: ETT  Plan Factors-Exercise tolerance (METS): >4 METS  Chart reviewed  EKG reviewed  Existing labs reviewed  Patient summary reviewed  Patient is not a current smoker  Induction- intravenous  Postoperative Plan- Plan for postoperative opioid use  Planned trial extubation    Informed Consent- Anesthetic plan and risks discussed with patient  I personally reviewed this patient with the CRNA  Discussed and agreed on the Anesthesia Plan with the CRNA  Yazmin Zarate

## 2022-02-07 ENCOUNTER — TELEPHONE (OUTPATIENT)
Dept: OBGYN CLINIC | Facility: CLINIC | Age: 52
End: 2022-02-07

## 2022-02-07 ENCOUNTER — TELEPHONE (OUTPATIENT)
Dept: BARIATRICS | Facility: CLINIC | Age: 52
End: 2022-02-07

## 2022-02-07 LAB
ANION GAP SERPL CALCULATED.3IONS-SCNC: 7 MMOL/L (ref 4–13)
BUN SERPL-MCNC: 13 MG/DL (ref 5–25)
CALCIUM SERPL-MCNC: 8.6 MG/DL (ref 8.3–10.1)
CHLORIDE SERPL-SCNC: 106 MMOL/L (ref 100–108)
CO2 SERPL-SCNC: 28 MMOL/L (ref 21–32)
CREAT SERPL-MCNC: 0.7 MG/DL (ref 0.6–1.3)
ERYTHROCYTE [DISTWIDTH] IN BLOOD BY AUTOMATED COUNT: 14 % (ref 11.6–15.1)
GFR SERPL CREATININE-BSD FRML MDRD: 102 ML/MIN/1.73SQ M
GLUCOSE SERPL-MCNC: 93 MG/DL (ref 65–140)
HCT VFR BLD AUTO: 34.7 % (ref 34.8–46.1)
HGB BLD-MCNC: 11 G/DL (ref 11.5–15.4)
MAGNESIUM SERPL-MCNC: 1.8 MG/DL (ref 1.6–2.6)
MCH RBC QN AUTO: 30.5 PG (ref 26.8–34.3)
MCHC RBC AUTO-ENTMCNC: 31.7 G/DL (ref 31.4–37.4)
MCV RBC AUTO: 96 FL (ref 82–98)
MRSA NOSE QL CULT: NORMAL
PHOSPHATE SERPL-MCNC: 3.6 MG/DL (ref 2.7–4.5)
PLATELET # BLD AUTO: 267 THOUSANDS/UL (ref 149–390)
PMV BLD AUTO: 8.9 FL (ref 8.9–12.7)
POTASSIUM SERPL-SCNC: 3.9 MMOL/L (ref 3.5–5.3)
RBC # BLD AUTO: 3.61 MILLION/UL (ref 3.81–5.12)
SODIUM SERPL-SCNC: 141 MMOL/L (ref 136–145)
WBC # BLD AUTO: 10.77 THOUSAND/UL (ref 4.31–10.16)

## 2022-02-07 PROCEDURE — 80048 BASIC METABOLIC PNL TOTAL CA: CPT | Performed by: NURSE PRACTITIONER

## 2022-02-07 PROCEDURE — 99024 POSTOP FOLLOW-UP VISIT: CPT | Performed by: SPECIALIST

## 2022-02-07 PROCEDURE — 85027 COMPLETE CBC AUTOMATED: CPT | Performed by: NURSE PRACTITIONER

## 2022-02-07 PROCEDURE — 84100 ASSAY OF PHOSPHORUS: CPT | Performed by: NURSE PRACTITIONER

## 2022-02-07 PROCEDURE — 83735 ASSAY OF MAGNESIUM: CPT | Performed by: NURSE PRACTITIONER

## 2022-02-07 RX ORDER — LORAZEPAM 0.5 MG/1
0.5 TABLET ORAL EVERY 8 HOURS PRN
Status: DISCONTINUED | OUTPATIENT
Start: 2022-02-07 | End: 2022-02-10 | Stop reason: HOSPADM

## 2022-02-07 RX ORDER — ZOLPIDEM TARTRATE 5 MG/1
5 TABLET ORAL
Status: DISCONTINUED | OUTPATIENT
Start: 2022-02-07 | End: 2022-02-10 | Stop reason: HOSPADM

## 2022-02-07 RX ORDER — ACETAMINOPHEN 325 MG/1
650 TABLET ORAL EVERY 6 HOURS PRN
Status: DISCONTINUED | OUTPATIENT
Start: 2022-02-07 | End: 2022-02-10 | Stop reason: HOSPADM

## 2022-02-07 RX ORDER — POLYETHYLENE GLYCOL 3350 17 G/17G
17 POWDER, FOR SOLUTION ORAL ONCE
Status: COMPLETED | OUTPATIENT
Start: 2022-02-07 | End: 2022-02-07

## 2022-02-07 RX ORDER — SODIUM CHLORIDE, SODIUM LACTATE, POTASSIUM CHLORIDE, CALCIUM CHLORIDE 600; 310; 30; 20 MG/100ML; MG/100ML; MG/100ML; MG/100ML
100 INJECTION, SOLUTION INTRAVENOUS CONTINUOUS
Status: DISCONTINUED | OUTPATIENT
Start: 2022-02-07 | End: 2022-02-10

## 2022-02-07 RX ORDER — CYANOCOBALAMIN 1000 UG/ML
1000 INJECTION INTRAMUSCULAR; SUBCUTANEOUS ONCE
Status: COMPLETED | OUTPATIENT
Start: 2022-02-07 | End: 2022-02-07

## 2022-02-07 RX ADMIN — PIPERACILLIN AND TAZOBACTAM 3.38 G: 36; 4.5 INJECTION, POWDER, FOR SOLUTION INTRAVENOUS at 14:00

## 2022-02-07 RX ADMIN — ZOLPIDEM TARTRATE 5 MG: 5 TABLET, COATED ORAL at 21:23

## 2022-02-07 RX ADMIN — PIPERACILLIN AND TAZOBACTAM 3.38 G: 36; 4.5 INJECTION, POWDER, FOR SOLUTION INTRAVENOUS at 07:54

## 2022-02-07 RX ADMIN — SODIUM CHLORIDE, SODIUM LACTATE, POTASSIUM CHLORIDE, AND CALCIUM CHLORIDE 100 ML/HR: .6; .31; .03; .02 INJECTION, SOLUTION INTRAVENOUS at 12:18

## 2022-02-07 RX ADMIN — SODIUM CHLORIDE, SODIUM LACTATE, POTASSIUM CHLORIDE, AND CALCIUM CHLORIDE 100 ML/HR: .6; .31; .03; .02 INJECTION, SOLUTION INTRAVENOUS at 21:28

## 2022-02-07 RX ADMIN — ENOXAPARIN SODIUM 40 MG: 40 INJECTION SUBCUTANEOUS at 09:05

## 2022-02-07 RX ADMIN — THIAMINE HYDROCHLORIDE 100 MG: 100 INJECTION, SOLUTION INTRAMUSCULAR; INTRAVENOUS at 18:04

## 2022-02-07 RX ADMIN — POLYETHYLENE GLYCOL 3350 17 G: 17 POWDER, FOR SOLUTION ORAL at 12:19

## 2022-02-07 RX ADMIN — ACETAMINOPHEN 650 MG: 325 TABLET, FILM COATED ORAL at 12:29

## 2022-02-07 RX ADMIN — PIPERACILLIN AND TAZOBACTAM 3.38 G: 36; 4.5 INJECTION, POWDER, FOR SOLUTION INTRAVENOUS at 19:58

## 2022-02-07 RX ADMIN — FAMOTIDINE 20 MG: 10 INJECTION INTRAVENOUS at 21:22

## 2022-02-07 RX ADMIN — CYANOCOBALAMIN 1000 MCG: 1000 INJECTION, SOLUTION INTRAMUSCULAR; SUBCUTANEOUS at 17:50

## 2022-02-07 RX ADMIN — PIPERACILLIN AND TAZOBACTAM 3.38 G: 36; 4.5 INJECTION, POWDER, FOR SOLUTION INTRAVENOUS at 02:05

## 2022-02-07 NOTE — PROGRESS NOTES
Progress Note - General Surgery   [facility] 54 y o  female MRN: [id number]  Unit/Bed#: ICU 06 Encounter: [id number]    Assessment:  52yo female with history of left breast cancer s/p neoadjuvant chemotherapy and chuckie-en-Y gastric bypass in October 2021 who originally presented with severe abdominal pain, and LOC after having PRBPR  She is now POD#1 exploratory laparotomy with Bushra's procedure secondary to perforated sigmoid colon likely due to stercoral ulcer       Plan:  Transfer out of ICU to surgical service on med-surg floor  Restart home meds  NPO, ice chips ok   IV fluids   Continue Dilaudid PCA  Consult placed for ostomy nurse education   Hernadez discontinued this morning  Reviewed IS with patient  Zosyn day 2   AM labs:  CBC, BMP, Mag   Called lab about CEA which was not collected prior to surgery, unable to run on existing specimen without gold top tube      Subjective/Objective     Subjective:  Patient denies any nausea, vomiting, SOB, chest pain, difficulty breathing, calf pain  Objective:   AVSS  UOP 1 0  TONI drains x2 with 155cc serosanguinous output  Blood pressure 130/76, pulse 76, temperature 97 5 °F (36 4 °C), temperature source Temporal, resp  rate 20, height 5' (1 524 m), weight 79 4 kg (175 lb), SpO2 95 %, not currently breastfeeding  ,Body mass index is 34 18 kg/m²        Intake/Output Summary (Last 24 hours) at 2/7/2022 0900  Last data filed at 2/7/2022 0700  Gross per 24 hour   Intake 1201 6 ml   Output 1620 ml   Net -418 4 ml       Invasive Devices  Report    Peripheral Intravenous Line            Peripheral IV 02/05/22 Right Antecubital 1 day          Drain            Closed/Suction Drain LLQ Bulb 1 day    Closed/Suction Drain RLQ Bulb 1 day    Colostomy Descending/sigmoid RUQ 1 day                Physical Exam: /76   Pulse 76   Temp 97 5 °F (36 4 °C) (Temporal)   Resp 20   Ht 5' (1 524 m)   Wt 79 4 kg (175 lb)   SpO2 95%   BMI 34 18 kg/m²   General appearance: alert and oriented, in no acute distress, anxious  Head: Normocephalic, without obvious abnormality, atraumatic  Lungs: clear to auscultation bilaterally  Heart: regular rate and rhythm with holosystolic murmur  Abdomen: midline incision intact with staples and without any erythema or drainage, hypoactive bowel sounds, stoma is pink and budded, no ostomy output yet, TONI drain output is serosanguinous  Extremities: extremities normal, warm and well-perfused; no cyanosis, clubbing, or edema  Skin: Skin color, texture, turgor normal  No rashes or lesions    Lab, Imaging and other studies:  I have personally reviewed pertinent lab results    , CBC:   Lab Results   Component Value Date    WBC 10 77 (H) 02/07/2022    HGB 11 0 (L) 02/07/2022    HCT 34 7 (L) 02/07/2022    MCV 96 02/07/2022     02/07/2022    MCH 30 5 02/07/2022    MCHC 31 7 02/07/2022    RDW 14 0 02/07/2022    MPV 8 9 02/07/2022   , CMP:   Lab Results   Component Value Date    SODIUM 141 02/07/2022    K 3 9 02/07/2022     02/07/2022    CO2 28 02/07/2022    BUN 13 02/07/2022    CREATININE 0 70 02/07/2022    CALCIUM 8 6 02/07/2022    EGFR 102 02/07/2022     VTE Pharmacologic Prophylaxis: Enoxaparin (Lovenox) SQ  VTE Mechanical Prophylaxis: sequential compression device

## 2022-02-07 NOTE — CASE MANAGEMENT
Case Management Assessment & Discharge Planning Note    Patient name Magalys Jimenez  Location ICU 06/ICU 06 MRN [id number]  : 1970 Date 2022       Current Admission Date: 2022  Current Admission Diagnosis:Perforation of sigmoid colon [facility])   Patient Active Problem List    Diagnosis Date Noted    Fecal peritonitis ([facility] ) 2022    [facility] of [facility] ) 2022    Pneumatosis intestinalis of large intestine 2022    S/P gastric bypass 2022    Rectal bleeding 2022    Syncope 2022    Encounter for surgical aftercare following surgery of digestive system 2022    Postsurgical malabsorption 2022    Continuous opioid dependence ([facility] ) 2021    Low back pain 10/03/2021    GERD (gastroesophageal reflux disease)     Complication of bariatric procedure 2021    Use of tamoxifen (Nolvadex) 2018    Malignant neoplasm of upper-outer quadrant of left breast in female, estrogen receptor positive ([facility] )     Depression, recurrent (HCC)     Anxiety       LOS (days): 1  Geometric Mean LOS (GMLOS) (days):   Days to GMLOS:     OBJECTIVE:    Risk of Unplanned Readmission Score: 19         Current admission status: Inpatient       Preferred Pharmacy:   [facility]/pharmacy #[id number]- Maira Jenkins [address] [id number]  Phone: [phone] Fax: [phone]    Primary Care Provider: Louanne Lesch, MD    [facility]  Secondary Insurance:     ASSESSMENT:  Active Health Care Agents    There are no active Health Care Agents on file  Patient Information  Admitted from[de-identified] Home  Mental Status: Alert  During Assessment patient was accompanied by: Not accompanied during assessment  Assessment information provided by[de-identified] Patient  Primary Caregiver: Self  Support Systems: Son  [place] of Residence: [address] [place] [address] do you live in?: [address] entry access options   Select all that apply : Stairs  Number of steps to enter home : 3  Do the steps have railings?: Yes  Type of Current Residence: Other (Comment) ([facility])  In the last 12 months, was there a time when you were not able to pay the mortgage or rent on time?: No  In the last 12 months, how many places have you lived?: 1  In the last 12 months, was there a time when you did not have a steady place to sleep or slept in a shelter (including now)?: No  Homeless/housing insecurity resource given?: No  Living Arrangements: Lives w/ Son    Activities of Daily Living Prior to Admission  Functional Status: Independent  Completes ADLs independently?: Yes  Ambulates independently?: Yes  Does patient use assisted devices?: No  Does patient currently own DME?: No  Does patient have a history of Outpatient Therapy (PT/OT)?: No  Does the patient have a history of Short-Term Rehab?: No  Does patient have a history of HHC?: No  Does patient currently have [facility]?: No         Patient Information Continued  Income Source: Employed  Does patient have prescription coverage?: Yes  Within the past 12 months, you worried that your food would run out before you got the money to buy more : Never true  Within the past 12 months, the food you bought just didnt last and you didnt have money to get more : Never true  Food insecurity resource given?: N/A  Does patient receive dialysis treatments?: No  Does patient have a history of substance abuse?: No  Does patient have a history of Mental Health Diagnosis?: No         Means of Transportation  Means of Transport to Appts[de-identified] Drives Self  In the past 12 months, has lack of transportation kept you from medical appointments or from getting medications?: No  In the past 12 months, has lack of transportation kept you from meetings, work, or from getting things needed for daily living?: No  Was application for public transport provided?: N/A        DISCHARGE DETAILS:    Discharge planning discussed with[de-identified] Sig Other  Freedom of Choice: Yes  Comments - Freedom of Choice: [facility] open to VNA ok with blanket referrals  CM contacted family/caregiver?: Yes  Were Treatment Team discharge recommendations reviewed with patient/caregiver?: Yes  Did patient/caregiver verbalize understanding of patient care needs?: Yes  Were patient/caregiver advised of the risks associated with not following Treatment Team discharge recommendations?: Yes    Contacts  Patient Contacts: Toni  Relationship to Patient[de-identified] Family  Contact Method: Phone  Phone Number: [phone]  Reason/Outcome: Continuity of [address]          Is the patient interested in [address] at discharge?: Yes  [address] requested[de-identified] [address] Name[de-identified] Other  [address] Provider[de-identified] PCP  Home Health Services Needed[de-identified] Wound/Ostomy Care  Homebound Criteria Met[de-identified] Requires the Assistance of Another Person for Safe Ambulation or to Leave the Home  Supporting Clincal Findings[de-identified] Limited Endurance      Would you like to participate in our [address] service program?  : No - Declined      CM spoke to sig other Eldridge Essex, verified demographics, and spoke about dcp  Eldridge Essex unsure if plan for pt will be for him to stay with her part of the time at her house, or for her to move into his home and stay with him  Eldridge Essex will be discussing plan with Hailee  [address] with [address] referrals being sent at this time  Cm will f/u with Hailee to discus plan further

## 2022-02-07 NOTE — TELEPHONE ENCOUNTER
L/M for patient to call to schedule an appt with Dr Patricio Crawford; ref by Dr Cas Moore  Please do intake   TY

## 2022-02-07 NOTE — TELEPHONE ENCOUNTER
Marianne,     Would you be able to reach out to this patient regarding rescheduling her apt with Dr Chriss Carrasco to Dr Ana Laura Holguin? TY  ----- Message from Nava Parkinson DO sent at 2/7/2022  3:13 PM EST -----  I reviewed her records and MRI report and I think it would be better for her to see Dr Ana Laura Holguin in pain management  She has already failed conservative treatment for back pain which is typically what I provided sports medicine  I think she needs to see a back specialist     Please cancel her upcoming appointment with me in reschedule with Dr Ana Laura Holguin   ----- Message -----  From: Katia Fitzpatrick MA  Sent: 2/7/2022   1:16 PM EST  To: DO Dr Chriss Vanegas please review records scanned into chart under media from [facility] orthopedics

## 2022-02-07 NOTE — PLAN OF CARE
Plan of care cont        Problem: PAIN - ADULT  Goal: Verbalizes/displays adequate comfort level or baseline comfort level  Description: Interventions:  - Encourage patient to monitor pain and request assistance  - Assess pain using appropriate pain scale  - Administer analgesics based on type and severity of pain and evaluate response  - Implement non-pharmacological measures as appropriate and evaluate response  - Consider cultural and social influences on pain and pain management  - Notify physician/advanced practitioner if interventions unsuccessful or patient reports new pain  Outcome: Progressing     Problem: INFECTION - ADULT  Goal: Absence or prevention of progression during hospitalization  Description: INTERVENTIONS:  - Assess and monitor for signs and symptoms of infection  - Monitor lab/diagnostic results  - Monitor all insertion sites, i e  indwelling lines, tubes, and drains  - Monitor endotracheal if appropriate and nasal secretions for changes in amount and color  - Newborn appropriate cooling/warming therapies per order  - Administer medications as ordered  - Instruct and encourage patient and family to use good hand hygiene technique  - Identify and instruct in appropriate isolation precautions for identified infection/condition  Outcome: Progressing     Problem: DISCHARGE PLANNING  Goal: Discharge to home or other facility with appropriate resources  Description: INTERVENTIONS:  - Identify barriers to discharge w/patient and caregiver  - Arrange for needed discharge resources and transportation as appropriate  - Identify discharge learning needs (meds, wound care, etc )  - Arrange for interpretive services to assist at discharge as needed  - Refer to Case Management Department for coordinating discharge planning if the patient needs post-hospital services based on physician/advanced practitioner order or complex needs related to functional status, cognitive ability, or social support system  Outcome: Progressing     Problem: Knowledge Deficit  Goal: Patient/family/caregiver demonstrates understanding of disease process, treatment plan, medications, and discharge instructions  Description: Complete learning assessment and assess knowledge base    Interventions:  - Provide teaching at level of understanding  - Provide teaching via preferred learning methods  Outcome: Progressing     Problem: GASTROINTESTINAL - ADULT  Goal: Maintains or returns to baseline bowel function  Description: INTERVENTIONS:  - Assess bowel function  - Encourage oral fluids to ensure adequate hydration  - Administer IV fluids if ordered to ensure adequate hydration  - Administer ordered medications as needed  - Encourage mobilization and activity  - Consider nutritional services referral to assist patient with adequate nutrition and appropriate food choices  Outcome: Progressing  Goal: Maintains adequate nutritional intake  Description: INTERVENTIONS:  - Monitor percentage of each meal consumed  - Identify factors contributing to decreased intake, treat as appropriate  - Assist with meals as needed  - Monitor I&O, weight, and lab values if indicated  - Obtain nutrition services referral as needed  Outcome: Progressing  Goal: Establish and maintain optimal ostomy function  Description: INTERVENTIONS:  - Assess bowel function  - Encourage oral fluids to ensure adequate hydration  - Administer IV fluids if ordered to ensure adequate hydration   - Administer ordered medications as needed  - Encourage mobilization and activity  - Nutrition services referral to assist patient with appropriate food choices  - Assess stoma site  - Consider wound care consult   Outcome: Progressing  Goal: Oral mucous membranes remain intact  Description: INTERVENTIONS  - Assess oral mucosa and hygiene practices  - Implement preventative oral hygiene regimen  - Implement oral medicated treatments as ordered  - Initiate Nutrition services referral as needed  Outcome: Progressing     Problem: Nutrition/Hydration-ADULT  Goal: Nutrient/Hydration intake appropriate for improving, restoring or maintaining nutritional needs  Description: Monitor and assess patient's nutrition/hydration status for malnutrition  Collaborate with interdisciplinary team and initiate plan and interventions as ordered  Monitor patient's weight and dietary intake as ordered or per policy  Utilize nutrition screening tool and intervene as necessary  Determine patient's food preferences and provide high-protein, high-caloric foods as appropriate       INTERVENTIONS:  - Monitor oral intake, urinary output, labs, and treatment plans  - Assess nutrition and hydration status and recommend course of action  - Evaluate amount of meals eaten  - Assist patient with eating if necessary   - Allow adequate time for meals  - Recommend/ encourage appropriate diets, oral nutritional supplements, and vitamin/mineral supplements  - Order, calculate, and assess calorie counts as needed  - Recommend, monitor, and adjust tube feedings and TPN/PPN based on assessed needs  - Assess need for intravenous fluids  - Provide specific nutrition/hydration education as appropriate  - Include patient/family/caregiver in decisions related to nutrition  Outcome: Progressing

## 2022-02-07 NOTE — UTILIZATION REVIEW
Initial Clinical Review    Admission: Date/Time/Statement:   Admission Orders (From admission, onward)     Ordered        02/06/22 0326  Inpatient Admission  Once                      Orders Placed This Encounter   Procedures    Inpatient Admission     Standing Status:   Standing     Number of Occurrences:   1     Order Specific Question:   Level of Care     Answer:   Critical Care [15]     Order Specific Question:   Estimated length of stay     Answer:   More than 2 Midnights     Order Specific Question:   Certification     Answer:   I certify that inpatient services are medically necessary for this patient for a duration of greater than two midnights  See H&P and MD Progress Notes for additional information about the patient's course of treatment  ED Arrival Information     Expected Arrival Acuity    - 2/5/2022 22:10 Emergent         Means of arrival Escorted by Service Admission type    [facility] Emergency         Arrival complaint    Abdominal pain        Chief Complaint   Patient presents with    Abdominal Pain     c/o severe low abd pain today,    Syncope     had syncopal event tonight while on toilet    Rectal Bleeding     states passed large amt of blood rectally tonight, hx of gastric bypass in oct     Initial Presentation:   54 yof to ER from home c/o sudden onset lower abd pain; passed large amount of blood at home, then passed out, fell off toilet, possiblehead strike  Presents hypertensive with abd tenderness RLQ, suprapubic & LLQ areas, guaiac+  Admission work-up showing sigmoid colon perforation    Admitted to inpatient status for perforted sigmoid colon, surgery consulted    To OR for emergent:  HARTMANS PROCEDURE WITH SIGMOID COLOSTOMY  LAPAROTOMY EXPLORATORY  Operative Findings:  Markedly scarred anterior abdominal wall secondary to multiple previous surgery  Dilated sigmoid colon large fecal mass with perforation   Pneumatosis free air around the sigmoid colon in the mesentery and wall of the sigmoid colon turbid fluid  Hard fecal matter in descending and transverse colon           Date: 2/7/22   Day 2:   POD#1 exploratory laparotomy with Bushra's procedure secondary to perforated sigmoid colon likely due to stercoral ulcer  Abdomen: midline incision intact with staples and without any erythema or drainage, hypoactive bowel sounds, stoma is pink and budded, no ostomy output yet, TONI drains x2 with 155cc serosanguinous output  Using PCA for pain control  Post-op course IVABT in progress  Hernadez d/c'd, voiding trial today  Remains NPO with IVF maintained      ED Triage Vitals [02/05/22 2218]   Temperature Pulse Respirations Blood Pressure SpO2   97 9 °F (36 6 °C) 63 (!) 24 (!) 174/75 100 %      Temp Source Heart Rate Source Patient Position - Orthostatic VS BP Location FiO2 (%)   Tympanic Monitor Sitting Right arm --      Pain Score       9          Wt Readings from Last 1 Encounters:   02/06/22 79 4 kg (175 lb)     Additional Vital Signs:   02/07/22 0746 97 5 °F (36 4 °C) 76 20 130/76 97 95 % None (Room air) -- Lying   02/07/22 0500 98 6 °F (37 °C) 82 -- 121/59 -- -- -- -- Lying   02/07/22 0300 -- 89 20 114/58 -- 95 % None (Room air) -- Lying   02/06/22 2341 98 2 °F (36 8 °C) 86 20 112/61 80 96 % -- -- Lying   02/06/22 2000 97 °F (36 1 °C) Abnormal  82 18 110/57 77 -- -- -- Lying   02/06/22 1800 -- -- -- -- -- 96 % None (Room air) -- --   02/06/22 1700 -- 78 19 96/59 70 94 % None (Room air) -- --   02/06/22 1627 -- 71 14 101/53 73 94 % -- -- --   02/06/22 1600 98 6 °F (37 °C) 69 13 96/54 68 94 % None (Room air) -- Lying     Pertinent Labs/Diagnostic Test Results:   Results from last 7 days   Lab Units 02/06/22  0102   SARS-COV-2  Negative     Results from last 7 days   Lab Units 02/07/22  0442 02/06/22  0758 02/05/22  2240   WBC Thousand/uL 10 77* 17 27* 9 06   HEMOGLOBIN g/dL 11 0* 11 6 12 6   HEMATOCRIT % 34 7* 36 3 40 1   PLATELETS Thousands/uL 267 260 340   NEUTROS ABS Thousands/µL  --   --  5 32     Results from last 7 days   Lab Units 02/07/22  0442 02/06/22  0758 02/05/22  2240   SODIUM mmol/L 141 136 138   POTASSIUM mmol/L 3 9 4 1 3 6   CHLORIDE mmol/L 106 105 103   CO2 mmol/L 28 27 30   ANION GAP mmol/L 7 4 5   BUN mg/dL 13 17 28*   CREATININE mg/dL 0 70 0 65 0 71   EGFR ml/min/1 73sq m 102 104 100   CALCIUM mg/dL 8 6 8 3 9 1   MAGNESIUM mg/dL 1 8 1 5*  --    PHOSPHORUS mg/dL 3 6 3 2  --      Results from last 7 days   Lab Units 02/05/22  2240   AST U/L 14   ALT U/L 33   ALK PHOS U/L 82   TOTAL PROTEIN g/dL 7 5   ALBUMIN g/dL 3 9   TOTAL BILIRUBIN mg/dL 0 27     Results from last 7 days   Lab Units 02/07/22  0442 02/06/22  0758 02/05/22  2240   GLUCOSE RANDOM mg/dL 93 152* 112     Results from last 7 days   Lab Units 02/06/22  0104 02/05/22  2240   HS TNI 0HR ng/L  --  2   HS TNI 2HR ng/L 3  --    HSTNI D2 ng/L 1  --      Results from last 7 days   Lab Units 02/06/22  0102   PROTIME seconds 13 8   INR  1 08   PTT seconds 27     Results from last 7 days   Lab Units 02/06/22  0102   LACTIC ACID mmol/L 0 8     Results from last 7 days   Lab Units 02/06/22  0102   INFLUENZA A PCR  Negative   INFLUENZA B PCR  Negative   RSV PCR  Negative     2/5  Cxr=  No acute cardiopulmonary disease  CT head=  No acute intracranial abnormality  CT high volume lower GI bleed=   Markedly abnormal appearance of the sigmoid colon with findings of perforation of viscus, as described above  Please see discussion  Perforated neoplasm should be excluded    Urgent Surgical consultation and follow-up is recommended    ED Treatment:   Medication Administration from 02/05/2022 2210 to 02/06/2022 0351       Date/Time Order Dose Route Action     02/05/2022 2239 ondansetron (ZOFRAN) injection 4 mg 4 mg Intravenous Given     02/05/2022 2259 sodium chloride 0 9 % bolus 1,000 mL 1,000 mL Intravenous New Bag     02/05/2022 2259 HYDROmorphone (DILAUDID) injection 1 mg 1 mg Intravenous Given     02/06/2022 0003 promethazine (PHENERGAN) injection 12 5 mg 12 5 mg Intravenous Given     02/06/2022 0002 pantoprazole (PROTONIX) injection 40 mg 40 mg Intravenous Given     02/05/2022 2352 iohexol (OMNIPAQUE) 350 MG/ML injection (SINGLE-DOSE) 100 mL 100 mL Intravenous Given     02/06/2022 0102 sodium chloride 0 9 % bolus 1,000 mL 1,000 mL Intravenous New Bag     02/06/2022 0117 HYDROmorphone (DILAUDID) injection 1 mg 1 mg Intravenous Given     02/06/2022 0207 piperacillin-tazobactam (ZOSYN) IVPB 3 375 g 3 375 g Intravenous New Bag     02/06/2022 0207 metroNIDAZOLE (FLAGYL) tablet 500 mg 500 mg Oral Given        Past Medical History:   Diagnosis Date    Anxiety     Back pain     BRCA negative 06/2016    [facility]    Cancer ([facility] [address] )     Headache     History of chemotherapy 2016    Neoadjuvant; at [address] History of transfusion 06/2017    no adverse reaction    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)     s/p chemotherapy    Obesity (BMI 30-39  9)      Present on Admission:   Fecal peritonitis ([address] )   Perforation of sigmoid colon (HCC)   Pneumatosis intestinalis of large intestine   Low back pain   Rectal bleeding   Syncope   Depression, recurrent (HCC)    Admitting Diagnosis: Syncope [R55]  Rectal bleeding [K62 5]  Abdominal pain [R10 9]  BRBPR (bright red blood per rectum) [K62 5]  Fecal peritonitis ([facility] [address] ) [K65 8]  Perforated bowel (HCC) [K63 1]  Perforation of sigmoid colon (HCC) [K63 1]  Age/Sex: 54 y o  female  Admission Orders:  Scd/foot pumps  Cont pulse ox    Scheduled Medications:  buPROPion, 150 mg, Oral, Daily  enoxaparin, 40 mg, Subcutaneous, Daily  piperacillin-tazobactam, 3 375 g, Intravenous, Q6H    Continuous IV Infusions:  HYDROmorphone,  Intravenous, Continuous    PRN Meds:  HYDROmorphone, 0 2 mg, Intravenous, Q1H PRN  naloxone, 0 04 mg, Intravenous, Q1MIN PRN  ondansetron, 4 mg, Intravenous, Q6H PRN    Network Utilization Review Department  ATTENTION: Please call with any questions or concerns to [phone] and carefully listen to the prompts so that you are directed to the right person  All voicemails are confidential   Valeta Pastel all requests for admission clinical reviews, approved or denied determinations and any other requests to dedicated fax number below belonging to the campus where the patient is receiving treatment   List of dedicated fax numbers for the Facilities:  [address] DENIALS (Administrative/Medical Necessity) [phone]   [address] (Maternity/NICU/Pediatrics) [phone]   [address]  [address] [facility] [place] [address] [address] [place] [address] [address] [facility] [place] [address] [phone]

## 2022-02-07 NOTE — CASE MANAGEMENT
Case Management Progress Note    Patient name Primitivo Gonzalez  Location ICU 06/ICU 06 MRN [id number]  : 1970 Date 2022       LOS (days): 1  Geometric Mean LOS (GMLOS) (days):   Days to GMLOS:        OBJECTIVE:        Current admission status: Inpatient  Preferred Pharmacy:   [facility]/pharmacy #[id number]- [address] [facility] [address]  Phone: [phone] Fax: [phone]    Primary Care Provider: Consuelo Lawton MD    Primary Insurance: [address]  Secondary Insurance:     PROGRESS NOTE:    CM spoke with pt regarding dcp  Pt is open with using any HHC just hasn't spoken with [facility] to discuss further planning yet  Pt unsure of any DME needs at this time  CM to send blanket referrals to [facility]

## 2022-02-07 NOTE — TELEPHONE ENCOUNTER
Patient called in with some concerns that happened over the weekend  She is currently in the ICU and requesting that someone from [facility] can come see her at [facility] 39 was sent through [address] [place], [place] [address]e to Danny Manuel, and [address] [place] [address]

## 2022-02-08 LAB
ANION GAP SERPL CALCULATED.3IONS-SCNC: 13 MMOL/L (ref 4–13)
BASOPHILS # BLD AUTO: 0.05 THOUSANDS/ΜL (ref 0–0.1)
BASOPHILS NFR BLD AUTO: 1 % (ref 0–1)
BUN SERPL-MCNC: 18 MG/DL (ref 5–25)
CALCIUM SERPL-MCNC: 8.3 MG/DL (ref 8.3–10.1)
CHLORIDE SERPL-SCNC: 106 MMOL/L (ref 100–108)
CO2 SERPL-SCNC: 22 MMOL/L (ref 21–32)
CREAT SERPL-MCNC: 0.57 MG/DL (ref 0.6–1.3)
EOSINOPHIL # BLD AUTO: 0.19 THOUSAND/ΜL (ref 0–0.61)
EOSINOPHIL NFR BLD AUTO: 2 % (ref 0–6)
ERYTHROCYTE [DISTWIDTH] IN BLOOD BY AUTOMATED COUNT: 13.5 % (ref 11.6–15.1)
GFR SERPL CREATININE-BSD FRML MDRD: 109 ML/MIN/1.73SQ M
GLUCOSE SERPL-MCNC: 58 MG/DL (ref 65–140)
HCT VFR BLD AUTO: 31.4 % (ref 34.8–46.1)
HGB BLD-MCNC: 9.8 G/DL (ref 11.5–15.4)
IMM GRANULOCYTES # BLD AUTO: 0.01 THOUSAND/UL (ref 0–0.2)
IMM GRANULOCYTES NFR BLD AUTO: 0 % (ref 0–2)
LYMPHOCYTES # BLD AUTO: 1.96 THOUSANDS/ΜL (ref 0.6–4.47)
LYMPHOCYTES NFR BLD AUTO: 23 % (ref 14–44)
MAGNESIUM SERPL-MCNC: 1.6 MG/DL (ref 1.6–2.6)
MCH RBC QN AUTO: 30.2 PG (ref 26.8–34.3)
MCHC RBC AUTO-ENTMCNC: 31.2 G/DL (ref 31.4–37.4)
MCV RBC AUTO: 97 FL (ref 82–98)
MONOCYTES # BLD AUTO: 0.57 THOUSAND/ΜL (ref 0.17–1.22)
MONOCYTES NFR BLD AUTO: 7 % (ref 4–12)
NEUTROPHILS # BLD AUTO: 5.72 THOUSANDS/ΜL (ref 1.85–7.62)
NEUTS SEG NFR BLD AUTO: 67 % (ref 43–75)
NRBC BLD AUTO-RTO: 0 /100 WBCS
PLATELET # BLD AUTO: 241 THOUSANDS/UL (ref 149–390)
PMV BLD AUTO: 8.6 FL (ref 8.9–12.7)
POTASSIUM SERPL-SCNC: 3.5 MMOL/L (ref 3.5–5.3)
RBC # BLD AUTO: 3.24 MILLION/UL (ref 3.81–5.12)
SODIUM SERPL-SCNC: 141 MMOL/L (ref 136–145)
WBC # BLD AUTO: 8.5 THOUSAND/UL (ref 4.31–10.16)

## 2022-02-08 PROCEDURE — 80048 BASIC METABOLIC PNL TOTAL CA: CPT | Performed by: PHYSICIAN ASSISTANT

## 2022-02-08 PROCEDURE — 83735 ASSAY OF MAGNESIUM: CPT | Performed by: PHYSICIAN ASSISTANT

## 2022-02-08 PROCEDURE — 85025 COMPLETE CBC W/AUTO DIFF WBC: CPT | Performed by: PHYSICIAN ASSISTANT

## 2022-02-08 PROCEDURE — 99024 POSTOP FOLLOW-UP VISIT: CPT | Performed by: SPECIALIST

## 2022-02-08 RX ORDER — OXYCODONE HYDROCHLORIDE 10 MG/1
10 TABLET ORAL ONCE
Status: COMPLETED | OUTPATIENT
Start: 2022-02-08 | End: 2022-02-08

## 2022-02-08 RX ORDER — POLYETHYLENE GLYCOL 3350 17 G/17G
17 POWDER, FOR SOLUTION ORAL ONCE
Status: COMPLETED | OUTPATIENT
Start: 2022-02-08 | End: 2022-02-08

## 2022-02-08 RX ORDER — MAGNESIUM SULFATE HEPTAHYDRATE 40 MG/ML
2 INJECTION, SOLUTION INTRAVENOUS ONCE
Status: COMPLETED | OUTPATIENT
Start: 2022-02-08 | End: 2022-02-08

## 2022-02-08 RX ORDER — B-COMPLEX WITH VITAMIN C
1 TABLET ORAL
Status: DISCONTINUED | OUTPATIENT
Start: 2022-02-08 | End: 2022-02-10 | Stop reason: HOSPADM

## 2022-02-08 RX ADMIN — POTASSIUM & SODIUM PHOSPHATES POWDER PACK 280-160-250 MG 1 PACKET: 280-160-250 PACK at 16:36

## 2022-02-08 RX ADMIN — FAMOTIDINE 20 MG: 10 INJECTION INTRAVENOUS at 20:12

## 2022-02-08 RX ADMIN — PIPERACILLIN AND TAZOBACTAM 3.38 G: 36; 4.5 INJECTION, POWDER, FOR SOLUTION INTRAVENOUS at 02:09

## 2022-02-08 RX ADMIN — PIPERACILLIN AND TAZOBACTAM 3.38 G: 36; 4.5 INJECTION, POWDER, FOR SOLUTION INTRAVENOUS at 16:36

## 2022-02-08 RX ADMIN — ENOXAPARIN SODIUM 40 MG: 40 INJECTION SUBCUTANEOUS at 09:08

## 2022-02-08 RX ADMIN — POLYETHYLENE GLYCOL 3350 17 G: 17 POWDER, FOR SOLUTION ORAL at 09:08

## 2022-02-08 RX ADMIN — CALCIUM CARBONATE 500 MG (1,250 MG)-VITAMIN D3 200 UNIT TABLET 1 TABLET: at 16:36

## 2022-02-08 RX ADMIN — PIPERACILLIN AND TAZOBACTAM 3.38 G: 36; 4.5 INJECTION, POWDER, FOR SOLUTION INTRAVENOUS at 10:18

## 2022-02-08 RX ADMIN — MAGNESIUM SULFATE HEPTAHYDRATE 2 G: 40 INJECTION, SOLUTION INTRAVENOUS at 10:19

## 2022-02-08 RX ADMIN — OXYCODONE HYDROCHLORIDE 10 MG: 10 TABLET ORAL at 09:08

## 2022-02-08 RX ADMIN — PIPERACILLIN AND TAZOBACTAM 3.38 G: 36; 4.5 INJECTION, POWDER, FOR SOLUTION INTRAVENOUS at 20:12

## 2022-02-08 RX ADMIN — ZOLPIDEM TARTRATE 5 MG: 5 TABLET, COATED ORAL at 21:46

## 2022-02-08 NOTE — PROGRESS NOTES
Progress Note - General Surgery   [facility] 54 y o  female MRN: [id number]  Unit/Bed#: 2 [place] [address] Encounter: [id number]    Assessment:  54yo female with history of left breast cancer s/p neoadjuvant chemotherapy and chuckie-en-Y gastric bypass in October 2021 who originally presented with severe abdominal pain, and LOC after having PRBPR  She is now POD#2 exploratory laparotomy with Bushra's procedure secondary to perforated sigmoid colon likely due to stercoral ulcer       Plan:  Restarted home vitamins  Clear liquids, bariatric diet   Daily miralax  Ensure clears   decrease IV fluids  Continue Dilaudid PCA into tomorrow   ostomy nurse education planned for today  Zosyn day 3 of 4  AM labs:   BMP, Mag, Phos  Continue TONI drains      Subjective/Objective     Subjective:  Patient denies any nausea, vomiting, SOB, chest pain, difficulty breathing, calf pain  She tolerated ice chips yesterday  Her IV pulled out overnight and her abdominal incision is causing her some pain  Objective:   AVSS  UOP 0 4  TONI drains x2 with 40cc serous output    Blood pressure 142/86, pulse 84, temperature 98 3 °F (36 8 °C), temperature source Oral, resp  rate 18, height 5' (1 524 m), weight 81 7 kg (180 lb 3 2 oz), SpO2 95 %, not currently breastfeeding  ,Body mass index is 35 19 kg/m²        Intake/Output Summary (Last 24 hours) at 2/8/2022 0737  Last data filed at 2/8/2022 0600  Gross per 24 hour   Intake 2 ml   Output 865 ml   Net -863 ml       Invasive Devices  Report    Drain            Closed/Suction Drain LLQ Bulb 2 days    Closed/Suction Drain RLQ Bulb 2 days    Colostomy Descending/sigmoid RUQ 2 days                Physical Exam: /86 (BP Location: Right arm)   Pulse 84   Temp 98 3 °F (36 8 °C) (Oral)   Resp 18   Ht 5' (1 524 m)   Wt 81 7 kg (180 lb 3 2 oz)   SpO2 95%   BMI 35 19 kg/m²   General appearance: alert and oriented, in no acute distress, anxious  Head: Normocephalic, without obvious abnormality, atraumatic  Lungs: clear to auscultation bilaterally  Heart: regular rate and rhythm with holosystolic murmur  Abdomen: midline incision intact with staples and without any erythema or drainage, bowel sounds present, stoma is pink and budded, no ostomy output yet, TONI drain output is serous  Extremities: extremities normal, warm and well-perfused; no cyanosis, clubbing, or edema  Skin: Skin color, texture, turgor normal  No rashes or lesions    Lab, Imaging and other studies:  I have personally reviewed pertinent lab results    , CBC:   Lab Results   Component Value Date    WBC 8 50 02/08/2022    HGB 9 8 (L) 02/08/2022    HCT 31 4 (L) 02/08/2022    MCV 97 02/08/2022     02/08/2022    MCH 30 2 02/08/2022    MCHC 31 2 (L) 02/08/2022    RDW 13 5 02/08/2022    MPV 8 6 (L) 02/08/2022    NRBC 0 02/08/2022   , CMP:   Lab Results   Component Value Date    SODIUM 141 02/08/2022    K 3 5 02/08/2022     02/08/2022    CO2 22 02/08/2022    BUN 18 02/08/2022    CREATININE 0 57 (L) 02/08/2022    CALCIUM 8 3 02/08/2022    EGFR 109 02/08/2022     VTE Pharmacologic Prophylaxis: Enoxaparin (Lovenox) SQ  VTE Mechanical Prophylaxis: sequential compression device

## 2022-02-08 NOTE — TELEPHONE ENCOUNTER
I did lvm again stating Dr Reyna Montes reviewed her records and advises she needs to see a back specialist Dr Сергей Melton  Appointment was canceled at this time  SPA Please reach out as well to assist with scheduling   TY

## 2022-02-08 NOTE — PLAN OF CARE
Problem: PAIN - ADULT  Goal: Verbalizes/displays adequate comfort level or baseline comfort level  Description: Interventions:  - Encourage patient to monitor pain and request assistance  - Assess pain using appropriate pain scale  - Administer analgesics based on type and severity of pain and evaluate response  - Implement non-pharmacological measures as appropriate and evaluate response  - Consider cultural and social influences on pain and pain management  - Notify physician/advanced practitioner if interventions unsuccessful or patient reports new pain  Outcome: Progressing     Problem: INFECTION - ADULT  Goal: Absence or prevention of progression during hospitalization  Description: INTERVENTIONS:  - Assess and monitor for signs and symptoms of infection  - Monitor lab/diagnostic results  - Monitor all insertion sites, i e  indwelling lines, tubes, and drains  - Monitor endotracheal if appropriate and nasal secretions for changes in amount and color  - [facility] appropriate cooling/warming therapies per order  - Administer medications as ordered  - Instruct and encourage patient and family to use good hand hygiene technique  - Identify and instruct in appropriate isolation precautions for identified infection/condition  Outcome: Progressing

## 2022-02-09 LAB
ANION GAP SERPL CALCULATED.3IONS-SCNC: 14 MMOL/L (ref 4–13)
BUN SERPL-MCNC: 8 MG/DL (ref 5–25)
CALCIUM SERPL-MCNC: 8.3 MG/DL (ref 8.3–10.1)
CHLORIDE SERPL-SCNC: 105 MMOL/L (ref 100–108)
CO2 SERPL-SCNC: 22 MMOL/L (ref 21–32)
CREAT SERPL-MCNC: 0.53 MG/DL (ref 0.6–1.3)
GFR SERPL CREATININE-BSD FRML MDRD: 111 ML/MIN/1.73SQ M
GLUCOSE SERPL-MCNC: 63 MG/DL (ref 65–140)
MAGNESIUM SERPL-MCNC: 1.6 MG/DL (ref 1.6–2.6)
PHOSPHATE SERPL-MCNC: 3.7 MG/DL (ref 2.7–4.5)
POTASSIUM SERPL-SCNC: 3.6 MMOL/L (ref 3.5–5.3)
SODIUM SERPL-SCNC: 141 MMOL/L (ref 136–145)

## 2022-02-09 PROCEDURE — 83735 ASSAY OF MAGNESIUM: CPT | Performed by: PHYSICIAN ASSISTANT

## 2022-02-09 PROCEDURE — 99024 POSTOP FOLLOW-UP VISIT: CPT | Performed by: SURGERY

## 2022-02-09 PROCEDURE — 84100 ASSAY OF PHOSPHORUS: CPT | Performed by: PHYSICIAN ASSISTANT

## 2022-02-09 PROCEDURE — 80048 BASIC METABOLIC PNL TOTAL CA: CPT | Performed by: PHYSICIAN ASSISTANT

## 2022-02-09 RX ORDER — KETOROLAC TROMETHAMINE 30 MG/ML
15 INJECTION, SOLUTION INTRAMUSCULAR; INTRAVENOUS EVERY 6 HOURS SCHEDULED
Status: DISCONTINUED | OUTPATIENT
Start: 2022-02-09 | End: 2022-02-10 | Stop reason: HOSPADM

## 2022-02-09 RX ORDER — KETOROLAC TROMETHAMINE 30 MG/ML
15 INJECTION, SOLUTION INTRAMUSCULAR; INTRAVENOUS EVERY 6 HOURS SCHEDULED
Status: DISCONTINUED | OUTPATIENT
Start: 2022-02-09 | End: 2022-02-09

## 2022-02-09 RX ORDER — OXYCODONE HYDROCHLORIDE 10 MG/1
10 TABLET ORAL EVERY 4 HOURS PRN
Status: DISCONTINUED | OUTPATIENT
Start: 2022-02-09 | End: 2022-02-10 | Stop reason: HOSPADM

## 2022-02-09 RX ORDER — OXYCODONE HYDROCHLORIDE AND ACETAMINOPHEN 5; 325 MG/1; MG/1
1 TABLET ORAL EVERY 4 HOURS PRN
Status: DISCONTINUED | OUTPATIENT
Start: 2022-02-09 | End: 2022-02-10 | Stop reason: HOSPADM

## 2022-02-09 RX ADMIN — KETOROLAC TROMETHAMINE 15 MG: 30 INJECTION, SOLUTION INTRAMUSCULAR at 23:18

## 2022-02-09 RX ADMIN — PIPERACILLIN AND TAZOBACTAM 3.38 G: 36; 4.5 INJECTION, POWDER, FOR SOLUTION INTRAVENOUS at 21:17

## 2022-02-09 RX ADMIN — SODIUM CHLORIDE, SODIUM LACTATE, POTASSIUM CHLORIDE, AND CALCIUM CHLORIDE 100 ML/HR: .6; .31; .03; .02 INJECTION, SOLUTION INTRAVENOUS at 01:44

## 2022-02-09 RX ADMIN — CALCIUM CARBONATE 500 MG (1,250 MG)-VITAMIN D3 200 UNIT TABLET 1 TABLET: at 13:42

## 2022-02-09 RX ADMIN — OXYCODONE HYDROCHLORIDE 10 MG: 10 TABLET ORAL at 16:16

## 2022-02-09 RX ADMIN — OXYCODONE HYDROCHLORIDE AND ACETAMINOPHEN 1 TABLET: 5; 325 TABLET ORAL at 09:59

## 2022-02-09 RX ADMIN — FAMOTIDINE 20 MG: 10 INJECTION INTRAVENOUS at 10:01

## 2022-02-09 RX ADMIN — CALCIUM CARBONATE 500 MG (1,250 MG)-VITAMIN D3 200 UNIT TABLET 1 TABLET: at 16:45

## 2022-02-09 RX ADMIN — PIPERACILLIN AND TAZOBACTAM 3.38 G: 36; 4.5 INJECTION, POWDER, FOR SOLUTION INTRAVENOUS at 01:44

## 2022-02-09 RX ADMIN — CALCIUM CARBONATE 500 MG (1,250 MG)-VITAMIN D3 200 UNIT TABLET 1 TABLET: at 09:59

## 2022-02-09 RX ADMIN — FAMOTIDINE 20 MG: 10 INJECTION INTRAVENOUS at 21:20

## 2022-02-09 RX ADMIN — ZOLPIDEM TARTRATE 5 MG: 5 TABLET, COATED ORAL at 21:20

## 2022-02-09 RX ADMIN — THIAMINE HYDROCHLORIDE 100 MG: 100 INJECTION, SOLUTION INTRAMUSCULAR; INTRAVENOUS at 10:07

## 2022-02-09 RX ADMIN — KETOROLAC TROMETHAMINE 15 MG: 30 INJECTION, SOLUTION INTRAMUSCULAR at 10:00

## 2022-02-09 RX ADMIN — PIPERACILLIN AND TAZOBACTAM 3.38 G: 36; 4.5 INJECTION, POWDER, FOR SOLUTION INTRAVENOUS at 08:06

## 2022-02-09 RX ADMIN — PIPERACILLIN AND TAZOBACTAM 3.38 G: 36; 4.5 INJECTION, POWDER, FOR SOLUTION INTRAVENOUS at 13:37

## 2022-02-09 RX ADMIN — ENOXAPARIN SODIUM 40 MG: 40 INJECTION SUBCUTANEOUS at 09:59

## 2022-02-09 RX ADMIN — Medication 1 TABLET: at 09:59

## 2022-02-09 NOTE — PROGRESS NOTES
Progress Note - General Surgery   [facility] 54 y o  female MRN: [id number]  Unit/Bed#: 2 [place] [address] Encounter: [id number]    Assessment:  POD#3 s/p exploratory laparotomy and Bushra's procedure secondary to perforated sigmoid colon likely due to stercoral ulcer  AVSS  Colostomy with stool and air into colostomy bag  Plan:  Advance to full liquid diet  Discontinue PCA pump  PRN analgesics as written  Toradol 15 mg q6 hours  Zosyn day 4 of 4  AM labs: BMP, mag and phos  Discontinue RLQ TONI drain  oob and ambulate  Subjective/Objective   Chief Complaint: "I am okay "     Subjective: Patient was seen examine bedside  Patient reports no acute changes through the night  Patient reports tolerating diet with no difficulties  Patient reports air and stool into the bag this morning  Objective: Abdominal bandage c/d/i   Blood pressure 120/65, pulse 86, temperature 97 9 °F (36 6 °C), temperature source Oral, resp  rate 16, height 5' (1 524 m), weight 81 7 kg (180 lb 3 2 oz), SpO2 96 %, not currently breastfeeding  ,Body mass index is 35 19 kg/m²  Intake/Output Summary (Last 24 hours) at 2/9/2022 0737  Last data filed at 2/9/2022 0720  Gross per 24 hour   Intake 1 4 ml   Output 1800 ml   Net -1798 6 ml       Invasive Devices  Report    Peripheral Intravenous Line            Peripheral IV 02/08/22 Dorsal (posterior); Left Hand <1 day          Drain            Closed/Suction Drain LLQ Bulb 3 days    Closed/Suction Drain RLQ Bulb 3 days    Colostomy Descending/sigmoid RUQ 3 days                Physical Exam: /81   Pulse 75   Temp 97 9 °F (36 6 °C) (Oral)   Resp 16   Ht 5' (1 524 m)   Wt 81 7 kg (180 lb 3 2 oz)   SpO2 95%   BMI 35 19 kg/m²   General appearance: alert and oriented, in no acute distress  Lungs: normal effort   Heart: regular rate   Abdomen: soft, non-tender; bowel sounds normal; no masses,  no organomegaly stoma good pink color with output and air into bag   Abdominal incision appear c/d/i  RLQ drain removed  Extremities: extremities normal, warm and well-perfused; no cyanosis, clubbing, or edema    Lab, Imaging and other studies:  I have personally reviewed pertinent lab results    , CBC: No results found for: WBC, HGB, HCT, MCV, PLT, ADJUSTEDWBC, MCH, MCHC, RDW, MPV, NRBC, CMP:   Lab Results   Component Value Date    SODIUM 141 02/09/2022    K 3 6 02/09/2022     02/09/2022    CO2 22 02/09/2022    BUN 8 02/09/2022    CREATININE 0 53 (L) 02/09/2022    CALCIUM 8 3 02/09/2022    EGFR 111 02/09/2022     VTE Pharmacologic Prophylaxis: Enoxaparin (Lovenox)  VTE Mechanical Prophylaxis: sequential compression device

## 2022-02-09 NOTE — WOUND OSTOMY CARE
Progress Note - Wound   Hailee Espinoza 54 y o  female MRN: [id number]  Unit/Bed#: [address] [place] [address] Encounter: [id number]      Assessment: Post op day 3  This is a 54year old female patient admitted on 2/5/22 with acute abdominal pain and perforation of sigmoid colon following syncopal episode and bleeding per rectum at home  She had emergent exploratory laparotomy with Bushra's procedure by Dr Sandhya Wells on 2/6/22  She has a history of breast cancer and obesity  She has had mastectomy and gastric bypass surgery  The patient was awake and alert but tired after taking pain medication and tearful at times  She was receptive to teaching and did ask questions but was not ready to change appliance herself due to fatigue and feeling "overwhelmed"  She refused ostomy teaching yesterday by Ostomy RN Juan Jose Arroyo  Entire educational book from ostomy teaching kit reviewed and stoma model used to practice measuring of stoma  Also reviewed how and when to empty ostomy bag  The patient practiced with ostomy ring and ostomy bags and asked questions  She was given supplies which should last her roughly 30 days  She was placed by this Ostomy RN in a [employer] moldable wafer 2 3/4" with accordion flange  The patient feels that this would be the best style for her because she is not ready to wear a cut to fit appliance  However, she was instructed how to measure and trace the stoma so that she can wear a cut to fit if necessary and was given some to take home  She was also given wipes, adhesive tape remover, ostomy rings, ostomy powder, spray and 5 sets of appliances with moldable wafer  The patient is passing flatus and small amounts of soft unformed stool  She states that she is ambulating in her room back and forth to bathroom  Plan:   The patient was given the phone number for inpatient wound/ostomy nurse for any problems or questions   As per Melanie DIAZ, the patient will be followed up by [facility] for continued ostomy teaching and care  This ostomy RN will ensure that patient will receive the supplies that she needs  Complimentary samples will also be ordered  Patient requested to watch ostomy teaching video so this nurse requested from primary care nurse Candelario Bliss RN to arrange from Education Department to have DVD player brought to her  Discussed assessment findings, and plan of care/recommendations with Khanh Harper  Arrangements made with [address] [place] [address] for patient to be seen weekly        Khalida Cortes RN, BSN, [employer]

## 2022-02-09 NOTE — PLAN OF CARE
Problem: INFECTION - ADULT  Goal: Absence or prevention of progression during hospitalization  Description: INTERVENTIONS:  - Assess and monitor for signs and symptoms of infection  - Monitor lab/diagnostic results  - Monitor all insertion sites, i e  indwelling lines, tubes, and drains  - Monitor endotracheal if appropriate and nasal secretions for changes in amount and color  - [place] appropriate cooling/warming therapies per order  - Administer medications as ordered  - Instruct and encourage patient and family to use good hand hygiene technique  - Identify and instruct in appropriate isolation precautions for identified infection/condition  Outcome: Progressing     Problem: GASTROINTESTINAL - ADULT  Goal: Maintains or returns to baseline bowel function  Description: INTERVENTIONS:  - Assess bowel function  - Encourage oral fluids to ensure adequate hydration  - Administer IV fluids if ordered to ensure adequate hydration  - Administer ordered medications as needed  - Encourage mobilization and activity  - Consider nutritional services referral to assist patient with adequate nutrition and appropriate food choices  Outcome: Progressing  Goal: Maintains adequate nutritional intake  Description: INTERVENTIONS:  - Monitor percentage of each meal consumed  - Identify factors contributing to decreased intake, treat as appropriate  - Assist with meals as needed  - Monitor I&O, weight, and lab values if indicated  - Obtain nutrition services referral as needed  Outcome: Progressing  Goal: Establish and maintain optimal ostomy function  Description: INTERVENTIONS:  - Assess bowel function  - Encourage oral fluids to ensure adequate hydration  - Administer IV fluids if ordered to ensure adequate hydration   - Administer ordered medications as needed  - Encourage mobilization and activity  - Nutrition services referral to assist patient with appropriate food choices  - Assess stoma site  - Consider wound care consult Outcome: Progressing  Goal: Oral mucous membranes remain intact  Description: INTERVENTIONS  - Assess oral mucosa and hygiene practices  - Implement preventative oral hygiene regimen  - Implement oral medicated treatments as ordered  - Initiate Nutrition services referral as needed  Outcome: Progressing

## 2022-02-09 NOTE — CASE MANAGEMENT
Case Management Discharge Planning Note    Patient name Delvis Padron  Location [address] 26 MRN [id number]  : 1970 Date 2022       Current Admission Date: 2022  Current Admission Diagnosis:Perforation of sigmoid colon [facility])   Patient Active Problem List    Diagnosis Date Noted    Fecal peritonitis ([facility] ) 2022    Perforation of sigmoid colon ([place] [address] ) 2022    Pneumatosis intestinalis of large intestine 2022    S/P gastric bypass 2022    Rectal bleeding 2022    Syncope 2022    Encounter for surgical aftercare following surgery of digestive system 2022    Postsurgical malabsorption 2022    Continuous opioid dependence ([place] [address] ) 2021    Low back pain 10/03/2021    GERD (gastroesophageal reflux disease)     Complication of bariatric procedure 2021    Use of tamoxifen (Nolvadex) 2018    Malignant neoplasm of upper-outer quadrant of left breast in female, estrogen receptor positive ([facility] )     Depression, recurrent (HCC)     Anxiety       LOS (days): 3  Geometric Mean LOS (GMLOS) (days):   Days to GMLOS:     OBJECTIVE:  Risk of Unplanned Readmission Score: 16       Current admission status: Inpatient   Preferred Pharmacy:   [id number] [address] [place] [address]  Phone: [phone] Fax: [phone]    Primary Care Provider: Siria Elmore MD    Primary Insurance: Keyanna Hood  Secondary Insurance:     DISCHARGE DETAILS:    Discharge planning discussed with[de-identified] Patient  Freedom of Choice: Yes  Comments - Freedom of Choice: SW sent blanket [address] referrals via [place]  Patient not accepted by most agencies due to her PA residence with [employer]  Patient has been accepted by [facility] on a pro sukumar basis       Were Treatment Team discharge recommendations reviewed with patient/caregiver?: Yes  Did patient/caregiver verbalize understanding of patient care needs?: Yes  Were patient/caregiver advised of the risks associated with not following Treatment Team discharge recommendations?: Yes    Requested [address]         Is the patient interested in Lucrecia Rocha at discharge?: Yes  [address] requested[de-identified] [address] Name[de-identified] Other ([place] [address])  [address] Provider[de-identified] PCP  Home Health Services Needed[de-identified] Wound/Ostomy Care  Homebound Criteria Met[de-identified] Requires the Assistance of Another Person for Safe Ambulation or to Leave the Home  Supporting Clincal Findings[de-identified] [place] in Short Distances    DME Referral Provided  Referral made for DME?: No    Other Referral/Resources/Interventions Provided:  Interventions: C    Would you like to participate in our [address] service program?  : No - Declined    Treatment Team Recommendation: Home with [address] [employer]  Discharge Destination Plan[de-identified] Home with Joanne at Discharge : Family      SW placed numerous referrals in attempt to locate agency that will accept patient with her [place] residency and [place] insurance  Patient was declined by all but one agency[facility] [phone]  SW spoke with Kushal Brown at [facility] and she stated that because the agency is new they are required to take their first ten patients on a pro sukumar basis and they are willing to accept patient as one of those  SW discussed with patient and she is willing to start care with [facility]  Patient may opt to stay with her boyfriend Charlene De Luna at [place] [address], [address] [place] [address] for a time after she is discharged and SW confirmed with [facility] that they do serve that area  SW will continue to follow for discharge planning needs

## 2022-02-09 NOTE — CONSULTS
Consult Note - Terry Espinoza 54 y o  female MRN: [id number]  Unit/Bed#: [address] [place] [address] Encounter: [id number]      Assessment:   Entered room to begin ostomy teaching  Facial grimacing on face  Patient states that pain at IV site in left wrist  Patient said this was not a good time now for teaching  IV site without erythema or edema  Flat affect  Irritable  I assessed the stoma and appliance  The appliance is intact  Liquid brown stool in pouch  Stoma pink and budded  I alerted Tay Hirsch RN, clinical coordinator to the painful IV and how it impacted my ability to carry out ostomy teaching  While I was waiting for Tom Trotter, the staff RN to address the IV issue, I began talking about ostomy basics  Patient stated, "I just cannot do this now "        Ostomy Plan:   1  Will defer ostomy teaching today  2  Neelam Barron, ostomy nurse, will see patient tomorrow to continue education  3  I left the [facility] colostomy education kit at the bedside and encouraged the patient to read

## 2022-02-09 NOTE — NURSING NOTE
Pt asking questions about medications she is receiving  RN educated pt about medications  Pt asks "are you sure I am able to have all these medications?" RN assures pt that the medications prescribed by the doctors are okay for her to receive  Pt asks if we, the healthcare team, are knowledgeable about the bariatric protocol and medications they can or cannot have  RN explained to pt that the medications she is receiving are allowed to be given and have been reviewed by the doctors, nurses and pharmacist  Pt aggitated and asks for transfer of care to a new RN  Report given to Benedict Woody, [address] [place] [address]  Care assignment for this pt transferred to Benedict Woody

## 2022-02-09 NOTE — PLAN OF CARE
Problem: Potential for Falls  Goal: Patient will remain free of falls  Description: INTERVENTIONS:  - Educate patient/family on patient safety including physical limitations  - Instruct patient to call for assistance with activity   - Consult OT/PT to assist with strengthening/mobility   - Keep Call bell within reach  - Keep bed low and locked with side rails adjusted as appropriate  - Keep care items and personal belongings within reach  - Initiate and maintain comfort rounds  - Make Fall Risk Sign visible to staff  - Offer Toileting every 2 Hours, in advance of need  - Initiate/Maintain bed alarm  - Obtain necessary fall risk management equipment:   - Apply yellow socks and bracelet for high fall risk patients  - Consider moving patient to room near nurses station  Outcome: Progressing     Problem: MOBILITY - ADULT  Goal: Maintain or return to baseline ADL function  Description: INTERVENTIONS:  -  Assess patient's ability to carry out ADLs; assess patient's baseline for ADL function and identify physical deficits which impact ability to perform ADLs (bathing, care of mouth/teeth, toileting, grooming, dressing, etc )  - Assess/evaluate cause of self-care deficits   - Assess range of motion  - Assess patient's mobility; develop plan if impaired  - Assess patient's need for assistive devices and provide as appropriate  - Encourage maximum independence but intervene and supervise when necessary  - Involve family in performance of ADLs  - Assess for home care needs following discharge   - Consider OT consult to assist with ADL evaluation and planning for discharge  - Provide patient education as appropriate  Outcome: Progressing  Goal: Maintains/Returns to pre admission functional level  Description: INTERVENTIONS:  - Perform BMAT or MOVE assessment daily    - Set and communicate daily mobility goal to care team and patient/family/caregiver     - Collaborate with rehabilitation services on mobility goals if consulted  - Perform Range of Motion 3 times a day  - Reposition patient every 2 hours    - Dangle patient 3  times a day  - Stand patient 3 times a day  - Ambulate patient 2  times a day  - Out of bed to chair 3 times a day   - Out of bed for meals 3 times a day  - Out of bed for toileting  - Record patient progress and toleration of activity level   Outcome: Progressing     Problem: PAIN - ADULT  Goal: Verbalizes/displays adequate comfort level or baseline comfort level  Description: Interventions:  - Encourage patient to monitor pain and request assistance  - Assess pain using appropriate pain scale  - Administer analgesics based on type and severity of pain and evaluate response  - Implement non-pharmacological measures as appropriate and evaluate response  - Consider cultural and social influences on pain and pain management  - Notify physician/advanced practitioner if interventions unsuccessful or patient reports new pain  Outcome: Progressing     Problem: INFECTION - ADULT  Goal: Absence or prevention of progression during hospitalization  Description: INTERVENTIONS:  - Assess and monitor for signs and symptoms of infection  - Monitor lab/diagnostic results  - Monitor all insertion sites, i e  indwelling lines, tubes, and drains  - Monitor endotracheal if appropriate and nasal secretions for changes in amount and color  - [place] appropriate cooling/warming therapies per order  - Administer medications as ordered  - Instruct and encourage patient and family to use good hand hygiene technique  - Identify and instruct in appropriate isolation precautions for identified infection/condition  Outcome: Progressing     Problem: SAFETY ADULT  Goal: Patient will remain free of falls  Description: INTERVENTIONS:  - Educate patient/family on patient safety including physical limitations  - Instruct patient to call for assistance with activity   - Consult OT/PT to assist with strengthening/mobility   - Keep Call bell within reach  - Keep bed low and locked with side rails adjusted as appropriate  - Keep care items and personal belongings within reach  - Initiate and maintain comfort rounds  - Make Fall Risk Sign visible to staff  - Offer Toileting every 2 Hours, in advance of need  - Initiate/Maintain bed alarm  - Obtain necessary fall risk management equipment:   - Apply yellow socks and bracelet for high fall risk patients  - Consider moving patient to room near nurses station  Outcome: Progressing  Goal: Maintain or return to baseline ADL function  Description: INTERVENTIONS:  -  Assess patient's ability to carry out ADLs; assess patient's baseline for ADL function and identify physical deficits which impact ability to perform ADLs (bathing, care of mouth/teeth, toileting, grooming, dressing, etc )  - Assess/evaluate cause of self-care deficits   - Assess range of motion  - Assess patient's mobility; develop plan if impaired  - Assess patient's need for assistive devices and provide as appropriate  - Encourage maximum independence but intervene and supervise when necessary  - Involve family in performance of ADLs  - Assess for home care needs following discharge   - Consider OT consult to assist with ADL evaluation and planning for discharge  - Provide patient education as appropriate  Outcome: Progressing     Problem: DISCHARGE PLANNING  Goal: Discharge to home or other facility with appropriate resources  Description: INTERVENTIONS:  - Identify barriers to discharge w/patient and caregiver  - Arrange for needed discharge resources and transportation as appropriate  - Identify discharge learning needs (meds, wound care, etc )  - Arrange for interpretive services to assist at discharge as needed  - Refer to Case Management Department for coordinating discharge planning if the patient needs post-hospital services based on physician/advanced practitioner order or complex needs related to functional status, cognitive ability, or social support system  Outcome: Progressing     Problem: Knowledge Deficit  Goal: Patient/family/caregiver demonstrates understanding of disease process, treatment plan, medications, and discharge instructions  Description: Complete learning assessment and assess knowledge base    Interventions:  - Provide teaching at level of understanding  - Provide teaching via preferred learning methods  Outcome: Progressing     Problem: GASTROINTESTINAL - ADULT  Goal: Maintains or returns to baseline bowel function  Description: INTERVENTIONS:  - Assess bowel function  - Encourage oral fluids to ensure adequate hydration  - Administer IV fluids if ordered to ensure adequate hydration  - Administer ordered medications as needed  - Encourage mobilization and activity  - Consider nutritional services referral to assist patient with adequate nutrition and appropriate food choices  Outcome: Progressing  Goal: Maintains adequate nutritional intake  Description: INTERVENTIONS:  - Monitor percentage of each meal consumed  - Identify factors contributing to decreased intake, treat as appropriate  - Assist with meals as needed  - Monitor I&O, weight, and lab values if indicated  - Obtain nutrition services referral as needed  Outcome: Progressing  Goal: Establish and maintain optimal ostomy function  Description: INTERVENTIONS:  - Assess bowel function  - Encourage oral fluids to ensure adequate hydration  - Administer IV fluids if ordered to ensure adequate hydration   - Administer ordered medications as needed  - Encourage mobilization and activity  - Nutrition services referral to assist patient with appropriate food choices  - Assess stoma site  - Consider wound care consult   Outcome: Progressing  Goal: Oral mucous membranes remain intact  Description: INTERVENTIONS  - Assess oral mucosa and hygiene practices  - Implement preventative oral hygiene regimen  - Implement oral medicated treatments as ordered  - Initiate Nutrition services referral as needed  Outcome: Progressing     Problem: Nutrition/Hydration-ADULT  Goal: Nutrient/Hydration intake appropriate for improving, restoring or maintaining nutritional needs  Description: Monitor and assess patient's nutrition/hydration status for malnutrition  Collaborate with interdisciplinary team and initiate plan and interventions as ordered  Monitor patient's weight and dietary intake as ordered or per policy  Utilize nutrition screening tool and intervene as necessary  Determine patient's food preferences and provide high-protein, high-caloric foods as appropriate       INTERVENTIONS:  - Monitor oral intake, urinary output, labs, and treatment plans  - Assess nutrition and hydration status and recommend course of action  - Evaluate amount of meals eaten  - Assist patient with eating if necessary   - Allow adequate time for meals  - Recommend/ encourage appropriate diets, oral nutritional supplements, and vitamin/mineral supplements  - Order, calculate, and assess calorie counts as needed  - Recommend, monitor, and adjust tube feedings and TPN/PPN based on assessed needs  - Assess need for intravenous fluids  - Provide specific nutrition/hydration education as appropriate  - Include patient/family/caregiver in decisions related to nutrition  Outcome: Progressing

## 2022-02-10 ENCOUNTER — TELEPHONE (OUTPATIENT)
Dept: SURGERY | Facility: CLINIC | Age: 52
End: 2022-02-10

## 2022-02-10 VITALS
SYSTOLIC BLOOD PRESSURE: 124 MMHG | HEIGHT: 60 IN | RESPIRATION RATE: 15 BRPM | OXYGEN SATURATION: 94 % | DIASTOLIC BLOOD PRESSURE: 72 MMHG | HEART RATE: 67 BPM | TEMPERATURE: 98.5 F | BODY MASS INDEX: 35.38 KG/M2 | WEIGHT: 180.2 LBS

## 2022-02-10 LAB
ANION GAP SERPL CALCULATED.3IONS-SCNC: 12 MMOL/L (ref 4–13)
BASOPHILS # BLD AUTO: 0.02 THOUSANDS/ΜL (ref 0–0.1)
BASOPHILS NFR BLD AUTO: 0 % (ref 0–1)
BUN SERPL-MCNC: 7 MG/DL (ref 5–25)
CALCIUM SERPL-MCNC: 8.4 MG/DL (ref 8.3–10.1)
CHLORIDE SERPL-SCNC: 105 MMOL/L (ref 100–108)
CO2 SERPL-SCNC: 23 MMOL/L (ref 21–32)
CREAT SERPL-MCNC: 0.45 MG/DL (ref 0.6–1.3)
EOSINOPHIL # BLD AUTO: 0.2 THOUSAND/ΜL (ref 0–0.61)
EOSINOPHIL NFR BLD AUTO: 3 % (ref 0–6)
ERYTHROCYTE [DISTWIDTH] IN BLOOD BY AUTOMATED COUNT: 13 % (ref 11.6–15.1)
GFR SERPL CREATININE-BSD FRML MDRD: 118 ML/MIN/1.73SQ M
GLUCOSE SERPL-MCNC: 74 MG/DL (ref 65–140)
HCT VFR BLD AUTO: 30.7 % (ref 34.8–46.1)
HGB BLD-MCNC: 10 G/DL (ref 11.5–15.4)
IMM GRANULOCYTES # BLD AUTO: 0.02 THOUSAND/UL (ref 0–0.2)
IMM GRANULOCYTES NFR BLD AUTO: 0 % (ref 0–2)
LYMPHOCYTES # BLD AUTO: 1.63 THOUSANDS/ΜL (ref 0.6–4.47)
LYMPHOCYTES NFR BLD AUTO: 24 % (ref 14–44)
MAGNESIUM SERPL-MCNC: 1.5 MG/DL (ref 1.6–2.6)
MCH RBC QN AUTO: 30.5 PG (ref 26.8–34.3)
MCHC RBC AUTO-ENTMCNC: 32.6 G/DL (ref 31.4–37.4)
MCV RBC AUTO: 94 FL (ref 82–98)
MONOCYTES # BLD AUTO: 0.49 THOUSAND/ΜL (ref 0.17–1.22)
MONOCYTES NFR BLD AUTO: 7 % (ref 4–12)
NEUTROPHILS # BLD AUTO: 4.32 THOUSANDS/ΜL (ref 1.85–7.62)
NEUTS SEG NFR BLD AUTO: 66 % (ref 43–75)
NRBC BLD AUTO-RTO: 0 /100 WBCS
PHOSPHATE SERPL-MCNC: 3.6 MG/DL (ref 2.7–4.5)
PLATELET # BLD AUTO: 283 THOUSANDS/UL (ref 149–390)
PMV BLD AUTO: 9 FL (ref 8.9–12.7)
POTASSIUM SERPL-SCNC: 3.6 MMOL/L (ref 3.5–5.3)
RBC # BLD AUTO: 3.28 MILLION/UL (ref 3.81–5.12)
SODIUM SERPL-SCNC: 140 MMOL/L (ref 136–145)
WBC # BLD AUTO: 6.68 THOUSAND/UL (ref 4.31–10.16)

## 2022-02-10 PROCEDURE — 85025 COMPLETE CBC W/AUTO DIFF WBC: CPT | Performed by: STUDENT IN AN ORGANIZED HEALTH CARE EDUCATION/TRAINING PROGRAM

## 2022-02-10 PROCEDURE — 84100 ASSAY OF PHOSPHORUS: CPT | Performed by: STUDENT IN AN ORGANIZED HEALTH CARE EDUCATION/TRAINING PROGRAM

## 2022-02-10 PROCEDURE — 99024 POSTOP FOLLOW-UP VISIT: CPT | Performed by: SURGERY

## 2022-02-10 PROCEDURE — 80048 BASIC METABOLIC PNL TOTAL CA: CPT | Performed by: STUDENT IN AN ORGANIZED HEALTH CARE EDUCATION/TRAINING PROGRAM

## 2022-02-10 PROCEDURE — 83735 ASSAY OF MAGNESIUM: CPT | Performed by: STUDENT IN AN ORGANIZED HEALTH CARE EDUCATION/TRAINING PROGRAM

## 2022-02-10 RX ORDER — OXYCODONE HYDROCHLORIDE AND ACETAMINOPHEN 5; 325 MG/1; MG/1
1 TABLET ORAL EVERY 6 HOURS PRN
Qty: 24 TABLET | Refills: 0 | Status: SHIPPED | OUTPATIENT
Start: 2022-02-10 | End: 2022-02-22

## 2022-02-10 RX ORDER — FAMOTIDINE 20 MG/1
20 TABLET, FILM COATED ORAL 2 TIMES DAILY
Qty: 60 TABLET | Refills: 0 | Status: SHIPPED | OUTPATIENT
Start: 2022-02-10 | End: 2022-04-22 | Stop reason: ALTCHOICE

## 2022-02-10 RX ORDER — MAGNESIUM SULFATE HEPTAHYDRATE 40 MG/ML
2 INJECTION, SOLUTION INTRAVENOUS ONCE
Status: COMPLETED | OUTPATIENT
Start: 2022-02-10 | End: 2022-02-10

## 2022-02-10 RX ORDER — POTASSIUM CHLORIDE 20 MEQ/1
40 TABLET, EXTENDED RELEASE ORAL ONCE
Status: COMPLETED | OUTPATIENT
Start: 2022-02-10 | End: 2022-02-10

## 2022-02-10 RX ADMIN — POTASSIUM CHLORIDE 40 MEQ: 1500 TABLET, EXTENDED RELEASE ORAL at 08:44

## 2022-02-10 RX ADMIN — KETOROLAC TROMETHAMINE 15 MG: 30 INJECTION, SOLUTION INTRAMUSCULAR at 06:06

## 2022-02-10 RX ADMIN — MAGNESIUM OXIDE TAB 400 MG (241.3 MG ELEMENTAL MG) 400 MG: 400 (241.3 MG) TAB at 17:29

## 2022-02-10 RX ADMIN — FAMOTIDINE 20 MG: 10 INJECTION INTRAVENOUS at 08:44

## 2022-02-10 RX ADMIN — ENOXAPARIN SODIUM 40 MG: 40 INJECTION SUBCUTANEOUS at 08:44

## 2022-02-10 RX ADMIN — KETOROLAC TROMETHAMINE 15 MG: 30 INJECTION, SOLUTION INTRAMUSCULAR at 12:13

## 2022-02-10 RX ADMIN — PIPERACILLIN AND TAZOBACTAM 3.38 G: 36; 4.5 INJECTION, POWDER, FOR SOLUTION INTRAVENOUS at 08:44

## 2022-02-10 RX ADMIN — Medication 1 TABLET: at 08:44

## 2022-02-10 RX ADMIN — THIAMINE HYDROCHLORIDE 100 MG: 100 INJECTION, SOLUTION INTRAMUSCULAR; INTRAVENOUS at 10:09

## 2022-02-10 RX ADMIN — MAGNESIUM SULFATE HEPTAHYDRATE 2 G: 40 INJECTION, SOLUTION INTRAVENOUS at 11:24

## 2022-02-10 RX ADMIN — PIPERACILLIN AND TAZOBACTAM 3.38 G: 36; 4.5 INJECTION, POWDER, FOR SOLUTION INTRAVENOUS at 01:40

## 2022-02-10 RX ADMIN — PIPERACILLIN AND TAZOBACTAM 3.38 G: 36; 4.5 INJECTION, POWDER, FOR SOLUTION INTRAVENOUS at 14:51

## 2022-02-10 RX ADMIN — CALCIUM CARBONATE 500 MG (1,250 MG)-VITAMIN D3 200 UNIT TABLET 1 TABLET: at 12:13

## 2022-02-10 RX ADMIN — CALCIUM CARBONATE 500 MG (1,250 MG)-VITAMIN D3 200 UNIT TABLET 1 TABLET: at 08:47

## 2022-02-10 RX ADMIN — CALCIUM CARBONATE 500 MG (1,250 MG)-VITAMIN D3 200 UNIT TABLET 1 TABLET: at 16:43

## 2022-02-10 NOTE — PLAN OF CARE
Problem: Potential for Falls  Goal: Patient will remain free of falls  Description: INTERVENTIONS:  - Educate patient/family on patient safety including physical limitations  - Instruct patient to call for assistance with activity   - Consult OT/PT to assist with strengthening/mobility   - Keep Call bell within reach  - Keep bed low and locked with side rails adjusted as appropriate  - Keep care items and personal belongings within reach  - Initiate and maintain comfort rounds  - Make Fall Risk Sign visible to staff  - Offer Toileting every  Hours, in advance of need  - Initiate/Maintain alarm  - Obtain necessary fall risk management equipment:   - Apply yellow socks and bracelet for high fall risk patients  - Consider moving patient to room near nurses station  Outcome: Progressing     Problem: MOBILITY - ADULT  Goal: Maintain or return to baseline ADL function  Description: INTERVENTIONS:  -  Assess patient's ability to carry out ADLs; assess patient's baseline for ADL function and identify physical deficits which impact ability to perform ADLs (bathing, care of mouth/teeth, toileting, grooming, dressing, etc )  - Assess/evaluate cause of self-care deficits   - Assess range of motion  - Assess patient's mobility; develop plan if impaired  - Assess patient's need for assistive devices and provide as appropriate  - Encourage maximum independence but intervene and supervise when necessary  - Involve family in performance of ADLs  - Assess for home care needs following discharge   - Consider OT consult to assist with ADL evaluation and planning for discharge  - Provide patient education as appropriate  Outcome: Progressing  Goal: Maintains/Returns to pre admission functional level  Description: INTERVENTIONS:  - Perform BMAT or MOVE assessment daily    - Set and communicate daily mobility goal to care team and patient/family/caregiver     - Collaborate with rehabilitation services on mobility goals if consulted  - Perform Range of Motion  times a day  - Reposition patient every  hours    - Dangle patient  times a day  - Stand patient  times a day  - Ambulate patient  times a day  - Out of bed to chair  times a day   - Out of bed for meals  times a day  - Out of bed for toileting  - Record patient progress and toleration of activity level   Outcome: Progressing     Problem: PAIN - ADULT  Goal: Verbalizes/displays adequate comfort level or baseline comfort level  Description: Interventions:  - Encourage patient to monitor pain and request assistance  - Assess pain using appropriate pain scale  - Administer analgesics based on type and severity of pain and evaluate response  - Implement non-pharmacological measures as appropriate and evaluate response  - Consider cultural and social influences on pain and pain management  - Notify physician/advanced practitioner if interventions unsuccessful or patient reports new pain  Outcome: Progressing     Problem: INFECTION - ADULT  Goal: Absence or prevention of progression during hospitalization  Description: INTERVENTIONS:  - Assess and monitor for signs and symptoms of infection  - Monitor lab/diagnostic results  - Monitor all insertion sites, i e  indwelling lines, tubes, and drains  - Monitor endotracheal if appropriate and nasal secretions for changes in amount and color  - [place] appropriate cooling/warming therapies per order  - Administer medications as ordered  - Instruct and encourage patient and family to use good hand hygiene technique  - Identify and instruct in appropriate isolation precautions for identified infection/condition  Outcome: Progressing     Problem: SAFETY ADULT  Goal: Patient will remain free of falls  Description: INTERVENTIONS:  - Educate patient/family on patient safety including physical limitations  - Instruct patient to call for assistance with activity   - Consult OT/PT to assist with strengthening/mobility   - Keep Call bell within reach  - Keep bed low and locked with side rails adjusted as appropriate  - Keep care items and personal belongings within reach  - Initiate and maintain comfort rounds  - Make Fall Risk Sign visible to staff  - Offer Toileting every  Hours, in advance of need  - Initiate/Maintain alarm  - Obtain necessary fall risk management equipment:   - Apply yellow socks and bracelet for high fall risk patients  - Consider moving patient to room near nurses station  Outcome: Progressing  Goal: Maintain or return to baseline ADL function  Description: INTERVENTIONS:  -  Assess patient's ability to carry out ADLs; assess patient's baseline for ADL function and identify physical deficits which impact ability to perform ADLs (bathing, care of mouth/teeth, toileting, grooming, dressing, etc )  - Assess/evaluate cause of self-care deficits   - Assess range of motion  - Assess patient's mobility; develop plan if impaired  - Assess patient's need for assistive devices and provide as appropriate  - Encourage maximum independence but intervene and supervise when necessary  - Involve family in performance of ADLs  - Assess for home care needs following discharge   - Consider OT consult to assist with ADL evaluation and planning for discharge  - Provide patient education as appropriate  Outcome: Progressing  Goal: Maintains/Returns to pre admission functional level  Description: INTERVENTIONS:  - Perform BMAT or MOVE assessment daily    - Set and communicate daily mobility goal to care team and patient/family/caregiver  - Collaborate with rehabilitation services on mobility goals if consulted  - Perform Range of Motion  times a day  - Reposition patient every  hours    - Dangle patient  times a day  - Stand patient  times a day  - Ambulate patient  times a day  - Out of bed to chair  times a day   - Out of bed for meals  times a day  - Out of bed for toileting  - Record patient progress and toleration of activity level   Outcome: Progressing Problem: DISCHARGE PLANNING  Goal: Discharge to home or other facility with appropriate resources  Description: INTERVENTIONS:  - Identify barriers to discharge w/patient and caregiver  - Arrange for needed discharge resources and transportation as appropriate  - Identify discharge learning needs (meds, wound care, etc )  - Arrange for interpretive services to assist at discharge as needed  - Refer to Case Management Department for coordinating discharge planning if the patient needs post-hospital services based on physician/advanced practitioner order or complex needs related to functional status, cognitive ability, or social support system  Outcome: Progressing     Problem: Knowledge Deficit  Goal: Patient/family/caregiver demonstrates understanding of disease process, treatment plan, medications, and discharge instructions  Description: Complete learning assessment and assess knowledge base    Interventions:  - Provide teaching at level of understanding  - Provide teaching via preferred learning methods  Outcome: Progressing     Problem: GASTROINTESTINAL - ADULT  Goal: Maintains or returns to baseline bowel function  Description: INTERVENTIONS:  - Assess bowel function  - Encourage oral fluids to ensure adequate hydration  - Administer IV fluids if ordered to ensure adequate hydration  - Administer ordered medications as needed  - Encourage mobilization and activity  - Consider nutritional services referral to assist patient with adequate nutrition and appropriate food choices  Outcome: Progressing  Goal: Maintains adequate nutritional intake  Description: INTERVENTIONS:  - Monitor percentage of each meal consumed  - Identify factors contributing to decreased intake, treat as appropriate  - Assist with meals as needed  - Monitor I&O, weight, and lab values if indicated  - Obtain nutrition services referral as needed  Outcome: Progressing  Goal: Establish and maintain optimal ostomy function  Description: INTERVENTIONS:  - Assess bowel function  - Encourage oral fluids to ensure adequate hydration  - Administer IV fluids if ordered to ensure adequate hydration   - Administer ordered medications as needed  - Encourage mobilization and activity  - Nutrition services referral to assist patient with appropriate food choices  - Assess stoma site  - Consider wound care consult   Outcome: Progressing  Goal: Oral mucous membranes remain intact  Description: INTERVENTIONS  - Assess oral mucosa and hygiene practices  - Implement preventative oral hygiene regimen  - Implement oral medicated treatments as ordered  - Initiate Nutrition services referral as needed  Outcome: Progressing     Problem: Nutrition/Hydration-ADULT  Goal: Nutrient/Hydration intake appropriate for improving, restoring or maintaining nutritional needs  Description: Monitor and assess patient's nutrition/hydration status for malnutrition  Collaborate with interdisciplinary team and initiate plan and interventions as ordered  Monitor patient's weight and dietary intake as ordered or per policy  Utilize nutrition screening tool and intervene as necessary  Determine patient's food preferences and provide high-protein, high-caloric foods as appropriate       INTERVENTIONS:  - Monitor oral intake, urinary output, labs, and treatment plans  - Assess nutrition and hydration status and recommend course of action  - Evaluate amount of meals eaten  - Assist patient with eating if necessary   - Allow adequate time for meals  - Recommend/ encourage appropriate diets, oral nutritional supplements, and vitamin/mineral supplements  - Order, calculate, and assess calorie counts as needed  - Recommend, monitor, and adjust tube feedings and TPN/PPN based on assessed needs  - Assess need for intravenous fluids  - Provide specific nutrition/hydration education as appropriate  - Include patient/family/caregiver in decisions related to nutrition  Outcome: Progressing

## 2022-02-10 NOTE — PROGRESS NOTES
Progress Note - General Surgery   [facility] 54 y o  female MRN: [id number]  Unit/Bed#: 2 [place] [address] Encounter: [id number]    Assessment:  1) POD #4 s/p Ex lap, Bushra's procedure - AVSS, no leukocytosis, labs within normal limits, 35 mL output from TONI drain serous in nature, tolerating clear liquids, minimal to no abdominal tenderness, tolerating activity/ambulation, passing gas and stool from stoma, completed 4 days of IV antibiotics    Plan:  1)   - likely discharge this afternoon  - advance to regular diet  - p r n  analgesic regiment upon discharge  - substantial patient Education  - DVT prophylax  - encouraged ambulation  - incentive spirometry  - magnesium oxide 400 mg  - discontinue IV antibiotics  - stomal care and Education  - coordination with case management  - reviewed labs  -removed TONI drain    Subjective/Objective   Chief Complaint:  So am I going home today or tomorrow    Subjective:  Patient was seen examined at bedside  Patient denies any acute events overnight  Patient denies any fevers or chills  Patient denies any increased abdominal pain  Patient reports that her pain is relatively well controlled  Patient is not sleeping the best and was discouraged by the fact that her IV is not working ideally  Patient denies any nausea or vomiting  Patient is having output from stoma  Patient is tolerating clear liquids but does not have much of an appetite  Patient is finding it hard to have the motivation to eat large portions  Patient is up and ambulating routinely  Patient has some apprehension about taking care of her stoma  Patient was educated on insert questions upon discharge on how to take care of herself in what to look out for postoperatively  Objective:     Blood pressure 130/70, pulse 89, temperature 98 5 °F (36 9 °C), temperature source Oral, resp  rate 15, height 5' (1 524 m), weight 81 7 kg (180 lb 3 2 oz), SpO2 98 %, not currently breastfeeding  ,Body mass index is 35 19 kg/m²  Intake/Output Summary (Last 24 hours) at 2/10/2022 1318  Last data filed at 2/10/2022 0847  Gross per 24 hour   Intake --   Output 1375 ml   Net -1375 ml       Invasive Devices  Report    Peripheral Intravenous Line            Peripheral IV 02/09/22 Right Antecubital <1 day          Drain            Closed/Suction Drain LLQ Bulb 4 days    Colostomy Descending/sigmoid RUQ 4 days                Physical Exam: /70 (BP Location: Right arm)   Pulse 89   Temp 98 5 °F (36 9 °C) (Oral)   Resp 15   Ht 5' (1 524 m)   Wt 81 7 kg (180 lb 3 2 oz)   SpO2 98%   BMI 35 19 kg/m²   General appearance: alert and oriented, in no acute distress  Head: Normocephalic, without obvious abnormality, atraumatic  Heart: regular rate and rhythm, S1, S2 normal, no murmur, click, rub or gallop  Abdomen: soft, non-tender; bowel sounds normal; no masses,  no organomegaly  Skin: Skin color, texture, turgor normal  No rashes or lesions or incision c/d/i    Lab, Imaging and other studies:  I have personally reviewed pertinent lab results    , CBC:   Lab Results   Component Value Date    WBC 6 68 02/10/2022    HGB 10 0 (L) 02/10/2022    HCT 30 7 (L) 02/10/2022    MCV 94 02/10/2022     02/10/2022    MCH 30 5 02/10/2022    MCHC 32 6 02/10/2022    RDW 13 0 02/10/2022    MPV 9 0 02/10/2022    NRBC 0 02/10/2022   , CMP:   Lab Results   Component Value Date    SODIUM 140 02/10/2022    K 3 6 02/10/2022     02/10/2022    CO2 23 02/10/2022    BUN 7 02/10/2022    CREATININE 0 45 (L) 02/10/2022    CALCIUM 8 4 02/10/2022    EGFR 118 02/10/2022     VTE Pharmacologic Prophylaxis: Enoxaparin (Lovenox)  VTE Mechanical Prophylaxis: sequential compression device

## 2022-02-10 NOTE — UTILIZATION REVIEW
Initial Clinical Review    Admission: Date/Time/Statement:   Admission Orders (From admission, onward)     Ordered        02/06/22 0326  Inpatient Admission  Once                      Orders Placed This Encounter   Procedures    Inpatient Admission     Standing Status:   Standing     Number of Occurrences:   1     Order Specific Question:   Level of Care     Answer:   Critical Care [15]     Order Specific Question:   Estimated length of stay     Answer:   More than 2 Midnights     Order Specific Question:   Certification     Answer:   I certify that inpatient services are medically necessary for this patient for a duration of greater than two midnights  See H&P and MD Progress Notes for additional information about the patient's course of treatment  ED Arrival Information     Expected Arrival Acuity    - 2/5/2022 22:10 Emergent         Means of arrival Escorted by Service Admission type    [facility] Emergency         Arrival complaint    Abdominal pain        Chief Complaint   Patient presents with    Abdominal Pain     c/o severe low abd pain today,    Syncope     had syncopal event tonight while on toilet    Rectal Bleeding     states passed large amt of blood rectally tonight, hx of gastric bypass in oct     Initial Presentation:   54 yof to ER from home c/o sudden onset lower abd pain; passed large amount of blood at home, then passed out, fell off toilet, possiblehead strike  Presents hypertensive with abd tenderness RLQ, suprapubic & LLQ areas, guaiac+  Admission work-up showing sigmoid colon perforation    Admitted to inpatient status for perforted sigmoid colon, surgery consulted    To OR for emergent:  HARTMANS PROCEDURE WITH SIGMOID COLOSTOMY  LAPAROTOMY EXPLORATORY  Operative Findings:  Markedly scarred anterior abdominal wall secondary to multiple previous surgery  Dilated sigmoid colon large fecal mass with perforation   Pneumatosis free air around the sigmoid colon in the mesentery and wall of the sigmoid colon turbid fluid  Hard fecal matter in descending and transverse colon           Date: 2/7/22   Day 2:   POD#1 exploratory laparotomy with Bushra's procedure secondary to perforated sigmoid colon likely due to stercoral ulcer  Abdomen: midline incision intact with staples and without any erythema or drainage, hypoactive bowel sounds, stoma is pink and budded, no ostomy output yet, TONI drains x2 with 155cc serosanguinous output  Using PCA for pain control  Post-op course IVABT in progress  Hernadez d/c'd, voiding trial today  Remains NPO with IVF maintained      ED Triage Vitals [02/05/22 2218]   Temperature Pulse Respirations Blood Pressure SpO2   97 9 °F (36 6 °C) 63 (!) 24 (!) 174/75 100 %      Temp Source Heart Rate Source Patient Position - Orthostatic VS BP Location FiO2 (%)   Tympanic Monitor Sitting Right arm --      Pain Score       9          Wt Readings from Last 1 Encounters:   02/08/22 81 7 kg (180 lb 3 2 oz)     Additional Vital Signs:   02/07/22 0746 97 5 °F (36 4 °C) 76 20 130/76 97 95 % None (Room air) -- Lying   02/07/22 0500 98 6 °F (37 °C) 82 -- 121/59 -- -- -- -- Lying   02/07/22 0300 -- 89 20 114/58 -- 95 % None (Room air) -- Lying   02/06/22 2341 98 2 °F (36 8 °C) 86 20 112/61 80 96 % -- -- Lying   02/06/22 2000 97 °F (36 1 °C) Abnormal  82 18 110/57 77 -- -- -- Lying   02/06/22 1800 -- -- -- -- -- 96 % None (Room air) -- --   02/06/22 1700 -- 78 19 96/59 70 94 % None (Room air) -- --   02/06/22 1627 -- 71 14 101/53 73 94 % -- -- --   02/06/22 1600 98 6 °F (37 °C) 69 13 96/54 68 94 % None (Room air) -- Lying     Pertinent Labs/Diagnostic Test Results:   Results from last 7 days   Lab Units 02/06/22  0102   SARS-COV-2  Negative     Results from last 7 days   Lab Units 02/10/22  0624 02/08/22  0602 02/07/22  0442 02/06/22  0758 02/06/22  0758 02/05/22  2240 02/05/22  2240   WBC Thousand/uL 6 68 8 50 10 77*   < > 17 27*   < > 9 06   HEMOGLOBIN g/dL 10 0* 9 8* 11 0*  --  11 6  --  12 6   HEMATOCRIT % 30 7* 31 4* 34 7*  --  36 3  --  40 1   PLATELETS Thousands/uL 283 241 267   < > 260   < > 340   NEUTROS ABS Thousands/µL 4 32 5 72  --   --   --   --  5 32    < > = values in this interval not displayed  Results from last 7 days   Lab Units 02/10/22  0624 02/09/22  0631 02/08/22  0602 02/07/22  0442 02/06/22  0758   SODIUM mmol/L 140 141 141 141 136   POTASSIUM mmol/L 3 6 3 6 3 5 3 9 4 1   CHLORIDE mmol/L 105 105 106 106 105   CO2 mmol/L 23 22 22 28 27   ANION GAP mmol/L 12 14* 13 7 4   BUN mg/dL 7 8 18 13 17   CREATININE mg/dL 0 45* 0 53* 0 57* 0 70 0 65   EGFR ml/min/1 73sq m 118 111 109 102 104   CALCIUM mg/dL 8 4 8 3 8 3 8 6 8 3   MAGNESIUM mg/dL 1 5* 1 6 1 6 1 8 1 5*   PHOSPHORUS mg/dL 3 6 3 7  --  3 6 3 2     Results from last 7 days   Lab Units 02/05/22  2240   AST U/L 14   ALT U/L 33   ALK PHOS U/L 82   TOTAL PROTEIN g/dL 7 5   ALBUMIN g/dL 3 9   TOTAL BILIRUBIN mg/dL 0 27     Results from last 7 days   Lab Units 02/10/22  0624 02/09/22  0631 02/08/22  0602 02/07/22  0442 02/06/22  0758 02/05/22  2240   GLUCOSE RANDOM mg/dL 74 63* 58* 93 152* 112     Results from last 7 days   Lab Units 02/06/22  0104 02/05/22  2240   HS TNI 0HR ng/L  --  2   HS TNI 2HR ng/L 3  --    HSTNI D2 ng/L 1  --      Results from last 7 days   Lab Units 02/06/22  0102   PROTIME seconds 13 8   INR  1 08   PTT seconds 27     Results from last 7 days   Lab Units 02/06/22  0102   LACTIC ACID mmol/L 0 8     Results from last 7 days   Lab Units 02/06/22  0102   INFLUENZA A PCR  Negative   INFLUENZA B PCR  Negative   RSV PCR  Negative     2/5  Cxr=  No acute cardiopulmonary disease  CT head=  No acute intracranial abnormality  CT high volume lower GI bleed=   Markedly abnormal appearance of the sigmoid colon with findings of perforation of viscus, as described above  Please see discussion  Perforated neoplasm should be excluded    Urgent Surgical consultation and follow-up is recommended    ED Treatment:   Medication Administration from 02/05/2022 2210 to 02/06/2022 0351       Date/Time Order Dose Route Action     02/05/2022 2239 ondansetron (ZOFRAN) injection 4 mg 4 mg Intravenous Given     02/05/2022 2259 sodium chloride 0 9 % bolus 1,000 mL 1,000 mL Intravenous New Bag     02/05/2022 2259 HYDROmorphone (DILAUDID) injection 1 mg 1 mg Intravenous Given     02/06/2022 0003 promethazine (PHENERGAN) injection 12 5 mg 12 5 mg Intravenous Given     02/06/2022 0002 pantoprazole (PROTONIX) injection 40 mg 40 mg Intravenous Given     02/05/2022 2352 iohexol (OMNIPAQUE) 350 MG/ML injection (SINGLE-DOSE) 100 mL 100 mL Intravenous Given     02/06/2022 0102 sodium chloride 0 9 % bolus 1,000 mL 1,000 mL Intravenous New Bag     02/06/2022 0117 HYDROmorphone (DILAUDID) injection 1 mg 1 mg Intravenous Given     02/06/2022 0207 piperacillin-tazobactam (ZOSYN) IVPB 3 375 g 3 375 g Intravenous New Bag     02/06/2022 0207 metroNIDAZOLE (FLAGYL) tablet 500 mg 500 mg Oral Given        Past Medical History:   Diagnosis Date    Anxiety     Back pain     BRCA negative 06/2016    [facility]    [facility]     Headache     History of chemotherapy 2016    Neoadjuvant; at [address] History of transfusion 06/2017    no adverse reaction    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)     s/p chemotherapy    Obesity (BMI 30-39  9)      Present on Admission:   Fecal peritonitis (HCC)   Perforation of sigmoid colon (HCC)   Pneumatosis intestinalis of large intestine   Low back pain   Rectal bleeding   Syncope   Depression, recurrent (HCC)    Admitting Diagnosis: Syncope [R55]  Rectal bleeding [K62 5]  Abdominal pain [R10 9]  BRBPR (bright red blood per rectum) [K62 5]  Fecal peritonitis ([facility] ) [K65 8]  Perforated bowel ([facility] [K63 1]  Perforation of sigmoid colon ([facility] ) [K63 1]  Age/Sex: 54 y o  female  Admission Orders:  Scd/foot pumps  Cont pulse ox    Scheduled Medications:  buPROPion, 150 mg, Oral, Daily  enoxaparin, 40 mg, Subcutaneous, Daily  piperacillin-tazobactam, 3 375 g, Intravenous, Q6H    Continuous IV Infusions:  HYDROmorphone,  Intravenous, Continuous    PRN Meds:  HYDROmorphone, 0 2 mg, Intravenous, Q1H PRN  naloxone, 0 04 mg, Intravenous, Q1MIN PRN  ondansetron, 4 mg, Intravenous, Q6H PRN    Network Utilization Review Department  ATTENTION: Please call with any questions or concerns to [phone] and carefully listen to the prompts so that you are directed to the right person  All voicemails are confidential   [facility] all requests for admission clinical reviews, approved or denied determinations and any other requests to dedicated fax number below belonging to the campus where the patient is receiving treatment   List of dedicated fax numbers for the Facilities:  [address] DENIALS (Administrative/Medical Necessity) [phone]   [address] (Maternity/NICU/Pediatrics) [phone]   [address]  [address] [place] [address] [address] [place] [address] [address] [place] [address] [facility] [phone]

## 2022-02-10 NOTE — TELEPHONE ENCOUNTER
Patient called office, states Dr Rey Thompson came to her room to discharge for later today  Discussed paperwork  Patient states she will get her part done and faxed over to her employer today  Advised will inform Dr Rey Thompson

## 2022-02-10 NOTE — NURSING NOTE
PCA Dilaudid discontinued on day shift 2/9/22 at 0830   Wasted medication 46 ml with Cristofer Sheppard

## 2022-02-10 NOTE — NURSING NOTE
Patient discharged home with VNA, escorted out via wheelchair accompanied by Brad Mcardle IV removed and AVS reviewed with the patient, focusing on ostomy care  Patient verbalized understanding of education and readiness for discharge, and was sent with her belongings

## 2022-02-10 NOTE — DISCHARGE SUMMARY
Discharge Summary - Sandy Barraza 54 y o  female MRN: [id number]    Unit/Bed#: [address] [place] [address] Encounter: [id number]    Admission Date:   Admission Orders (From admission, onward)     Ordered        02/06/22 0326  Inpatient Admission  Once                        Admitting Diagnosis: Syncope [R55]  Rectal bleeding [K62 5]  Abdominal pain [R10 9]  BRBPR (bright red blood per rectum) [K62 5]  Fecal peritonitis ([address] ) [K65 8]  Perforated bowel ([address] ) [K63 1]  Perforation of sigmoid colon ([address] ) [K63 1]    Per Dr Beth Boyle 2/6/22  HPI: Sandy Barraza is a 54 y o  female who presents with the sudden onset of severe lower abdominal pain started tonight patient has a large amount of blood but no stones  He was feeling dizzy and came to the ER workup in ER with CT scan revealed sigmoid colon perforation pneumatosis with retroperitoneal tracking of air large amount of fecal debris in the colon  outside the bowel lumen and free air      Emergent surgical consult was obtained patient was explained about the possible colectomy colostomy  Patient was taken on emergent basis for exploratory laparotomy resection of colon and the colostomy placement     Patient has multiple surgery in past left breast cancer/gastric bypass abdominoplasty and  Had chemotherapy for breast cancer     Patient had a normal colonoscopy 2 years ago     Patient is not vaccinated for [address] test was done which was negative    Procedures Performed:   Orders Placed This Encounter   Procedures    ED ECG Documentation Only       Summary of Hospital Course:  Patient presented to the emergency department with severe lower abdominal quadrant pain early on the morning 2/6/22  Patient had CT scan which showed pneumoperitoneum and suggested perforated sigmoid colon  Patient has a result was brought by the general surgery team emergently for exploratory laparotomy and sigmoid colon resection    Patient was admitted by the general surgery service and had procedure performed immediately  Patient did not have any acute or immediate complications  Patient had sigmoid colon resection and end colostomy  Patient postoperative day 1 had diet restriction was NPO, restarted home meds, IV fluid resuscitation, Dilaudid PCA, routine labs, IV antibiotics, encouraged ambulation, incentive spirometry  Postoperative day 2 patient had continued PCA, IV antibiotics, routine labs, continue TONI drains, advancement of diet, decreased IV fluids  Patient was continuing to improve throughout the course of this admission  Postoperative day 3 patient had continued antibiotics, discontinuation of PCA, addition of Toradol, completion of antibiotics, removal of right lower quadrant TONI drain, encouraged ambulation, advancement of full liquids  Postoperative day 4 advancement to regular diet, continued stomal care Education, electrolytes repleted as needed, discontinued IV antibiotics, discontinued IV fluids, removed left lower quadrant drain  By postoperative day 4 patient was continuing to tolerate well and ambulate and use incentive spirometry  Patient was tolerating regular diet  Patient was educated on postoperative care and management and was instructed to follow-up in 10-14 days  Patient was doing so well patient was discharged postoperative day 4 2/10/22  By postoperative day 4 patient also had pathology return with signs suggestive of acute serositis but no pathologic abnormality  Patient did have VNA set up for stomal care instructions  Patient had met with her wound care prior to discharge and was given several days worth of supplies as well as Education regarding her stoma  Significant Findings, Care, Treatment and Services Provided:  CT scan, sigmoid colon perforation, sigmoid colon resection and end colostomy, stomal care/Education, IV fluid resuscitation, routine labs, p r n  Analgesia, pathology for sigmoid colon,     A   Sigmoid colon, sigmoidectomy:  - Portion of colon with perforation and acute serositis  - Uninvolved colonic mucosa with no pathologic abnormality  - Histologically viable unremarkable resection margins  Complications: none    Discharge Diagnosis:     Medical Problems             Resolved Problems  Date Reviewed: 2/6/2022    None                Condition at Discharge: good         Discharge instructions/Information to patient and family:   See after visit summary for information provided to patient and family  Provisions for Follow-Up Care:  See after visit summary for information related to follow-up care and any pertinent home health orders  PCP: Hao Ramey MD    Disposition: Home    Planned Readmission: No      Discharge Statement   I spent 15 minutes discharging the patient  This time was spent on the day of discharge  I had direct contact with the patient on the day of discharge  Additional documentation is required if more than 30 minutes were spent on discharge  Discharge Medications:  See after visit summary for reconciled discharge medications provided to patient and family

## 2022-02-10 NOTE — PLAN OF CARE
Problem: Potential for Falls  Goal: Patient will remain free of falls  Description: INTERVENTIONS:  - Educate patient/family on patient safety including physical limitations  - Instruct patient to call for assistance with activity   - Consult OT/PT to assist with strengthening/mobility   - Keep Call bell within reach  - Keep bed low and locked with side rails adjusted as appropriate  - Keep care items and personal belongings within reach  - Initiate and maintain comfort rounds  - Make Fall Risk Sign visible to staff  - Offer Toileting every  Hours, in advance of need  - Initiate/Maintain alarm  - Obtain necessary fall risk management equipment:   - Apply yellow socks and bracelet for high fall risk patients  - Consider moving patient to room near nurses station  2/10/2022 1554 by Zachary Nolen RN  Outcome: Completed       Problem: MOBILITY - ADULT  Goal: Maintain or return to baseline ADL function  Description: INTERVENTIONS:  -  Assess patient's ability to carry out ADLs; assess patient's baseline for ADL function and identify physical deficits which impact ability to perform ADLs (bathing, care of mouth/teeth, toileting, grooming, dressing, etc )  - Assess/evaluate cause of self-care deficits   - Assess range of motion  - Assess patient's mobility; develop plan if impaired  - Assess patient's need for assistive devices and provide as appropriate  - Encourage maximum independence but intervene and supervise when necessary  - Involve family in performance of ADLs  - Assess for home care needs following discharge   - Consider OT consult to assist with ADL evaluation and planning for discharge  - Provide patient education as appropriate  2/10/2022 1554 by Zachary Nolen RN  Outcome: Completed    Goal: Maintains/Returns to pre admission functional level  Description: INTERVENTIONS:  - Perform BMAT or MOVE assessment daily    - Set and communicate daily mobility goal to care team and patient/family/caregiver     - Collaborate with rehabilitation services on mobility goals if consulted  - Perform Range of Motion  times a day  - Reposition patient every  hours    - Dangle patient  times a day  - Stand patient  times a day  - Ambulate patient  times a day  - Out of bed to chair  times a day   - Out of bed for meals  times a day  - Out of bed for toileting  - Record patient progress and toleration of activity level   2/10/2022 1554 by Stefani Zayas RN  Outcome: Completed       Problem: PAIN - ADULT  Goal: Verbalizes/displays adequate comfort level or baseline comfort level  Description: Interventions:  - Encourage patient to monitor pain and request assistance  - Assess pain using appropriate pain scale  - Administer analgesics based on type and severity of pain and evaluate response  - Implement non-pharmacological measures as appropriate and evaluate response  - Consider cultural and social influences on pain and pain management  - Notify physician/advanced practitioner if interventions unsuccessful or patient reports new pain  2/10/2022 1554 by Stefani Zayas RN  Outcome: Completed       Problem: INFECTION - ADULT  Goal: Absence or prevention of progression during hospitalization  Description: INTERVENTIONS:  - Assess and monitor for signs and symptoms of infection  - Monitor lab/diagnostic results  - Monitor all insertion sites, i e  indwelling lines, tubes, and drains  - Monitor endotracheal if appropriate and nasal secretions for changes in amount and color  - [place] appropriate cooling/warming therapies per order  - Administer medications as ordered  - Instruct and encourage patient and family to use good hand hygiene technique  - Identify and instruct in appropriate isolation precautions for identified infection/condition  2/10/2022 1554 by Stefani Zayas RN  Outcome: Completed       Problem: SAFETY ADULT  Goal: Patient will remain free of falls  Description: INTERVENTIONS:  - Educate patient/family on patient safety including physical limitations  - Instruct patient to call for assistance with activity   - Consult OT/PT to assist with strengthening/mobility   - Keep Call bell within reach  - Keep bed low and locked with side rails adjusted as appropriate  - Keep care items and personal belongings within reach  - Initiate and maintain comfort rounds  - Make Fall Risk Sign visible to staff  - Offer Toileting every  Hours, in advance of need  - Initiate/Maintain alarm  - Obtain necessary fall risk management equipment:   - Apply yellow socks and bracelet for high fall risk patients  - Consider moving patient to room near nurses station  2/10/2022 1554 by Vinny Harper RN  Outcome: Completed    Goal: Maintain or return to baseline ADL function  Description: INTERVENTIONS:  -  Assess patient's ability to carry out ADLs; assess patient's baseline for ADL function and identify physical deficits which impact ability to perform ADLs (bathing, care of mouth/teeth, toileting, grooming, dressing, etc )  - Assess/evaluate cause of self-care deficits   - Assess range of motion  - Assess patient's mobility; develop plan if impaired  - Assess patient's need for assistive devices and provide as appropriate  - Encourage maximum independence but intervene and supervise when necessary  - Involve family in performance of ADLs  - Assess for home care needs following discharge   - Consider OT consult to assist with ADL evaluation and planning for discharge  - Provide patient education as appropriate  2/10/2022 1554 by Vinny Harper RN  Outcome: Completed    Goal: Maintains/Returns to pre admission functional level  Description: INTERVENTIONS:  - Perform BMAT or MOVE assessment daily    - Set and communicate daily mobility goal to care team and patient/family/caregiver  - Collaborate with rehabilitation services on mobility goals if consulted  - Perform Range of Motion  times a day  - Reposition patient every  hours    - Dangle patient times a day  - Stand patient  times a day  - Ambulate patient  times a day  - Out of bed to chair  times a day   - Out of bed for meals  times a day  - Out of bed for toileting  - Record patient progress and toleration of activity level   2/10/2022 1554 by Laurel Ko RN  Outcome: Completed     Problem: DISCHARGE PLANNING  Goal: Discharge to home or other facility with appropriate resources  Description: INTERVENTIONS:  - Identify barriers to discharge w/patient and caregiver  - Arrange for needed discharge resources and transportation as appropriate  - Identify discharge learning needs (meds, wound care, etc )  - Arrange for interpretive services to assist at discharge as needed  - Refer to Case Management Department for coordinating discharge planning if the patient needs post-hospital services based on physician/advanced practitioner order or complex needs related to functional status, cognitive ability, or social support system  2/10/2022 1554 by Laurel Ko RN  Outcome: Completed       Problem: Knowledge Deficit  Goal: Patient/family/caregiver demonstrates understanding of disease process, treatment plan, medications, and discharge instructions  Description: Complete learning assessment and assess knowledge base    Interventions:  - Provide teaching at level of understanding  - Provide teaching via preferred learning methods  2/10/2022 1554 by Laurel Ko RN  Outcome: Completed       Problem: GASTROINTESTINAL - ADULT  Goal: Maintains or returns to baseline bowel function  Description: INTERVENTIONS:  - Assess bowel function  - Encourage oral fluids to ensure adequate hydration  - Administer IV fluids if ordered to ensure adequate hydration  - Administer ordered medications as needed  - Encourage mobilization and activity  - Consider nutritional services referral to assist patient with adequate nutrition and appropriate food choices  2/10/2022 1554 by Laurel Ko RN  Outcome: Completed    Goal: Maintains adequate nutritional intake  Description: INTERVENTIONS:  - Monitor percentage of each meal consumed  - Identify factors contributing to decreased intake, treat as appropriate  - Assist with meals as needed  - Monitor I&O, weight, and lab values if indicated  - Obtain nutrition services referral as needed  2/10/2022 1554 by Cyrus Ferguson RN  Outcome: Completed    Goal: Establish and maintain optimal ostomy function  Description: INTERVENTIONS:  - Assess bowel function  - Encourage oral fluids to ensure adequate hydration  - Administer IV fluids if ordered to ensure adequate hydration   - Administer ordered medications as needed  - Encourage mobilization and activity  - Nutrition services referral to assist patient with appropriate food choices  - Assess stoma site  - Consider wound care consult   2/10/2022 1554 by Cyrus Ferguson RN  Outcome: Completed    Goal: Oral mucous membranes remain intact  Description: INTERVENTIONS  - Assess oral mucosa and hygiene practices  - Implement preventative oral hygiene regimen  - Implement oral medicated treatments as ordered  - Initiate Nutrition services referral as needed  2/10/2022 1554 by Cyrus Ferguson RN  Outcome: Completed     Problem: Nutrition/Hydration-ADULT  Goal: Nutrient/Hydration intake appropriate for improving, restoring or maintaining nutritional needs  Description: Monitor and assess patient's nutrition/hydration status for malnutrition  Collaborate with interdisciplinary team and initiate plan and interventions as ordered  Monitor patient's weight and dietary intake as ordered or per policy  Utilize nutrition screening tool and intervene as necessary  Determine patient's food preferences and provide high-protein, high-caloric foods as appropriate       INTERVENTIONS:  - Monitor oral intake, urinary output, labs, and treatment plans  - Assess nutrition and hydration status and recommend course of action  - Evaluate amount of meals eaten  - Assist patient with eating if necessary   - Allow adequate time for meals  - Recommend/ encourage appropriate diets, oral nutritional supplements, and vitamin/mineral supplements  - Order, calculate, and assess calorie counts as needed  - Recommend, monitor, and adjust tube feedings and TPN/PPN based on assessed needs  - Assess need for intravenous fluids  - Provide specific nutrition/hydration education as appropriate  - Include patient/family/caregiver in decisions related to nutrition  2/10/2022 1554 by Arlene Trinidad RN  Outcome: Completed

## 2022-02-10 NOTE — DISCHARGE INSTRUCTIONS
Postoperative Care Instructions  Colon Resection and End Colostomy    1  General: You may feel pulling sensations around the wound or funny aches and pains around the incisions  This is normal  Even minor surgery is a change in your body and this is your body's reaction to it  If you have had abdominal surgery, it may help to support the incision with a small pillow or blanket for comfort when moving or coughing  2  Wound care: The glue over the incisions will fall off over the next week or two  If you have staples or stitches, they will be removed by the physician at your follow up appointment  3  Showering: You may shower  Please do not soak wound in standing water such as a bath, hot tub, pool, lake, etc  Do not scrub or use exfoliants on the surgical wounds  4  Activity: You may go up and down stairs, walk as much as you are comfortable, but walk at least 3 times each day  If you have had abdominal surgery, do not perform any strenuous exercise or lift anything heavier than 15 pounds for at least 4 weeks, unless cleared by your physician  5  Diet: You may resume your regular diet  Please drink lots of water  6  Medications: Resume all of your previous medications, unless told otherwise by the doctor  A good option for pain control is to start with acetaminophen(Tylenol) 650mg and ibuprofen(Advil) 600mg and alternate taking them every 3 hours  If this is not sufficient then you make take the narcotic pain medicine as prescribed  You do not need to take the narcotic pain medication unless you are having significant pain and discomfort  Please take the narcotic medication with food  Insure that you do not take more than 4000 mg of Tylenol per day  7  Driving: You will need someone to drive you home on the day of surgery  Do not drive or make any important decisions while on narcotic pain medication   Generally, you may drive 48 hours after you've stopped taking all narcotic pain medications  8  Upset Stomach: You may take Maalox, Tums, or similar items for an upset stomach  If your narcotic pain medication causes an upset stomach, do not take it on an empty stomach  Try taking it with at least some crackers or toast      9  Constipation: Patients often experience constipation after surgery  We recommend starting an over-the-counter medication for this, such as Metamucil, Senokot, Colace, milk of magnesia, etc  You may stop taking these medications a couple days after your last dose of narcotic medication  If you experience significant nausea or vomiting after abdominal surgery, call the office before trying any of these medications  10  Call the office: If you are experiencing any of the following: fevers above 101 5°, significant nausea or vomiting, if the wound develops drainage and/or excessive redness around the wound, or if you have significant diarrhea or other worsening symptoms  11  Pain: A prescription for narcotic pain medication will be sent to your pharmacy upon discharge from the hospital             Colectomy   WHAT YOU NEED TO KNOW:   A colectomy is surgery to remove part or all of your colon  The colon, or large intestine, is the long tube that connects your intestine with your anus  DISCHARGE INSTRUCTIONS:   Medicines: You may need any of the following:  · Prescription pain medicine  may be given  Ask your healthcare provider how to take this medicine safely  · Bowel movement softeners  make it easier for you to have a bowel movement  You may need this medicine to prevent constipation  · Blood thinners  help prevent blood clots  Examples of blood thinners include heparin and warfarin  Clots can cause strokes, heart attacks, and death  The following are general safety guidelines to follow while you are taking a blood thinner:    ? Watch for bleeding and bruising while you take blood thinners  Watch for bleeding from your gums or nose   Watch for blood in your urine and bowel movements  Use a soft washcloth on your skin, and a soft toothbrush to brush your teeth  This can keep your skin and gums from bleeding  If you shave, use an electric shaver  Do not play contact sports  ? Tell your dentist and other healthcare providers that you take anticoagulants  Wear a bracelet or necklace that says you take this medicine  ? Do not start or stop any medicines unless your healthcare provider tells you to  Many medicines cannot be used with blood thinners  ? Tell your healthcare provider right away if you forget to take the medicine, or if you take too much  ? Warfarin  is a blood thinner that you may need to take  The following are things you should be aware of if you take warfarin:     § Foods and medicines can affect the amount of warfarin in your blood  Do not make major changes to your diet while you take warfarin  Warfarin works best when you eat about the same amount of vitamin K every day  Vitamin K is found in green leafy vegetables and certain other foods  Ask for more information about what to eat when you are taking warfarin  § You will need to see your healthcare provider for follow-up visits when you are on warfarin  You will need regular blood tests  These tests are used to decide how much medicine you need  · Antibiotics  help prevent an infection caused by bacteria  · Take your medicine as directed  Contact your healthcare provider if you think your medicine is not helping or if you have side effects  Tell him or her if you are allergic to any medicine  Keep a list of the medicines, vitamins, and herbs you take  Include the amounts, and when and why you take them  Bring the list or the pill bottles to follow-up visits  Carry your medicine list with you in case of an emergency  Follow up with your healthcare provider as directed: You may need to return for tests or to have your stitches or staples removed   Write down your questions so you remember to ask them during your visits  Eat a variety of low-fiber foods: Low-fiber, or low-residue, foods are easy to digest  Good choices include eggs, white bread, creamy peanut butter, and macaroni  This will help slow down your bowel movements and prevent trauma to your colon  Do not eat whole-wheat breads or pastas, raw fruits or vegetables, or nuts  Return to your daily activities as directed: It may take 2 or 3 weeks to return to your usual activities  You may need to avoid lifting anything over 20 pounds for 6 weeks  Contact your healthcare provider if:   · You have a fever  · Your wound is red, swollen, or draining pus  · You have nausea or are vomiting  · You have trouble urinating or feel burning when you urinate  · You have questions or concerns about your condition or care  Seek care immediately or call 911 if:   · You feel lightheaded, short of breath, and have chest pain  · You cough up blood  · Your arm or leg feels warm, tender, and painful  It may look swollen and red  · Blood soaks through your bandage  · Your stitches come apart  · You have severe pain  · You are unable to have a bowel movement or pass gas  · Your abdomen is swollen and hard  · You cannot eat without vomiting  · You see blood in your bowel movement or on your toilet paper  © Copyright [employer] 2018 Information is for End User's use only and may not be sold, redistributed or otherwise used for commercial purposes  All illustrations and images included in CareNotes® are the copyrighted property of A D A M , Inc  or [facility] Wily Valverde   The above information is an  only  It is not intended as medical advice for individual conditions or treatments  Talk to your doctor, nurse or pharmacist before following any medical regimen to see if it is safe and effective for you        Colostomy Creation   WHAT YOU NEED TO KNOW:   A colostomy creation is surgery that brings part of your colon (bowel) to the surface of your abdomen  This creates a small opening in your abdomen called a stoma  Bowel movements pass through the stoma into a pouch that is attached to your abdomen  DISCHARGE INSTRUCTIONS:   Medicines:   · Pain medicine: You may need medicine to take away or decrease pain  ¨ Learn how to take your medicine  Ask what medicine and how much you should take  Be sure you know how, when, and how often to take it  ¨ Do not wait until the pain is severe before you take your medicine  Tell caregivers if your pain does not decrease  ¨ Pain medicine can make you dizzy or sleepy  Prevent falls by calling someone when you get out of bed or if you need help  · Take your medicine as directed  Contact your healthcare provider if you think your medicine is not helping or if you have side effects  Tell him or her if you are allergic to any medicine  Keep a list of the medicines, vitamins, and herbs you take  Include the amounts, and when and why you take them  Bring the list or the pill bottles to follow-up visits  Carry your medicine list with you in case of an emergency  Follow up with your healthcare provider or specialist as directed: Your healthcare provider will check your stoma and surgical wounds  He will ask about any problems with your stoma, the ostomy bag, or equipment changes  He may fit you for a new ostomy bag after swelling around your stoma goes down  Write down your questions so you remember to ask them during your visits  Nutrition:  Ask your healthcare provider when you can eat solid foods  Rest and activity:   · Limit your activity as directed: You may have limits on lifting heavy items or driving  · Rest when you need to while you heal after surgery  Slowly start to do more each day  Return to your daily activities as directed  Wound care:  Care for your surgical wounds as directed  Keep the wounds clean and dry  Stoma care:   You will need physical and emotional support to deal with the lifestyle changes that happen when you have a colostomy  Your healthcare provider can help you with the following:  · Meet with an enterostomal therapist:  These therapists can tell you what it is like to live with a colostomy  They may help you meet with other people who have colostomies  Enterostomal therapists can help you choose equipment that best fits your stoma  · Practice changing your ostomy bag and equipment:  Ostomy bags are usually emptied up to 2 times a day  The bag is then changed every 3 to 7 days  Ask your healthcare provider for more information about colostomy care  · Take care of your skin:  You do not need special soaps to clean the skin around your stoma  You can trim hair around the stoma with an electric razor or scissors with rounded tips  Avoid oils or ointments that may prevent the ostomy bag from sticking to your skin  Your healthcare provider or enterostomal therapist can help if itching, redness, or a rash develops around your stoma  For more information:   · [address] ([facility])  [address], [place] [address]  Phone: [phone]  Web Address: www digestive  niddk nih gov  · [employer] [facility]  [address] , [address]  Phone: 5- 888 [phone]  Web Address: [employer] pt  org  Contact your specialist or healthcare provider if:   · You have nausea or vomiting  · You have a fever  · More bowel movement is draining from your stoma than normal  Your bowel movement may look watery or smell very bad  · The skin around your stoma is red, sore, itchy, swollen, or has a rash  · Your stoma opening has narrowed or you have drainage from around your stoma  · Your stoma has moved farther inside or outside of your abdomen  You see bulges under the skin around your stoma      · You have questions or concerns about your stoma, surgery, medicine, or care  Seek care immediately or call 911 if:   · You are urinating very little or not at all  · No bowel movement is passing through your stoma  · You have pus or a foul-smelling odor coming from your surgery wound or stoma  · You vomit blood, are bleeding from your stoma, or see blood in your bowel movement  Your bowel movement may look like tar  · Your abdomen feels hard and tender  · Your stoma looks gray, purple, dark brown or black  · Your arm or leg feels warm, tender, and painful  It may look swollen and red  · You feel lightheaded, short of breath, and have chest pain  You cough up blood  · You feel lightheaded, short of breath, and have chest pain  · You cough up blood  © 2017 [employer] Information is for End User's use only and may not be sold, redistributed or otherwise used for commercial purposes  All illustrations and images included in CareNotes® are the copyrighted property of [employer] , Inc  or Derek Montalvo  The above information is an  only  It is not intended as medical advice for individual conditions or treatments  Talk to your doctor, nurse or pharmacist before following any medical regimen to see if it is safe and effective for you

## 2022-02-11 ENCOUNTER — TRANSITIONAL CARE MANAGEMENT (OUTPATIENT)
Dept: FAMILY MEDICINE CLINIC | Facility: CLINIC | Age: 52
End: 2022-02-11

## 2022-02-11 ENCOUNTER — TELEPHONE (OUTPATIENT)
Dept: BARIATRICS | Facility: CLINIC | Age: 52
End: 2022-02-11

## 2022-02-11 NOTE — TELEPHONE ENCOUNTER
LMOM advising patient to call back and schedule with RD to discuss diet  May need outpatient IVF infusions if unable to tolerate adequate hydration

## 2022-02-11 NOTE — UTILIZATION REVIEW
Notification of Discharge   This is a Notification of Discharge from our facility [address]  Please be advised that this patient has been discharge from our facility  Below you will find the admission and discharge date and time including the patients disposition  UTILIZATION REVIEW CONTACT:  Willow Infante  Utilization   Network Utilization Review Department  Phone: [phone] x carefully listen to the prompts  All voicemails are confidential   Email: [email or web address]     PHYSICIAN ADVISORY SERVICES:  FOR OQNG-WP-STSO REVIEW - MEDICAL NECESSITY DENIAL  Phone: [phone]  Fax: [phone]  Email: [email or web address]     PRESENTATION DATE: 2/5/2022 10:19 PM  OBERVATION ADMISSION DATE:  INPATIENT ADMISSION DATE: 2/6/22  3:26 AM   DISCHARGE DATE: 2/10/2022  6:16 PM  DISPOSITION: Home with [place] with Home Health Care      IMPORTANT INFORMATION:  Send all requests for admission clinical reviews, approved or denied determinations and any other requests to dedicated fax number below belonging to the campus where the patient is receiving treatment   List of dedicated fax numbers:  [address] [place] [address] DENIALS (Administrative/Medical Necessity) [phone]   [address] (Maternity/NICU/Pediatrics) [phone]   [facility] [phone]   [address] [facility] [address] [phone]   [address] [place] [address] [phone]   [address] [facility] [address] [place] [address],[address] [phone]   [facility]  [phone]   [address] [place] [address], [facility]  [address] [facility] [address] [facility] [phone]

## 2022-02-14 ENCOUNTER — DOCUMENTATION (OUTPATIENT)
Dept: OTHER | Facility: HOSPITAL | Age: 52
End: 2022-02-14

## 2022-02-14 NOTE — PROGRESS NOTES
Completed disability paperwork  No working 5 February through 27 February 2022      Patient may resume work with limitations on 28 February 2022

## 2022-02-15 ENCOUNTER — TELEPHONE (OUTPATIENT)
Dept: BARIATRICS | Facility: CLINIC | Age: 52
End: 2022-02-15

## 2022-02-15 ENCOUNTER — TELEPHONE (OUTPATIENT)
Dept: FAMILY MEDICINE CLINIC | Facility: CLINIC | Age: 52
End: 2022-02-15

## 2022-02-15 DIAGNOSIS — B37.9 YEAST INFECTION: Primary | ICD-10-CM

## 2022-02-15 RX ORDER — FLUCONAZOLE 150 MG/1
150 TABLET ORAL ONCE
Qty: 1 TABLET | Refills: 0 | Status: SHIPPED | OUTPATIENT
Start: 2022-02-15 | End: 2022-02-15

## 2022-02-15 NOTE — TELEPHONE ENCOUNTER
Hailee was recently in hospital and has a TCM on Thursday  She cannot come in office before this and wants to know if the doctor can call something in for her for yeast infection   The medications she is causing yeast infection

## 2022-02-15 NOTE — TELEPHONE ENCOUNTER
Patient called in asking to speak to Claudette Hunter  She is home and does not know how to be eating/ drinking  Reached out to Claudette Hunter and she will call the patient today

## 2022-02-15 NOTE — TELEPHONE ENCOUNTER
Returned pt's phone call  She is struggling with fluid intake  Reports has probably only consumed 10-12oz thus far today at 4pm   Suggested some options of trying different temp fluids, try broth or bone broth (states broth didn't work the one time she tried it), try hot ana tea, SF jello or SF ice pops, etc   Also suggested to drink from a spoon to ensure taking really tiny sips  Encouraged pt to keep a log  The only food she has really been able to tolerate is a piece of baked potato  Tried tuna but felt was going to throw up  Scheduled pt to come in tomorrow to meet [facility] RD and PA-c   Pt will try to get a ride ot the office, but if she can't can do a virtual

## 2022-02-16 ENCOUNTER — TELEMEDICINE (OUTPATIENT)
Dept: BARIATRICS | Facility: CLINIC | Age: 52
End: 2022-02-16
Payer: COMMERCIAL

## 2022-02-16 ENCOUNTER — OFFICE VISIT (OUTPATIENT)
Dept: BARIATRICS | Facility: CLINIC | Age: 52
End: 2022-02-16

## 2022-02-16 DIAGNOSIS — K63.1 PERFORATION OF SIGMOID COLON (HCC): Primary | ICD-10-CM

## 2022-02-16 DIAGNOSIS — K91.2 POSTSURGICAL MALABSORPTION: Primary | ICD-10-CM

## 2022-02-16 DIAGNOSIS — K91.2 POSTSURGICAL MALABSORPTION: ICD-10-CM

## 2022-02-16 DIAGNOSIS — F33.9 DEPRESSION, RECURRENT (HCC): ICD-10-CM

## 2022-02-16 PROCEDURE — 1036F TOBACCO NON-USER: CPT | Performed by: PHYSICIAN ASSISTANT

## 2022-02-16 PROCEDURE — 99214 OFFICE O/P EST MOD 30 MIN: CPT | Performed by: PHYSICIAN ASSISTANT

## 2022-02-16 PROCEDURE — 1111F DSCHRG MED/CURRENT MED MERGE: CPT | Performed by: PHYSICIAN ASSISTANT

## 2022-02-16 PROCEDURE — RECHECK

## 2022-02-16 RX ORDER — FLUCONAZOLE 150 MG/1
TABLET ORAL
COMMUNITY
Start: 2022-02-15 | End: 2022-02-17 | Stop reason: ALTCHOICE

## 2022-02-16 NOTE — PROGRESS NOTES
Virtual Regular Visit    Verification of patient location:    Patient is located in the following state in which I hold an active license [place]      Assessment/Plan:    Problem List Items Addressed This Visit        Digestive    Postsurgical malabsorption     -At risk for malabsorption of vitamins/minerals secondary to malabsorption and restriction of intake from bariatric surgery  -Not Currently taking adequate postop bariatric surgery vitamin supplementation: Bariatric Pal MVI with iron, calcium citrate 500mg TID - not always up for taking these regularly - will come to office tomorrow to get new samples of different flavors and capsules  -Last set of bariatric labs completed on 01/13/22:  -High calcium - decrease calcium to BID  -High B12 and B1  -High Zinc  -Rest of vitamins WNL    -Next set of bariatric labs ordered for approximately 3 months  -Patient received education about the importance of adhering to a lifelong supplementation regimen to avoid vitamin/mineral deficiencies          Perforation of sigmoid colon ([facility] [address] ) - Primary     -Continue ostomy care per general surgery  -Follow low fiber/low residue diet  -Inadequate protein, kcals, and fluids  -Push protein richardson/shakes, hydrating fluids to goal of 64oz   -F/u with RD weekly - stop by office tomorrow for protein and vitamin samples  -May require outpatient infusions; will reassess next week or call sooner              Other    Depression, recurrent ([address] )     + hx of anxiety  -On wellbutrin  -Continue monitoring and management with prescribing provider  -Encouraged consistent f/u with therapist/counselor and offered LCSW services here for additional support  -Encouraged stress reduction techniques, exercise  -Support group encouraged                      Reason for visit is   Chief Complaint   Patient presents with    Virtual Regular Visit        Encounter provider Kristina Moralez PA-C    Provider located at [address] [place] [address] [facility] [place]  [place] [id number] [phone]      Recent Visits  Date Type Provider Dept   [facility] [place]   02/15/22 Telephone Marty George MD [address] [place]   02/11/22 Telephone Sukhdev Monge PA-C [facility] [place] recent visits within past 7 days and meeting all other requirements  Today's Visits  Date Type Provider Dept   02/16/22 Telemedicine Sukhdev Monge PA-C [facility] [place]   02/16/22 Office Visit Brigid Arnold RD [facility]   Showing today's visits and meeting all other requirements  Future Appointments  No visits were found meeting these conditions  Showing future appointments within next 150 days and meeting all other requirements       The patient was identified by name and date of birth  Orly Gomez was informed that this is a telemedicine visit and that the visit is being conducted through [address] [place] [address] Now and patient was informed that this is a secure, HIPAA-compliant platform  She agrees to proceed     My office door was closed  The patient was notified the following individuals were present in the room Dietitian Brigid Arnold  She acknowledged consent and understanding of privacy and security of the video platform  The patient has agreed to participate and understands they can discontinue the visit at any time  Patient is aware this is a billable service  Damaris Perez is a 54 y o  female s/p Napoleon-En-Y Gastric Bypass with Dr Jordana Brooks on 10/04/21  She is s/p emergent ex lap and Bushra's procedure with sigmoid colostomy on 02/05/22 secondary to sigmoid perforation  She presented to the ED on 02/05/22 with severe abdominal pain, blood in her stool, and dizziness   CT scan revealed sigmoid colon perforation, pneumatosis with retroperitoneal tracking of air large amount of fecal debris in the colon outside the bowel lumen and free air  She was taken to the OR for emergent ex lap and Bushra's procedure with sigmoid colostomy secondary to sigmoid perforation  She is not getting in much fluids or protein, unsure what to eat and feeling very scared to fill up her ostomy bag  She has f/u with surgeon tomorrow  Has been drinking some ensure clear which was given to her at the hospital, yogurt, berries, and potato  She is tired and food doesn't taste good to her, unmotivated to eat or drink  Has some intermittent nausea, but denies dysphagia, reflux, or mid-epigastric pain  Has incisional pain  Reports having small bowel movements multiple times a day prior to this surgery  Has not had much output in her ostomy bag last several days  HPI     Past Medical History:   Diagnosis Date    Anxiety     Back pain     BRCA negative 06/2016    [facility]    Cancer ([facility] [address] )     Headache     History of chemotherapy 2016    Neoadjuvant; at [address] History of transfusion 06/2017    no adverse reaction    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)     s/p chemotherapy    Obesity (BMI 30-39  9)        Past Surgical History:   Procedure Laterality Date    ABDOMINAL ADHESION SURGERY N/A 10/4/2021    Procedure: Lysis Adhesions;  Surgeon: Ivania Andino MD;  Location: [address] [place] [address];  Service: Bariatrics    ABDOMINAL WALL SURGERY Left 9/21/2016    Procedure: DEBRIDEMENT OF LEFT ABDOMINAL TISSUE AND CLOSURE; DEBRIDEMENT OF LEFT BREAST FLAP; SUBMUSCULAR TISSUE EXPANDER PLACEMENT WITH ADM (ACELLULAR DERMAL MATRIX);   Surgeon: Samira Phillip MD;  Location:  MAIN OR;  Service:     BREAST BIOPSY Left 04/2016    with sentinel node biospy    BREAST BIOPSY Right 10/18/2021    benign    BREAST RECONSTRUCTION Left 12/22/2017    Procedure: REVISION OF LEFT BREAST SCAR, LOCAL FLAP;  Surgeon: Naz Heaton MD;  [facility];  Service: Plastics    DILATION AND CURETTAGE OF UTERUS      HARTMANS PROCEDURE N/A 2/6/2022    Procedure: Truddie Saalzar PROCEDURE WITH SIGMOID COLOSTOMY;  Surgeon: Kyra Tello MD;  Location: [address] [place] [address];  Service: General    LAPAROSCOPIC CHOLECYSTECTOMY  2000    LAPAROSCOPIC GASTRIC BANDING  2008    removed 2013    LAPAROSCOPIC GASTRIC BANDING  2013    removal    LAPAROTOMY N/A 2/6/2022    Procedure: LAPAROTOMY EXPLORATORY;  Surgeon: Kyra Tello MD;  Location: [address] [place] [address];  Service: General    LIPOSUCTION W/ FAT INJECTION Left 6/8/2017    Procedure: BREAST FAT GRAFTING ;  Surgeon: Mario Huertas MD;  [facility];  Service:     MASTECTOMY Left 2016    MEDIPORT INSERTION, SINGLE  2016    DE BIOPSY/EXCISION, LYMPH NODE(S) Left 8/10/2016    Procedure: LYMPHOSCINTIGRAPHY; SENTINAL LYMPH NODE BIOPSY (INJECT AT 1100);   Surgeon: Franco Doan MD;  [facility];  Service: Surgical Oncology    DE BREAST RECONSTRUCTION W/FREE FLAP Left 9/19/2016    Procedure: BREAST DELAYED RECONSTRUCTION; DEIP FREE FLAP removal of port-a -cath;  Surgeon: Mario Huertas MD;  [facility];  Service: Plastics    DE BREAST REDUCTION Right 12/19/2016    Procedure: BREAST REDUCTION/MASTOPEXY ;  Surgeon: Mario Huertas MD;  [facility];  Service: Plastics    DE HYSTEROSCOPY,W/ENDO [address] N/A 5/21/2018    Procedure: DILATATION AND CURETTAGE (D&C), HYSTEROSCOPY;  Surgeon: Janelle Castellon MD;  [facility];  Service: Gynecology Oncology    DE INSJ/RPLCMT BREAST IMPLANT SEP DAY MASTECTOMY Left 12/19/2016    Procedure: BREAST TISSUE EXPANDER REMOVAL; BREAST IMPLANT PLACEMENT;  Surgeon: Mario Huertas MD;  [facility];  Service: Plastics    DE LAP GASTRIC BYPASS/RAMIN-EN-Y N/A 10/4/2021    Procedure: LAPAROSCOPIC RAMIN-EN-Y GASTRIC BYPASS AND INTRAOPERATIVE EGD;  Surgeon: Carmen Stokes MD;  Location: [address] [place] [address];  Service: [address] [place] [address],  [address], SIMPLE, COMPLETE Left 8/10/2016    Procedure: BREAST MASTECTOMY; FROZEN SECTION ;  Surgeon: Franco Doan MD;  [facility];  Service: Surgical Oncology    MI REMOVAL TISSUE EXPANDER W/O INSERTION IMPLANT Left 1/23/2017    Procedure: BREAST IMPLANT REMOVAL; WASHOUT AND DEBRIDEMENT;  Surgeon: Amanda López MD;  [facility];  Service: Plastics    REDUCTION MAMMAPLASTY  2016    REDUCTION MAMMAPLASTY  2018    REVISION OF SCAR ON TORSO N/A 12/19/2016    Procedure: ABDOMINAL SCAR REVISION ;  Surgeon: Amanda López MD;  [facility];  Service:     US GUIDANCE  4/22/2016    US GUIDED BREAST BIOPSY RIGHT COMPLETE Right 10/18/2021    WISDOM TOOTH EXTRACTION         Current Outpatient Medications   Medication Sig Dispense Refill    buPROPion (WELLBUTRIN SR) 150 mg 12 hr tablet TAKE 1 TABLET BY MOUTH TWICE A  tablet 0    Calcium Carbonate-Vit D-Min (CALCIUM 1200 PO) Take by mouth 2  times day geovanni       cyclobenzaprine (FLEXERIL) 10 mg tablet Take 1 tablet (10 mg total) by mouth daily at bedtime (Patient not taking: Reported on 2/11/2022 ) 10 tablet 0    famotidine (PEPCID) 20 mg tablet Take 1 tablet (20 mg total) by mouth 2 (two) times a day 60 tablet 0    fluconazole (DIFLUCAN) 150 mg tablet       LORazepam (ATIVAN) 0 5 mg tablet Take 0 5 mg by mouth 2 (two) times a day as needed       methylPREDNISolone 4 MG tablet therapy pack Use as directed on package (Patient not taking: Reported on 2/5/2022 ) 21 each 0    Multiple Vitamin (multivitamin) capsule Take 1 capsule by mouth daily geovnani      oxyCODONE-acetaminophen (PERCOCET) 5-325 mg per tablet Take 1 tablet by mouth every 6 (six) hours as needed for moderate pain for up to 12 days Max Daily Amount: 4 tablets 24 tablet 0    zolpidem (AMBIEN) 5 mg tablet Take 1 tablet (5 mg total) by mouth daily at bedtime 30 tablet 0     No current facility-administered medications for this visit  Allergies   Allergen Reactions    Effexor [Venlafaxine] GI Intolerance    Bactrim [Sulfamethoxazole-Trimethoprim] Itching       Review of Systems   Constitutional: Positive for fatigue  Negative for chills, fever and unexpected weight change  HENT: Negative for trouble swallowing  Respiratory: Negative for cough and shortness of breath  Cardiovascular: Negative for chest pain and palpitations  Gastrointestinal: Positive for abdominal pain and nausea  Negative for abdominal distention, constipation, diarrhea and vomiting  Musculoskeletal: Positive for back pain  Skin: Positive for wound  Neurological: Negative for dizziness  Psychiatric/Behavioral: The patient is nervous/anxious  +Depression       Video Exam:  GEN: awake, alert, non-diaphoretic, no psychomotor agitation, no acute distress    HEENT :Head: atraumatic, normocephalic, no rashes noted, no lesions noted    Eyes: NO redness, discharge, swelling, or lesions    Nose: NO redness, swelling, discharge, deformity, or impetigo/crusting    Skin: no lesions, wounds, erythema, or cyanosis noted on face or hands    Cardiopulmonary: no increased respiratory effort, speaking in clear sentences, I:E ratio WNL    Abdomen: midline incision C/D/I, no signs of infection, abdomen non-distended, ostomy bag in place    Neuro: speech normal rate and rhythm, orientation arrived to appointment on time with no prompting, moved both upper extremities equally    Pysch: appearance, behavior, and attitude pleasant, tearful at times      There were no vitals filed for this visit  Patient asked to weigh herself for next visit; can get on scale tomorrow when comes to the office    Physical Exam     I spent 25 minutes directly with the patient during this visit    [facility] [place] verbally agrees to participate in [employer]   Pt is aware that [employer] could be limited without vital signs or the ability to perform a full hands-on physical exam  Hailee Espinoza understands she or the provider may request at any time to terminate the video visit and request the patient to seek care or treatment in person

## 2022-02-16 NOTE — ASSESSMENT & PLAN NOTE
-Continue ostomy care per general surgery  -Follow low fiber/low residue diet  -Inadequate protein, kcals, and fluids  -Push protein richardson/shakes, hydrating fluids to goal of 64oz   -F/u with RD weekly - stop by office tomorrow for protein and vitamin samples  -May require outpatient infusions; will reassess next week or call sooner

## 2022-02-16 NOTE — PROGRESS NOTES
Bariatric Follow Up Nutrition Note    Virtual visit via [facility] due to inclement weather    i  Name was verified by patient stating name? yes  ii   verified by patient stating? yes  iii  Verification of patient location yes  iv  Verified patient is in state in which I hold an active license? yes  v  Verified the patient is alone? yes  vi  I would like to verify that you were offered a live visit but are now consenting to this telephone visit? yes  vii  This visit is free yes      Type of surgery  Gastric bypass: laparoscopic  Surgery Date: 10/4/21  4 months  post-op  Surgeon: Dr Karl Fang  54 y o   female  not currently breastfeeding  Did not review weight today    Initial:  200 6#  Weight on Day of Weight Loss Surgery: 207# (higher than start weight)  Weight in (lb) to have BMI = 25: 127 3#  Pre-Op Excess Wt: 73 3# from initial weight orf 200 6#, but 79 7# from DOS weight  Post-Op Wt Loss: 25 6#/ 35% EBWL in 4 month(s) based off initial weight of 200 6#, however pt gained weight by DOS therefore 32# wt loss/40% from DOS weight of 207#---per last visit    Initial short term goal:  160lbs  153lbs would get BMI <30lbs    Review of History and Medications   Pt now s/p colostomy w/ d/c from hospital on 22  Past Medical History:   Diagnosis Date    Anxiety     Back pain     BRCA negative 2016    [facility]    Cancer ([facility] [address] )     Headache     History of chemotherapy 2016    Neoadjuvant; at [address] History of transfusion 2017    no adverse reaction    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)     s/p chemotherapy    Obesity (BMI 30-39  9)      Past Surgical History:   Procedure Laterality Date    ABDOMINAL ADHESION SURGERY N/A 10/4/2021    Procedure: Lysis Adhesions;  Surgeon: Ana Angulo MD;  Location: [address] [place] [address];  Service: Bariatrics    ABDOMINAL WALL SURGERY Left 2016    Procedure: DEBRIDEMENT OF LEFT ABDOMINAL TISSUE AND CLOSURE; DEBRIDEMENT OF LEFT BREAST FLAP; SUBMUSCULAR TISSUE EXPANDER PLACEMENT WITH ADM (ACELLULAR DERMAL MATRIX); Surgeon: Katie Stuart MD;  [facility];  Service:     BREAST BIOPSY Left 04/2016    with sentinel node biospy    BREAST BIOPSY Right 10/18/2021    benign    BREAST RECONSTRUCTION Left 12/22/2017    Procedure: REVISION OF LEFT BREAST SCAR, LOCAL FLAP;  Surgeon: Kamlesh Valdivia MD;  [facility];  Service: Plastics    [address] [place] [address] N/A 2/6/2022    Procedure: Yulissa Arnie PROCEDURE WITH SIGMOID COLOSTOMY;  Surgeon: Enmanuel Santos MD;  Location: [address] [place] [address];  Service: General    [address] GASTRIC BANDING  2008    removed 2013    LAPAROSCOPIC GASTRIC BANDING  2013    removal    LAPAROTOMY N/A 2/6/2022    Procedure: LAPAROTOMY EXPLORATORY;  Surgeon: Enmanuel Santos MD;  Location: [address] [place] [address];  Service: General    LIPOSUCTION W/ FAT INJECTION Left 6/8/2017    Procedure: BREAST FAT GRAFTING ;  Surgeon: Katie Stuart MD;  [facility];  Service:     MASTECTOMY Left 2016    MEDIPORT INSERTION, SINGLE  2016    NJ BIOPSY/EXCISION, LYMPH NODE(S) Left 8/10/2016    Procedure: LYMPHOSCINTIGRAPHY; SENTINAL LYMPH NODE BIOPSY (INJECT AT 1100);   Surgeon: Obi Ferguson MD;  [facility];  Service: Surgical Oncology    NJ BREAST RECONSTRUCTION W/FREE FLAP Left 9/19/2016    Procedure: BREAST DELAYED RECONSTRUCTION; DEIP FREE FLAP removal of port-a -cath;  Surgeon: Katie Stuart MD;  [facility];  Service: Plastics    NJ BREAST REDUCTION Right 12/19/2016    Procedure: BREAST REDUCTION/MASTOPEXY ;  Surgeon: Ktaie Stuart MD;  [facility];  Service: Plastics    [place] HYSTEROSCOPY,W/ENDO [address] N/A 5/21/2018    Procedure: DILATATION AND CURETTAGE (D&C), HYSTEROSCOPY;  Surgeon: Anca Schroeder MD;  [facility];  Service: Gynecology Oncology    NJ INSJ/RPLCMT BREAST IMPLANT SEP DAY MASTECTOMY Left 2016    Procedure: BREAST TISSUE EXPANDER REMOVAL; BREAST IMPLANT PLACEMENT;  Surgeon: Celi Jernigan MD;  [facility];  Service: Plastics    [place] LAP GASTRIC BYPASS/RAMIN-EN-Y N/A 10/4/2021    Procedure: LAPAROSCOPIC RAMIN-EN-Y GASTRIC BYPASS AND INTRAOPERATIVE EGD;  Surgeon: Jose Gaspar MD;  Location: [address] [place] [address];  Service: [facility],  [address], SIMPLE, COMPLETE Left 8/10/2016    Procedure: BREAST MASTECTOMY; FROZEN SECTION ;  Surgeon: Kyung Madsen MD;  [facility];  Service: Surgical Oncology    [place] REMOVAL TISSUE EXPANDER W/O INSERTION IMPLANT Left 2017    Procedure: BREAST IMPLANT REMOVAL; WASHOUT AND DEBRIDEMENT;  Surgeon: Celi Jernigan MD;  [facility];  Service: Plastics    REDUCTION MAMMAPLASTY  2016    REDUCTION MAMMAPLASTY  2018    REVISION OF SCAR ON TORSO N/A 2016    Procedure: ABDOMINAL SCAR REVISION ;  Surgeon: Celi Jernigan MD;  [facility];  Service:    [address] [place] [address]  2016    US GUIDED BREAST BIOPSY RIGHT COMPLETE Right 10/18/2021    WISDOM TOOTH EXTRACTION       Social History     Socioeconomic History    Marital status: Legally      Spouse name: Not on file    Number of children: Not on file    Years of education: Not on file    Highest education level: Not on file   Occupational History    Not on file   Tobacco Use    Smoking status: Former Smoker     Packs/day: 1 00     Years: 15 00     Pack years: 15 00     Quit date:      Years since quittin     Smokeless tobacco: Never Used   Vaping Use    Vaping Use: Never used   Substance and Sexual Activity    Alcohol use: Not Currently     Comment: rarely    Drug use: No    Sexual activity: Not on file   Other Topics Concern    Not on file   Social History Narrative    Not on file     Social Determinants of Health     Financial Resource Strain: Not on file   Food Insecurity: No Food Insecurity    Worried About [address] in the Last Year: Never true    Ran Out of Food in the Last Year: Never true   Transportation Needs: No Transportation Needs    Lack of Transportation (Medical): No    Lack of Transportation (Non-Medical):  No   Physical Activity: Not on file   Stress: Not on file   Social Connections: Not on file   Intimate Partner Violence: Not on file   Housing Stability: Low Risk     Unable to Pay for Housing in the Last Year: No    Number of Places Lived in the Last Year: 1    Unstable Housing in the Last Year: No       Current Outpatient Medications:     buPROPion (WELLBUTRIN SR) 150 mg 12 hr tablet, TAKE 1 TABLET BY MOUTH TWICE A DAY, Disp: 180 tablet, Rfl: 0    Calcium Carbonate-Vit D-Min (CALCIUM 1200 PO), Take by mouth 2  times day geovanni , Disp: , Rfl:     cyclobenzaprine (FLEXERIL) 10 mg tablet, Take 1 tablet (10 mg total) by mouth daily at bedtime (Patient not taking: Reported on 2/11/2022 ), Disp: 10 tablet, Rfl: 0    famotidine (PEPCID) 20 mg tablet, Take 1 tablet (20 mg total) by mouth 2 (two) times a day, Disp: 60 tablet, Rfl: 0    fluconazole (DIFLUCAN) 150 mg tablet, , Disp: , Rfl:     LORazepam (ATIVAN) 0 5 mg tablet, Take 0 5 mg by mouth 2 (two) times a day as needed , Disp: , Rfl:     methylPREDNISolone 4 MG tablet therapy pack, Use as directed on package (Patient not taking: Reported on 2/5/2022 ), Disp: 21 each, Rfl: 0    Multiple Vitamin (multivitamin) capsule, Take 1 capsule by mouth daily geovanni, Disp: , Rfl:     oxyCODONE-acetaminophen (PERCOCET) 5-325 mg per tablet, Take 1 tablet by mouth every 6 (six) hours as needed for moderate pain for up to 12 days Max Daily Amount: 4 tablets, Disp: 24 tablet, Rfl: 0    zolpidem (AMBIEN) 5 mg tablet, Take 1 tablet (5 mg total) by mouth daily at bedtime, Disp: 30 tablet, Rfl: 0    Food Intake and Lifestyle Assessment   Food Intake Assessment completed via usual diet recall    Since last visit pt was admitted to the hospital with a perforated sigmoid colon and emergency surgery; now has a colostomy  Pt reports that only thing that settled well for her was a boiled potato and plain greek yogurt w/frozen fruit  She hasn't had anything to eat of of today  She is also only getting about 8-10oz fluids per day  Beverage intake: Now only getting 8oz fluid per day  Diet texture/stage: regular  Protein supplement:   Has JBN powder and has [employer] OR Pure Protein--suggested to blend up the powder with fruit and non-fat milk or almond milk OR blend unflavored protein powder with frozen berries and water so something lighter since frozen fruit  Estimated protein intake per day: <60gm  Estimated fluid intake per day: 8-10oz fluids  Meals eaten away from home: not often  Typical meal pattern: 2-3 meals per day and grazing in evening snacks per day  Eating Behaviors: Appropriate diet advancement, Frequent snacking/ grazing and Mindless eating    Food allergies or intolerances: cottage cheese, some meats and the yogurt doesn't taste good    Vitamins:  · Bariatricpal chewable, but doesn't like  Advised can take a capsule and can provide a month supply sample if she is able to stop by the office tomorrow  · BA 1 TID chewy calcium--taste is fair  Trying to get them down  Physical Assessment  Nutrition Related Findings  Now with colostomy-- minimal output, but also minimal PO intake    Physical Activity  Types of exercise: None  Current physical limitations: having back issues and now s/p colostomy and still healing  Psychosocial Assessment   Support systems: friend(s) relative(s) children  Socioeconomic factors: none    Nutrition Diagnosis  Diagnosis: Inadequate protein intake (NI-5 7  3)  Related to: Limited adherence to nutrition-related recommendations  As Evidenced by: Expected anthropometric outcomes are not achieved     Interventions and Teaching   Patient educated on post-op nutrition guidelines  Patient educated and handouts provided    Surgical changes to stomach / GI  Capacity of post-surgery stomach  Adequate hydration  Expected weight loss  Weight loss plateaus/ possibility of weight regain  Exercise  Nutrition considerations after surgery  Protein supplements  Meal planning and preparation  Appropriate carbohydrate, protein, and fat intake, and food/fluid choices to maximize safe weight loss, nutrient intake, and tolerance   Dietary and lifestyle changes  Possible problems with poor eating habits  Intuitive eating  Techniques for self monitoring and keeping daily food journal  Potential for food intolerance after surgery, and ways to deal with them including: lactose intolerance, nausea, reflux, vomiting, diarrhea, food intolerance, appetite changes, gas  Vitamin / Mineral supplementation of Multivitamin with minerals and Calcium    Education provided to: patient    Barriers to learning: No barriers identified    Readiness to change: action    Comprehension: verbalizes understanding     Expected Compliance: good     Met with pt virtually today with Miranda Fang  Pt is now s/p colostomy, was discharged from the hospital on Feb 11th  Pt admits to not drinking many fluids or eating much food  She feels it hurts when she eats  When PA asked to show where it hurt she pointed to mid-lower abdomen  Pt reports plain [place] yogurt w/frozen fruit and boiled potato were the only things that she felt she could tolerate  Pt was sipping some fluids while on the call  Educated pt on low fiber, low residue diet for the time being since with the new colostomy, but advised to talk to general surgeon at tomorrow's appt to better determine their recommendation of diet progression  Pt stated she did buy bean soups and oatmeal to try, but now will hold off since likely too much fiber   Suggested grits or cream of wheat with protein powder, potatoes, the greek yogurt, smoothies with unflavored protein powder and frozen fruit, bone broth, etc  to better help meet protein needs and keep to low fiber diet while also adding fluid intake  Pt has been drinking Ensure clear which was provided to her in the hospital   Pt showed the label and it is 53gm of carbs and only 6 or 9 gm of protein  Encouraged protein richardson instead  Encouraged pt to stop by the office tomorrow when she is out to  some samples (poriten water, unflavored protein powder and chicken broth protein and capsule multi)  Pt was teary during the visit  Encouraged her to make an appt with Christy Gottlieb and also talk to general surgeon to see if there are any support groups for colostomy pts so she can talk to others going through the same process  Goals  · Push fluids:  water, crystal light, protein water, bone broth, smoothies, SF Jello-O, SF ice pops  · Start on bland, low fiber, low residue diet and talk to general surgeon about progression  · Stop by office to get samples of unflavored protein powder, chicken broth protein, 1 month supply of capsule to try  · Make an appt with EMILY Son  · F/U with RD in 1-2 weeks  · Pt to talk to general surgeon about diet progression and possible ostomy support groups      Time Spent:   30 Minutes

## 2022-02-16 NOTE — ASSESSMENT & PLAN NOTE
+ hx of anxiety  -On wellbutrin  -Continue monitoring and management with prescribing provider  -Encouraged consistent f/u with therapist/counselor and offered LCSW services here for additional support  -Encouraged stress reduction techniques, exercise  -Support group encouraged

## 2022-02-16 NOTE — ASSESSMENT & PLAN NOTE
-At risk for malabsorption of vitamins/minerals secondary to malabsorption and restriction of intake from bariatric surgery  -Not Currently taking adequate postop bariatric surgery vitamin supplementation: Bariatric Pal MVI with iron, calcium citrate 500mg TID - not always up for taking these regularly - will come to office tomorrow to get new samples of different flavors and capsules  -Last set of bariatric labs completed on 01/13/22:  -High calcium - decrease calcium to BID  -High B12 and B1  -High Zinc  -Rest of vitamins WNL    -Next set of bariatric labs ordered for approximately 3 months  -Patient received education about the importance of adhering to a lifelong supplementation regimen to avoid vitamin/mineral deficiencies

## 2022-02-17 ENCOUNTER — OFFICE VISIT (OUTPATIENT)
Dept: SURGERY | Facility: CLINIC | Age: 52
End: 2022-02-17

## 2022-02-17 ENCOUNTER — OFFICE VISIT (OUTPATIENT)
Dept: FAMILY MEDICINE CLINIC | Facility: CLINIC | Age: 52
End: 2022-02-17
Payer: COMMERCIAL

## 2022-02-17 VITALS
HEART RATE: 90 BPM | RESPIRATION RATE: 16 BRPM | SYSTOLIC BLOOD PRESSURE: 90 MMHG | BODY MASS INDEX: 32.39 KG/M2 | WEIGHT: 165 LBS | DIASTOLIC BLOOD PRESSURE: 60 MMHG | OXYGEN SATURATION: 99 % | TEMPERATURE: 97.3 F | HEIGHT: 60 IN

## 2022-02-17 VITALS — WEIGHT: 165.6 LBS | TEMPERATURE: 97.1 F | BODY MASS INDEX: 32.51 KG/M2 | HEIGHT: 60 IN

## 2022-02-17 DIAGNOSIS — G47.00 INSOMNIA, UNSPECIFIED TYPE: ICD-10-CM

## 2022-02-17 DIAGNOSIS — F11.20 CONTINUOUS OPIOID DEPENDENCE (HCC): ICD-10-CM

## 2022-02-17 DIAGNOSIS — C50.412 MALIGNANT NEOPLASM OF UPPER-OUTER QUADRANT OF LEFT BREAST IN FEMALE, ESTROGEN RECEPTOR POSITIVE (HCC): ICD-10-CM

## 2022-02-17 DIAGNOSIS — F33.9 DEPRESSION, RECURRENT (HCC): ICD-10-CM

## 2022-02-17 DIAGNOSIS — F41.9 ANXIETY: ICD-10-CM

## 2022-02-17 DIAGNOSIS — K63.89 PNEUMATOSIS INTESTINALIS OF LARGE INTESTINE: ICD-10-CM

## 2022-02-17 DIAGNOSIS — K95.89 COMPLICATION OF BARIATRIC PROCEDURE: ICD-10-CM

## 2022-02-17 DIAGNOSIS — K65.8 FECAL PERITONITIS (HCC): ICD-10-CM

## 2022-02-17 DIAGNOSIS — M54.50 LOW BACK PAIN: ICD-10-CM

## 2022-02-17 DIAGNOSIS — Z98.84 S/P GASTRIC BYPASS: ICD-10-CM

## 2022-02-17 DIAGNOSIS — K63.1 PERFORATION OF SIGMOID COLON (HCC): Primary | ICD-10-CM

## 2022-02-17 DIAGNOSIS — K63.1 PERFORATION OF SIGMOID COLON (HCC): ICD-10-CM

## 2022-02-17 DIAGNOSIS — Z48.89 ENCOUNTER FOR SURGICAL FOLLOW-UP CARE: Primary | ICD-10-CM

## 2022-02-17 DIAGNOSIS — R11.0 NAUSEA: ICD-10-CM

## 2022-02-17 DIAGNOSIS — Z17.0 MALIGNANT NEOPLASM OF UPPER-OUTER QUADRANT OF LEFT BREAST IN FEMALE, ESTROGEN RECEPTOR POSITIVE (HCC): ICD-10-CM

## 2022-02-17 PROCEDURE — 99495 TRANSJ CARE MGMT MOD F2F 14D: CPT | Performed by: FAMILY MEDICINE

## 2022-02-17 PROCEDURE — 1111F DSCHRG MED/CURRENT MED MERGE: CPT | Performed by: FAMILY MEDICINE

## 2022-02-17 PROCEDURE — 99024 POSTOP FOLLOW-UP VISIT: CPT | Performed by: SPECIALIST

## 2022-02-17 PROCEDURE — 3008F BODY MASS INDEX DOCD: CPT | Performed by: PHYSICIAN ASSISTANT

## 2022-02-17 RX ORDER — ZOLPIDEM TARTRATE 5 MG/1
5 TABLET ORAL
Qty: 30 TABLET | Refills: 0 | Status: SHIPPED | OUTPATIENT
Start: 2022-02-17 | End: 2022-04-22 | Stop reason: ALTCHOICE

## 2022-02-17 RX ORDER — ONDANSETRON 4 MG/1
4 TABLET, FILM COATED ORAL EVERY 8 HOURS PRN
Qty: 20 TABLET | Refills: 0 | Status: SHIPPED | OUTPATIENT
Start: 2022-02-17 | End: 2022-05-11 | Stop reason: ALTCHOICE

## 2022-02-17 RX ORDER — LORAZEPAM 0.5 MG/1
0.5 TABLET ORAL 2 TIMES DAILY PRN
Qty: 60 TABLET | Refills: 0 | Status: SHIPPED | OUTPATIENT
Start: 2022-02-17 | End: 2022-05-11 | Stop reason: SDUPTHER

## 2022-02-17 NOTE — PROGRESS NOTES
General Surgery Office Visit Follow up   [facility]  Patient: Trinity Garcia   : 1970 Sex: female MRN: [id number]   CSN: [id number] PCP: Blair Sierra MD    Assessment/ Plan:  Trinity Garcia is a 54 y o  female  day(s) POD # 2 weeks  S/p Bushra's procedure  Encounter for surgical follow-up care [Z48 89]    Plan  Stable postop   Removal of staples  Follow-up in 6 weeks  Advised to increase the activity allowed to drive car  Advised the increase p o  intake of water  And MiraLax once a week  Protein supplement and shakes     SUBJECTIVE:   I feel weak  I did not get any colostomy supply at my home yet  A stoma is functioning  My incision is healing well    OBJECTIVE:  No complaints  Minimal incisional pain  No fever no chills no rigors  Tolerating p o   Diet well  Normal bowel movement no constipation or diarrhea  Ambulating well     Vitals:   Temp (!) 97 1 °F (36 2 °C) (Core)   Ht 5' (1 524 m)   Wt 75 1 kg (165 lb 9 6 oz)   BMI 32 34 kg/m²     Active medications:    Current Outpatient Medications:     buPROPion (WELLBUTRIN SR) 150 mg 12 hr tablet, TAKE 1 TABLET BY MOUTH TWICE A DAY, Disp: 180 tablet, Rfl: 0    Calcium Carbonate-Vit D-Min (CALCIUM 1200 PO), Take by mouth 2  times day geovanni , Disp: , Rfl:     famotidine (PEPCID) 20 mg tablet, Take 1 tablet (20 mg total) by mouth 2 (two) times a day, Disp: 60 tablet, Rfl: 0    Multiple Vitamin (multivitamin) capsule, Take 1 capsule by mouth daily geovanni, Disp: , Rfl:     oxyCODONE-acetaminophen (PERCOCET) 5-325 mg per tablet, Take 1 tablet by mouth every 6 (six) hours as needed for moderate pain for up to 12 days Max Daily Amount: 4 tablets, Disp: 24 tablet, Rfl: 0    zolpidem (AMBIEN) 5 mg tablet, Take 1 tablet (5 mg total) by mouth daily at bedtime, Disp: 30 tablet, Rfl: 0    cyclobenzaprine (FLEXERIL) 10 mg tablet, Take 1 tablet (10 mg total) by mouth daily at bedtime (Patient not taking: Reported on 2022 ), Disp: 10 tablet, Rfl: 0    fluconazole (DIFLUCAN) 150 mg tablet, , Disp: , Rfl:     LORazepam (ATIVAN) 0 5 mg tablet, Take 0 5 mg by mouth 2 (two) times a day as needed  (Patient not taking: Reported on 2/17/2022 ), Disp: , Rfl:     methylPREDNISolone 4 MG tablet therapy pack, Use as directed on package (Patient not taking: Reported on 2/5/2022 ), Disp: 21 each, Rfl: 0    Physical Exam:   General Alert awake   Normocephalic atraumatic PERRLA   Lungs clear bilaterally  Cardiac normal S1 normal S2  Abdomen soft,non tender Bowel sounds present  Stoma clean dry and viable mucosa  Skin: surgical dressing is C/D/I  Ext: No clubbing, cyanosis, edema  Surgical wound well healed    Visit Diagnosis:   Diagnoses and all orders for this visit:    Encounter for surgical follow-up care    Fecal peritonitis ([address] )    Continuous opioid dependence ([address] )    Complication of bariatric procedure    Pneumatosis intestinalis of large intestine    Perforation of sigmoid colon ([address] )    Low back pain    Malignant neoplasm of upper-outer quadrant of left breast in female, estrogen receptor positive ([address] )    S/P gastric bypass       Plan of care was discussed with patient in detail    Pertinent labs reviewed  Most Recent Labs:   Admission on 02/05/2022, Discharged on 02/10/2022   Component Date Value Ref Range Status    WBC 02/05/2022 9 06  4 31 - 10 16 Thousand/uL Final    RBC 02/05/2022 4 18  3 81 - 5 12 Million/uL Final    Hemoglobin 02/05/2022 12 6  11 5 - 15 4 g/dL Final    Hematocrit 02/05/2022 40 1  34 8 - 46 1 % Final    MCV 02/05/2022 96  82 - 98 fL Final    MCH 02/05/2022 30 1  26 8 - 34 3 pg Final    MCHC 02/05/2022 31 4  31 4 - 37 4 g/dL Final    RDW 02/05/2022 13 6  11 6 - 15 1 % Final    MPV 02/05/2022 9 0  8 9 - 12 7 fL Final    Platelets 96/20/1172 340  149 - 390 Thousands/uL Final    nRBC 02/05/2022 0  /100 WBCs Final    Neutrophils Relative 02/05/2022 60  43 - 75 % Final    Immat GRANS % 02/05/2022 0  0 - 2 % Final  Lymphocytes Relative 02/05/2022 32  14 - 44 % Final    Monocytes Relative 02/05/2022 7  4 - 12 % Final    Eosinophils Relative 02/05/2022 1  0 - 6 % Final    Basophils Relative 02/05/2022 0  0 - 1 % Final    Neutrophils Absolute 02/05/2022 5 32  1 85 - 7 62 Thousands/µL Final    Immature Grans Absolute 02/05/2022 0 01  0 00 - 0 20 Thousand/uL Final    Lymphocytes Absolute 02/05/2022 2 92  0 60 - 4 47 Thousands/µL Final    Monocytes Absolute 02/05/2022 0 67  0 17 - 1 22 Thousand/µL Final    Eosinophils Absolute 02/05/2022 0 10  0 00 - 0 61 Thousand/µL Final    Basophils Absolute 02/05/2022 0 04  0 00 - 0 10 Thousands/µL Final    Sodium 02/05/2022 138  136 - 145 mmol/L Final    Potassium 02/05/2022 3 6  3 5 - 5 3 mmol/L Final    Chloride 02/05/2022 103  100 - 108 mmol/L Final    CO2 02/05/2022 30  21 - 32 mmol/L Final    ANION GAP 02/05/2022 5  4 - 13 mmol/L Final    BUN 02/05/2022 28* 5 - 25 mg/dL Final    Creatinine 02/05/2022 0 71  0 60 - 1 30 mg/dL Final    Standardized to IDMS reference method    Glucose 02/05/2022 112  65 - 140 mg/dL Final    If the patient is fasting, the ADA then defines impaired fasting glucose as > 100 mg/dL and diabetes as > or equal to 123 mg/dL  Specimen collection should occur prior to Sulfasalazine administration due to the potential for falsely depressed results  Specimen collection should occur prior to Sulfapyridine administration due to the potential for falsely elevated results   Calcium 02/05/2022 9 1  8 3 - 10 1 mg/dL Final    AST 02/05/2022 14  5 - 45 U/L Final    Specimen collection should occur prior to Sulfasalazine administration due to the potential for falsely depressed results   ALT 02/05/2022 33  12 - 78 U/L Final    Specimen collection should occur prior to Sulfasalazine administration due to the potential for falsely depressed results       Alkaline Phosphatase 02/05/2022 82  46 - 116 U/L Final    Total Protein 02/05/2022 7 5  6 4 - 8 2 g/dL Final    Albumin 02/05/2022 3 9  3 5 - 5 0 g/dL Final    Total Bilirubin 02/05/2022 0 27  0 20 - 1 00 mg/dL Final    Use of this assay is not recommended for patients undergoing treatment with eltrombopag due to the potential for falsely elevated results   eGFR 02/05/2022 100  ml/min/1 73sq m Final    hs TnI 0hr 02/05/2022 2  "Refer to ACS Flowchart"- see link ng/L Final    Comment:                                              Initial (time 0) result  If >=50 ng/L, Myocardial injury suggested ;  Type of myocardial injury and treatment strategy  to be determined  If 5-49 ng/L, a delta result at 2 hours and or 4 hours will be needed to further evaluate  If <4 ng/L, and chest pain has been >3 hours since onset, patient may qualify for discharge based on the HEART score in the ED  If <5 ng/L and <3hours since onset of chest pain, a delta result at 2 hours will be needed to further evaluate  Second Troponin (time 2 hours)  If calculated delta >= 20 ng/L,  Myocardial injury suggested ; Type of myocardial injury and treatment strategy to be determined  If 5-49 ng/L and the calculated delta is 5-19 ng/L, consult medical service for evaluation  Continue evaluation for ischemia on ecg and other possible etiology and repeat hs troponin at 4 hours  If delta is <5 ng/L at 2 hours, consider discharge based on risk stratification via the HEART score (if in ED), or KRISTY                            risk score in IP/Observation   hs TnI 2hr 02/06/2022 3  "Refer to ACS Flowchart"- see link ng/L Final    Comment:                                              Initial (time 0) result  If >=50 ng/L, Myocardial injury suggested ;  Type of myocardial injury and treatment strategy  to be determined  If 5-49 ng/L, a delta result at 2 hours and or 4 hours will be needed to further evaluate  If <4 ng/L, and chest pain has been >3 hours since onset, patient may qualify for discharge based on the HEART score in the ED    If <5 ng/L and <3hours since onset of chest pain, a delta result at 2 hours will be needed to further evaluate  Second Troponin (time 2 hours)  If calculated delta >= 20 ng/L,  Myocardial injury suggested ; Type of myocardial injury and treatment strategy to be determined  If 5-49 ng/L and the calculated delta is 5-19 ng/L, consult medical service for evaluation  Continue evaluation for ischemia on ecg and other possible etiology and repeat hs troponin at 4 hours  If delta is <5 ng/L at 2 hours, consider discharge based on risk stratification via the HEART score (if in ED), or KRISTY                            risk score in IP/Observation      Delta 2hr hsTnI 02/06/2022 1  <20 ng/L Final    LACTIC ACID 02/06/2022 0 8  0 5 - 2 0 mmol/L Final    SARS-CoV-2 02/06/2022 Negative  Negative Final    INFLUENZA A PCR 02/06/2022 Negative  Negative Final    INFLUENZA B PCR 02/06/2022 Negative  Negative Final    RSV PCR 02/06/2022 Negative  Negative Final    ABO Grouping 02/06/2022 A   Final    Rh Factor 02/06/2022 Positive   Final    Antibody Screen 02/06/2022 Negative   Final    Specimen Expiration Date 02/06/2022 [id number]   Final    Protime 02/06/2022 13 8  11 6 - 14 5 seconds Final    INR 02/06/2022 1 08  0 84 - 1 19 Final    PTT 02/06/2022 27  23 - 37 seconds Final    Therapeutic Heparin Range =  60-90 seconds    Preg, Serum 02/05/2022 Negative  Negative Final    Case Report 02/06/2022    Final                    Value:Surgical Pathology Report                         Case: [id number]                                   Authorizing Provider:  Amira Richard MD          Collected:           02/06/2022 0512              Ordering Location:     [facility] Received:            02/06/2022 1924                                     Operating Room                                                               Pathologist:           Gilles Arreola MD Specimen:    Colon, Portion of Sigmoid                                                                  Final Diagnosis 02/06/2022    Final                    Value: This result contains rich text formatting which cannot be displayed here   Note 02/06/2022    Final                    Value: This result contains rich text formatting which cannot be displayed here   Additional Information 02/06/2022    Final                    Value: This result contains rich text formatting which cannot be displayed here  Oralia Taylor Description 02/06/2022    Final                    Value: This result contains rich text formatting which cannot be displayed here   Clinical Information 02/06/2022    Final                    Value:Please set in formalin    MRSA Culture Only 02/06/2022 No Methicillin Resistant Staphlyococcus aureus (MRSA) isolated   Final    WBC 02/06/2022 17 27* 4 31 - 10 16 Thousand/uL Final    RBC 02/06/2022 3 80* 3 81 - 5 12 Million/uL Final    Hemoglobin 02/06/2022 11 6  11 5 - 15 4 g/dL Final    Hematocrit 02/06/2022 36 3  34 8 - 46 1 % Final    MCV 02/06/2022 96  82 - 98 fL Final    MCH 02/06/2022 30 5  26 8 - 34 3 pg Final    MCHC 02/06/2022 32 0  31 4 - 37 4 g/dL Final    RDW 02/06/2022 13 6  11 6 - 15 1 % Final    Platelets 51/45/9552 260  149 - 390 Thousands/uL Final    MPV 02/06/2022 8 9  8 9 - 12 7 fL Final    Sodium 02/06/2022 136  136 - 145 mmol/L Final    Potassium 02/06/2022 4 1  3 5 - 5 3 mmol/L Final    Chloride 02/06/2022 105  100 - 108 mmol/L Final    CO2 02/06/2022 27  21 - 32 mmol/L Final    ANION GAP 02/06/2022 4  4 - 13 mmol/L Final    BUN 02/06/2022 17  5 - 25 mg/dL Final    Creatinine 02/06/2022 0 65  0 60 - 1 30 mg/dL Final    Standardized to IDMS reference method    Glucose 02/06/2022 152* 65 - 140 mg/dL Final    If the patient is fasting, the ADA then defines impaired fasting glucose as > 100 mg/dL and diabetes as > or equal to 123 mg/dL    Specimen collection should occur prior to Sulfasalazine administration due to the potential for falsely depressed results  Specimen collection should occur prior to Sulfapyridine administration due to the potential for falsely elevated results   Calcium 02/06/2022 8 3  8 3 - 10 1 mg/dL Final    eGFR 02/06/2022 104  ml/min/1 73sq m Final    Phosphorus 02/06/2022 3 2  2 7 - 4 5 mg/dL Final    Magnesium 02/06/2022 1 5* 1 6 - 2 6 mg/dL Final    Ventricular Rate 02/05/2022 64  BPM Final    Atrial Rate 02/05/2022 64  BPM Final    VT Interval 02/05/2022 150  ms Final    QRSD Interval 02/05/2022 108  ms Final    QT Interval 02/05/2022 422  ms Final    QTC Interval 02/05/2022 435  ms Final    P Axis 02/05/2022 67  degrees Final    QRS Axis 02/05/2022 23  degrees Final    T Wave Axis 02/05/2022 67  degrees Final    Magnesium 02/07/2022 1 8  1 6 - 2 6 mg/dL Final    Phosphorus 02/07/2022 3 6  2 7 - 4 5 mg/dL Final    Sodium 02/07/2022 141  136 - 145 mmol/L Final    Potassium 02/07/2022 3 9  3 5 - 5 3 mmol/L Final    Chloride 02/07/2022 106  100 - 108 mmol/L Final    CO2 02/07/2022 28  21 - 32 mmol/L Final    ANION GAP 02/07/2022 7  4 - 13 mmol/L Final    BUN 02/07/2022 13  5 - 25 mg/dL Final    Creatinine 02/07/2022 0 70  0 60 - 1 30 mg/dL Final    Standardized to IDMS reference method    Glucose 02/07/2022 93  65 - 140 mg/dL Final    If the patient is fasting, the ADA then defines impaired fasting glucose as > 100 mg/dL and diabetes as > or equal to 123 mg/dL  Specimen collection should occur prior to Sulfasalazine administration due to the potential for falsely depressed results  Specimen collection should occur prior to Sulfapyridine administration due to the potential for falsely elevated results      Calcium 02/07/2022 8 6  8 3 - 10 1 mg/dL Final    eGFR 02/07/2022 102  ml/min/1 73sq m Final    WBC 02/07/2022 10 77* 4 31 - 10 16 Thousand/uL Final    RBC 02/07/2022 3 61* 3 81 - 5 12 Million/uL Final    Hemoglobin 02/07/2022 11 0* 11 5 - 15 4 g/dL Final    Hematocrit 02/07/2022 34 7* 34 8 - 46 1 % Final    MCV 02/07/2022 96  82 - 98 fL Final    MCH 02/07/2022 30 5  26 8 - 34 3 pg Final    MCHC 02/07/2022 31 7  31 4 - 37 4 g/dL Final    RDW 02/07/2022 14 0  11 6 - 15 1 % Final    Platelets 84/74/2134 267  149 - 390 Thousands/uL Final    MPV 02/07/2022 8 9  8 9 - 12 7 fL Final    WBC 02/08/2022 8 50  4 31 - 10 16 Thousand/uL Final    RBC 02/08/2022 3 24* 3 81 - 5 12 Million/uL Final    Hemoglobin 02/08/2022 9 8* 11 5 - 15 4 g/dL Final    Hematocrit 02/08/2022 31 4* 34 8 - 46 1 % Final    MCV 02/08/2022 97  82 - 98 fL Final    MCH 02/08/2022 30 2  26 8 - 34 3 pg Final    MCHC 02/08/2022 31 2* 31 4 - 37 4 g/dL Final    RDW 02/08/2022 13 5  11 6 - 15 1 % Final    MPV 02/08/2022 8 6* 8 9 - 12 7 fL Final    Platelets 65/80/2276 241  149 - 390 Thousands/uL Final    nRBC 02/08/2022 0  /100 WBCs Final    Neutrophils Relative 02/08/2022 67  43 - 75 % Final    Immat GRANS % 02/08/2022 0  0 - 2 % Final    Lymphocytes Relative 02/08/2022 23  14 - 44 % Final    Monocytes Relative 02/08/2022 7  4 - 12 % Final    Eosinophils Relative 02/08/2022 2  0 - 6 % Final    Basophils Relative 02/08/2022 1  0 - 1 % Final    Neutrophils Absolute 02/08/2022 5 72  1 85 - 7 62 Thousands/µL Final    Immature Grans Absolute 02/08/2022 0 01  0 00 - 0 20 Thousand/uL Final    Lymphocytes Absolute 02/08/2022 1 96  0 60 - 4 47 Thousands/µL Final    Monocytes Absolute 02/08/2022 0 57  0 17 - 1 22 Thousand/µL Final    Eosinophils Absolute 02/08/2022 0 19  0 00 - 0 61 Thousand/µL Final    Basophils Absolute 02/08/2022 0 05  0 00 - 0 10 Thousands/µL Final    Sodium 02/08/2022 141  136 - 145 mmol/L Final    Potassium 02/08/2022 3 5  3 5 - 5 3 mmol/L Final    Chloride 02/08/2022 106  100 - 108 mmol/L Final    CO2 02/08/2022 22  21 - 32 mmol/L Final    ANION GAP 02/08/2022 13  4 - 13 mmol/L Final    BUN 02/08/2022 18  5 - 25 mg/dL Final    Creatinine 02/08/2022 0 57* 0 60 - 1 30 mg/dL Final    Standardized to IDMS reference method    Glucose 02/08/2022 58* 65 - 140 mg/dL Final    If the patient is fasting, the ADA then defines impaired fasting glucose as > 100 mg/dL and diabetes as > or equal to 123 mg/dL  Specimen collection should occur prior to Sulfasalazine administration due to the potential for falsely depressed results  Specimen collection should occur prior to Sulfapyridine administration due to the potential for falsely elevated results   Calcium 02/08/2022 8 3  8 3 - 10 1 mg/dL Final    eGFR 02/08/2022 109  ml/min/1 73sq m Final    Magnesium 02/08/2022 1 6  1 6 - 2 6 mg/dL Final    Magnesium 02/09/2022 1 6  1 6 - 2 6 mg/dL Final    Phosphorus 02/09/2022 3 7  2 7 - 4 5 mg/dL Final    Sodium 02/09/2022 141  136 - 145 mmol/L Final    Potassium 02/09/2022 3 6  3 5 - 5 3 mmol/L Final    Chloride 02/09/2022 105  100 - 108 mmol/L Final    CO2 02/09/2022 22  21 - 32 mmol/L Final    ANION GAP 02/09/2022 14* 4 - 13 mmol/L Final    BUN 02/09/2022 8  5 - 25 mg/dL Final    Creatinine 02/09/2022 0 53* 0 60 - 1 30 mg/dL Final    Standardized to IDMS reference method    Glucose 02/09/2022 63* 65 - 140 mg/dL Final    If the patient is fasting, the ADA then defines impaired fasting glucose as > 100 mg/dL and diabetes as > or equal to 123 mg/dL  Specimen collection should occur prior to Sulfasalazine administration due to the potential for falsely depressed results  Specimen collection should occur prior to Sulfapyridine administration due to the potential for falsely elevated results      Calcium 02/09/2022 8 3  8 3 - 10 1 mg/dL Final    eGFR 02/09/2022 111  ml/min/1 73sq m Final    WBC 02/10/2022 6 68  4 31 - 10 16 Thousand/uL Final    RBC 02/10/2022 3 28* 3 81 - 5 12 Million/uL Final    Hemoglobin 02/10/2022 10 0* 11 5 - 15 4 g/dL Final    Hematocrit 02/10/2022 30 7* 34 8 - 46 1 % Final    MCV 02/10/2022 94  82 - 98 fL Final    MCH 02/10/2022 30 5  26 8 - 34 3 pg Final    MCHC 02/10/2022 32 6  31 4 - 37 4 g/dL Final    RDW 02/10/2022 13 0  11 6 - 15 1 % Final    MPV 02/10/2022 9 0  8 9 - 12 7 fL Final    Platelets 89/17/8393 283  149 - 390 Thousands/uL Final    nRBC 02/10/2022 0  /100 WBCs Final    Neutrophils Relative 02/10/2022 66  43 - 75 % Final    Immat GRANS % 02/10/2022 0  0 - 2 % Final    Lymphocytes Relative 02/10/2022 24  14 - 44 % Final    Monocytes Relative 02/10/2022 7  4 - 12 % Final    Eosinophils Relative 02/10/2022 3  0 - 6 % Final    Basophils Relative 02/10/2022 0  0 - 1 % Final    Neutrophils Absolute 02/10/2022 4 32  1 85 - 7 62 Thousands/µL Final    Immature Grans Absolute 02/10/2022 0 02  0 00 - 0 20 Thousand/uL Final    Lymphocytes Absolute 02/10/2022 1 63  0 60 - 4 47 Thousands/µL Final    Monocytes Absolute 02/10/2022 0 49  0 17 - 1 22 Thousand/µL Final    Eosinophils Absolute 02/10/2022 0 20  0 00 - 0 61 Thousand/µL Final    Basophils Absolute 02/10/2022 0 02  0 00 - 0 10 Thousands/µL Final    Phosphorus 02/10/2022 3 6  2 7 - 4 5 mg/dL Final    Magnesium 02/10/2022 1 5* 1 6 - 2 6 mg/dL Final    Sodium 02/10/2022 140  136 - 145 mmol/L Final    Potassium 02/10/2022 3 6  3 5 - 5 3 mmol/L Final    Chloride 02/10/2022 105  100 - 108 mmol/L Final    CO2 02/10/2022 23  21 - 32 mmol/L Final    ANION GAP 02/10/2022 12  4 - 13 mmol/L Final    BUN 02/10/2022 7  5 - 25 mg/dL Final    Creatinine 02/10/2022 0 45* 0 60 - 1 30 mg/dL Final    Standardized to IDMS reference method    Glucose 02/10/2022 74  65 - 140 mg/dL Final    If the patient is fasting, the ADA then defines impaired fasting glucose as > 100 mg/dL and diabetes as > or equal to 123 mg/dL  Specimen collection should occur prior to Sulfasalazine administration due to the potential for falsely depressed results   Specimen collection should occur prior to Sulfapyridine administration due to the potential for falsely elevated results   Calcium 02/10/2022 8 4  8 3 - 10 1 mg/dL Final    eGFR 02/10/2022 118  ml/min/1 73sq m Final       Pertinent images and available reads personally reviewed  Procedure: XR chest 1 view portable    Result Date: 2/6/2022  Narrative: CHEST INDICATION:   syncope  COMPARISON:  Chest radiograph from 11/1/2018, abdomen CT from 2/5/2022, chest CT from 11/1/2018  EXAM PERFORMED/VIEWS:  XR CHEST PORTABLE FINDINGS: Cardiomediastinal silhouette appears unremarkable  The lungs are clear  No pneumothorax or pleural effusion  Osseous structures appear within normal limits for patient age  Cholecystectomy  Gastric bypass  Left axillary dissection  Clips in the anterior chest wall  Impression: No acute cardiopulmonary disease  Workstation performed: [id number]     Procedure: CT head without contrast    Result Date: 2/6/2022  Narrative: CT BRAIN - WITHOUT CONTRAST INDICATION:   Head trauma, minor, normal mental status (Age 24-59y) syncope, hit head  COMPARISON:  11/1/2018  TECHNIQUE:  CT examination of the brain was performed  In addition to axial images, sagittal and coronal 2D reformatted images were created and submitted for interpretation  Radiation dose length product (DLP) for this visit:  941 mGy-cm   This examination, like all CT scans performed in the [facility], was performed utilizing techniques to minimize radiation dose exposure, including the use of iterative reconstruction and automated exposure control  IMAGE QUALITY:  Diagnostic  FINDINGS: PARENCHYMA:  No intracranial mass, mass effect or midline shift  No CT signs of acute infarction  No acute parenchymal hemorrhage  Stable bilateral hypodensities in the basal ganglia, possibly prominent perivascular spaces  VENTRICLES AND EXTRA-AXIAL SPACES:  Normal for the patient's age  VISUALIZED ORBITS AND PARANASAL SINUSES:  Unremarkable   CALVARIUM AND EXTRACRANIAL SOFT TISSUES:  Normal      Impression: No acute intracranial abnormality  Workstation performed: [id number]     Procedure: CT high volume lower GI bleed    Result Date: 2/6/2022  Narrative: CT ABDOMEN AND PELVIS - WITHOUT AND WITH IV CONTRAST INDICATION:   Severe lower abdominal pain, rectal bleeding  History of gastric bypass surgery in October 2021  History of breast carcinoma  COMPARISON: October 1, 2019  TECHNIQUE:  CT examination of the abdomen and pelvis was performed both prior to and after the administration of intravenous contrast   Contrast was injected one time intravenously without immediate complication  Scanning through the abdomen and pelvis was performed in arterial, venous and delayed phases according a protocol specifically designed to evaluate for GI bleeding  Axial, sagittal, and coronal 2D reformatted images were created from the source data and submitted for interpretation  Radiation dose length product (DLP) for this visit:  3904 61 mGy-cm   This examination, like all CT scans performed in the [facility], was performed utilizing techniques to minimize radiation dose exposure, including the use of iterative reconstruction and automated exposure control  IV Contrast:  100 mL of iohexol (OMNIPAQUE) Enteric Contrast:  Enteric contrast was not administered  FINDINGS: ABDOMEN  BOWEL:  There is no evidence of active extravasation of intravenous contrast into the lumen of stomach, small bowel, or large bowel loops  The sigmoid colon is markedly abnormal in appearance  A large amount of stool is present in this region and there are areas of apparent asymmetric bowel wall thickening as well as areas where the bowel wall appears imperceptible  Additionally, there are findings concerning for pneumatosis intestinalis involving this portion of the sigmoid colon  Gas is seen extraluminally tracking up the retroperitoneum and there also appears to be some mesenteric venous gas    Some of the stool in the region of the  sigmoid may possibly be extraluminal  Prior bariatric surgery  LOWER CHEST:  No significant abnormality in the lung bases  LIVER/BILIARY TREE:  Unremarkable  GALLBLADDER:  Gallbladder is surgically absent  SPLEEN:  Unremarkable  PANCREAS:  Unremarkable  ADRENAL GLANDS:  Unremarkable  KIDNEYS/URETERS:  Small right renal cysts  No hydronephrosis bilaterally  PELVIS REPRODUCTIVE ORGANS:  Unremarkable for patient's age  URINARY BLADDER:  Unremarkable  APPENDIX: No findings to suggest appendicitis  ADDITIONAL ABDOMINAL AND PELVIC STRUCTURES ABDOMINOPELVIC CAVITY:  As described above  ABDOMINAL WALL/INGUINAL REGIONS:  Postoperative changes abdominal wall  OSSEOUS STRUCTURES:  No acute fracture or destructive osseous lesion  Impression: Markedly abnormal appearance of the sigmoid colon with findings of perforation of viscus, as described above  Please see discussion  Perforated neoplasm should be excluded  Urgent Surgical consultation and follow-up is recommended  I personally discussed this study with Devon Patrick on 2/6/2022 at 12:54 AM  Workstation performed: [id number]     Procedure: US breast right limited (diagnostic)    Result Date: 1/26/2022  Narrative: DIAGNOSIS: Lump of right breast; Malignant neoplasm of upper-outer quadrant of left breast in female, estrogen receptor positive ([facility] [address] ) TECHNIQUE: Ultrasound of the right breast(s) was performed  COMPARISONS: Prior breast imaging dated: 10/18/2021, 10/18/2021, 10/11/2021, 10/11/2021, 04/21/2021, 04/30/2020, 04/25/2019, 04/25/2019, 04/19/2018, 04/18/2017, 04/18/2017, 01/13/2017, 04/25/2016, 04/25/2016, 04/20/2016, 04/14/2016, 04/14/2016, and 04/12/2016 RELEVANT HISTORY: Family Breast Cancer History: History of breast cancer in [address] [place] [address]  Family Medical History: Family medical history includes breast cancer in cousin  Personal History: Hormone history includes tamoxifen  Surgical history includes breast biopsy, breast reduction, mastectomy, and breast explant   Medical history includes breast cancer and history of chemotherapy  RISK ASSESSMENT: Tyrer-zick risk assessment reporting was suppressed due to the patient's history and/or demographic factors  INDICATION: Aguilar Quezada is a 54 y o  female presenting for New changing lump felt on right breast  FINDINGS: RIGHT C) CYST: There is a 7 mm x 7 mm x 10 mm round complicated cyst with circumscribed margins seen in the right breast at 4 o'clock, 7 cm from the nipple  The cyst correlates with the palpable mass reported by the patient  D) CYST: There is a 7 mm x 7 mm x 7 mm round simple cyst with circumscribed margins seen in the right breast at 3 o'clock, 9 cm from the nipple  The cyst correlates with the palpable mass reported by the patient  Both cysts are subcutaneous in location likely postsurgical fat necrosis  Surveillance recommended to confirm stability  Impression:  Suspected multifocal fat necrosis in the right breast at the areas of palpable concern  Surveillance recommended  ASSESSMENT/BI-RADS CATEGORY: Right: 3 - Probably Benign Overall: 3 - Probably Benign RECOMMENDATION:      - Ultrasound in 6 months for the right breast  Workstation ID: [id number]        Pertinent notes reviewed    Counseling / Coordination of Care  Total Office time /unit time spent today 15minutes  Greater than 50% of total time was spent with the patient and / or family counseling and / or coordination of care  A description of the counseling / coordination of care:  I performed an interim history, pertinent images and labs, performed a physical examination to arrive at the plan delineated above with associated thought processes  Family member/primary significant other at the bedside    Savannah Wright MD MS FRCS FACS  [address] [place] [address] [facility]  02/17/22 3:53 PM        Portions of the record may have been created with voice recognition software   Occasional wrong word or "sound a like" substitutions may have occurred due to the inherent limitations of voice recognition software  Read the chart carefully and recognize, using context, where substitutions have occurred

## 2022-02-17 NOTE — PROGRESS NOTES
Assessment/Plan:       Diagnoses and all orders for this visit:    Perforation of sigmoid colon ([facility] [address] )  Comments:  Had follow up visit today with general surgery  Next visit in 6 weeks  Doing ok post op  Surgical site is healing well  Pain controlled with percocet  She does have some nausea and fatigue  Will send zofran for nausea  She will speak with her nutritionist about diet  She is going to get stomal supplies  Depression, recurrent ([facility] [address] )  Comments:  currently on wellbutrin  Insomnia, unspecified type  Comments:  Ambien refilled  Aware not to take at the same time as percocet and ativan  Orders:  -     zolpidem (AMBIEN) 5 mg tablet; Take 1 tablet (5 mg total) by mouth daily at bedtime    Anxiety  Comments:  continue wellbutrin and prn ativan  Orders:  -     LORazepam (ATIVAN) 0 5 mg tablet; Take 1 tablet (0 5 mg total) by mouth 2 (two) times a day as needed for anxiety    Nausea  -     ondansetron (ZOFRAN) 4 mg tablet; Take 1 tablet (4 mg total) by mouth every 8 (eight) hours as needed for nausea or vomiting        TCM Call (since 1/17/2022)     Date and time call was made  2/11/2022  8:56 AM    Hospital care reviewed  Records reviewed        Patient was hospitialized at  [address] [place] [address]        Date of Admission  02/05/22    Date of discharge  02/10/22    Diagnosis  Perforation of sigmoid colon    Disposition  Home    Were the patients medications reviewed and updated  Yes    Current Symptoms  --  Loss of appetite       TCM Call (since 1/17/2022)     Post hospital issues  None    Should patient be enrolled in anticoag monitoring? No    Scheduled for follow up?   Yes    Patients specialists  Other (comment)    Other specialists names  Dr Abhishek Ragland, surgeon    Did you obtain your prescribed medications  Yes    Do you need help managing your prescriptions or medications  No    Is transportation to your appointment needed  No    I have advised the patient to call PCP with any new or worsening symptoms [facility]; Spouse or Significiant other    Support System  Family    The type of support provided  Physical; Emotional    Do you have social support  Yes, as much as I need    Are you recieving any outpatient services  No    Are you recieving home care services  Yes    Types of home care services  Nurse visit    Interperter language line needed  No    Counseling  Patient    Counseling topics  Activities of daily living; Importance of RX compliance; patient and family education; instructions for management; Risk factor reduction; Home health agency benefits    Comments  Ann Suggs states that she is doing ok post op  She has no appetite but she is drinking and tolerating her liquids  She knows to call if any fever or uncontrolled pain, vomiting, constipation etc  Visiting nurse is coming today to do ostomy care with Ann Suggs  Saturnino Barger        Subjective:      Patient ID: Kavon Salmon is a 54 y o  female  [facility]     Hailee presents today for hospital discharge follow up after she was hospitalized at [facility] under the surgical service from 2/5 to 2/10/22 for perforated sigmoid colon  She went to the emergency room for severe abdominal pain  CT scan showed pneumoperitoneum and suggested perforated sigmoid colon  Patient had emergent surgery including sigmoid colon resection and end colostomy  There were no postop complications  She was educated on stomal care  Her diet was advanced as tolerated  She completed antibiotics and discharged with 2 week post op outpatient surgical check  She had post op visit today with surgeon Dr Francisco Javier Machuca with no current issues  She was advised to follow up again in 6 weeks  For now she is able to increase her activity and increase oral fluid intake  She will use miralax once a week and drink protein supplement  Today she states that she feels tired and weak  Also has some nausea and decreased appetite   Frustrated with her gastric bypass vs current surgical diet changes  She will speak with her nutritionist  Anna Victor if she will be ready to go back to work next week  She feels anxious  Needs refills of medications  The following portions of the patient's history were reviewed and updated as appropriate: allergies, current medications, past family history, past medical history, past social history, past surgical history and problem list     Review of Systems   Constitutional: Positive for fatigue  HENT: Negative  Eyes: Negative  Respiratory: Negative  Cardiovascular: Negative  Gastrointestinal: Negative  Endocrine: Negative  Genitourinary: Negative  Musculoskeletal: Negative  Skin: Negative  Allergic/Immunologic: Negative  Neurological: Negative  Hematological: Negative  Psychiatric/Behavioral: Negative  Objective:      BP 90/60   Pulse 90   Temp (!) 97 3 °F (36 3 °C)   Resp 16   Ht 5' (1 524 m)   Wt 74 8 kg (165 lb)   SpO2 99%   BMI 32 22 kg/m²          Physical Exam  Constitutional:       General: She is not in acute distress  Appearance: She is well-developed  She is not diaphoretic  HENT:      Head: Normocephalic and atraumatic  Cardiovascular:      Rate and Rhythm: Normal rate and regular rhythm  Heart sounds: Normal heart sounds  No murmur heard  No friction rub  No gallop  Pulmonary:      Effort: Pulmonary effort is normal  No respiratory distress  Breath sounds: Normal breath sounds  No wheezing or rales  Chest:      Chest wall: No tenderness  Abdominal:      General: Abdomen is flat  Bowel sounds are normal  There is no distension  Palpations: Abdomen is soft  Comments: Surgical site healing well with no evidence of infection  Stoma in place, clean and dry  Musculoskeletal:         General: No deformity  Normal range of motion  Cervical back: Normal range of motion and neck supple  Skin:     General: Skin is warm and dry     Neurological:      Mental Status: She is alert and oriented to person, place, and time  Psychiatric:         Behavior: Behavior normal          Thought Content:  Thought content normal          Judgment: Judgment normal

## 2022-02-18 ENCOUNTER — TELEPHONE (OUTPATIENT)
Dept: BARIATRICS | Facility: CLINIC | Age: 52
End: 2022-02-18

## 2022-02-18 NOTE — TELEPHONE ENCOUNTER
Call to patient to follow up and offer support during struggles she is currently having post-op  Patient expresses the most frustration is not having guidance on what she should be eating that satisfies the bariatric diet but also helps with bowel movements and doesn't cause her pain  Her general surgeon wants her to have vegetables but she struggles to even drink at this time  She is experiencing anxiety about eating at all since her confidence in knowing her body has been shaken  She isn't sure what the foods are going to do to her and she feels nauseous and pain at times  Validation and empathy were offered, education provided about the physical effects anxiety can have on the body and encouraged her to slowly explore the recommended foods to determine if the pain she's feeling is physical or promoted by anxiety  Review of RD notes it was suggested patient try fruit mixed in with protein powder, suggestion of also blending in vegetables to get those nutrients as well  SW told patient she would pass on diet information to the weight management team that she received from her general surgeon yesterday to see if they can offer any further guidance  Patient would also like information about ostomy support groups, EMILY emailed her the website [employer] which has a support group finder along with education and other supports  Patient is aware of her upcoming appointment with RD on 3/4, SW offered ongoing support to patient to discuss, vent and process her struggles  She appreciates the offer and has contact information if needed

## 2022-02-21 ENCOUNTER — TELEPHONE (OUTPATIENT)
Dept: SURGERY | Facility: CLINIC | Age: 52
End: 2022-02-21

## 2022-02-21 NOTE — TELEPHONE ENCOUNTER
HAS NOT HAD ANYTHING IN HER BAG FOR 3 DAYS NOT SHE DID TAKE MIRALAX TODAY BUT WOULD LIKE TO TALK TO YOU  SHE IS ALSO CONCERNED ABOUT RETURN TO WORK DATE

## 2022-02-23 ENCOUNTER — NURSE TRIAGE (OUTPATIENT)
Dept: OTHER | Facility: OTHER | Age: 52
End: 2022-02-23

## 2022-02-23 ENCOUNTER — TELEPHONE (OUTPATIENT)
Dept: BARIATRICS | Facility: CLINIC | Age: 52
End: 2022-02-23

## 2022-02-23 NOTE — TELEPHONE ENCOUNTER
Called and spoke to pt about diet  States yesterday was her best day as far as fluids consuming 40oz (20oz decaf iced starbucks tea, 11oz Fairlife protein shake, 8oz v8)  Encouraged pt to stick with whatever works  She did also like the [id number] drinks but needs to purchase more  As far as food she has tried light and fit greek yogurt, banana, protein shake (JBN), potato  She is still afraid to try many foods  Encouraged soft, bland foods such as foods on the soft stage of the bariatric diet  She did call her general surgeon yesterday because hasn't had any output in 3 days  They advised to increase the miralax to 1x/day + colace  Pt had some hard output today  Suggested to try 4oz of plum smart light juice  As output improves can decrease the miralax slowly  Emailed pt information on diet s/p colostomy with sample menus gradually incorporating fiber over time  Pt verbalizes understanding and w/o questions at this time  Advised to call if questions arise and have f/u appt on 3/4

## 2022-02-24 ENCOUNTER — TELEPHONE (OUTPATIENT)
Dept: SURGERY | Facility: CLINIC | Age: 52
End: 2022-02-24

## 2022-02-24 NOTE — TELEPHONE ENCOUNTER
Talked to patient  and she had many questions regarding her Colostomy and how to handle the hard stool that is getting stuck in her stoma  The stool came out but what to do if it happens again and how far she should go to get it out? She was having minimal bleeding on tissue from stoma site  I discussed with another nurse and we both agreed that patient should not be trying to dig out stool in her stoma, unless it is right on the surface and ready to come out  Small amount of bleeding is normal from passing the hard stool which did come out  I reviewed with this patient the need for drinking more water which she is not doing and adding fiber to her diet  I explained to this patient since she is comfortable now these questions need to be answered by her surgeon's office in the morning and she needs to call her VNA to come out tomorrow to give her more direction on her diet and the care of her colostomy  She finally agreed to that and will call tomorrow  Reason for Disposition   Nursing judgment or information in reference    Answer Assessment - Initial Assessment Questions  1  REASON FOR CALL: "What is your main concern right now?"      Hard stool got stuck in her stoma  2  ONSET: "When did this occur?"      tonight  3  SEVERITY: "How bad is it?"      Got the stool out  4  FEVER: "Do you have a fever?"      None  5  OTHER SYMPTOMS: "Do you have any other new symptoms?"      small amount of blood on tisse after getting the stool out  6  INTERVENTIONS AND RESPONSE: "What have you done so far to try to make this better? What medications have you used?"      Took two extra Colace tonight  7   PREGNANCY: "Is there any chance you are pregnant?"      No    Protocols used: NO GUIDELINE AVAILABLE-ADULT-

## 2022-02-24 NOTE — TELEPHONE ENCOUNTER
Per Dr Hawa White, faxed letter to [facility] extending patient's return to work date  Patient may return to work on 3/7/2022

## 2022-02-24 NOTE — TELEPHONE ENCOUNTER
Regarding: Had surgery feel like I'm having complication  ----- Message from [facility] sent at 2/23/2022 10:35 PM EST -----  '' I had surgery and I feel like I'm having complication concerned what to do ''

## 2022-02-25 ENCOUNTER — TELEPHONE (OUTPATIENT)
Dept: SURGERY | Facility: CLINIC | Age: 52
End: 2022-02-25

## 2022-03-07 ENCOUNTER — TELEPHONE (OUTPATIENT)
Dept: SURGERY | Facility: CLINIC | Age: 52
End: 2022-03-07

## 2022-03-07 ENCOUNTER — OFFICE VISIT (OUTPATIENT)
Dept: BARIATRICS | Facility: CLINIC | Age: 52
End: 2022-03-07

## 2022-03-07 DIAGNOSIS — K91.2 POSTSURGICAL MALABSORPTION: Primary | ICD-10-CM

## 2022-03-07 PROCEDURE — RECHECK

## 2022-03-07 NOTE — TELEPHONE ENCOUNTER
Patient called received email on 3/4 from Danna requesting update on return to work date  Patient states has a MD appointment on 3/14/2022 would like return to work date to be 3/15/2022  Danna also requested last office visit note to be sent with form  Faxed form and OV notes from 2/17/2022 to [facility])  Fax [phone]

## 2022-03-07 NOTE — PROGRESS NOTES
Bariatric Follow Up Nutrition Note    Virtual visit via [facility]    i  Name was verified by patient stating name? yes  ii   verified by patient stating? yes  iii  Verification of patient location yes  iv  Verified patient is in state in which I hold an active license? yes  v  Verified the patient is alone? yes  vi  I would like to verify that you were offered a live visit but are now consenting to this telephone visit? yes  vii  This visit is free yes      Type of surgery  Gastric bypass: laparoscopic  Surgery Date: 10/4/21  5 months  post-op  Surgeon: Dr Jim Domingo  54 y o   female  Weight (last 2 days)     Date/Time Weight    22 1246 73 5 (162)      REPORTED WEIGHT VIA HOME SCALE  Body mass index is 31 64 kg/m²  Initial:  200 6#  Weight on Day of Weight Loss Surgery: 207# (higher than start weight)  Weight in (lb) to have BMI = 25: 127 3#  Pre-Op Excess Wt: 73 3# from initial weight orf 200 6#, but 79 7# from DOS weight  Post-Op Wt Loss: 38#/ 52 7% EBWL in 4 month(s) based off initial weight of 200 6#, however pt gained weight by DOS therefore 45# wt loss/56 4% from DOS weight of 207#    Initial short term goal:  160lbs  153lbs would get BMI <30lbs    Review of History and Medications   Pt now s/p colostomy w/ d/c from hospital on 22  Past Medical History:   Diagnosis Date    Anxiety     Back pain     BRCA negative 2016    [facility]    Cancer ([address] )     Headache     History of chemotherapy 2016    [facility] [address] History of transfusion 2017    no adverse reaction    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)     s/p chemotherapy    Obesity (BMI 30-39  9)      Past Surgical History:   Procedure Laterality Date    ABDOMINAL ADHESION SURGERY N/A 10/4/2021    Procedure: Lysis Adhesions;  Surgeon: Salvador Abarca MD;  Location: [address] [place] [address];  Service: [place] [id number] Left 9/21/2016    Procedure: DEBRIDEMENT OF LEFT ABDOMINAL TISSUE AND CLOSURE; DEBRIDEMENT OF LEFT BREAST FLAP; SUBMUSCULAR TISSUE EXPANDER PLACEMENT WITH ADM (ACELLULAR DERMAL MATRIX); Surgeon: Eveline Trinidad MD;  [facility];  Service:     BREAST BIOPSY Left 04/2016    with sentinel node biospy    BREAST BIOPSY Right 10/18/2021    benign    BREAST RECONSTRUCTION Left 12/22/2017    Procedure: REVISION OF LEFT BREAST SCAR, LOCAL FLAP;  Surgeon: Emily Shah MD;  [facility];  Service: Plastics    [address] [place] [address] N/A 2/6/2022    Procedure: [place] PROCEDURE WITH SIGMOID COLOSTOMY;  Surgeon: Kaia Aguirre MD;  Location: [address] [place] [address];  Service: General    [address] [facility] GASTRIC BANDING  2008    removed 2013    LAPAROSCOPIC GASTRIC BANDING  2013    removal    LAPAROTOMY N/A 2/6/2022    Procedure: LAPAROTOMY EXPLORATORY;  Surgeon: Kaia Aguirre MD;  Location: [address] [place] [address];  Service: General    LIPOSUCTION W/ FAT INJECTION Left 6/8/2017    Procedure: BREAST FAT GRAFTING ;  Surgeon: Eveline Trinidad MD;  [facility];  Service:     MASTECTOMY Left 2016    MEDIPORT INSERTION, SINGLE  2016    FL BIOPSY/EXCISION, LYMPH NODE(S) Left 8/10/2016    Procedure: LYMPHOSCINTIGRAPHY; SENTINAL LYMPH NODE BIOPSY (INJECT AT 1100);   Surgeon: Emmy West MD;  [facility];  Service: Surgical Oncology    FL BREAST RECONSTRUCTION W/FREE FLAP Left 9/19/2016    Procedure: BREAST DELAYED RECONSTRUCTION; DEIP FREE FLAP removal of port-a -cath;  Surgeon: Eveline Trinidad MD;  [facility];  Service: Plastics    FL BREAST REDUCTION Right 12/19/2016    Procedure: BREAST REDUCTION/MASTOPEXY ;  Surgeon: Eveline Trinidad MD;  [facility];  Service: Plastics    FL HYSTEROSCOPY,W/ENDO [address] N/A 5/21/2018    Procedure: DILATATION AND CURETTAGE (D&C), HYSTEROSCOPY;  Surgeon: Joann Sims MD;  [facility];  Service: Gynecology Oncology    SD INSJ/RPLCMT BREAST IMPLANT SEP DAY MASTECTOMY Left 2016    Procedure: BREAST TISSUE EXPANDER REMOVAL; BREAST IMPLANT PLACEMENT;  Surgeon: Fabrice Diana MD;  [facility];  Service: Plastics    SD LAP GASTRIC BYPASS/RAMIN-EN-Y N/A 10/4/2021    Procedure: LAPAROSCOPIC RAMIN-EN-Y GASTRIC BYPASS AND INTRAOPERATIVE EGD;  Surgeon: Valdemar Andersen MD;  Location: [address] [place] [address];  Service: [address] [place] [address] [address], SIMPLE, COMPLETE Left 8/10/2016    Procedure: BREAST MASTECTOMY; FROZEN SECTION ;  Surgeon: Abena Castillo MD;  [facility];  Service: Surgical Oncology    SD REMOVAL TISSUE EXPANDER W/O INSERTION IMPLANT Left 2017    Procedure: BREAST IMPLANT REMOVAL; WASHOUT AND DEBRIDEMENT;  Surgeon: Fabrice Diana MD;  [facility];  Service: Plastics    REDUCTION MAMMAPLASTY  2016    REDUCTION MAMMAPLASTY  2018    REVISION OF SCAR ON TORSO N/A 2016    Procedure: ABDOMINAL SCAR REVISION ;  Surgeon: Fabrice Diana MD;  [facility];  Service:    [address] [place] [address]  2016    US GUIDED BREAST BIOPSY RIGHT COMPLETE Right 10/18/2021    WISDOM TOOTH EXTRACTION       Social History     Socioeconomic History    Marital status: Legally      Spouse name: Not on file    Number of children: Not on file    Years of education: Not on file    Highest education level: Not on file   Occupational History    Not on file   Tobacco Use    Smoking status: Former Smoker     Packs/day: 1 00     Years: 15 00     Pack years: 15 00     Quit date:      Years since quittin     Smokeless tobacco: Never Used   Vaping Use    Vaping Use: Never used   Substance and Sexual Activity    Alcohol use: Not Currently     Comment: rarely    Drug use: No    Sexual activity: Not on file   Other Topics Concern    Not on file   Social History Narrative    Not on file     Social Determinants of Health     Financial Resource Strain: Not on file   Food Insecurity: No Food Insecurity    Worried About Running Out of Food in the Last Year: Never true    Corinne of Food in the Last Year: Never true   Transportation Needs: No Transportation Needs    Lack of Transportation (Medical): No    Lack of Transportation (Non-Medical): No   Physical Activity: Not on file   Stress: Not on file   Social Connections: Not on file   Intimate Partner Violence: Not on file   Housing Stability: Low Risk     Unable to Pay for Housing in the Last Year: No    Number of Places Lived in the Last Year: 1    Unstable Housing in the Last Year: No       Current Outpatient Medications:     buPROPion (WELLBUTRIN SR) 150 mg 12 hr tablet, TAKE 1 TABLET BY MOUTH TWICE A DAY, Disp: 180 tablet, Rfl: 0    Calcium Carbonate-Vit D-Min (CALCIUM 1200 PO), Take by mouth 2  times day geovanni , Disp: , Rfl:     famotidine (PEPCID) 20 mg tablet, Take 1 tablet (20 mg total) by mouth 2 (two) times a day, Disp: 60 tablet, Rfl: 0    LORazepam (ATIVAN) 0 5 mg tablet, Take 1 tablet (0 5 mg total) by mouth 2 (two) times a day as needed for anxiety, Disp: 60 tablet, Rfl: 0    Multiple Vitamin (multivitamin) capsule, Take 1 capsule by mouth daily geovanni, Disp: , Rfl:     ondansetron (ZOFRAN) 4 mg tablet, Take 1 tablet (4 mg total) by mouth every 8 (eight) hours as needed for nausea or vomiting, Disp: 20 tablet, Rfl: 0    zolpidem (AMBIEN) 5 mg tablet, Take 1 tablet (5 mg total) by mouth daily at bedtime, Disp: 30 tablet, Rfl: 0    Food Intake and Lifestyle Assessment   Food Intake Assessment completed via usual diet recall--POOR APPETITE    12pm-JBN shake (25gm)  2:45pm-built bar (17gm)  6pm-salad works last night (ate 3/4, but took 2 5 hrs to eat)--lettuce, chick peas, chicken, olives, corn bean mix, cucumbers    Food she has tried:  [place] yogurt, stuffed pepper, salad works,     Beverage intake:   Diet texture/stage: regular  Protein supplement:   Has JBN powder w/almond milk and has [employer] OR Pure Protein--suggested to blend up the powder with fruit and non-fat milk or almond milk OR blend unflavored protein powder with frozen berries and water so something lighter since frozen fruit  Estimated protein intake per day: 50-60gm (mostly from protein shake and water)  25gm protein from JBN shake + 15gm from protein water  Estimated fluid intake per day: 8-10oz V8 tomato juice (but didn't have today because was told by RN can cause gas) + 1 protein water (16oz) + 12oz protein shake=28-38oz fluids  Yesterday did drink 1/2 Vitamin Water  Meals eaten away from home: not often  Typical meal pattern: 1 meal of food + 2 protein shakes/bars  Eating Behaviors: Appropriate diet advancement, Frequent snacking/ grazing and Mindless eating    Food allergies or intolerances: cottage cheese, some meats and the yogurt doesn't taste good    Vitamins:  · Pt changed to the BariatricPal capsule which is going well (1 per day)   · BA 1 TID chewy calcium--taste is fair  Trying to get them down  Physical Assessment  Nutrition Related Findings  Now with colostomy-- on miralax every other day started over the weekend per general surgeon  Physical Activity  Types of exercise: None  Current physical limitations: having back issues and now s/p colostomy and still healing  Psychosocial Assessment   Support systems: friend(s) relative(s) children  Socioeconomic factors: none    Nutrition Diagnosis  Diagnosis: Inadequate protein intake (NI-5 7  3)  Related to: Limited adherence to nutrition-related recommendations  As Evidenced by: Expected anthropometric outcomes are not achieved     Interventions and Teaching   Patient educated on post-op nutrition guidelines  Patient educated and handouts provided    Surgical changes to stomach / GI  Capacity of post-surgery stomach  Adequate hydration  Expected weight loss  Weight loss plateaus/ possibility of weight regain  Exercise  Nutrition considerations after surgery  Protein supplements  Meal planning and preparation  Appropriate carbohydrate, protein, and fat intake, and food/fluid choices to maximize safe weight loss, nutrient intake, and tolerance   Dietary and lifestyle changes  Possible problems with poor eating habits  Intuitive eating  Techniques for self monitoring and keeping daily food journal  Potential for food intolerance after surgery, and ways to deal with them including: lactose intolerance, nausea, reflux, vomiting, diarrhea, food intolerance, appetite changes, gas  Vitamin / Mineral supplementation of Multivitamin with minerals and Calcium    Education provided to: patient    Barriers to learning: No barriers identified    Readiness to change: action    Comprehension: verbalizes understanding     Expected Compliance: good     Pt is now about 1 month s/p colostomy  She reports her appetite is still poor and hydration is less than 48oz  She reports being very tired so if she puts the effort into cooking a meal she is then too tired to eat it  Tried to provide some quick and easy options, but pt seems to have limited motivation at this time  She is still very upset/emotional about the situation and was lying in bed during the virtual call  Pt was advised by general surgery to increase veggie intake, but pt is haivng trouble due to limited space s/p RYGB  She appears to be able to eat about 1/2 cup, maybe a little more of food, depending on the food  pt was also frustrated, stating she isn't losing weight  Pt reports today she was 162lbs  Advised her she was 175# here in the office before she was admitted to the hospital so there has been weight loss, but right now we are most concerned about her nutrition and hydration  After reviewing visit with Miranda Skaggs, would recommend pt start on IVF         Goals  · Continue to push fluids:  water, crystal light, protein water, bone broth, smoothies, SF Jello-O, SF ice pops  · Keep trying new foods  · Incorporate veggies and balance with protein  · Try some of the quick easy food options-tuna pouches, canned chicken, rotisserie chicken,   · Keep on trying to eat small frequent meals      · Start PRASANTH PAElizac to set up    Time Spent:   30 Minutes

## 2022-03-07 NOTE — TELEPHONE ENCOUNTER
Nadiya from [facility] called she is with patient  States patient has a buldge near stoma and is concerned  Message sent to Dr Jessica Pang to call patient   [phone]

## 2022-03-08 ENCOUNTER — TELEPHONE (OUTPATIENT)
Dept: BARIATRICS | Facility: CLINIC | Age: 52
End: 2022-03-08

## 2022-03-08 VITALS — HEIGHT: 60 IN | WEIGHT: 162 LBS | BODY MASS INDEX: 31.8 KG/M2

## 2022-03-08 DIAGNOSIS — E86.0 MILD DEHYDRATION: Primary | ICD-10-CM

## 2022-03-08 NOTE — TELEPHONE ENCOUNTER
Please advise patient that she should go to infusions  They may be able to consult the ostomy nurse in the hospital to come see her while there  She should contact Dr Dorcas Willis office as well  If she is not going to keep the appointment she needs to call them and cancel in advance   Thank you

## 2022-03-08 NOTE — TELEPHONE ENCOUNTER
Called pt and advised her that PA is recommending IVF and an appt was scheduled for tomorrow and recommended 3x/week  Pt is hesitant  She doesn't want to commit to the IVF until she knows if it is covered by her insurance  Advised pt will f/u and be in touch

## 2022-03-08 NOTE — PROGRESS NOTES
Patient with inadequate fluid intake  Will start outpatient IVF infusions 3x/week for now  Continue close f/u with RD and LCSW  She needs to continue close f/u with Dr Mardelle Nageotte

## 2022-03-08 NOTE — TELEPHONE ENCOUNTER
Gerber Pritchett reached out to infusion center and preauthorize was contacted  It was advised that IVF are approved and covered  RD called pt and left voicemail advising of the information  Advised pt that appt is at 1:30, tomorrow, March 9th at the infusion center in the hospital  Requested a call back to discuss and let us know if she will attend the appt

## 2022-03-08 NOTE — TELEPHONE ENCOUNTER
Pt called back  Pt frustrated that her bag has been malfunctioning  Since she now has a hernia near the ostomy the bag isn't staying on properly  Pt has calls out to people to try to help the situation  Pt is hesitant about going for the IVF tomorrow because she is afraid the bag will leak while there  Pt also states she is afraid to eat and drink more because of the bag malfunctioning, therefore this RD stressed the need for the IV hydration  Pt did talk to one nurse who gave her a few tips  She will try those tips and we will reassess in the morning, but did strongly encourage pt to consider to go to the infusion center appt tomorrow

## 2022-03-08 NOTE — TELEPHONE ENCOUNTER
Please advise patient that I have placed outpatient IVF infusions, she has an appointment tomorrow at 1:30  She is scheduled for 3x/week - they can see her Friday at 1:30 as well but she will schedule the rest with them   Thank you

## 2022-03-08 NOTE — TELEPHONE ENCOUNTER
I suggest that she call her insurance and the infusion center  I am not sure but I would be surprised if it's no covered   Thanks

## 2022-03-09 ENCOUNTER — HOSPITAL ENCOUNTER (OUTPATIENT)
Dept: INFUSION CENTER | Facility: HOSPITAL | Age: 52
Discharge: HOME | End: 2022-03-09
Attending: SURGERY

## 2022-03-09 ENCOUNTER — DOCUMENTATION (OUTPATIENT)
Dept: WOUND CARE | Facility: HOSPITAL | Age: 52
End: 2022-03-09

## 2022-03-09 NOTE — TELEPHONE ENCOUNTER
Pt emailed at 4:30pm yesterday:  Gabriel Ford,     I am seeing Len Burton at the wound care center tomorrow at 11:30 to see how we can fix this so it is more stable  I am not sure how long i iwll be with her but i can call you when I am done at the clinic  RD responded:  Gabriel Stephen,    Where is the wound center? In [place]? If so I think it would be perfect if you finished there then went to the infusion center, the timing might work out perfect  I really think it would be the best thing for you as far as nutrition goes right now  Gladis    Also, RD called and left pt a voicemail this morning  I advised the pt that I did receive email and strongly encouraged her to go to the IVF at the infusion center at 1:30pm, that the timing should work out well with her 11:30 wound center appt  Adivsed pt if she truly doesn't think she will make the 1:30 appt to call the center and cancel  Requested a f/u call

## 2022-03-11 ENCOUNTER — HOSPITAL ENCOUNTER (OUTPATIENT)
Dept: INFUSION CENTER | Facility: HOSPITAL | Age: 52
Discharge: HOME | End: 2022-03-11
Attending: SURGERY

## 2022-03-11 NOTE — TELEPHONE ENCOUNTER
Pt sent following email on 3/10/22 around 12:30pm:    Gabriel Griffith,  I am sorry I was unable to go after the wound care place  I was exhausted  I am going to try eating and drinking more but i have an appt on Monday at 1:30 if not  RD responded thanking her for the update

## 2022-03-14 ENCOUNTER — HOSPITAL ENCOUNTER (OUTPATIENT)
Dept: INFUSION CENTER | Facility: HOSPITAL | Age: 52
Discharge: HOME | End: 2022-03-14
Attending: SURGERY
Payer: COMMERCIAL

## 2022-03-14 VITALS
RESPIRATION RATE: 18 BRPM | TEMPERATURE: 98.3 F | SYSTOLIC BLOOD PRESSURE: 128 MMHG | DIASTOLIC BLOOD PRESSURE: 58 MMHG | OXYGEN SATURATION: 98 % | HEART RATE: 66 BPM

## 2022-03-14 DIAGNOSIS — E86.0 MILD DEHYDRATION: Primary | ICD-10-CM

## 2022-03-14 LAB
ANION GAP SERPL CALCULATED.3IONS-SCNC: 7 MMOL/L (ref 4–13)
BUN SERPL-MCNC: 22 MG/DL (ref 5–25)
CALCIUM SERPL-MCNC: 9.3 MG/DL (ref 8.3–10.1)
CHLORIDE SERPL-SCNC: 109 MMOL/L (ref 100–108)
CO2 SERPL-SCNC: 26 MMOL/L (ref 21–32)
CREAT SERPL-MCNC: 0.5 MG/DL (ref 0.6–1.3)
GFR SERPL CREATININE-BSD FRML MDRD: 114 ML/MIN/1.73SQ M
GLUCOSE SERPL-MCNC: 95 MG/DL (ref 65–140)
MAGNESIUM SERPL-MCNC: 2.2 MG/DL (ref 1.6–2.6)
PHOSPHATE SERPL-MCNC: 3.2 MG/DL (ref 2.7–4.5)
POTASSIUM SERPL-SCNC: 4.6 MMOL/L (ref 3.5–5.3)
SODIUM SERPL-SCNC: 142 MMOL/L (ref 136–145)

## 2022-03-14 PROCEDURE — 96365 THER/PROPH/DIAG IV INF INIT: CPT

## 2022-03-14 PROCEDURE — 96361 HYDRATE IV INFUSION ADD-ON: CPT

## 2022-03-14 PROCEDURE — 84100 ASSAY OF PHOSPHORUS: CPT | Performed by: SURGERY

## 2022-03-14 PROCEDURE — 80048 BASIC METABOLIC PNL TOTAL CA: CPT | Performed by: SURGERY

## 2022-03-14 PROCEDURE — 83735 ASSAY OF MAGNESIUM: CPT | Performed by: SURGERY

## 2022-03-14 RX ADMIN — SODIUM CHLORIDE, SODIUM LACTATE, POTASSIUM CHLORIDE, AND CALCIUM CHLORIDE 1000 ML: .6; .31; .03; .02 INJECTION, SOLUTION INTRAVENOUS at 14:37

## 2022-03-14 RX ADMIN — THIAMINE HYDROCHLORIDE: 100 INJECTION, SOLUTION INTRAMUSCULAR; INTRAVENOUS at 15:45

## 2022-03-15 ENCOUNTER — TELEPHONE (OUTPATIENT)
Dept: BARIATRICS | Facility: CLINIC | Age: 52
End: 2022-03-15

## 2022-03-15 DIAGNOSIS — E86.0 MILD DEHYDRATION: Primary | ICD-10-CM

## 2022-03-15 RX ORDER — SODIUM CHLORIDE 9 MG/ML
10 INJECTION INTRAVENOUS EVERY 12 HOURS
Qty: 600 ML | Refills: 0 | Status: SHIPPED | OUTPATIENT
Start: 2022-03-15 | End: 2022-04-22 | Stop reason: ALTCHOICE

## 2022-03-16 ENCOUNTER — HOSPITAL ENCOUNTER (OUTPATIENT)
Dept: INFUSION CENTER | Facility: HOSPITAL | Age: 52
Discharge: HOME | End: 2022-03-16
Attending: SURGERY
Payer: COMMERCIAL

## 2022-03-16 VITALS
TEMPERATURE: 97.3 F | DIASTOLIC BLOOD PRESSURE: 58 MMHG | SYSTOLIC BLOOD PRESSURE: 122 MMHG | HEART RATE: 69 BPM | OXYGEN SATURATION: 97 % | RESPIRATION RATE: 20 BRPM

## 2022-03-16 DIAGNOSIS — E86.0 MILD DEHYDRATION: Primary | ICD-10-CM

## 2022-03-16 PROCEDURE — 96361 HYDRATE IV INFUSION ADD-ON: CPT

## 2022-03-16 PROCEDURE — 96365 THER/PROPH/DIAG IV INF INIT: CPT

## 2022-03-16 RX ADMIN — THIAMINE HYDROCHLORIDE: 100 INJECTION, SOLUTION INTRAMUSCULAR; INTRAVENOUS at 10:48

## 2022-03-16 RX ADMIN — SODIUM CHLORIDE, SODIUM LACTATE, POTASSIUM CHLORIDE, AND CALCIUM CHLORIDE 1000 ML: .6; .31; .03; .02 INJECTION, SOLUTION INTRAVENOUS at 09:44

## 2022-03-16 NOTE — PROGRESS NOTES
Patient stating that she does not want midline IV inserted today  Patient requesting to try peripheral IV today  IV started left hand  Office notified

## 2022-03-18 ENCOUNTER — HOSPITAL ENCOUNTER (OUTPATIENT)
Dept: INFUSION CENTER | Facility: HOSPITAL | Age: 52
Discharge: HOME | End: 2022-03-18
Attending: SURGERY
Payer: COMMERCIAL

## 2022-03-18 VITALS
OXYGEN SATURATION: 99 % | RESPIRATION RATE: 20 BRPM | DIASTOLIC BLOOD PRESSURE: 97 MMHG | SYSTOLIC BLOOD PRESSURE: 141 MMHG | HEART RATE: 70 BPM | TEMPERATURE: 98 F

## 2022-03-18 DIAGNOSIS — E86.0 MILD DEHYDRATION: Primary | ICD-10-CM

## 2022-03-18 PROCEDURE — 96361 HYDRATE IV INFUSION ADD-ON: CPT

## 2022-03-18 PROCEDURE — 96365 THER/PROPH/DIAG IV INF INIT: CPT

## 2022-03-18 RX ADMIN — SODIUM CHLORIDE, SODIUM LACTATE, POTASSIUM CHLORIDE, AND CALCIUM CHLORIDE 1000 ML: .6; .31; .03; .02 INJECTION, SOLUTION INTRAVENOUS at 08:55

## 2022-03-18 RX ADMIN — THIAMINE HYDROCHLORIDE: 100 INJECTION, SOLUTION INTRAMUSCULAR; INTRAVENOUS at 10:02

## 2022-03-21 ENCOUNTER — HOSPITAL ENCOUNTER (OUTPATIENT)
Dept: INFUSION CENTER | Facility: HOSPITAL | Age: 52
Discharge: HOME | End: 2022-03-21
Attending: SURGERY

## 2022-03-23 ENCOUNTER — HOSPITAL ENCOUNTER (OUTPATIENT)
Dept: INFUSION CENTER | Facility: HOSPITAL | Age: 52
Discharge: HOME | End: 2022-03-23
Attending: SURGERY

## 2022-03-29 ENCOUNTER — HOSPITAL ENCOUNTER (OUTPATIENT)
Dept: RADIOLOGY | Facility: HOSPITAL | Age: 52
Discharge: HOME | End: 2022-03-29
Attending: INTERNAL MEDICINE
Payer: COMMERCIAL

## 2022-03-29 ENCOUNTER — CONSULT (OUTPATIENT)
Dept: GASTROENTEROLOGY | Facility: CLINIC | Age: 52
End: 2022-03-29
Payer: COMMERCIAL

## 2022-03-29 VITALS
HEIGHT: 60 IN | SYSTOLIC BLOOD PRESSURE: 110 MMHG | BODY MASS INDEX: 31.41 KG/M2 | WEIGHT: 160 LBS | HEART RATE: 84 BPM | DIASTOLIC BLOOD PRESSURE: 86 MMHG | TEMPERATURE: 98.2 F | OXYGEN SATURATION: 96 %

## 2022-03-29 DIAGNOSIS — K21.9 GASTROESOPHAGEAL REFLUX DISEASE WITHOUT ESOPHAGITIS: ICD-10-CM

## 2022-03-29 DIAGNOSIS — R11.10 NON-INTRACTABLE VOMITING, PRESENCE OF NAUSEA NOT SPECIFIED, UNSPECIFIED VOMITING TYPE: ICD-10-CM

## 2022-03-29 DIAGNOSIS — K63.1 PERFORATION OF SIGMOID COLON (HCC): Primary | ICD-10-CM

## 2022-03-29 DIAGNOSIS — K63.1 PERFORATION OF SIGMOID COLON (HCC): ICD-10-CM

## 2022-03-29 PROCEDURE — 3008F BODY MASS INDEX DOCD: CPT | Performed by: INTERNAL MEDICINE

## 2022-03-29 PROCEDURE — 74018 RADEX ABDOMEN 1 VIEW: CPT

## 2022-03-29 PROCEDURE — 1036F TOBACCO NON-USER: CPT | Performed by: INTERNAL MEDICINE

## 2022-03-29 PROCEDURE — 99204 OFFICE O/P NEW MOD 45 MIN: CPT | Performed by: INTERNAL MEDICINE

## 2022-03-29 RX ORDER — PANTOPRAZOLE SODIUM 40 MG/1
40 TABLET, DELAYED RELEASE ORAL DAILY
Qty: 30 TABLET | Refills: 3 | Status: SHIPPED | OUTPATIENT
Start: 2022-03-29 | End: 2022-04-18

## 2022-03-29 NOTE — LETTER
March 29, 2022     Jammie Marroquin MD  [address] [place] [address]    Patient: Kavon Salmon   YOB: 1970   Date of Visit: 3/29/2022       Dear Dr Trina Styles:    Thank you for referring Kavon Salmon to me for evaluation  Below are my notes for this consultation  If you have questions, please do not hesitate to call me  I look forward to following your patient along with you  Sincerely,        Shawn Leiva MD        CC: No Recipients  Shawn Leiva MD  3/29/2022  4:57 PM  Sign when Signing Visit  Consultation - [facility]) [facility]  Kavon Salmon 54 y o  female MRN: [id number]          Assessment & Plan:    53-year-old female history of gastric bypass, longstanding history of chronic constipation recently had a sigmoid perforation requiring colostomy and Bushra's procedure possibly from severe constipation based on large amount of stool seen on CT scan  She had similar fecal loading on prior CT scan about 2 years ago  Now with recurrent episodes of vomiting which occur postprandially  1  Recurrent vomiting:  Unclear etiology, she may have developed some dysmotility versus anastomotic ulceration  -will start her empirically on PPI therapy  -recommend patient proceed with an upper endoscopy, we will try to have this coordinated with her colonoscopy    2  Chronic constipation:  Longstanding history of constipation suspect this may have been the cause of her perforation given CT scan findings with large amount of stool in her sigmoid colon and fecal loading throughout her colon  -recommend that she take MiraLax daily and continue to do so after her reversal  -she should follow up closely with us, may need to have additional promotility agents  -may benefit from anorectal manometry though much of the symptoms occurred after her surgery for her mastectomy and complications    Hailee was seen today for consult      Diagnoses and all orders for this visit:    Perforation of sigmoid colon (HCC)  -     XR abdomen 1 view kub; Future    Gastroesophageal reflux disease without esophagitis  -     pantoprazole (PROTONIX) 40 mg tablet; Take 1 tablet (40 mg total) by mouth daily  -     EGD; Future    Non-intractable vomiting, presence of nausea not specified, unspecified vomiting type  -     pantoprazole (PROTONIX) 40 mg tablet; Take 1 tablet (40 mg total) by mouth daily  -     EGD; Future            _____________________________________________________________        CC:  Chronic constipation    HPI:  Aguilar Quezada is a 54 y  o female who was referred for evaluation of chronic constipation  This is a pleasant 45-year-old female, recently presented to the hospital February with acute onset abdominal pain found to have sigmoid perforation which required resection, colostomy and Bushra's pouch  Plan to have this reversed in the near future  Has a longstanding history of constipation, was seen many years ago by Gastroenterology, had a colonoscopy at outside facility which was unremarkable, was told to take MiraLax which she notes that when she was taking it regularly started to cause explosive diarrhea every several days  But would have only have small formed stools in between  Patient's recent perforation of of unclear etiology, suspected due to severe constipation    Has a history of Napoleon-en-Y gastric bypass  Also had complicated mastectomy for breast cancer had reconstructive flap but had complications from this reports she had multiple surgeries thereafter and much of her constipation started afterwards  She notes that with her ostomy she has had 1 having softer stools leakage around her stoma  She developed a peristomal hernia  Patient notes that ever since her colostomy she has had persistent postprandial vomiting when she eats solid foods      She was given an H2 blocker when she was discharged but is not on any acid regimen at this time no prior history of acid reflux or dysphagia    Patient is otherwise healthy, treated for depression anxiety    Surgical history is notable for Napoleon-en-Y gastric bypass, cholecystectomy, mastectomy, complicated flap repair  Recent colon perforation with colostomy and Bushra's procedure  Family history is notable for father with melanoma    She denies any tobacco, rarely drinks  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[employer]  ROS:  The remainder of the ROS was negative except for the pertinent positives mentioned in HPI           Allergies: Effexor [venlafaxine] and Bactrim [sulfamethoxazole-trimethoprim]    Medications:   Current Outpatient Medications:     buPROPion (WELLBUTRIN SR) 150 mg 12 hr tablet, TAKE 1 TABLET BY MOUTH TWICE A DAY, Disp: 180 tablet, Rfl: 0    Calcium Carbonate-Vit D-Min (CALCIUM 1200 PO), Take by mouth 2  times day geovanni , Disp: , Rfl:     LORazepam (ATIVAN) 0 5 mg tablet, Take 1 tablet (0 5 mg total) by mouth 2 (two) times a day as needed for anxiety, Disp: 60 tablet, Rfl: 0    Multiple Vitamin (multivitamin) capsule, Take 1 capsule by mouth daily geovanni, Disp: , Rfl:     ondansetron (ZOFRAN) 4 mg tablet, Take 1 tablet (4 mg total) by mouth every 8 (eight) hours as needed for nausea or vomiting, Disp: 20 tablet, Rfl: 0    famotidine (PEPCID) 20 mg tablet, Take 1 tablet (20 mg total) by mouth 2 (two) times a day (Patient not taking: Reported on 3/29/2022 ), Disp: 60 tablet, Rfl: 0    pantoprazole (PROTONIX) 40 mg tablet, Take 1 tablet (40 mg total) by mouth daily, Disp: 30 tablet, Rfl: 3    sodium chloride, PF, 0 9 %, Infuse 10 mL into a venous catheter every 12 (twelve) hours (Patient not taking: Reported on 3/29/2022 ), Disp: 600 mL, Rfl: 0    zolpidem (AMBIEN) 5 mg tablet, Take 1 tablet (5 mg total) by mouth daily at bedtime (Patient not taking: Reported on 3/29/2022 ), Disp: 30 tablet, Rfl: 0'    Past Medical History:   Diagnosis Date    Anxiety     Back pain  BRCA negative 06/2016    [facility]    Cancer ([facility] [address] )     Headache     History of chemotherapy 2016    Neoadjuvant; at [address] History of transfusion 06/2017    no adverse reaction    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)     s/p chemotherapy    Obesity (BMI 30-39  9)        Past Surgical History:   Procedure Laterality Date    ABDOMINAL ADHESION SURGERY N/A 10/4/2021    Procedure: Lysis Adhesions;  Surgeon: Christina Merritt MD;  Location: [address] [place] [address];  Service: Bariatrics    ABDOMINAL WALL SURGERY Left 9/21/2016    Procedure: DEBRIDEMENT OF LEFT ABDOMINAL TISSUE AND CLOSURE; DEBRIDEMENT OF LEFT BREAST FLAP; SUBMUSCULAR TISSUE EXPANDER PLACEMENT WITH ADM (ACELLULAR DERMAL MATRIX); Surgeon: Eveline Trinidad MD;  [facility];  Service:     BREAST BIOPSY Left 04/2016    with sentinel node biospy    BREAST BIOPSY Right 10/18/2021    benign    BREAST RECONSTRUCTION Left 12/22/2017    Procedure: REVISION OF LEFT BREAST SCAR, LOCAL FLAP;  Surgeon: Emily Shah MD;  [facility];  Service: Plastics    [address] [place] [address] N/A 2/6/2022    Procedure: [place] PROCEDURE WITH SIGMOID COLOSTOMY;  Surgeon: Kaia Aguirre MD;  Location: [address] [place] [address];  Service: General    [address] [place] [address] GASTRIC BANDING  2008    removed 2013    LAPAROSCOPIC GASTRIC BANDING  2013    removal    LAPAROTOMY N/A 2/6/2022    Procedure: LAPAROTOMY EXPLORATORY;  Surgeon: Kaia Aguirre MD;  Location: [address] [place] [address];  Service: General    LIPOSUCTION W/ FAT INJECTION Left 6/8/2017    Procedure: BREAST FAT GRAFTING ;  Surgeon: Eveline Trinidad MD;  [facility];  Service:     MASTECTOMY Left 2016    MEDIPORT INSERTION, SINGLE  2016    CT BIOPSY/EXCISION, LYMPH NODE(S) Left 8/10/2016    Procedure: LYMPHOSCINTIGRAPHY; SENTINAL LYMPH NODE BIOPSY (INJECT AT 1100);   Surgeon: Emmy West MD;  [facility];  Service: Surgical Oncology    ID BREAST RECONSTRUCTION W/FREE FLAP Left 9/19/2016    Procedure: BREAST DELAYED RECONSTRUCTION; DEIP FREE FLAP removal of port-a -cath;  Surgeon: Idris Almaraz MD;  [facility];  Service: Plastics    ID BREAST REDUCTION Right 12/19/2016    Procedure: BREAST REDUCTION/MASTOPEXY ;  Surgeon: Idris Almaraz MD;  [facility];  Service: Plastics    ID HY[address]OSCOPY,W/ENDO [address] N/A 5/21/2018    Procedure: DILATATION AND CURETTAGE (D&C), HYSTEROSCOPY;  Surgeon: Cody Zarate MD;  [facility];  Service: Gynecology Oncology    ID INSJ/RPLCMT BREAST IMPLANT SEP DAY MASTECTOMY Left 12/19/2016    Procedure: BREAST TISSUE EXPANDER REMOVAL; BREAST IMPLANT PLACEMENT;  Surgeon: Idris Almaraz MD;  [facility];  Service: Plastics    ID LAP GASTRIC BYPASS/RAMIN-EN-Y N/A 10/4/2021    Procedure: LAPAROSCOPIC RAMIN-EN-Y GASTRIC BYPASS AND INTRAOPERATIVE EGD;  Surgeon: Allie Payne MD;  Location: [address] [place] [address];  Service: [address] [place] [address] [address], SIMPLE, COMPLETE Left 8/10/2016    Procedure: BREAST MASTECTOMY; FROZEN SECTION ;  Surgeon: David Jackson MD;  [facility];  Service: Surgical Oncology    ID REMOVAL TISSUE EXPANDER W/O INSERTION IMPLANT Left 1/23/2017    Procedure: BREAST IMPLANT REMOVAL; WASHOUT AND DEBRIDEMENT;  Surgeon: Idris Almaraz MD;  [facility];  Service: Plastics    REDUCTION MAMMAPLASTY  2016    REDUCTION MAMMAPLASTY  2018    REVISION OF SCAR ON TORSO N/A 12/19/2016    Procedure: ABDOMINAL SCAR REVISION ;  Surgeon: Idris Almaraz MD;  [facility];  Service:    [address]  4/22/2016    US GUIDED BREAST BIOPSY RIGHT COMPLETE Right 10/18/2021    WISDOM TOOTH EXTRACTION         Family History   Problem Relation Age of Onset    Diabetes Mother     Heart disease Mother     Kidney disease Mother     Obesity Mother     Other Father     Cancer Father         melanoma    Obesity Father     Diabetes Maternal Aunt     No Known Problems Paternal Aunt     Breast cancer Cousin     Diverticulitis Maternal Grandmother         reports that she quit smoking about 20 years ago  She has a 15 00 pack-year smoking history  She has never used smokeless tobacco  She reports previous alcohol use  She reports that she does not use drugs            Physical Exam:     /86 (BP Location: Right arm, Patient Position: Sitting, Cuff Size: Standard)   Pulse 84   Temp 98 2 °F (36 8 °C)   Ht 5' (1 524 m)   Wt 72 6 kg (160 lb) Comment: per patient; patient refused to take weight  SpO2 96%   BMI 31 25 kg/m²     Gen: wn/wd, NAD  HEENT: anicteric, MMM, no cervical LAD  CVS: RRR, no m/r/g  CHEST: CTA b/l  ABD: +BS, soft, NT,ND, no hepatosplenomegaly, abdominal wall hernia not evaluated  EXT: no c/c/e  NEURO: aaox3  SKIN: NO rashes

## 2022-03-29 NOTE — PROGRESS NOTES
Consultation - [facility]) [facility]  [facility] 54 y o  female MRN: [id number]          Assessment & Plan:    73-year-old female history of gastric bypass, longstanding history of chronic constipation recently had a sigmoid perforation requiring colostomy and Bushra's procedure possibly from severe constipation based on large amount of stool seen on CT scan  She had similar fecal loading on prior CT scan about 2 years ago  Now with recurrent episodes of vomiting which occur postprandially  1  Recurrent vomiting:  Unclear etiology, she may have developed some dysmotility versus anastomotic ulceration  -will start her empirically on PPI therapy  -recommend patient proceed with an upper endoscopy, we will try to have this coordinated with her colonoscopy    2  Chronic constipation:  Longstanding history of constipation suspect this may have been the cause of her perforation given CT scan findings with large amount of stool in her sigmoid colon and fecal loading throughout her colon  -recommend that she take MiraLax daily and continue to do so after her reversal  -she should follow up closely with us, may need to have additional promotility agents  -may benefit from anorectal manometry though much of the symptoms occurred after her surgery for her mastectomy and complications    Hailee was seen today for consult  Diagnoses and all orders for this visit:    Perforation of sigmoid colon ([address] )  -     XR abdomen 1 view kub; Future    Gastroesophageal reflux disease without esophagitis  -     pantoprazole (PROTONIX) 40 mg tablet; Take 1 tablet (40 mg total) by mouth daily  -     EGD; Future    Non-intractable vomiting, presence of nausea not specified, unspecified vomiting type  -     pantoprazole (PROTONIX) 40 mg tablet; Take 1 tablet (40 mg total) by mouth daily  -     EGD;  Future            _____________________________________________________________        CC:  Chronic constipation    HPI: Zara Figueroa is a 54 y  o female who was referred for evaluation of chronic constipation  This is a pleasant 61-year-old female, recently presented to the hospital February with acute onset abdominal pain found to have sigmoid perforation which required resection, colostomy and Bushra's pouch  Plan to have this reversed in the near future  Has a longstanding history of constipation, was seen many years ago by Gastroenterology, had a colonoscopy at outside facility which was unremarkable, was told to take MiraLax which she notes that when she was taking it regularly started to cause explosive diarrhea every several days  But would have only have small formed stools in between  Patient's recent perforation of of unclear etiology, suspected due to severe constipation    Has a history of Napoleon-en-Y gastric bypass  Also had complicated mastectomy for breast cancer had reconstructive flap but had complications from this reports she had multiple surgeries thereafter and much of her constipation started afterwards  She notes that with her ostomy she has had 1 having softer stools leakage around her stoma  She developed a peristomal hernia  Patient notes that ever since her colostomy she has had persistent postprandial vomiting when she eats solid foods  She was given an H2 blocker when she was discharged but is not on any acid regimen at this time no prior history of acid reflux or dysphagia    Patient is otherwise healthy, treated for depression anxiety    Surgical history is notable for Napoleon-en-Y gastric bypass, cholecystectomy, mastectomy, complicated flap repair  Recent colon perforation with colostomy and Bushra's procedure  Family history is notable for father with melanoma    She denies any tobacco, rarely drinks  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[employer]  ROS:  The remainder of the ROS was negative except for the pertinent positives mentioned in HPI  Allergies: Effexor [venlafaxine] and Bactrim [sulfamethoxazole-trimethoprim]    Medications:   Current Outpatient Medications:     buPROPion (WELLBUTRIN SR) 150 mg 12 hr tablet, TAKE 1 TABLET BY MOUTH TWICE A DAY, Disp: 180 tablet, Rfl: 0    Calcium Carbonate-Vit D-Min (CALCIUM 1200 PO), Take by mouth 2  times day geovanni , Disp: , Rfl:     LORazepam (ATIVAN) 0 5 mg tablet, Take 1 tablet (0 5 mg total) by mouth 2 (two) times a day as needed for anxiety, Disp: 60 tablet, Rfl: 0    Multiple Vitamin (multivitamin) capsule, Take 1 capsule by mouth daily geovanni, Disp: , Rfl:     ondansetron (ZOFRAN) 4 mg tablet, Take 1 tablet (4 mg total) by mouth every 8 (eight) hours as needed for nausea or vomiting, Disp: 20 tablet, Rfl: 0    famotidine (PEPCID) 20 mg tablet, Take 1 tablet (20 mg total) by mouth 2 (two) times a day (Patient not taking: Reported on 3/29/2022 ), Disp: 60 tablet, Rfl: 0    pantoprazole (PROTONIX) 40 mg tablet, Take 1 tablet (40 mg total) by mouth daily, Disp: 30 tablet, Rfl: 3    sodium chloride, PF, 0 9 %, Infuse 10 mL into a venous catheter every 12 (twelve) hours (Patient not taking: Reported on 3/29/2022 ), Disp: 600 mL, Rfl: 0    zolpidem (AMBIEN) 5 mg tablet, Take 1 tablet (5 mg total) by mouth daily at bedtime (Patient not taking: Reported on 3/29/2022 ), Disp: 30 tablet, Rfl: 0'    Past Medical History:   Diagnosis Date    Anxiety     Back pain     BRCA negative 06/2016    [facility]    Cancer ([facility] [address] )     Headache     [address] of [address] 2016    [address] History of transfusion 06/2017    no adverse reaction    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)     s/p chemotherapy    Obesity (BMI 30-39  9)        Past Surgical History:   Procedure Laterality Date    ABDOMINAL ADHESION SURGERY N/A 10/4/2021    Procedure: Lysis Adhesions;  Surgeon: Taylor Negron MD;  Location: [address] [place] [address];  Service: [place] [id number] Left 9/21/2016    Procedure: DEBRIDEMENT OF LEFT ABDOMINAL TISSUE AND CLOSURE; DEBRIDEMENT OF LEFT BREAST FLAP; SUBMUSCULAR TISSUE EXPANDER PLACEMENT WITH ADM (ACELLULAR DERMAL MATRIX); Surgeon: Laisha Quiroz MD;  [facility];  Service:     BREAST BIOPSY Left 04/2016    with sentinel node biospy    BREAST BIOPSY Right 10/18/2021    benign    BREAST RECONSTRUCTION Left 12/22/2017    Procedure: REVISION OF LEFT BREAST SCAR, LOCAL FLAP;  Surgeon: Harish Crowell MD;  [facility];  Service: Plastics    [address] [place] [address] N/A 2/6/2022    Procedure: Jennifer Александр PROCEDURE WITH SIGMOID COLOSTOMY;  Surgeon: Verner Conch, MD;  Location: [address] [place] [address];  Service: General    [facility] GASTRIC BANDING  2008    removed 2013    LAPAROSCOPIC GASTRIC BANDING  2013    removal    LAPAROTOMY N/A 2/6/2022    Procedure: LAPAROTOMY EXPLORATORY;  Surgeon: Verner Conch, MD;  Location: [address] [place] [address];  Service: General    LIPOSUCTION W/ FAT INJECTION Left 6/8/2017    Procedure: BREAST FAT GRAFTING ;  Surgeon: Laisha Quiroz MD;  [facility];  Service:     MASTECTOMY Left 2016    MEDIPORT INSERTION, SINGLE  2016    NH BIOPSY/EXCISION, LYMPH NODE(S) Left 8/10/2016    Procedure: LYMPHOSCINTIGRAPHY; SENTINAL LYMPH NODE BIOPSY (INJECT AT 1100);   Surgeon: Salud Torres MD;  [facility];  Service: Surgical Oncology    NH BREAST RECONSTRUCTION W/FREE FLAP Left 9/19/2016    Procedure: BREAST DELAYED RECONSTRUCTION; DEIP FREE FLAP removal of port-a -cath;  Surgeon: Laisha Quiroz MD;  [facility];  Service: Plastics    NH BREAST REDUCTION Right 12/19/2016    Procedure: BREAST REDUCTION/MASTOPEXY ;  Surgeon: Laisha Quiroz MD;  [facility];  Service: Plastics    NH HYSTEROSCOPY,W/ENDO [address] N/A 5/21/2018    Procedure: DILATATION AND CURETTAGE (D&C), HYSTEROSCOPY;  Surgeon: Tl Marlow MD;  [facility];  Service: Gynecology Oncology    HI INSJ/RPLCMT BREAST IMPLANT SEP DAY MASTECTOMY Left 12/19/2016    Procedure: BREAST TISSUE EXPANDER REMOVAL; BREAST IMPLANT PLACEMENT;  Surgeon: Edwin De MD;  [facility];  Service: Plastics    HI LAP GASTRIC BYPASS/RAMIN-EN-Y N/A 10/4/2021    Procedure: LAPAROSCOPIC RAMIN-EN-Y GASTRIC BYPASS AND INTRAOPERATIVE EGD;  Surgeon: Kaelyn Amaro MD;  Location: [address] [place] [address];  Service: [address] [place] [address] [address], SIMPLE, COMPLETE Left 8/10/2016    Procedure: BREAST MASTECTOMY; FROZEN SECTION ;  Surgeon: Juan Padron MD;  [facility];  Service: Surgical Oncology    HI REMOVAL TISSUE EXPANDER W/O INSERTION IMPLANT Left 1/23/2017    Procedure: BREAST IMPLANT REMOVAL; WASHOUT AND DEBRIDEMENT;  Surgeon: Edwin De MD;  [facility];  Service: Plastics    REDUCTION MAMMAPLASTY  2016    REDUCTION MAMMAPLASTY  2018    REVISION OF SCAR ON TORSO N/A 12/19/2016    Procedure: ABDOMINAL SCAR REVISION ;  Surgeon: Edwin De MD;  [facility];  Service:    [address] [place] [address]  4/22/2016    US GUIDED BREAST BIOPSY RIGHT COMPLETE Right 10/18/2021    WISDOM TOOTH EXTRACTION         Family History   Problem Relation Age of Onset    Diabetes Mother     Heart disease Mother     Kidney disease Mother     Obesity Mother     Other Father     Cancer Father         melanoma    Obesity Father     Diabetes Maternal Aunt     No Known Problems Paternal Aunt     Breast cancer Cousin     Diverticulitis Maternal Grandmother         reports that she quit smoking about 20 years ago  She has a 15 00 pack-year smoking history  She has never used smokeless tobacco  She reports previous alcohol use  She reports that she does not use drugs            Physical Exam:     /86 (BP Location: Right arm, Patient Position: Sitting, Cuff Size: Standard)   Pulse 84   Temp 98 2 °F (36 8 °C)   Ht 5' (1 524 m)   Wt 72 6 kg (160 lb) Comment: per patient; patient refused to take weight  SpO2 96%   BMI 31 25 kg/m²     Gen: wn/wd, NAD  HEENT: anicteric, MMM, no cervical LAD  CVS: RRR, no m/r/g  CHEST: CTA b/l  ABD: +BS, soft, NT,ND, no hepatosplenomegaly, abdominal wall hernia not evaluated  EXT: no c/c/e  NEURO: aaox3  SKIN: NO rashes

## 2022-03-30 ENCOUNTER — TELEPHONE (OUTPATIENT)
Dept: GASTROENTEROLOGY | Facility: AMBULARY SURGERY CENTER | Age: 52
End: 2022-03-30

## 2022-03-30 NOTE — TELEPHONE ENCOUNTER
Called patient, reached voicemail left message results are still pending but to call back if she has further questions

## 2022-03-30 NOTE — TELEPHONE ENCOUNTER
Patients GI provider:  Dr Kaykay Cornejo     Number to return call: [phone]    Reason for call: Pt calling requesting for a call back to review xray results       Scheduled procedure/appointment date if applicable: Apt/procedure n/a

## 2022-04-07 ENCOUNTER — TELEPHONE (OUTPATIENT)
Dept: OTHER | Facility: OTHER | Age: 52
End: 2022-04-07

## 2022-04-07 NOTE — TELEPHONE ENCOUNTER
Patient is requesting a call back regarding her Procedure that is scheduled and also her xray results

## 2022-04-12 DIAGNOSIS — F32.A DEPRESSION, UNSPECIFIED DEPRESSION TYPE: ICD-10-CM

## 2022-04-12 DIAGNOSIS — F41.9 ANXIETY: ICD-10-CM

## 2022-04-12 RX ORDER — BUPROPION HYDROCHLORIDE 150 MG/1
TABLET, EXTENDED RELEASE ORAL
Qty: 180 TABLET | Refills: 0 | Status: SHIPPED | OUTPATIENT
Start: 2022-04-12 | End: 2022-07-07

## 2022-04-13 ENCOUNTER — ANESTHESIA EVENT (OUTPATIENT)
Dept: GASTROENTEROLOGY | Facility: HOSPITAL | Age: 52
End: 2022-04-13

## 2022-04-13 ENCOUNTER — HOSPITAL ENCOUNTER (OUTPATIENT)
Dept: GASTROENTEROLOGY | Facility: HOSPITAL | Age: 52
Setting detail: OUTPATIENT SURGERY
Discharge: HOME | End: 2022-04-13
Attending: COLON & RECTAL SURGERY | Admitting: COLON & RECTAL SURGERY
Payer: COMMERCIAL

## 2022-04-13 ENCOUNTER — ANESTHESIA (OUTPATIENT)
Dept: GASTROENTEROLOGY | Facility: HOSPITAL | Age: 52
End: 2022-04-13

## 2022-04-13 VITALS
HEIGHT: 60 IN | TEMPERATURE: 96.4 F | BODY MASS INDEX: 31.41 KG/M2 | HEART RATE: 82 BPM | OXYGEN SATURATION: 100 % | RESPIRATION RATE: 18 BRPM | SYSTOLIC BLOOD PRESSURE: 127 MMHG | WEIGHT: 160 LBS | DIASTOLIC BLOOD PRESSURE: 68 MMHG

## 2022-04-13 DIAGNOSIS — R11.2 NAUSEA AND VOMITING, UNSPECIFIED VOMITING TYPE: ICD-10-CM

## 2022-04-13 DIAGNOSIS — K63.1 PERFORATION OF SIGMOID COLON (HCC): ICD-10-CM

## 2022-04-13 DIAGNOSIS — R11.10 VOMITING, UNSPECIFIED VOMITING TYPE, UNSPECIFIED WHETHER NAUSEA PRESENT: Primary | ICD-10-CM

## 2022-04-13 LAB
EXT PREGNANCY TEST URINE: NEGATIVE
EXT. CONTROL: NORMAL

## 2022-04-13 PROCEDURE — 81025 URINE PREGNANCY TEST: CPT | Performed by: STUDENT IN AN ORGANIZED HEALTH CARE EDUCATION/TRAINING PROGRAM

## 2022-04-13 PROCEDURE — 45378 DIAGNOSTIC COLONOSCOPY: CPT | Performed by: COLON & RECTAL SURGERY

## 2022-04-13 PROCEDURE — 43239 EGD BIOPSY SINGLE/MULTIPLE: CPT | Performed by: INTERNAL MEDICINE

## 2022-04-13 PROCEDURE — 88305 TISSUE EXAM BY PATHOLOGIST: CPT | Performed by: PATHOLOGY

## 2022-04-13 RX ORDER — SODIUM CHLORIDE 9 MG/ML
INJECTION, SOLUTION INTRAVENOUS CONTINUOUS PRN
Status: DISCONTINUED | OUTPATIENT
Start: 2022-04-13 | End: 2022-04-13

## 2022-04-13 RX ORDER — GLYCOPYRROLATE 0.2 MG/ML
INJECTION INTRAMUSCULAR; INTRAVENOUS AS NEEDED
Status: DISCONTINUED | OUTPATIENT
Start: 2022-04-13 | End: 2022-04-13

## 2022-04-13 RX ORDER — PROPOFOL 10 MG/ML
INJECTION, EMULSION INTRAVENOUS AS NEEDED
Status: DISCONTINUED | OUTPATIENT
Start: 2022-04-13 | End: 2022-04-13

## 2022-04-13 RX ADMIN — PROPOFOL 50 MG: 10 INJECTION, EMULSION INTRAVENOUS at 08:38

## 2022-04-13 RX ADMIN — PROPOFOL 50 MG: 10 INJECTION, EMULSION INTRAVENOUS at 08:28

## 2022-04-13 RX ADMIN — PROPOFOL 50 MG: 10 INJECTION, EMULSION INTRAVENOUS at 08:53

## 2022-04-13 RX ADMIN — SODIUM CHLORIDE: 0.9 INJECTION, SOLUTION INTRAVENOUS at 07:55

## 2022-04-13 RX ADMIN — PROPOFOL 150 MG: 10 INJECTION, EMULSION INTRAVENOUS at 08:20

## 2022-04-13 RX ADMIN — PROPOFOL 50 MG: 10 INJECTION, EMULSION INTRAVENOUS at 08:26

## 2022-04-13 RX ADMIN — PROPOFOL 50 MG: 10 INJECTION, EMULSION INTRAVENOUS at 08:48

## 2022-04-13 RX ADMIN — PROPOFOL 50 MG: 10 INJECTION, EMULSION INTRAVENOUS at 08:45

## 2022-04-13 RX ADMIN — PROPOFOL 50 MG: 10 INJECTION, EMULSION INTRAVENOUS at 08:31

## 2022-04-13 RX ADMIN — GLYCOPYRROLATE 0.2 MG: 0.2 INJECTION, SOLUTION INTRAMUSCULAR; INTRAVENOUS at 08:48

## 2022-04-13 RX ADMIN — PROPOFOL 50 MG: 10 INJECTION, EMULSION INTRAVENOUS at 08:33

## 2022-04-13 RX ADMIN — LIDOCAINE HYDROCHLORIDE 100 MG: 20 INJECTION INTRAVENOUS at 08:48

## 2022-04-13 RX ADMIN — SODIUM CHLORIDE: 0.9 INJECTION, SOLUTION INTRAVENOUS at 08:15

## 2022-04-13 RX ADMIN — PROPOFOL 50 MG: 10 INJECTION, EMULSION INTRAVENOUS at 08:35

## 2022-04-13 RX ADMIN — PROPOFOL 50 MG: 10 INJECTION, EMULSION INTRAVENOUS at 08:24

## 2022-04-13 RX ADMIN — PROPOFOL 50 MG: 10 INJECTION, EMULSION INTRAVENOUS at 08:42

## 2022-04-13 NOTE — ANESTHESIA PREPROCEDURE EVALUATION
Procedure:  COLONOSCOPY    Relevant Problems   GI/HEPATIC   (+) GERD (gastroesophageal reflux disease)   (+) Rectal bleeding      GYN   (+) Malignant neoplasm of upper-outer quadrant of left breast in female, estrogen receptor positive (HCC)      MUSCULOSKELETAL   (+) Low back pain      NEURO/PSYCH   (+) Anxiety   (+) Continuous opioid dependence (HCC)   (+) Depression, recurrent (HCC)        Physical Exam    Airway    Mallampati score: I  TM Distance: >3 FB  Neck ROM: full     Dental   No notable dental hx     Cardiovascular  Cardiovascular exam normal    Pulmonary  Pulmonary exam normal     Other Findings        Anesthesia Plan  ASA Score- 2     Anesthesia Type- IV sedation with anesthesia with ASA Monitors  Additional Monitors:   Airway Plan:           Plan Factors-Exercise tolerance (METS): >4 METS  Chart reviewed  Existing labs reviewed  Patient summary reviewed  Patient is not a current smoker  Induction- intravenous  Postoperative Plan-     Informed Consent- Anesthetic plan and risks discussed with patient  I personally reviewed this patient with the CRNA  Discussed and agreed on the Anesthesia Plan with the CRNA  Melissa Hernandez is a 54 y o  female presenting today for Colonoscopy   History of anesthesia : no issues  NPO since approrpiate  Bowel prep completed  Denies N/V, F, chills, CP, SOB  AQA  Plan for MAC

## 2022-04-13 NOTE — H&P
History and Physical   Colon and Rectal Surgery   [facility] 54 y o  female MRN: [id number]  Unit/Bed#:  Encounter: [id number]  04/13/22   8:11 AM      CC:  Diverticulitis  For closure [place]  History of Present Illness   HPI:  [facility] is a 54 y o  female as above  Historical Information   Past Medical History:   Diagnosis Date    Anxiety     Back pain     BRCA negative 06/2016    [facility]riad    Cancer ([address] )     Headache     History of chemotherapy 2016    Neoadjuvant; at [address] History of transfusion 06/2017    no adverse reaction    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)     s/p chemotherapy    Obesity (BMI 30-39  9)      Past Surgical History:   Procedure Laterality Date    ABDOMINAL ADHESION SURGERY N/A 10/4/2021    Procedure: Lysis Adhesions;  Surgeon: Vince Reid MD;  Location: [address] [place] [address];  Service: Bariatrics    ABDOMINAL WALL SURGERY Left 9/21/2016    Procedure: DEBRIDEMENT OF LEFT ABDOMINAL TISSUE AND CLOSURE; DEBRIDEMENT OF LEFT BREAST FLAP; SUBMUSCULAR TISSUE EXPANDER PLACEMENT WITH ADM (ACELLULAR DERMAL MATRIX);   Surgeon: Parker Tran MD;  Location:  MAIN OR;  Service:     BREAST BIOPSY Left 04/2016    with sentinel node biospy    BREAST BIOPSY Right 10/18/2021    benign    BREAST RECONSTRUCTION Left 12/22/2017    Procedure: REVISION OF LEFT BREAST SCAR, LOCAL FLAP;  Surgeon: Kendell Spencer MD;  [facility];  Service: Plastics    [address] [place] [address] N/A 2/6/2022    Procedure: Zack Damme PROCEDURE WITH SIGMOID COLOSTOMY;  Surgeon: Fox Zarate MD;  Location: [address] [place] [address];  Service: General    LAPAROSCOPIC CHOLECYSTECTOMY  2000    LAPAROSCOPIC GASTRIC BANDING  2008    removed 2013    LAPAROSCOPIC GASTRIC BANDING  2013    removal    LAPAROTOMY N/A 2/6/2022    Procedure: LAPAROTOMY EXPLORATORY;  Surgeon: Fox Zarate MD;  [facility];  Service: General    LIPOSUCTION W/ FAT INJECTION Left 6/8/2017    Procedure: BREAST FAT GRAFTING ;  Surgeon: Amari Benoit MD;  [facility];  Service:     MASTECTOMY Left 2016    MEDIPORT INSERTION, SINGLE  2016    NJ BIOPSY/EXCISION, LYMPH NODE(S) Left 8/10/2016    Procedure: LYMPHOSCINTIGRAPHY; SENTINAL LYMPH NODE BIOPSY (INJECT AT 1100);   Surgeon: Joseline Rodrigues MD;  [facility];  Service: Surgical Oncology    NJ BREAST RECONSTRUCTION W/FREE FLAP Left 9/19/2016    Procedure: BREAST DELAYED RECONSTRUCTION; DEIP FREE FLAP removal of port-a -cath;  Surgeon: Amari Benoit MD;  [facility];  Service: Plastics    NJ BREAST REDUCTION Right 12/19/2016    Procedure: BREAST REDUCTION/MASTOPEXY ;  Surgeon: Amari Benoit MD;  [facility];  Service: Plastics    NJ HYSTEROSCOPY,W/ENDO [address] N/A 5/21/2018    Procedure: DILATATION AND CURETTAGE (D&C), HYSTEROSCOPY;  Surgeon: Chase Scott MD;  [facility];  Service: Gynecology Oncology    NJ INSJ/RPLCMT BREAST IMPLANT SEP DAY MASTECTOMY Left 12/19/2016    Procedure: BREAST TISSUE EXPANDER REMOVAL; BREAST IMPLANT PLACEMENT;  Surgeon: Amari Benoit MD;  [facility];  Service: Plastics    NJ LAP GASTRIC BYPASS/RAMIN-EN-Y N/A 10/4/2021    Procedure: LAPAROSCOPIC RAMIN-EN-Y GASTRIC BYPASS AND INTRAOPERATIVE EGD;  Surgeon: Yamil Hood MD;  Location: [address] [place] [address];  Service: [address] [place] [address],  [address], SIMPLE, COMPLETE Left 8/10/2016    Procedure: BREAST MASTECTOMY; FROZEN SECTION ;  Surgeon: Joseline Rodrigues MD;  [facility];  Service: Surgical Oncology    NJ REMOVAL TISSUE EXPANDER W/O INSERTION IMPLANT Left 1/23/2017    Procedure: BREAST IMPLANT REMOVAL; WASHOUT AND DEBRIDEMENT;  Surgeon: Amari Benoit MD;  [facility];  Service: Plastics    REDUCTION MAMMAPLASTY  2016    REDUCTION MAMMAPLASTY  2018    REVISION OF SCAR ON TORSO N/A 12/19/2016    Procedure: ABDOMINAL SCAR REVISION ;  Surgeon: Amari Benoit MD;  [facility];  Service:    [address] [place] [address] 2016    US GUIDED BREAST BIOPSY RIGHT COMPLETE Right 10/18/2021    WISDOM TOOTH EXTRACTION         Meds/Allergies     (Not in a hospital admission)        Current Outpatient Medications:     buPROPion (WELLBUTRIN SR) 150 mg 12 hr tablet, TAKE 1 TABLET BY MOUTH TWICE A DAY, Disp: 180 tablet, Rfl: 0    Calcium Carbonate-Vit D-Min (CALCIUM 1200 PO), Take by mouth 2  times day geovanni , Disp: , Rfl:     LORazepam (ATIVAN) 0 5 mg tablet, Take 1 tablet (0 5 mg total) by mouth 2 (two) times a day as needed for anxiety, Disp: 60 tablet, Rfl: 0    zolpidem (AMBIEN) 5 mg tablet, Take 1 tablet (5 mg total) by mouth daily at bedtime, Disp: 30 tablet, Rfl: 0    famotidine (PEPCID) 20 mg tablet, Take 1 tablet (20 mg total) by mouth 2 (two) times a day (Patient not taking: Reported on 3/29/2022 ), Disp: 60 tablet, Rfl: 0    Multiple Vitamin (multivitamin) capsule, Take 1 capsule by mouth daily geovanni, Disp: , Rfl:     ondansetron (ZOFRAN) 4 mg tablet, Take 1 tablet (4 mg total) by mouth every 8 (eight) hours as needed for nausea or vomiting, Disp: 20 tablet, Rfl: 0    pantoprazole (PROTONIX) 40 mg tablet, Take 1 tablet (40 mg total) by mouth daily, Disp: 30 tablet, Rfl: 3    sodium chloride, PF, 0 9 %, Infuse 10 mL into a venous catheter every 12 (twelve) hours (Patient not taking: Reported on 3/29/2022 ), Disp: 600 mL, Rfl: 0    Allergies   Allergen Reactions    Effexor [Venlafaxine] GI Intolerance    Bactrim [Sulfamethoxazole-Trimethoprim] Itching         Social History   Social History     Substance and Sexual Activity   Alcohol Use Not Currently    Comment: rarely     Social History     Substance and Sexual Activity   Drug Use No     Social History     Tobacco Use   Smoking Status Former Smoker    Packs/day: 1 00    Years: 15 00    Pack years: 15     Quit date:     Years since quittin 2   Smokeless Tobacco Never Used         Family History:   Family History   Problem Relation Age of Onset    Diabetes Mother     Heart disease Mother     Kidney disease Mother     Obesity Mother     Other Father     Cancer Father         melanoma    Obesity Father     Diabetes Maternal Aunt     No Known Problems Paternal Aunt     Breast cancer Cousin     Diverticulitis Maternal Grandmother        Objective     Current Vitals:   Blood Pressure: 118/67 (04/13/22 0743)  Pulse: 72 (04/13/22 0743)  Temperature: 97 5 °F (36 4 °C) (04/13/22 0743)  Temp Source: Tympanic (04/13/22 0743)  Respirations: 18 (04/13/22 0743)  Height: 5' (152 4 cm) (04/13/22 0743)  Weight - Scale: 72 6 kg (160 lb) (04/13/22 0743)  SpO2: 98 % (04/13/22 0743)  No intake or output data in the 24 hours ending 04/13/22 0811    Physical Exam:  General:  Well nourished, no distress  Neuro: Alert and oriented  Eyes:Sclera anicteric, conjunctiva pink  Pulm: Clear to auscultation bilaterally  No respiratory Distress  CV:  Regular rate and rhythm  No murmurs  Abdomen:  Soft, flat, non-tender, without masses or hepatosplenomegaly  Stoma intact  Lab Results:       ASSESSMENT:  María Garner is a 54 y o  female for screening and surgical preparation  PLAN:  Colonoscopy  Risks , including, but not limited to, bleeding, perforation, missed lesions, and potential need for surgery, were reviewed  Alternatives to colonoscopy were discussed    Sophie Hawkins MD

## 2022-04-13 NOTE — H&P (VIEW-ONLY)
History and Physical   Colon and Rectal Surgery   [facility] 54 y o  female MRN: [id number]  Unit/Bed#:  Encounter: [id number]  04/13/22   8:11 AM      CC:  Diverticulitis  For closure [place]  History of Present Illness   HPI:  [facility] is a 54 y o  female as above  Historical Information   Past Medical History:   Diagnosis Date    Anxiety     Back pain     BRCA negative 06/2016    [facility]riad    Cancer ([address] )     Headache     History of chemotherapy 2016    Neoadjuvant; at [address] History of transfusion 06/2017    no adverse reaction    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)     s/p chemotherapy    Obesity (BMI 30-39  9)      Past Surgical History:   Procedure Laterality Date    ABDOMINAL ADHESION SURGERY N/A 10/4/2021    Procedure: Lysis Adhesions;  Surgeon: Ovidio Marley MD;  Location: [address] [place] [address];  Service: Bariatrics    ABDOMINAL WALL SURGERY Left 9/21/2016    Procedure: DEBRIDEMENT OF LEFT ABDOMINAL TISSUE AND CLOSURE; DEBRIDEMENT OF LEFT BREAST FLAP; SUBMUSCULAR TISSUE EXPANDER PLACEMENT WITH ADM (ACELLULAR DERMAL MATRIX);   Surgeon: Hong Euceda MD;  Location:  MAIN OR;  Service:     BREAST BIOPSY Left 04/2016    with sentinel node biospy    BREAST BIOPSY Right 10/18/2021    benign    BREAST RECONSTRUCTION Left 12/22/2017    Procedure: REVISION OF LEFT BREAST SCAR, LOCAL FLAP;  Surgeon: Joselito Lorenz MD;  [facility];  Service: Plastics    [address] [place] [address] N/A 2/6/2022    Procedure: Celina Fanector PROCEDURE WITH SIGMOID COLOSTOMY;  Surgeon: Annmarie Brooks MD;  Location: [address] [place] [address];  Service: General    LAPAROSCOPIC CHOLECYSTECTOMY  2000    LAPAROSCOPIC GASTRIC BANDING  2008    removed 2013    LAPAROSCOPIC GASTRIC BANDING  2013    removal    LAPAROTOMY N/A 2/6/2022    Procedure: LAPAROTOMY EXPLORATORY;  Surgeon: Annmarie Brooks MD;  [facility];  Service: General    LIPOSUCTION W/ FAT INJECTION Left 6/8/2017    Procedure: BREAST FAT GRAFTING ;  Surgeon: Frank Herzog MD;  [facility];  Service:     MASTECTOMY Left 2016    MEDIPORT INSERTION, SINGLE  2016    SC BIOPSY/EXCISION, LYMPH NODE(S) Left 8/10/2016    Procedure: LYMPHOSCINTIGRAPHY; SENTINAL LYMPH NODE BIOPSY (INJECT AT 1100);   Surgeon: Autumn Pierson MD;  [facility];  Service: Surgical Oncology    SC BREAST RECONSTRUCTION W/FREE FLAP Left 9/19/2016    Procedure: BREAST DELAYED RECONSTRUCTION; DEIP FREE FLAP removal of port-a -cath;  Surgeon: Frank Herzog MD;  [facility];  Service: Plastics    SC BREAST REDUCTION Right 12/19/2016    Procedure: BREAST REDUCTION/MASTOPEXY ;  Surgeon: Frank Herzog MD;  [facility];  Service: Plastics    SC HYSTEROSCOPY,W/ENDO [address] N/A 5/21/2018    Procedure: DILATATION AND CURETTAGE (D&C), HYSTEROSCOPY;  Surgeon: Pierce Funez MD;  [facility];  Service: Gynecology Oncology    SC INSJ/RPLCMT BREAST IMPLANT SEP DAY MASTECTOMY Left 12/19/2016    Procedure: BREAST TISSUE EXPANDER REMOVAL; BREAST IMPLANT PLACEMENT;  Surgeon: Frank Herzog MD;  [facility];  Service: Plastics    SC LAP GASTRIC BYPASS/RAMIN-EN-Y N/A 10/4/2021    Procedure: LAPAROSCOPIC RAMIN-EN-Y GASTRIC BYPASS AND INTRAOPERATIVE EGD;  Surgeon: Samy Jameson MD;  Location: [address] [place] [address];  Service: [address] [place] [address] [address], SIMPLE, COMPLETE Left 8/10/2016    Procedure: BREAST MASTECTOMY; FROZEN SECTION ;  Surgeon: Autumn Pierson MD;  [facility];  Service: Surgical Oncology    SC REMOVAL TISSUE EXPANDER W/O INSERTION IMPLANT Left 1/23/2017    Procedure: BREAST IMPLANT REMOVAL; WASHOUT AND DEBRIDEMENT;  Surgeon: Frank Herzog MD;  [facility];  Service: Plastics    REDUCTION MAMMAPLASTY  2016    REDUCTION MAMMAPLASTY  2018    REVISION OF SCAR ON TORSO N/A 12/19/2016    Procedure: ABDOMINAL SCAR REVISION ;  Surgeon: Frank Herzog MD;  [facility];  Service:    [address] [place] [address] 2016    US GUIDED BREAST BIOPSY RIGHT COMPLETE Right 10/18/2021    WISDOM TOOTH EXTRACTION         Meds/Allergies     (Not in a hospital admission)        Current Outpatient Medications:     buPROPion (WELLBUTRIN SR) 150 mg 12 hr tablet, TAKE 1 TABLET BY MOUTH TWICE A DAY, Disp: 180 tablet, Rfl: 0    Calcium Carbonate-Vit D-Min (CALCIUM 1200 PO), Take by mouth 2  times day geovanni , Disp: , Rfl:     LORazepam (ATIVAN) 0 5 mg tablet, Take 1 tablet (0 5 mg total) by mouth 2 (two) times a day as needed for anxiety, Disp: 60 tablet, Rfl: 0    zolpidem (AMBIEN) 5 mg tablet, Take 1 tablet (5 mg total) by mouth daily at bedtime, Disp: 30 tablet, Rfl: 0    famotidine (PEPCID) 20 mg tablet, Take 1 tablet (20 mg total) by mouth 2 (two) times a day (Patient not taking: Reported on 3/29/2022 ), Disp: 60 tablet, Rfl: 0    Multiple Vitamin (multivitamin) capsule, Take 1 capsule by mouth daily geovanni, Disp: , Rfl:     ondansetron (ZOFRAN) 4 mg tablet, Take 1 tablet (4 mg total) by mouth every 8 (eight) hours as needed for nausea or vomiting, Disp: 20 tablet, Rfl: 0    pantoprazole (PROTONIX) 40 mg tablet, Take 1 tablet (40 mg total) by mouth daily, Disp: 30 tablet, Rfl: 3    sodium chloride, PF, 0 9 %, Infuse 10 mL into a venous catheter every 12 (twelve) hours (Patient not taking: Reported on 3/29/2022 ), Disp: 600 mL, Rfl: 0    Allergies   Allergen Reactions    Effexor [Venlafaxine] GI Intolerance    Bactrim [Sulfamethoxazole-Trimethoprim] Itching         Social History   Social History     Substance and Sexual Activity   Alcohol Use Not Currently    Comment: rarely     Social History     Substance and Sexual Activity   Drug Use No     Social History     Tobacco Use   Smoking Status Former Smoker    Packs/day: 1 00    Years: 15 00    Pack years: 15     Quit date:     Years since quittin 2   Smokeless Tobacco Never Used         Family History:   Family History   Problem Relation Age of Onset    Diabetes Mother     Heart disease Mother     Kidney disease Mother     Obesity Mother     Other Father     Cancer Father         melanoma    Obesity Father     Diabetes Maternal Aunt     No Known Problems Paternal Aunt     Breast cancer Cousin     Diverticulitis Maternal Grandmother        Objective     Current Vitals:   Blood Pressure: 118/67 (04/13/22 0743)  Pulse: 72 (04/13/22 0743)  Temperature: 97 5 °F (36 4 °C) (04/13/22 0743)  Temp Source: Tympanic (04/13/22 0743)  Respirations: 18 (04/13/22 0743)  Height: 5' (152 4 cm) (04/13/22 0743)  Weight - Scale: 72 6 kg (160 lb) (04/13/22 0743)  SpO2: 98 % (04/13/22 0743)  No intake or output data in the 24 hours ending 04/13/22 0811    Physical Exam:  General:  Well nourished, no distress  Neuro: Alert and oriented  Eyes:Sclera anicteric, conjunctiva pink  Pulm: Clear to auscultation bilaterally  No respiratory Distress  CV:  Regular rate and rhythm  No murmurs  Abdomen:  Soft, flat, non-tender, without masses or hepatosplenomegaly  Stoma intact  Lab Results:       ASSESSMENT:  Macarena Augustin is a 54 y o  female for screening and surgical preparation  PLAN:  Colonoscopy  Risks , including, but not limited to, bleeding, perforation, missed lesions, and potential need for surgery, were reviewed  Alternatives to colonoscopy were discussed    Kiana Luciano MD

## 2022-04-15 ENCOUNTER — TELEPHONE (OUTPATIENT)
Dept: OTHER | Facility: OTHER | Age: 52
End: 2022-04-15

## 2022-04-15 NOTE — TELEPHONE ENCOUNTER
Patient was told to call because she needed to follow up with the doctor  Patient is requesting a call back

## 2022-04-18 ENCOUNTER — TELEPHONE (OUTPATIENT)
Dept: BARIATRICS | Facility: CLINIC | Age: 52
End: 2022-04-18

## 2022-04-18 DIAGNOSIS — K25.9 GASTRIC ULCER: Primary | ICD-10-CM

## 2022-04-18 RX ORDER — PANTOPRAZOLE SODIUM 40 MG/1
40 TABLET, DELAYED RELEASE ORAL 2 TIMES DAILY
Qty: 90 TABLET | Refills: 1 | Status: SHIPPED | OUTPATIENT
Start: 2022-04-18 | End: 2022-04-19

## 2022-04-18 RX ORDER — SUCRALFATE 1 G/1
1 TABLET ORAL 4 TIMES DAILY
Qty: 180 TABLET | Refills: 1 | Status: SHIPPED | OUTPATIENT
Start: 2022-04-18 | End: 2022-07-17

## 2022-04-18 NOTE — TELEPHONE ENCOUNTER
Called pt to f/u from Richard Victor PA-c message she left for pt  Pt did  and RD was able to speak to her  Advised her that Unrulyluiz Victor did leave a message informing her that 2 rx were called in to help with healing the ulcer  Pt states she didn't listen to the message as of yet, but will and will  the rx  Asked pt about hydration and food intake, but pt very short and stated "sorry, you got me on a bad day " She is regretful for even getting the RYGB surgery at this time  She is scheduled for the colostomy reversal at the beginning of May which is hopeful to improve her situation  Pt c/o of stomach pain and some diarrhea at this time  Advised the pain could possibly be related to the GI ulcer  Offered to have Miranda Victor to call her back and discuss in more detail, but pt refused  Pt did not give RD any information on amounts or what she is eating or drinking  RD encouraged to consume 64oz of calorie free beverages

## 2022-04-18 NOTE — TELEPHONE ENCOUNTER
Jim Martines (Self) [phone] (H)     Is call stating Dr Ismael Spencer MD was very thorough however she is unsure if she still needs the upper endoscopy as discussed on   3/29/2022  She is requesting a call back to discuss this

## 2022-04-18 NOTE — TELEPHONE ENCOUNTER
LMOM - Advised patient that EGD on 04/13/22 shows large MU and she must start Protonix 40mg BID and carafate 1g QID today, increase hydrating fluids to goal of 64oz+, avoid any gastric irritants (NSAIDS, smoking/second hand smoke, caffeine, etc )  Per Dr Vanessa Quiñones this should not delay her colostomy surgery  She will need repeat EGD in 3 months to ensure resolution

## 2022-04-18 NOTE — TELEPHONE ENCOUNTER
[facility] [birth year][facility]  [phone]  She would like to discuss if she needs an Upper Endoscopy or not  She was not clear about that after her appt

## 2022-04-19 RX ORDER — PANTOPRAZOLE SODIUM 40 MG/1
40 TABLET, DELAYED RELEASE ORAL DAILY
Qty: 90 TABLET | Refills: 1 | Status: SHIPPED | OUTPATIENT
Start: 2022-04-19

## 2022-04-19 NOTE — TELEPHONE ENCOUNTER
Hailee just called in and wanted to let you know she picked up her medicines  She wants to know if there is anything else she needs to follow up on as well  She did her blood work and wants you to review her labs once they are completed  She is requesting a call back [phone]      Thank you

## 2022-04-19 NOTE — TELEPHONE ENCOUNTER
I do not see any labs, can you please obtain them when possible? Thank you    LMOM and advised her to continue with Protonix 40mg BID and carafate 1g QID, push hydrating fluids, avoid gastric irritants

## 2022-04-19 NOTE — TELEPHONE ENCOUNTER
Left a message for patient to call me back and clarify when and where labs were drawn so I can track them down  Also left a detailed message about how she should be using her Protonix despite the directions on the bottle  Waiting for a return phone call

## 2022-04-21 NOTE — TELEPHONE ENCOUNTER
Spoke to patient and she only had these labs drawn this past Tuesday 4/19  Patient is asking that you give her a call so that she can address concerns about the severity of her ulcer and she is also wondering the turn around time for the bx results  She asked to be called after 3pm so she is more likely to be able to answer her phone   [phone]

## 2022-04-21 NOTE — TELEPHONE ENCOUNTER
Called patient again  She is taking Protonix 40mg BID and carafate 1g QID and the mid-epigastric pain is improving  40oz total daily but recently found clear protein drink that is well tolerated  She is now tolerating more foods such a salad and some fruit  She will avoid any caffeine and gastric irritants

## 2022-04-22 ENCOUNTER — APPOINTMENT (OUTPATIENT)
Dept: LAB | Facility: HOSPITAL | Age: 52
End: 2022-04-22
Payer: COMMERCIAL

## 2022-04-22 DIAGNOSIS — R11.2 NAUSEA AND VOMITING, UNSPECIFIED VOMITING TYPE: ICD-10-CM

## 2022-04-22 DIAGNOSIS — R11.10 VOMITING, UNSPECIFIED VOMITING TYPE, UNSPECIFIED WHETHER NAUSEA PRESENT: ICD-10-CM

## 2022-04-22 PROCEDURE — 87338 HPYLORI STOOL AG IA: CPT | Performed by: INTERNAL MEDICINE

## 2022-04-22 NOTE — PRE-PROCEDURE INSTRUCTIONS
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     Post-Op Info:  Procedure(s) (LRB):  CRANIOTOMY, USING FRAMELESS STEREOTAXY (Left)   4 Days Post-Op     Interval History: NAEON. Improved mental status today.    Medications:  Continuous Infusions:   sodium chloride 0.9% 100 mL/hr at 06/23/19 1800     Scheduled Meds:   ceFAZolin (ANCEF) IVPB  1 g Intravenous Q8H    docusate sodium  100 mg Oral Daily    famotidine (PF)  20 mg Intravenous BID    hydrocortisone sodium succinate  50 mg Intravenous BID    lacosamide (VIMPAT) IVPB  100 mg Intravenous Q12H    levothyroxine  50 mcg Intravenous Daily    mupirocin  1 g Nasal BID     PRN Meds:acetaminophen, hydrALAZINE, HYDROcodone-acetaminophen, magnesium sulfate IVPB, magnesium sulfate IVPB, potassium chloride in water **AND** potassium chloride in water **AND** potassium chloride in water, sodium phosphate IVPB, sodium phosphate IVPB, sodium phosphate IVPB       Objective:     Weight: 76.1 kg (167 lb 12.3 oz)  Body mass index is 27.08 kg/m².  Vital Signs (Most Recent):  Temp: 97.8 °F (36.6 °C) (06/23/19 1500)  Pulse: 73 (06/23/19 1800)  Resp: 15 (06/23/19 1800)  BP: (!) 148/71 (06/23/19 1800)  SpO2: 100 % (06/23/19 1800) Vital Signs (24h Range):  Temp:  [97.8 °F (36.6 °C)-98.8 °F (37.1 °C)] 97.8 °F (36.6 °C)  Pulse:  [49-97] 73  Resp:  [14-28] 15  SpO2:  [98 %-100 %] 100 %  BP: (105-181)/(51-97) 148/71     Date 06/23/19 0700 - 06/24/19 0659   Shift 8808-2731 4187-9755 4533-6562 24 Hour Total   INTAKE   I.V.(mL/kg) 791.7(10.4) 400(5.3)  1191.7(15.7)   IV Piggyback 700   700   Shift Total(mL/kg) 1491.7(19.6) 400(5.3)  1891.7(24.9)   OUTPUT   Urine(mL/kg/hr) 825(1.4) 375  1200   Shift  Pre-Surgery Instructions:   Medication Instructions    buPROPion [facility] SR) 150 mg 12 hr tablet Take day of surgery   Calcium Carbonate-Vit D-Min (CALCIUM 1200 PO) Hold day of surgery   LORazepam (ATIVAN) 0 5 mg tablet Take day of surgery  prn    Multiple Vitamin (multivitamin) capsule Hold day of surgery   ondansetron (ZOFRAN) 4 mg tablet Take day of surgery  prn    pantoprazole (PROTONIX) 40 mg tablet Take day of surgery   sucralfate (CARAFATE) 1 g tablet Hold day of surgery  You will receive a phone call from hospital for arrival time  Please call surgeons office if any changes in your condition  Wear easy on/off clothing; consider type of surgery;  Valuables, jewelry, piercing's please keep at home  **COVID-19  education/surgical guidelines  Updated covid    Visitation policy  Please: No contact lenses or eye make up, artificial eyelashes    *prep instructions per surgeon    Please secure transportation     Follow pre surgery showering or cleaning instructions as  Reviewed by nurse or surgeons office      Questions answered and concerns addressed Total(mL/kg) 825(10.8) 375(4.9)  1200(15.8)   Weight (kg) 76.1 76.1 76.1 76.1              Closed/Suction Drain 06/19/19 1240 Left Scalp Accordion 10 Fr. (Active)   Site Description Unable to view 6/20/2019  5:05 PM   Dressing Type [place] 6/20/2019  8:00 PM   Dressing Status Clean;Dry;Intact 6/20/2019  8:00 PM   Dressing Intervention Dressing reinforced 6/20/2019  5:05 PM   Drainage Bloody 6/20/2019  5:05 PM   Status Other (Comment) 6/20/2019  5:05 PM   Output (mL) 10 mL 6/21/2019 11:07 AM       Neurosurgery Physical Exam      General: well developed, well nourished, no distress.   Head: normocephalic, Left crani incision   GCS: Motor: 6/Verbal: 5/Eyes: 4 GCS Total: 15  Mental Status: Oriented x 4  Language: No aphasia  Speech: Dysarthric  Cranial nerves: face symmetric, tongue midline, CN II-XII grossly intact.   Eyes: pupils equal, round, reactive to light with accomodation, EOMI.  Pulmonary: normal respirations, no signs of respiratory distress  Abdomen: soft, non-distended, not tender to palpation  Sensory: intact to light touch throughout  Motor Strength: Moves all extremities spontaneously with good tone. Full strength upper and lower extremities. No abnormal movements seen.   Incision c/d/i with staples approximating skin edges. No surrounding erythema or edema. No drainage from incision. HV drain in place.       Significant Labs:  Recent Labs   Lab 06/22/19  0445 06/23/19  0136   GLU 96 104    141   K 3.3* 3.1*    110   CO2 21* 22*   BUN 18 16   CREATININE 0.8 0.7   CALCIUM 7.9* 7.6*   MG 2.1 2.2     Recent Labs   Lab 06/22/19  0445 06/23/19  0136   WBC 11.37 9.92   HGB 12.9* 11.8*   HCT 37.7* 35.6*   * 164     Recent Labs   Lab 06/22/19  1305 06/23/19  0136   INR 0.9 1.0     Microbiology Results (last 7 days)     ** No results found for the last 168 hours. **        Recent Lab Results       06/23/19  0553   06/23/19  0136        Anion Gap   9     Baso #   0.01     Basophil%   0.1      BUN, Bld   16     Calcium   7.6     Chloride   110     CO2   22     Creatinine   0.7     Differential Method   Automated     eGFR if    >60.0     eGFR if non    >60.0  Comment:  Calculation used to obtain the estimated glomerular filtration  rate (eGFR) is the CKD-EPI equation.        Eos #   0.0     Eosinophil%   0.0     Glucose   104     Gran # (ANC)   8.6     Gran%   86.3     Group & Rh A POS       Hematocrit   35.6     Hemoglobin   11.8     Immature Grans (Abs)   0.04  Comment:  Mild elevation in immature granulocytes is non specific and   can be seen in a variety of conditions including stress response,   acute inflammation, trauma and pregnancy. Correlation with other   laboratory and clinical findings is essential.       Immature Granulocytes   0.4     INDIRECT PRABHU NEG       Coumadin Monitoring INR   1.0  Comment:  Coumadin Therapy:  2.0 - 3.0 for INR for all indicators except mechanical heart valves  and antiphospholipid syndromes which should use 2.5 - 3.5.       Lymph #   0.7     Lymph%   7.5     Magnesium   2.2     MCH   29.0     MCHC   33.1     MCV   88     Mono #   0.6     Mono%   5.7     MPV   11.9     nRBC   0     Phosphorus   3.0     Platelets   164     Potassium   3.1     Protime   10.4     RBC   4.07     RDW   14.8     Sodium   141     WBC   9.92         All pertinent labs from the last 24 hours have been reviewed.    Significant Diagnostics:  All pertinent imaging reviewed.     Review of Systems        Assessment/Plan:     * Meningioma, cerebral  Patient is a 75 yo male with PMH panhypopituitarism from pituitary adenoma, parkinsonism presenting for elective L temporal meningioma resection (6/19).     - Increased agitation overnight requiring 1x dose Zyprexa to be given. Stepped down from ICU yesterday, re-admitted for close monitoring and q1h neuro checks.   Neuro exam stable, lethargic, remains alert to self, year, place, and situation.  CT head reviewed and  appropriate post operatively. Repeat CT head today due to increased agitation.  Drain with minimal output, will continue to monitor.   Keppra for post surgical seizure ppx   H/ID: Continue abx ppx while drain in place  CV: SBP < 140. Vitals stable last 24 hours  Pulm: No resp distress, wean O2 to room air  GI: ADAT, speech recs for pureed diet, nectar thick liquids  PT/OT/OOB  Dispo: re-admit to ICU for close monitoring.    Panhypopituitarism  Patient is a 75 yo male with PMH panhypopituitarism from pituitary adenoma, parkinsonism presenting for elective L temporal meningioma resection (6/20).     Neuro: continue neuro ICU for q1 hour neurochecks  Drain with minimal output, remove today  Keppra for post surgical seizure ppx   CV: SBP < 140.  Pulm: No resp distress, wean O2 to room air  GI: ADAT, speech recs for thin liquids  PT/OT/OOB  Dispo: remain in ICU for monitoring, possible TTF tomorrow        Ravinder Finn MD  Neurosurgery  [facility]

## 2022-04-23 LAB — H PYLORI AG STL QL IA: NEGATIVE

## 2022-04-27 PROCEDURE — U0005 INFEC AGEN DETEC AMPLI PROBE: HCPCS | Performed by: COLON & RECTAL SURGERY

## 2022-04-27 PROCEDURE — U0003 INFECTIOUS AGENT DETECTION BY NUCLEIC ACID (DNA OR RNA); SEVERE ACUTE RESPIRATORY SYNDROME CORONAVIRUS 2 (SARS-COV-2) (CORONAVIRUS DISEASE [COVID-19]), AMPLIFIED PROBE TECHNIQUE, MAKING USE OF HIGH THROUGHPUT TECHNOLOGIES AS DESCRIBED BY CMS-2020-01-R: HCPCS | Performed by: COLON & RECTAL SURGERY

## 2022-05-02 ENCOUNTER — ANESTHESIA EVENT (OUTPATIENT)
Dept: PERIOP | Facility: HOSPITAL | Age: 52
DRG: 330 | End: 2022-05-02
Payer: COMMERCIAL

## 2022-05-02 LAB
25(OH)D3+25(OH)D2 SERPL-MCNC: 49 NG/ML (ref 30–100)
ALBUMIN SERPL-MCNC: 4.3 G/DL (ref 3.8–4.8)
ALBUMIN/GLOB SERPL: 1.6 {RATIO} (ref 1.2–2.2)
ALP SERPL-CCNC: 104 IU/L (ref 44–121)
ALT SERPL-CCNC: 28 IU/L (ref 0–32)
AST SERPL-CCNC: 23 IU/L (ref 0–40)
BASOPHILS # BLD AUTO: 0.1 X10E3/UL (ref 0–0.2)
BASOPHILS NFR BLD AUTO: 1 %
BILIRUB SERPL-MCNC: 0.3 MG/DL (ref 0–1.2)
BUN SERPL-MCNC: 21 MG/DL (ref 6–24)
BUN/CREAT SERPL: 31 (ref 9–23)
CALCIUM SERPL-MCNC: 10.3 MG/DL (ref 8.7–10.2)
CHLORIDE SERPL-SCNC: 101 MMOL/L (ref 96–106)
CO2 SERPL-SCNC: 23 MMOL/L (ref 20–29)
CREAT SERPL-MCNC: 0.68 MG/DL (ref 0.57–1)
EGFR: 107 ML/MIN/1.73
EOSINOPHIL # BLD AUTO: 0.2 X10E3/UL (ref 0–0.4)
EOSINOPHIL NFR BLD AUTO: 3 %
ERYTHROCYTE [DISTWIDTH] IN BLOOD BY AUTOMATED COUNT: 12.9 % (ref 11.7–15.4)
FERRITIN SERPL-MCNC: 26 NG/ML (ref 15–150)
FOLATE SERPL-MCNC: >20 NG/ML
GLOBULIN SER-MCNC: 2.7 G/DL (ref 1.5–4.5)
GLUCOSE SERPL-MCNC: 87 MG/DL (ref 65–99)
HCT VFR BLD AUTO: 40.5 % (ref 34–46.6)
HGB BLD-MCNC: 13.4 G/DL (ref 11.1–15.9)
IMM GRANULOCYTES # BLD: 0 X10E3/UL (ref 0–0.1)
IMM GRANULOCYTES NFR BLD: 0 %
IRON SATN MFR SERPL: 18 % (ref 15–55)
IRON SERPL-MCNC: 59 UG/DL (ref 27–159)
LYMPHOCYTES # BLD AUTO: 1.8 X10E3/UL (ref 0.7–3.1)
LYMPHOCYTES NFR BLD AUTO: 31 %
MCH RBC QN AUTO: 31.2 PG (ref 26.6–33)
MCHC RBC AUTO-ENTMCNC: 33.1 G/DL (ref 31.5–35.7)
MCV RBC AUTO: 94 FL (ref 79–97)
MONOCYTES # BLD AUTO: 0.5 X10E3/UL (ref 0.1–0.9)
MONOCYTES NFR BLD AUTO: 8 %
NEUTROPHILS # BLD AUTO: 3.4 X10E3/UL (ref 1.4–7)
NEUTROPHILS NFR BLD AUTO: 57 %
PLATELET # BLD AUTO: 287 X10E3/UL (ref 150–450)
POTASSIUM SERPL-SCNC: 4.3 MMOL/L (ref 3.5–5.2)
PROT SERPL-MCNC: 7 G/DL (ref 6–8.5)
PTH-INTACT SERPL-MCNC: 18 PG/ML (ref 15–65)
RBC # BLD AUTO: 4.3 X10E6/UL (ref 3.77–5.28)
SODIUM SERPL-SCNC: 140 MMOL/L (ref 134–144)
TIBC SERPL-MCNC: 332 UG/DL (ref 250–450)
UIBC SERPL-MCNC: 273 UG/DL (ref 131–425)
VIT A SERPL-MCNC: 56.7 UG/DL (ref 20.1–62)
VIT B1 BLD-SCNC: 204 NMOL/L (ref 66.5–200)
VIT B12 SERPL-MCNC: >2000 PG/ML (ref 232–1245)
WBC # BLD AUTO: 5.9 X10E3/UL (ref 3.4–10.8)
ZINC SERPL-MCNC: 90 UG/DL (ref 44–115)

## 2022-05-02 NOTE — ANESTHESIA PREPROCEDURE EVALUATION
Procedure:  INSERTION URETERAL CATHETERS PREOP (Bilateral Ureter)  CLOSURE COLOSTOMY, LAPAROSCOPIC (N/A Abdomen)    Relevant Problems   GI/HEPATIC   (+) GERD (gastroesophageal reflux disease)   (+) Rectal bleeding      GYN   (+) Malignant neoplasm of upper-outer quadrant of left breast in female, estrogen receptor positive (HCC)      MUSCULOSKELETAL   (+) Low back pain      NEURO/PSYCH   (+) Anxiety   (+) Continuous opioid dependence (HCC)   (+) Depression, recurrent (HCC)     Malignant neoplasm of upper-outer quadrant of left breast in female, estrogen receptor positive (HCC)   Depression, recurrent (HCC)   Anxiety   Use of tamoxifen (Nolvadex)   Complication of bariatric procedure   GERD (gastroesophageal reflux disease)   Low back pain   Continuous opioid dependence ([facility] [address] )   Encounter for surgical follow-up care   Postsurgical malabsorption   Fecal peritonitis (HCC)   Perforation of sigmoid colon (HCC)   Pneumatosis intestinalis of large intestine   S/P gastric bypass   Rectal bleeding   Syncope   Mild dehydration   Vomiting               Anesthesia Plan  ASA Score- 2     Anesthesia Type- general with ASA Monitors  Additional Monitors:   Airway Plan: ETT  Plan Factors-Exercise tolerance (METS): >4 METS  Chart reviewed  EKG reviewed  Imaging results reviewed  Existing labs reviewed  Patient summary reviewed  Induction- intravenous  Postoperative Plan- Plan for postoperative opioid use  Planned trial extubation    Informed Consent- Anesthetic plan and risks discussed with patient  I personally reviewed this patient with the CRNA  Discussed and agreed on the Anesthesia Plan with the CRNA  Katrina Gilmore

## 2022-05-03 ENCOUNTER — ANESTHESIA (OUTPATIENT)
Dept: PERIOP | Facility: HOSPITAL | Age: 52
DRG: 330 | End: 2022-05-03
Payer: COMMERCIAL

## 2022-05-03 ENCOUNTER — HOSPITAL ENCOUNTER (INPATIENT)
Facility: HOSPITAL | Age: 52
LOS: 2 days | Discharge: HOME | DRG: 330 | End: 2022-05-05
Attending: UROLOGY | Admitting: COLON & RECTAL SURGERY
Payer: COMMERCIAL

## 2022-05-03 DIAGNOSIS — K63.1 PERFORATION OF SIGMOID COLON (HCC): ICD-10-CM

## 2022-05-03 LAB
ABO GROUP BLD: NORMAL
ANION GAP SERPL CALCULATED.3IONS-SCNC: 8 MMOL/L (ref 4–13)
BLD GP AB SCN SERPL QL: NEGATIVE
BUN SERPL-MCNC: 15 MG/DL (ref 5–25)
CALCIUM SERPL-MCNC: 8.3 MG/DL (ref 8.3–10.1)
CHLORIDE SERPL-SCNC: 112 MMOL/L (ref 100–108)
CO2 SERPL-SCNC: 21 MMOL/L (ref 21–32)
CREAT SERPL-MCNC: 0.65 MG/DL (ref 0.6–1.3)
ERYTHROCYTE [DISTWIDTH] IN BLOOD BY AUTOMATED COUNT: 13.3 % (ref 11.6–15.1)
EXT PREGNANCY TEST URINE: NEGATIVE
EXT. CONTROL: NORMAL
GFR SERPL CREATININE-BSD FRML MDRD: 104 ML/MIN/1.73SQ M
GLUCOSE SERPL-MCNC: 111 MG/DL (ref 65–140)
HCT VFR BLD AUTO: 35.3 % (ref 34.8–46.1)
HGB BLD-MCNC: 10.9 G/DL (ref 11.5–15.4)
MCH RBC QN AUTO: 30.7 PG (ref 26.8–34.3)
MCHC RBC AUTO-ENTMCNC: 30.9 G/DL (ref 31.4–37.4)
MCV RBC AUTO: 99 FL (ref 82–98)
PLATELET # BLD AUTO: 242 THOUSANDS/UL (ref 149–390)
PMV BLD AUTO: 9.1 FL (ref 8.9–12.7)
POTASSIUM SERPL-SCNC: 3.9 MMOL/L (ref 3.5–5.3)
RBC # BLD AUTO: 3.55 MILLION/UL (ref 3.81–5.12)
RH BLD: POSITIVE
SODIUM SERPL-SCNC: 141 MMOL/L (ref 136–145)
SPECIMEN EXPIRATION DATE: NORMAL
WBC # BLD AUTO: 9.34 THOUSAND/UL (ref 4.31–10.16)

## 2022-05-03 PROCEDURE — 44227 LAP CLOSE ENTEROSTOMY: CPT | Performed by: COLON & RECTAL SURGERY

## 2022-05-03 PROCEDURE — NC001 PR NO CHARGE: Performed by: UROLOGY

## 2022-05-03 PROCEDURE — 88304 TISSUE EXAM BY PATHOLOGIST: CPT | Performed by: PATHOLOGY

## 2022-05-03 PROCEDURE — 85027 COMPLETE CBC AUTOMATED: CPT | Performed by: SURGERY

## 2022-05-03 PROCEDURE — C1769 GUIDE WIRE: HCPCS | Performed by: UROLOGY

## 2022-05-03 PROCEDURE — 88307 TISSUE EXAM BY PATHOLOGIST: CPT | Performed by: PATHOLOGY

## 2022-05-03 PROCEDURE — C1758 CATHETER, URETERAL: HCPCS | Performed by: UROLOGY

## 2022-05-03 PROCEDURE — 45330 DIAGNOSTIC SIGMOIDOSCOPY: CPT | Performed by: COLON & RECTAL SURGERY

## 2022-05-03 PROCEDURE — 0DJD8ZZ INSPECTION OF LOWER INTESTINAL TRACT, VIA NATURAL OR ARTIFICIAL OPENING ENDOSCOPIC: ICD-10-PCS | Performed by: COLON & RECTAL SURGERY

## 2022-05-03 PROCEDURE — 81025 URINE PREGNANCY TEST: CPT | Performed by: COLON & RECTAL SURGERY

## 2022-05-03 PROCEDURE — 0DBN4ZZ EXCISION OF SIGMOID COLON, PERCUTANEOUS ENDOSCOPIC APPROACH: ICD-10-PCS | Performed by: COLON & RECTAL SURGERY

## 2022-05-03 PROCEDURE — 49652 PR LAP, VENTRAL HERNIA REPAIR,REDUCIBLE: CPT | Performed by: COLON & RECTAL SURGERY

## 2022-05-03 PROCEDURE — 80048 BASIC METABOLIC PNL TOTAL CA: CPT | Performed by: SURGERY

## 2022-05-03 PROCEDURE — 86900 BLOOD TYPING SEROLOGIC ABO: CPT | Performed by: UROLOGY

## 2022-05-03 PROCEDURE — 0WQF4ZZ REPAIR ABDOMINAL WALL, PERCUTANEOUS ENDOSCOPIC APPROACH: ICD-10-PCS | Performed by: COLON & RECTAL SURGERY

## 2022-05-03 PROCEDURE — 86901 BLOOD TYPING SEROLOGIC RH(D): CPT | Performed by: UROLOGY

## 2022-05-03 PROCEDURE — 0DSM0ZZ REPOSITION DESCENDING COLON, OPEN APPROACH: ICD-10-PCS | Performed by: COLON & RECTAL SURGERY

## 2022-05-03 PROCEDURE — 86850 RBC ANTIBODY SCREEN: CPT | Performed by: UROLOGY

## 2022-05-03 PROCEDURE — 0T788DZ DILATION OF BILATERAL URETERS WITH INTRALUMINAL DEVICE, VIA NATURAL OR ARTIFICIAL OPENING ENDOSCOPIC: ICD-10-PCS | Performed by: UROLOGY

## 2022-05-03 PROCEDURE — 52005 CYSTO W/URTRL CATHJ: CPT | Performed by: UROLOGY

## 2022-05-03 PROCEDURE — 0TP9XDZ REMOVAL OF INTRALUMINAL DEVICE FROM URETER, EXTERNAL APPROACH: ICD-10-PCS | Performed by: COLON & RECTAL SURGERY

## 2022-05-03 RX ORDER — OXYCODONE HYDROCHLORIDE 10 MG/1
10 TABLET ORAL EVERY 4 HOURS PRN
Status: DISCONTINUED | OUTPATIENT
Start: 2022-05-03 | End: 2022-05-03

## 2022-05-03 RX ORDER — SODIUM CHLORIDE, SODIUM LACTATE, POTASSIUM CHLORIDE, CALCIUM CHLORIDE 600; 310; 30; 20 MG/100ML; MG/100ML; MG/100ML; MG/100ML
125 INJECTION, SOLUTION INTRAVENOUS CONTINUOUS
Status: DISCONTINUED | OUTPATIENT
Start: 2022-05-03 | End: 2022-05-04

## 2022-05-03 RX ORDER — SODIUM CHLORIDE 9 MG/ML
INJECTION, SOLUTION INTRAVENOUS CONTINUOUS PRN
Status: DISCONTINUED | OUTPATIENT
Start: 2022-05-03 | End: 2022-05-03

## 2022-05-03 RX ORDER — ONDANSETRON 2 MG/ML
4 INJECTION INTRAMUSCULAR; INTRAVENOUS EVERY 4 HOURS PRN
Status: DISCONTINUED | OUTPATIENT
Start: 2022-05-03 | End: 2022-05-05

## 2022-05-03 RX ORDER — NEOSTIGMINE METHYLSULFATE 1 MG/ML
INJECTION INTRAVENOUS AS NEEDED
Status: DISCONTINUED | OUTPATIENT
Start: 2022-05-03 | End: 2022-05-03

## 2022-05-03 RX ORDER — HYDROMORPHONE HCL/PF 1 MG/ML
1 SYRINGE (ML) INJECTION
Status: DISCONTINUED | OUTPATIENT
Start: 2022-05-03 | End: 2022-05-03

## 2022-05-03 RX ORDER — ALBUMIN, HUMAN INJ 5% 5 %
SOLUTION INTRAVENOUS CONTINUOUS PRN
Status: DISCONTINUED | OUTPATIENT
Start: 2022-05-03 | End: 2022-05-03

## 2022-05-03 RX ORDER — METRONIDAZOLE 500 MG/100ML
500 INJECTION, SOLUTION INTRAVENOUS ONCE
Status: COMPLETED | OUTPATIENT
Start: 2022-05-03 | End: 2022-05-03

## 2022-05-03 RX ORDER — SUCRALFATE 1 G/1
1 TABLET ORAL 4 TIMES DAILY
Status: DISCONTINUED | OUTPATIENT
Start: 2022-05-03 | End: 2022-05-05 | Stop reason: HOSPADM

## 2022-05-03 RX ORDER — ONDANSETRON 2 MG/ML
INJECTION INTRAMUSCULAR; INTRAVENOUS AS NEEDED
Status: DISCONTINUED | OUTPATIENT
Start: 2022-05-03 | End: 2022-05-03

## 2022-05-03 RX ORDER — PROPOFOL 10 MG/ML
INJECTION, EMULSION INTRAVENOUS AS NEEDED
Status: DISCONTINUED | OUTPATIENT
Start: 2022-05-03 | End: 2022-05-03

## 2022-05-03 RX ORDER — PROMETHAZINE HYDROCHLORIDE 25 MG/ML
12.5 INJECTION, SOLUTION INTRAMUSCULAR; INTRAVENOUS ONCE AS NEEDED
Status: DISCONTINUED | OUTPATIENT
Start: 2022-05-03 | End: 2022-05-03 | Stop reason: HOSPADM

## 2022-05-03 RX ORDER — ONDANSETRON 2 MG/ML
4 INJECTION INTRAMUSCULAR; INTRAVENOUS ONCE AS NEEDED
Status: DISCONTINUED | OUTPATIENT
Start: 2022-05-03 | End: 2022-05-03 | Stop reason: HOSPADM

## 2022-05-03 RX ORDER — FENTANYL CITRATE 50 UG/ML
INJECTION, SOLUTION INTRAMUSCULAR; INTRAVENOUS AS NEEDED
Status: DISCONTINUED | OUTPATIENT
Start: 2022-05-03 | End: 2022-05-03

## 2022-05-03 RX ORDER — METHOCARBAMOL 500 MG/1
500 TABLET, FILM COATED ORAL EVERY 6 HOURS PRN
Status: DISCONTINUED | OUTPATIENT
Start: 2022-05-03 | End: 2022-05-05 | Stop reason: HOSPADM

## 2022-05-03 RX ORDER — FENTANYL CITRATE/PF 50 MCG/ML
25 SYRINGE (ML) INJECTION
Status: DISCONTINUED | OUTPATIENT
Start: 2022-05-03 | End: 2022-05-03 | Stop reason: HOSPADM

## 2022-05-03 RX ORDER — KETAMINE HYDROCHLORIDE 50 MG/ML
INJECTION, SOLUTION, CONCENTRATE INTRAMUSCULAR; INTRAVENOUS AS NEEDED
Status: DISCONTINUED | OUTPATIENT
Start: 2022-05-03 | End: 2022-05-03

## 2022-05-03 RX ORDER — METRONIDAZOLE 500 MG/1
1000 TABLET ORAL 3 TIMES DAILY
Status: DISCONTINUED | OUTPATIENT
Start: 2022-05-03 | End: 2022-05-03

## 2022-05-03 RX ORDER — MIDAZOLAM HYDROCHLORIDE 2 MG/2ML
INJECTION, SOLUTION INTRAMUSCULAR; INTRAVENOUS AS NEEDED
Status: DISCONTINUED | OUTPATIENT
Start: 2022-05-03 | End: 2022-05-03

## 2022-05-03 RX ORDER — ROCURONIUM BROMIDE 10 MG/ML
INJECTION, SOLUTION INTRAVENOUS AS NEEDED
Status: DISCONTINUED | OUTPATIENT
Start: 2022-05-03 | End: 2022-05-03

## 2022-05-03 RX ORDER — PANTOPRAZOLE SODIUM 40 MG/1
40 TABLET, DELAYED RELEASE ORAL DAILY
Status: DISCONTINUED | OUTPATIENT
Start: 2022-05-03 | End: 2022-05-05 | Stop reason: HOSPADM

## 2022-05-03 RX ORDER — SODIUM CHLORIDE, SODIUM LACTATE, POTASSIUM CHLORIDE, CALCIUM CHLORIDE 600; 310; 30; 20 MG/100ML; MG/100ML; MG/100ML; MG/100ML
125 INJECTION, SOLUTION INTRAVENOUS CONTINUOUS
Status: DISCONTINUED | OUTPATIENT
Start: 2022-05-03 | End: 2022-05-03

## 2022-05-03 RX ORDER — SUCCINYLCHOLINE/SOD CL,ISO/PF 100 MG/5ML
SYRINGE (ML) INTRAVENOUS AS NEEDED
Status: DISCONTINUED | OUTPATIENT
Start: 2022-05-03 | End: 2022-05-03

## 2022-05-03 RX ORDER — OXYCODONE HYDROCHLORIDE 5 MG/1
5 TABLET ORAL EVERY 4 HOURS PRN
Status: DISCONTINUED | OUTPATIENT
Start: 2022-05-03 | End: 2022-05-03

## 2022-05-03 RX ORDER — HYDROMORPHONE HCL/PF 1 MG/ML
0.5 SYRINGE (ML) INJECTION ONCE
Status: COMPLETED | OUTPATIENT
Start: 2022-05-03 | End: 2022-05-03

## 2022-05-03 RX ORDER — MAGNESIUM HYDROXIDE 1200 MG/15ML
LIQUID ORAL AS NEEDED
Status: DISCONTINUED | OUTPATIENT
Start: 2022-05-03 | End: 2022-05-03 | Stop reason: HOSPADM

## 2022-05-03 RX ORDER — NEOMYCIN SULFATE 500 MG/1
1000 TABLET ORAL 3 TIMES DAILY
Status: DISCONTINUED | OUTPATIENT
Start: 2022-05-03 | End: 2022-05-03

## 2022-05-03 RX ORDER — LIDOCAINE HYDROCHLORIDE 10 MG/ML
INJECTION, SOLUTION EPIDURAL; INFILTRATION; INTRACAUDAL; PERINEURAL AS NEEDED
Status: DISCONTINUED | OUTPATIENT
Start: 2022-05-03 | End: 2022-05-03

## 2022-05-03 RX ORDER — CEFAZOLIN SODIUM 2 G/50ML
2000 SOLUTION INTRAVENOUS ONCE
Status: COMPLETED | OUTPATIENT
Start: 2022-05-03 | End: 2022-05-03

## 2022-05-03 RX ORDER — LORAZEPAM 0.5 MG/1
0.5 TABLET ORAL 2 TIMES DAILY PRN
Status: DISCONTINUED | OUTPATIENT
Start: 2022-05-03 | End: 2022-05-05 | Stop reason: HOSPADM

## 2022-05-03 RX ORDER — BUPROPION HYDROCHLORIDE 150 MG/1
150 TABLET ORAL 2 TIMES DAILY
Status: DISCONTINUED | OUTPATIENT
Start: 2022-05-03 | End: 2022-05-05 | Stop reason: HOSPADM

## 2022-05-03 RX ORDER — BUPROPION HYDROCHLORIDE 150 MG/1
300 TABLET ORAL DAILY
Refills: 0 | Status: DISCONTINUED | OUTPATIENT
Start: 2022-05-04 | End: 2022-05-03

## 2022-05-03 RX ORDER — HYDROMORPHONE HCL/PF 1 MG/ML
SYRINGE (ML) INJECTION AS NEEDED
Status: DISCONTINUED | OUTPATIENT
Start: 2022-05-03 | End: 2022-05-03

## 2022-05-03 RX ORDER — DEXAMETHASONE SODIUM PHOSPHATE 10 MG/ML
INJECTION, SOLUTION INTRAMUSCULAR; INTRAVENOUS AS NEEDED
Status: DISCONTINUED | OUTPATIENT
Start: 2022-05-03 | End: 2022-05-03

## 2022-05-03 RX ORDER — ACETAMINOPHEN 325 MG/1
975 TABLET ORAL EVERY 8 HOURS SCHEDULED
Status: DISCONTINUED | OUTPATIENT
Start: 2022-05-03 | End: 2022-05-05 | Stop reason: HOSPADM

## 2022-05-03 RX ORDER — HEPARIN SODIUM 5000 [USP'U]/ML
5000 INJECTION, SOLUTION INTRAVENOUS; SUBCUTANEOUS ONCE
Status: COMPLETED | OUTPATIENT
Start: 2022-05-03 | End: 2022-05-03

## 2022-05-03 RX ORDER — GLYCOPYRROLATE 0.2 MG/ML
INJECTION INTRAMUSCULAR; INTRAVENOUS AS NEEDED
Status: DISCONTINUED | OUTPATIENT
Start: 2022-05-03 | End: 2022-05-03

## 2022-05-03 RX ORDER — HEPARIN SODIUM 5000 [USP'U]/ML
5000 INJECTION, SOLUTION INTRAVENOUS; SUBCUTANEOUS EVERY 8 HOURS SCHEDULED
Status: DISCONTINUED | OUTPATIENT
Start: 2022-05-03 | End: 2022-05-05 | Stop reason: HOSPADM

## 2022-05-03 RX ORDER — LIDOCAINE HYDROCHLORIDE 20 MG/ML
1 JELLY TOPICAL ONCE
Status: COMPLETED | OUTPATIENT
Start: 2022-05-03 | End: 2022-05-03

## 2022-05-03 RX ORDER — HYDROMORPHONE HCL/PF 1 MG/ML
0.5 SYRINGE (ML) INJECTION
Status: DISCONTINUED | OUTPATIENT
Start: 2022-05-03 | End: 2022-05-03 | Stop reason: HOSPADM

## 2022-05-03 RX ADMIN — FENTANYL CITRATE 100 MCG: 50 INJECTION INTRAMUSCULAR; INTRAVENOUS at 08:35

## 2022-05-03 RX ADMIN — HYDROMORPHONE HYDROCHLORIDE 0.5 MG: 1 INJECTION, SOLUTION INTRAMUSCULAR; INTRAVENOUS; SUBCUTANEOUS at 09:49

## 2022-05-03 RX ADMIN — HYDROMORPHONE HYDROCHLORIDE: 10 INJECTION, SOLUTION INTRAMUSCULAR; INTRAVENOUS; SUBCUTANEOUS at 20:45

## 2022-05-03 RX ADMIN — ACETAMINOPHEN 975 MG: 325 TABLET, FILM COATED ORAL at 14:25

## 2022-05-03 RX ADMIN — Medication 25 MCG: at 13:24

## 2022-05-03 RX ADMIN — SODIUM CHLORIDE: 0.9 INJECTION, SOLUTION INTRAVENOUS at 08:42

## 2022-05-03 RX ADMIN — ROCURONIUM BROMIDE 10 MG: 50 INJECTION INTRAVENOUS at 10:22

## 2022-05-03 RX ADMIN — ROCURONIUM BROMIDE 10 MG: 50 INJECTION INTRAVENOUS at 10:52

## 2022-05-03 RX ADMIN — PANTOPRAZOLE SODIUM 40 MG: 40 TABLET, DELAYED RELEASE ORAL at 14:25

## 2022-05-03 RX ADMIN — ROCURONIUM BROMIDE 10 MG: 50 INJECTION INTRAVENOUS at 11:13

## 2022-05-03 RX ADMIN — OXYCODONE HYDROCHLORIDE 10 MG: 10 TABLET ORAL at 14:26

## 2022-05-03 RX ADMIN — HEPARIN SODIUM 5000 UNITS: 5000 INJECTION INTRAVENOUS; SUBCUTANEOUS at 22:13

## 2022-05-03 RX ADMIN — PROPOFOL 200 MG: 10 INJECTION, EMULSION INTRAVENOUS at 08:35

## 2022-05-03 RX ADMIN — ALBUMIN (HUMAN): 12.5 INJECTION, SOLUTION INTRAVENOUS at 10:41

## 2022-05-03 RX ADMIN — ONDANSETRON 4 MG: 2 INJECTION INTRAMUSCULAR; INTRAVENOUS at 11:50

## 2022-05-03 RX ADMIN — PHENYLEPHRINE HYDROCHLORIDE 20 MCG/MIN: 10 INJECTION INTRAVENOUS at 08:57

## 2022-05-03 RX ADMIN — ACETAMINOPHEN 975 MG: 325 TABLET, FILM COATED ORAL at 22:12

## 2022-05-03 RX ADMIN — ROCURONIUM BROMIDE 50 MG: 50 INJECTION INTRAVENOUS at 08:46

## 2022-05-03 RX ADMIN — SODIUM CHLORIDE, SODIUM LACTATE, POTASSIUM CHLORIDE, AND CALCIUM CHLORIDE: .6; .31; .03; .02 INJECTION, SOLUTION INTRAVENOUS at 08:27

## 2022-05-03 RX ADMIN — LIDOCAINE HYDROCHLORIDE 50 MG: 10 INJECTION, SOLUTION EPIDURAL; INFILTRATION; INTRACAUDAL; PERINEURAL at 08:35

## 2022-05-03 RX ADMIN — GLYCOPYRROLATE 0.4 MG: 0.2 INJECTION, SOLUTION INTRAMUSCULAR; INTRAVENOUS at 11:55

## 2022-05-03 RX ADMIN — Medication 25 MCG: at 12:53

## 2022-05-03 RX ADMIN — SUCRALFATE 1 G: 1 TABLET ORAL at 18:55

## 2022-05-03 RX ADMIN — SODIUM CHLORIDE: 0.9 INJECTION, SOLUTION INTRAVENOUS at 10:30

## 2022-05-03 RX ADMIN — DEXAMETHASONE SODIUM PHOSPHATE 4 MG: 10 INJECTION, SOLUTION INTRAMUSCULAR; INTRAVENOUS at 08:44

## 2022-05-03 RX ADMIN — SUCRALFATE 1 G: 1 TABLET ORAL at 22:13

## 2022-05-03 RX ADMIN — METHOCARBAMOL 500 MG: 500 TABLET ORAL at 18:55

## 2022-05-03 RX ADMIN — HYDROMORPHONE HYDROCHLORIDE 0.5 MG: 1 INJECTION, SOLUTION INTRAMUSCULAR; INTRAVENOUS; SUBCUTANEOUS at 11:34

## 2022-05-03 RX ADMIN — ROCURONIUM BROMIDE 20 MG: 50 INJECTION INTRAVENOUS at 09:27

## 2022-05-03 RX ADMIN — NEOSTIGMINE METHYLSULFATE 3 MG: 1 INJECTION INTRAVENOUS at 11:55

## 2022-05-03 RX ADMIN — KETAMINE HYDROCHLORIDE 10 MG: 50 INJECTION, SOLUTION INTRAMUSCULAR; INTRAVENOUS at 11:37

## 2022-05-03 RX ADMIN — ALBUMIN (HUMAN): 12.5 INJECTION, SOLUTION INTRAVENOUS at 10:56

## 2022-05-03 RX ADMIN — SODIUM CHLORIDE, SODIUM LACTATE, POTASSIUM CHLORIDE, AND CALCIUM CHLORIDE 125 ML/HR: .6; .31; .03; .02 INJECTION, SOLUTION INTRAVENOUS at 13:27

## 2022-05-03 RX ADMIN — KETAMINE HYDROCHLORIDE 10 MG: 50 INJECTION, SOLUTION INTRAMUSCULAR; INTRAVENOUS at 10:29

## 2022-05-03 RX ADMIN — METRONIDAZOLE: 500 SOLUTION INTRAVENOUS at 08:44

## 2022-05-03 RX ADMIN — HYDROMORPHONE HYDROCHLORIDE 0.5 MG: 1 INJECTION, SOLUTION INTRAMUSCULAR; INTRAVENOUS; SUBCUTANEOUS at 20:11

## 2022-05-03 RX ADMIN — SODIUM CHLORIDE, SODIUM LACTATE, POTASSIUM CHLORIDE, AND CALCIUM CHLORIDE 125 ML/HR: .6; .31; .03; .02 INJECTION, SOLUTION INTRAVENOUS at 22:00

## 2022-05-03 RX ADMIN — Medication 25 MCG: at 13:31

## 2022-05-03 RX ADMIN — ROCURONIUM BROMIDE 20 MG: 50 INJECTION INTRAVENOUS at 11:28

## 2022-05-03 RX ADMIN — LIDOCAINE HYDROCHLORIDE 1 APPLICATION: 20 JELLY TOPICAL at 18:55

## 2022-05-03 RX ADMIN — HEPARIN SODIUM 5000 UNITS: 5000 INJECTION INTRAVENOUS; SUBCUTANEOUS at 07:48

## 2022-05-03 RX ADMIN — MIDAZOLAM 2 MG: 1 INJECTION INTRAMUSCULAR; INTRAVENOUS at 08:35

## 2022-05-03 RX ADMIN — Medication 100 MG: at 08:35

## 2022-05-03 RX ADMIN — CEFAZOLIN SODIUM 2000 MG: 2 SOLUTION INTRAVENOUS at 08:37

## 2022-05-03 RX ADMIN — HYDROMORPHONE HYDROCHLORIDE 1 MG: 1 INJECTION, SOLUTION INTRAMUSCULAR; INTRAVENOUS; SUBCUTANEOUS at 15:45

## 2022-05-03 RX ADMIN — BUPROPION HYDROCHLORIDE 150 MG: 150 TABLET, FILM COATED, EXTENDED RELEASE ORAL at 22:13

## 2022-05-03 RX ADMIN — KETAMINE HYDROCHLORIDE 30 MG: 50 INJECTION, SOLUTION INTRAMUSCULAR; INTRAVENOUS at 09:27

## 2022-05-03 NOTE — INTERVAL H&P NOTE
H&P reviewed  After examining the patient I find no changes in the patients condition since the H&P had been written    Head and all 4s ok    Vitals:    05/03/22 0730   Pulse: 72   Resp: 16   Temp: 98 °F (36 7 °C)   SpO2: 98%

## 2022-05-03 NOTE — ANESTHESIA POSTPROCEDURE EVALUATION
Post-Op Assessment Note    CV Status:  Stable  Pain Score: 0    Pain management: adequate     Mental Status:  Sleepy and arousable   Hydration Status:  Euvolemic   PONV Controlled:  Controlled   Airway Patency:  Patent      Post Op Vitals Reviewed: Yes      Staff: CRNA         No complications documented      BP   111/64   Temp   96 9   Pulse  76   Resp   12   SpO2   100% 4 Lfm

## 2022-05-03 NOTE — QUICK NOTE
Post- OP Note - Colorectal Surgery   Hailee Espinoza 54 y o  female MRN: [id number]  Unit/Bed#: [facility] 826-01 Encounter: [id number]    Assessment:  54 y o  female Day of Surgery s/p Procedure(s) (LRB):  INSERTION URETERAL CATHETERS PREOP (Bilateral)  CLOSURE COLOSTOMY, LAPAROSCOPIC closure Hartmens sigmoid resection (N/A)     Plan:   Clears   IVF   I/Os- monitor urine output   Please tigertext on call white surgery resident with any questions     Subjective/Objective     Subjective:   Patient alert and oriented  Complains of spasms in bladder  No N/V  Objective:    Blood pressure 111/68, pulse 67, temperature 98 °F (36 7 °C), resp  rate 20, height 5' (1 524 m), weight 72 6 kg (160 lb), last menstrual period 04/05/2022, SpO2 93 %, not currently breastfeeding  ,Body mass index is 31 25 kg/m²  Physical Exam:   Gen: NAD  HEENT: MMM  CV: RRR , well perfused  Lungs: Normal respiratory effort on RA  Abd: soft, appropriately tender, nondistended, Incisions clean dry and intact  Skin: warm/ dry  Neuro:  AxO x3

## 2022-05-03 NOTE — OP NOTE
OPERATIVE REPORT  PATIENT NAME: Jose Alfredo Luna    :  1970  MRN: [id number]  Pt Location: BE OR ROOM 09    SURGERY DATE: 5/3/2022    Surgeon(s) and Role:  Panel 1:     * Boris Mccormick MD - Primary  Panel 2:     * Dionne Elmore MD - Primary     * Carmelita Singh MD - Assisting    Preop Diagnosis:  Perforation of sigmoid colon ([address] ) [K63 1]    Post-Op Diagnosis Codes:     * Perforation of sigmoid colon ([address] ) [K63 1]    Procedure:  Cystoscopy with bilateral ureteral stent insertion    Specimen(s):  ID Type Source Tests Collected by Time Destination   1 : colostomy Tissue Other TISSUE EXAM Dionne Elmore MD 5/3/2022 1021        Estimated Blood Loss:   Minimal    Drains:  Closed/Suction Drain LLQ Bulb (Active)   Number of days: 86       Colostomy Descending/sigmoid RUQ (Active)   Number of days: 86       Urethral Catheter Non-latex;Straight-tip 16 Fr  (Active)   Number of days: 0       Ureteral Internal Stent Left ureter 5 Fr  (Active)   Number of days: 0       Ureteral Internal Stent Right ureter 5 Fr  (Active)   Number of days: 0       Anesthesia Type:   General    Operative Indications:  Perforation of sigmoid colon ([address] ) [K63 1]      Operative Findings:  Normal bladder  Standard insertion of bilateral 5 [place] open-ended ureteral catheters preoperatively    Complications:   None    Procedure and Technique:  Megan Saavedra is a 51-year-old female known to Dr Vandana Ledezma with prior sigmoid perforation requiring diverting colostomy with Bushra's  She presents today to undergo takedown of the Bushra's  Preoperative ureteral stents of been requested  Risk and benefits of the procedure were discussed and reviewed  Informed consent was obtained  The patient was brought to the operating room on May 3, 2022  After the smooth induction of general endotracheal anesthesia, the patient was placed in the dorsal lithotomy position  Her genitalia was prepped and draped in a sterile fashion    Intravenous antibiotics were administered  A timeout was performed with all members of the operative team confirming the patient's identity and procedure to be performed  A 22 [place] rigid cystoscope with 30° lens was inserted  The bladder was thoroughly inspected  Both ureteral orifices were visualized with clear efflux of urine  A urine culture was obtained  The bladder wall was thoroughly inspected and was normal without signs of lesion, tumor, or fistula  Both ureteral orifices were in the natural anatomic position         Bilateral 5 [place] open-ended catheters were placed under direct vision into each respective renal pelvis  Catheters were placed without difficulty  The bladder was left full the cystoscope was removed  An 25 [place] Hernadez catheter was inserted and a 5 [place] open-ended ureteral stents were backloaded into the distal aspect of the Hernadez catheter  The Hernadez catheter and bilateral 5 [place] open-ended catheters were then connected to gravity drainage and secured to the patient's inner thigh with tape  We see the dictation of Dr Elizabeth Laguna for additional details regarding his colon resection      Patient Disposition:  Per Dr Manpreet Tran: Zi Bullard MD  DATE: May 3, 2022  TIME: 10:23 AM

## 2022-05-03 NOTE — PROGRESS NOTES
Conrad Antunez is a 53-year-old female with history of colonic perforation and diversion  She is now returning for colostomy takedown and possible resection  Urologic consultation has been requested for bilateral ureteral stent insertion preoperatively  We discussed that preoperative ureteral stents can help to identify the ureters but do not always preclude ureteral injury  Ureteral catheters can assist with identification of ureteral injury and help facilitate closure/repair of the ureters if needed  Risk and benefits of the procedure discussed reviewed informed consent obtained

## 2022-05-03 NOTE — OP NOTE
OPERATIVE REPORT  PATIENT NAME: Aida Cabrera    :  1970  MRN: [id number]  Pt Location: [facility] OR ROOM 09    SURGERY DATE: 5/3/2022    Surgeon(s) and Role:  Panel 1:     * Mel Davenport MD - Primary  Panel 2:     * Khanh Schultz MD - Primary     * Mathew Valentin MD - Assisting    Preop Diagnosis:  Perforation of sigmoid colon ([address] ) [K63 1]  Bushra colostomy  Ventral hernia, paracolostomy    Post-Op Diagnosis Codes:  Perforation of sigmoid colon ([address] ) [K63 1]  Bushra colostomy  Ventral hernia, paracolostomy    Procedure(s) (LRB):  INSERTION URETERAL CATHETERS PREOP (Bilateral)  CLOSURE COLOSTOMY, LAPAROSCOPIC closure Hartmens sigmoid resection (N/A), FLEXIBLE SIGMOIDOSCOPY, REPAIR VENTRAL HERNIA    Specimen(s):  ID Type Source Tests Collected by Time Destination   1 : colostomy Tissue Other TISSUE EXAM Khanh Schultz MD 5/3/2022 1021    2 : sigmoid colon Tissue Large Intestine, Sigmoid Colon TISSUE EXAM Khanh Schultz MD 5/3/2022 1055        Estimated Blood Loss:   50 mL    Drains:  Closed/Suction Drain LLQ Bulb (Active)   Number of days: 86       Colostomy Descending/sigmoid RUQ (Active)   Number of days: 86       Urethral Catheter Non-latex;Straight-tip 16 Fr  (Active)   Number of days: 0       [REMOVED] Ureteral Internal Stent Left ureter 5 Fr  (Removed)   Number of days: 0       [REMOVED] Ureteral Internal Stent Right ureter 5 Fr  (Removed)   Number of days: 0       Anesthesia Type:   General    Operative Indications:  Perforation of sigmoid colon ([address] ) [K63 1]  Bushra colostomy    Operative Findings:  Bushra colostomy    Complications:   None    Procedure and Technique:  The patient was placed on a pink pad in the supine position with her legs in [employer]  Her arms and chest were wrapped using pink pad material   The colostomy was closed using a running 0 silk stitch at the mucocutaneous junction    The abdomen was prepped widely using ChloraPrep and allowed to dry completely  The area was draped in a sterile manner with the colostomy covered with the Ray-Amber beneath IO band draping  A time-out was done  An initial attempt at placement of a trocar in the right upper periumbilical area through the old incision was somewhat difficult  The subcutaneous fat was deep and I could not tell if there was mesh and the fascial level was difficult to appreciate  We therefore moved to the lower midline where a cut-down technique for placement of a trocar was accomplished  A Laureano trocar was placed  This was a 12 mm trocar  This was done in the midline after opening the skin using cautery and dissecting down through the fascia using cautery  The peritoneum was entered using sharp dissection  No injury was apparent  We used this site to insufflate after placing 0 Vicryl sutures on either side of the wound  The 0 Vicryl was used to hold the [place] in place  A 12 mm trocar was placed in the right lower quadrant  A 5 mm trocar was placed in the right abdomen at the umbilical level  Laparoscopic dissection was initiated  Adhesiolysis was required to separate a small amount of small intestine from the anterior abdominal wall just to the right of the midline  The midline had some adhesions additionally that required dissection  Two bands of adhesion between the small bowel and the pelvis were identified  One of these bands had a twisting attachment that was lysed to prevent internal herniation  The other was deep in the pelvis and was lysed to simply allow the small bowel to be lifted from the pelvis during our work around the rectum  Other adhesions were minimal   There were no significant attachments to the sigmoid colon that was suspended from the colostomy site  The bowel there appeared to be quite redundant and I felt that we did not need to mobilize any other bowel in order to accomplish our stoma closure    The rectum was assessed and was found to be free of surrounding adhesions  I therefore progressed to the takedown of the colostomy  The IO band and Ray-Amber sponge were removed from the colostomy  The skin was divided at the stoma edge and a horizontally oriented incision was created around the colostomy  We quickly dissected down to the subcutaneous level where the ventral hernia around the stoma was identified  The hernia sac was entered and dissection was undertaken to separate dense adhesions between the mesentery and stoma edge  There were less dense adhesions on all other sides of the colostomy  No injury to the bowel was made  The anvil of a 29 mm [facility] stapler was placed after removal of the very end of the colostomy but we later chose to use a more proximal site  Once the colostomy was freed up in the anvil had been position, we found that the bowel was quite lengthy  There was a segment of sigmoid colon that was relatively narrowed and thickened compared to more proximal descending colon that was quite supple and well-vascularized as well as being of a wide lumen  This seemed to be better bowel to utilize for primary anastomosis  For that reason, the remainder of the sigmoid colon which approximated 15 cm in length was removed  Mesentery was sequentially divided using EnSeal   The bowel was prepared for the proximal side of the anastomosis  A pursestring device was used to clamp the bowel slightly  The sigmoid colon was removed and sent for pathologic evaluation  A Steve needle was placed to allow wrapping 2 0 Prolene around the anvil of a 29 mm [facility] stapler device  This was a full-thickness collar of tissue wrapped with a double wrap of the Prolene  The area was cleansed and return to the abdominal cavity  A GelPort was positioned in the wound after 1st enlarging the fascial defect slightly to allow for hand assisted technique      We could see that the remaining descending colon could easily be dropped into the pelvis for anastomosis without tension  A double stapled anastomosis was created using the [facility] stapler  The handle of the device was initially passed but there was a fair amount of stool/mucus remaining in the rectum  The rectum was disimpacted  This allowed for endoscopy of the rectum and stump of the rectum  This was seen to be free of significant fecal debris  We then performed the anastomosis using the 29 mm [facility] stapler  Because of some narrowing of the proximal rectum, we brought the trocar of the stapler out the anterior rectum approximately 4 cm distal to the staple line on the rectum  The anastomosis was created without difficulty  The donuts were circumferentially intact  There were thick  Sigmoidoscopy was performed  It revealed of circumferentially intact staple line with well-vascularized tissue on both sides of the line  The lumen was wide  Under water bath, laparoscopy revealed that there was no anastomotic leak  There was no tension on the anastomosis  The areas of dissection were inspected for hemostasis and were found to be dry  The areas were irrigated and dried  The trocars and GelPort were removed  The parastomal hernia site was closed after debriding the edges of fascia by running a 1 PDS suture from either into the wound  The lower midline Laureano trocar site was closed using the previously placed 0 Vicryl suture followed by an additional stitch to ensure the fascial closure was complete  The wounds were irrigated and dried  A couple of subcutaneous sutures were placed in the parastomal wound before closure of the skin at all sites using skin staples  The wounds were cleansed and dried and covered with gauze  Sponge needle and instrument counts were correct  Wand technique revealed no retained gauze      I was present for the entire procedure    Patient Disposition:  PACU       SIGNATURE: Khanh Schultz MD  DATE: May 3, 2022  TIME: 12:09 PM

## 2022-05-04 LAB
ANION GAP SERPL CALCULATED.3IONS-SCNC: 6 MMOL/L (ref 4–13)
BUN SERPL-MCNC: 10 MG/DL (ref 5–25)
CALCIUM SERPL-MCNC: 8.4 MG/DL (ref 8.3–10.1)
CHLORIDE SERPL-SCNC: 108 MMOL/L (ref 100–108)
CO2 SERPL-SCNC: 24 MMOL/L (ref 21–32)
CREAT SERPL-MCNC: 0.56 MG/DL (ref 0.6–1.3)
ERYTHROCYTE [DISTWIDTH] IN BLOOD BY AUTOMATED COUNT: 13.5 % (ref 11.6–15.1)
GFR SERPL CREATININE-BSD FRML MDRD: 109 ML/MIN/1.73SQ M
GLUCOSE SERPL-MCNC: 87 MG/DL (ref 65–140)
HCT VFR BLD AUTO: 25.5 % (ref 34.8–46.1)
HGB BLD-MCNC: 7.8 G/DL (ref 11.5–15.4)
MCH RBC QN AUTO: 30.5 PG (ref 26.8–34.3)
MCHC RBC AUTO-ENTMCNC: 30.6 G/DL (ref 31.4–37.4)
MCV RBC AUTO: 100 FL (ref 82–98)
PLATELET # BLD AUTO: 180 THOUSANDS/UL (ref 149–390)
PMV BLD AUTO: 9.4 FL (ref 8.9–12.7)
POTASSIUM SERPL-SCNC: 4.4 MMOL/L (ref 3.5–5.3)
RBC # BLD AUTO: 2.56 MILLION/UL (ref 3.81–5.12)
SODIUM SERPL-SCNC: 138 MMOL/L (ref 136–145)
WBC # BLD AUTO: 7.69 THOUSAND/UL (ref 4.31–10.16)

## 2022-05-04 PROCEDURE — 85027 COMPLETE CBC AUTOMATED: CPT | Performed by: SURGERY

## 2022-05-04 PROCEDURE — 80048 BASIC METABOLIC PNL TOTAL CA: CPT | Performed by: SURGERY

## 2022-05-04 PROCEDURE — 97166 OT EVAL MOD COMPLEX 45 MIN: CPT

## 2022-05-04 PROCEDURE — 97163 PT EVAL HIGH COMPLEX 45 MIN: CPT

## 2022-05-04 RX ORDER — HYDROMORPHONE HCL/PF 1 MG/ML
0.5 SYRINGE (ML) INJECTION EVERY 4 HOURS PRN
Status: DISCONTINUED | OUTPATIENT
Start: 2022-05-04 | End: 2022-05-05

## 2022-05-04 RX ORDER — DEXTROSE, SODIUM CHLORIDE, AND POTASSIUM CHLORIDE 5; .45; .15 G/100ML; G/100ML; G/100ML
100 INJECTION INTRAVENOUS CONTINUOUS
Status: DISCONTINUED | OUTPATIENT
Start: 2022-05-04 | End: 2022-05-05

## 2022-05-04 RX ADMIN — HEPARIN SODIUM 5000 UNITS: 5000 INJECTION INTRAVENOUS; SUBCUTANEOUS at 05:37

## 2022-05-04 RX ADMIN — ACETAMINOPHEN 975 MG: 325 TABLET, FILM COATED ORAL at 21:08

## 2022-05-04 RX ADMIN — HEPARIN SODIUM 5000 UNITS: 5000 INJECTION INTRAVENOUS; SUBCUTANEOUS at 14:11

## 2022-05-04 RX ADMIN — DEXTROSE, SODIUM CHLORIDE, AND POTASSIUM CHLORIDE 100 ML/HR: 5; .45; .15 INJECTION INTRAVENOUS at 08:51

## 2022-05-04 RX ADMIN — ACETAMINOPHEN 975 MG: 325 TABLET, FILM COATED ORAL at 05:37

## 2022-05-04 RX ADMIN — ACETAMINOPHEN 975 MG: 325 TABLET, FILM COATED ORAL at 14:10

## 2022-05-04 RX ADMIN — ONDANSETRON 4 MG: 2 INJECTION INTRAMUSCULAR; INTRAVENOUS at 10:04

## 2022-05-04 RX ADMIN — SUCRALFATE 1 G: 1 TABLET ORAL at 08:51

## 2022-05-04 RX ADMIN — PANTOPRAZOLE SODIUM 40 MG: 40 TABLET, DELAYED RELEASE ORAL at 08:51

## 2022-05-04 RX ADMIN — SUCRALFATE 1 G: 1 TABLET ORAL at 14:10

## 2022-05-04 RX ADMIN — SUCRALFATE 1 G: 1 TABLET ORAL at 17:31

## 2022-05-04 RX ADMIN — HYDROMORPHONE HYDROCHLORIDE: 10 INJECTION, SOLUTION INTRAMUSCULAR; INTRAVENOUS; SUBCUTANEOUS at 09:57

## 2022-05-04 RX ADMIN — HEPARIN SODIUM 5000 UNITS: 5000 INJECTION INTRAVENOUS; SUBCUTANEOUS at 21:09

## 2022-05-04 RX ADMIN — HYDROMORPHONE HYDROCHLORIDE 0.5 MG: 1 INJECTION, SOLUTION INTRAMUSCULAR; INTRAVENOUS; SUBCUTANEOUS at 21:09

## 2022-05-04 RX ADMIN — BUPROPION HYDROCHLORIDE 150 MG: 150 TABLET, FILM COATED, EXTENDED RELEASE ORAL at 08:51

## 2022-05-04 RX ADMIN — DEXTROSE, SODIUM CHLORIDE, AND POTASSIUM CHLORIDE 100 ML/HR: 5; .45; .15 INJECTION INTRAVENOUS at 22:48

## 2022-05-04 RX ADMIN — BUPROPION HYDROCHLORIDE 150 MG: 150 TABLET, FILM COATED, EXTENDED RELEASE ORAL at 21:08

## 2022-05-04 RX ADMIN — SUCRALFATE 1 G: 1 TABLET ORAL at 21:09

## 2022-05-04 RX ADMIN — METHOCARBAMOL 500 MG: 500 TABLET ORAL at 21:09

## 2022-05-04 NOTE — CASE MANAGEMENT
Case Management Discharge Planning Note    Patient name Jabier Sorto  [facility] [id number]  : 1970 Date 2022       Current Admission Date: 5/3/2022  Current Admission Diagnosis:Perforation of sigmoid colon [facility])   Patient Active Problem List    Diagnosis Date Noted    Vomiting 2022    Mild dehydration 2022    Fecal peritonitis ([facility] ) 2022    Perforation of sigmoid colon ([facility] ) 2022    Pneumatosis intestinalis of large intestine 2022    S/P gastric bypass 2022    Rectal bleeding 2022    Syncope 2022    Encounter for surgical follow-up care 2022    Postsurgical malabsorption 2022    Continuous opioid dependence ([facility] ) 2021    Low back pain 10/03/2021    GERD (gastroesophageal reflux disease) 2247    Complication of bariatric procedure 2021    Use of tamoxifen (Nolvadex) 2018    Malignant neoplasm of upper-outer quadrant of left breast in female, estrogen receptor positive ([facility] )     Depression, recurrent (HCC)     Anxiety       LOS (days): 1  Geometric Mean LOS (GMLOS) (days):   Days to GMLOS:     OBJECTIVE:  Risk of Unplanned Readmission Score: 20         Current admission status: Inpatient   Preferred Pharmacy:   [facility]/pharmacy #[id number] [address]  Phone: [phone] Fax: [phone]    [address] [place] [address] [facility] 38 210 [facility]  Phone: [phone] Fax: [phone]    Primary Care Provider: Benedicto Leung MD    Primary Insurance: Keyanna Hood  Secondary Insurance:     DISCHARGE DETAILS:              [address]         Is the patient interested in [address] at discharge?: No    DME Referral Provided  Referral made for DME?: No         Treatment Team Recommendation: Home  Discharge Destination Plan[de-identified] Home  Transport at Discharge : Family

## 2022-05-04 NOTE — PHYSICAL THERAPY NOTE
Physical Therapy Evaluation    Patient's Name: Chioma Coates    Admitting Diagnosis  Perforation of sigmoid colon ([facility] ) [K63 1]    Problem List  Patient Active Problem List   Diagnosis    Malignant neoplasm of upper-outer quadrant of left breast in female, estrogen receptor positive ([facility] )    Depression, recurrent ([facility] )    Anxiety    Use of tamoxifen (Nolvadex)    Complication of bariatric procedure    GERD (gastroesophageal reflux disease)    Low back pain    Continuous opioid dependence ([facility] )    Encounter for surgical follow-up care    Postsurgical malabsorption    Fecal peritonitis ([facility] )    Perforation of sigmoid colon ([facility] )    Pneumatosis intestinalis of large intestine    S/P gastric bypass    Rectal bleeding    Syncope    Mild dehydration    Vomiting       Past Medical History  Past Medical History:   Diagnosis Date    Anxiety     Back pain     BRCA negative 06/2016    [facility]    Cancer ([facility] )     Gastric ulcer     Headache     History of chemotherapy 2016    Neoadjuvant; at [address] History of transfusion 06/2017    no adverse reaction    Limb alert care status     left    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)     s/p chemotherapy    Obesity (BMI 30-39  9)        Past Surgical History  Past Surgical History:   Procedure Laterality Date    ABDOMINAL ADHESION SURGERY N/A 10/4/2021    Procedure: Lysis Adhesions;  Surgeon: Joseluis Pickens MD;  Location: [address] [place] [address];  Service: Bariatrics    ABDOMINAL WALL SURGERY Left 9/21/2016    Procedure: DEBRIDEMENT OF LEFT ABDOMINAL TISSUE AND CLOSURE; DEBRIDEMENT OF LEFT BREAST FLAP; SUBMUSCULAR TISSUE EXPANDER PLACEMENT WITH ADM (ACELLULAR DERMAL MATRIX);   Surgeon: Raegan Cope MD;  Location:  MAIN OR;  Service:     BREAST BIOPSY Left 04/2016    with sentinel node biospy    BREAST BIOPSY Right 10/18/2021    benign    BREAST RECONSTRUCTION Left 12/22/2017    Procedure: REVISION OF LEFT BREAST SCAR, LOCAL FLAP;  Surgeon: Daryl Rios MD;  Location: [facility] Main [facility];  Service: Plastics    COLONOSCOPY      DILATION AND CURETTAGE OF UTERUS      [address] N/A 2/6/2022    Procedure: Bosie Cornea PROCEDURE WITH SIGMOID COLOSTOMY;  Surgeon: Cale Fowler MD;  Location: [address] [place] [address];  Service: General    [address] [facility] [address] GASTRIC BANDING  2008    removed 2013    LAPAROSCOPIC GASTRIC BANDING  2013    removal    LAPAROTOMY N/A 2/6/2022    Procedure: LAPAROTOMY EXPLORATORY;  Surgeon: Cale Fowler MD;  Location: [address] [place] [address];  Service: General    LIPOSUCTION W/ FAT INJECTION Left 6/8/2017    Procedure: BREAST FAT GRAFTING ;  Surgeon: Frank Herzog MD;  Location: [facility] Main OR;  Service:     MASTECTOMY Left 2016    MEDIPORT INSERTION, SINGLE  2016    UT BIOPSY/EXCISION, LYMPH NODE(S) Left 8/10/2016    Procedure: LYMPHOSCINTIGRAPHY; SENTINAL LYMPH NODE BIOPSY (INJECT AT 1100);   Surgeon: Autumn Pierson MD;  Location: [facility];  Service: Surgical Oncology    UT BREAST RECONSTRUCTION W/FREE FLAP Left 9/19/2016    Procedure: BREAST DELAYED RECONSTRUCTION; DEIP FREE FLAP removal of port-a -cath;  Surgeon: Frank Herzog MD;  Location: [facility] MAIN OR;  Service: Plastics    UT BREAST REDUCTION Right 12/19/2016    Procedure: BREAST REDUCTION/MASTOPEXY ;  Surgeon: Frank Herzog MD;  Location: [facility];  Service: Plastics    UT HYSTEROSCOPY,W/ENDO [address] N/A 5/21/2018    Procedure: DILATATION AND CURETTAGE (D&C), HYSTEROSCOPY;  Surgeon: Pierce Funez MD;  Location: [facility] OR;  Service: Gynecology Oncology    UT INSJ/RPLCMT BREAST IMPLANT SEP DAY MASTECTOMY Left 12/19/2016    Procedure: BREAST TISSUE EXPANDER REMOVAL; BREAST IMPLANT PLACEMENT;  Surgeon: Frank Herzog MD;  Location: [facility];  Service: Plastics    UT LAP GASTRIC BYPASS/RAMIN-EN-Y N/A 10/4/2021    Procedure: LAPAROSCOPIC RAMIN-EN-Y GASTRIC BYPASS AND INTRAOPERATIVE EGD;  Surgeon: Samy Jameson MD;  Location: [address] [place] [address];  Service: Bariatrics    SD LAP, SURG CLOSE ENTEROSTOMY RESECT ANAST N/A 5/3/2022    Procedure: CLOSURE COLOSTOMY, LAPAROSCOPIC closure Hartmens sigmoid resection;  Surgeon: Preet Abraham MD;  Location: [facility];  Service: Colorectal    SD MASTECTOMY, SIMPLE, COMPLETE Left 8/10/2016    Procedure: BREAST MASTECTOMY; FROZEN SECTION ;  Surgeon: Perfecto Dominguez MD;  Location: [facility];  Service: Surgical Oncology    SD REMOVAL TISSUE EXPANDER W/O INSERTION IMPLANT Left 1/23/2017    Procedure: BREAST IMPLANT REMOVAL; WASHOUT AND DEBRIDEMENT;  Surgeon: Edwin Esposito MD;  Location: [facility];  Service: Plastics    REDUCTION MAMMAPLASTY  2016    REDUCTION MAMMAPLASTY  2018    REVISION OF SCAR ON TORSO N/A 12/19/2016    Procedure: ABDOMINAL SCAR REVISION ;  Surgeon: Edwin Esposito MD;  Location: [facility];  Service:     US GUIDANCE  4/22/2016    US GUIDED BREAST BIOPSY RIGHT COMPLETE Right 10/18/2021    WISDOM TOOTH EXTRACTION          05/04/22 0953   PT Last Visit   PT Visit Date 05/04/22   Note Type   Note type Evaluation   Pain Assessment   Pain Assessment Tool 0-10   Pain Score 7   Pain Location/Orientation Orientation: Bilateral;Location: Abdomen   Hospital Pain Intervention(s) Repositioned; Ambulation/increased activity   Restrictions/Precautions   Weight Bearing Precautions Per Order No   Other Precautions Multiple lines; Fall Risk;Pain   Home Living   Type of [address] [place] [address] Two level;Stairs to enter with rails   Additional Comments Pt typically resides with son in 2 level home, plans to stay at [facility] 1 level house with 6 TUNDE   Prior Function   Level of [place] Independent with ADLs and functional mobility   Lives With Son   Receives Help From Family   ADL Assistance Independent   IADLs Independent   Falls in the last 6 months 0   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)   Comments Pt reports no AD PTA, 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, active and enjoys walking for exercise   General Family/Caregiver Present No   Cognition   Overall Cognitive Status WFL   Orientation Level Oriented X4   Following Commands Follows all commands and directions without difficulty   Comments Pt very pleasant, cooperative   RLE Assessment   RLE Assessment WFL   LLE Assessment   LLE Assessment WFL   Light Touch   RLE Light Touch Grossly intact   LLE Light Touch Grossly intact   Bed Mobility   Supine to Sit 5  Supervision   Additional items Bedrails; Increased time required   Transfers   Sit to Stand 5  Supervision   Stand to Sit 5  Supervision   Additional Comments with and without AD throughout session, from varying heights, bed, bedside chair, toilet   Ambulation/Elevation   Gait pattern Decreased foot clearance; Short stride; Antalgic   Gait Assistance 5  Supervision   Assistive Device Rolling walker   Distance 130 ft, good control, cues for posture   Stair Management Assistance 5  Supervision   Stair Management Technique One rail R;Step to pattern   Number of Stairs 7   Balance   Static Sitting Good   Dynamic Sitting Fair +   Static Standing Fair   Dynamic Standing Fair -   Ambulatory Fair -   Activity Tolerance   Activity Tolerance Patient limited by fatigue  (nausea, RN made aware)   Medical Staff Made Aware Co-evaluation with OT given medical complexity, post-op status   Nurse Made Aware RN updated   Assessment   Assessment Pt is a 54 y o  female seen for PT evaluation s[facility]  Pt was admitted with a primary dx of: Perforation of sigmoid colon s/p Monae's procedure 2/6/22, now s/p Bushra's reversal on 5/3  PT now consulted for assessment of mobility and d/c needs  Pt with Ambulate orders  Pts current comorbidities and personal factors effecting treatment include: Anxiety, resides in 2 level home, primary caregiver for child, works full-time   Pts current clinical presentation is Unstable/ Unpredictable (high complexity) due to Ongoing medical management for primary dx, Increased reliance on more restrictive AD compared to baseline, Decreased activity tolerance compared to baseline, Trending lab values  Prior to admission, pt was independent without AD  Upon evaluation, pt currently is requiring supervision for bed mobility; supervision for transfers and supervision for ambulation 130 ft w/ RW  Pt able to climb 6 steps with 1 HR to simulate entrance to SO's home  Pt primarily limited by nausea  Encouraged continued ambulation 3x/day during hospital stay, discussed with restorative therapist   Pt with no acute PT needs at this time  At conclusion of PT session pt returned back in chair with phone and call bell within reach  Pt denies any further questions at this time  Recommend home with family care upon hospital D/C     Goals   Patient Goals to walk   Plan   PT Frequency Other (Comment)  (one time evaluation)   Recommendation   PT Discharge Recommendation No rehabilitation needs   AM-PAC Basic Mobility Inpatient   Turning in Bed Without Bedrails 4   Lying on Back to Sitting on Edge of Flat Bed 4   Moving Bed to Chair 4   Standing Up From Chair 4   Walk in Room 3   Climb 3-5 Stairs 3   Basic Mobility Inpatient Raw Score 22   Basic Mobility Standardized Score 47 4   Highest Level Of Mobility   -[facility] Goal 7: Walk 25 feet or more       Jesika Toribio, PT, DPT, GCS

## 2022-05-04 NOTE — CASE MANAGEMENT
Case Management Assessment & Discharge Planning Note    Patient name Juliano Curry  Location [address] [facility] [address] 826/PPHP 826-01 MRN [id number]  : 1970 Date 2022       Current Admission Date: 5/3/2022  Current Admission Diagnosis:Perforation of sigmoid colon [facility])   Patient Active Problem List    Diagnosis Date Noted    Vomiting 2022    Mild dehydration 2022    Fecal peritonitis ([facility] [address] [facility] ) 2022    Perforation of sigmoid colon ([facility] [address] [facility] ) 2022    Pneumatosis intestinalis of large intestine 2022    S/P gastric bypass 2022    Rectal bleeding 2022    Syncope 2022    Encounter for surgical follow-up care 2022    Postsurgical malabsorption 2022    Continuous opioid dependence ([facility] ) 2021    Low back pain 10/03/2021    GERD (gastroesophageal reflux disease)     Complication of bariatric procedure 2021    Use of tamoxifen (Nolvadex) 2018    Malignant neoplasm of upper-outer quadrant of left breast in female, estrogen receptor positive ([facility] [facility] )     Depression, recurrent (HCC)     Anxiety       LOS (days): 1  Geometric Mean LOS (GMLOS) (days):   Days to GMLOS:     OBJECTIVE:    Risk of Unplanned Readmission Score: 20         Current admission status: Inpatient       Preferred Pharmacy:   [facility]/pharmacy #[id number]- [place] [address]  Phone: [phone] Fax: [phone]    [address] [place] [address] [facility] [address] [facility] [address]  Phone: [phone] Fax: [phone]    Primary Care Provider: Rupa Mccord MD    Primary Insurance: Keyanna 35  Secondary Insurance:     ASSESSMENT:  [address] Proxies    There are no active Health Care Proxies on file         Advance Directives  Does patient have a Health Care POA?: No  Was patient offered paperwork?: Yes (declined)  Does patient have Advance Directives?: No  Was patient offered paperwork?: Yes (declined)  Primary Contact: Geeta Deluna (significant other) [phone]         Readmission Root Cause  30 Day Readmission: No    Patient Information  Admitted from[de-identified] Home  Mental Status: Alert  During Assessment patient was accompanied by: Not accompanied during assessment  Assessment information provided by[de-identified] Patient  Primary Caregiver: Self  Support Systems: [address] entry access options   Select all that apply : Stairs  Number of steps to enter home : 3  Do the steps have railings?: No  Type of Current Residence: Other (Comment),2 story home (2 story [facility])  Upon entering residence, is there a bedroom on the main floor (no further steps)?: No  A bedroom is located on the following floor levels of residence (select all that apply):: 2nd Floor  Upon entering residence, is there a bathroom on the main floor (no further steps)?: Yes  Number of steps to 2nd floor from main floor: One Flight  In the last 12 months, was there a time when you were not able to pay the mortgage or rent on time?: No  In the last 12 months, how many places have you lived?: 1  In the last 12 months, was there a time when you did not have a steady place to sleep or slept in a shelter (including now)?: No  Homeless/housing insecurity resource given?: N/A  Living Arrangements: Lives w/ Son  Is patient a ?: No    Activities of Daily Living Prior to Admission  Functional Status: Independent  Completes ADLs independently?: Yes  Ambulates independently?: Yes  Does patient use assisted devices?: No  Does patient currently own DME?: No  Does patient have a history of Outpatient Therapy (PT/OT)?: Yes  Does the patient have a history of Short-Term Rehab?: No  Does patient have a history of HHC?: Yes ([facility])  Does patient currently have [address]?: No         Patient Information Continued  Income Source: Employed  Does patient have prescription coverage?: Yes (Patient uses [facility] in [place])  Within the past 12 months, you worried that your food would run out before you got the money to buy more : Never true  Within the past 12 months, the food you bought just didnt last and you didnt have money to get more : Never true  Food insecurity resource given?: N/A  Does patient receive dialysis treatments?: No         Means of Transportation  Means of Transport to Appts[de-identified] Drives Self  In the past 12 months, has lack of transportation kept you from medical appointments or from getting medications?: No  In the past 12 months, has lack of transportation kept you from meetings, work, or from getting things needed for daily living?: No  Was application for public transport provided?: N/A        DISCHARGE DETAILS:    Discharge planning discussed with[de-identified] Patient  Freedom of Choice: Yes  Comments - Freedom of Choice: Patient open to Lucrecia Rocha if needed, would like to use [facility] again     Were Treatment Team discharge recommendations reviewed with patient/caregiver?: Yes  Did patient/caregiver verbalize understanding of patient care needs?: Yes  Were patient/caregiver advised of the risks associated with not following Treatment Team discharge recommendations?: Yes    Contacts  Patient Contacts: Pawel Pride (significant other)  Relationship to Patient[de-identified] Family  Contact Method: Phone  Phone Number: [phone]  Reason/Outcome: Emergency Contact                   Would you like to participate in our [address] service program?  : Yes                                     Patient is not vaccinated for covid-19

## 2022-05-04 NOTE — RESPIRATORY THERAPY NOTE
Respiratory care      05/04/22 0803   Respiratory Assessment   Assessment Type Assess only   General Appearance Alert; Awake   Respiratory Pattern Normal   Chest Assessment Chest expansion symmetrical   Bilateral Breath Sounds Clear   Cough None   Resp Comments PT awake & appears comfortable, PT on PSN, PT on RA at this time & sat 94%, PT assisted with IS, [id number]     O2 Device RA

## 2022-05-04 NOTE — PLAN OF CARE
Problem: MOBILITY - ADULT  Goal: Maintain or return to baseline ADL function  Description: INTERVENTIONS:  -  Assess patient's ability to carry out ADLs; assess patient's baseline for ADL function and identify physical deficits which impact ability to perform ADLs (bathing, care of mouth/teeth, toileting, grooming, dressing, etc )  - Assess/evaluate cause of self-care deficits   - Assess range of motion  - Assess patient's mobility; develop plan if impaired  - Assess patient's need for assistive devices and provide as appropriate  - Encourage maximum independence but intervene and supervise when necessary  - Involve family in performance of ADLs  - Assess for home care needs following discharge   - Consider OT consult to assist with ADL evaluation and planning for discharge  - Provide patient education as appropriate  Outcome: Progressing  Goal: Maintains/Returns to pre admission functional level  Description: INTERVENTIONS:  - Perform BMAT or MOVE assessment daily    - Set and communicate daily mobility goal to care team and patient/family/caregiver  - Collaborate with rehabilitation services on mobility goals if consulted  - Perform Range of Motion 3 times a day  - Reposition patient every 2 hours    - Dangle patient 3 times a day  - Stand patient 3 times a day  - Ambulate patient 3 times a day  - Out of bed to chair 3 times a day   - Out of bed for meals 3 times a day  - Out of bed for toileting  - Record patient progress and toleration of activity level   Outcome: Progressing     Problem: Potential for Falls  Goal: Patient will remain free of falls  Description: INTERVENTIONS:  - Educate patient/family on patient safety including physical limitations  - Instruct patient to call for assistance with activity   - Consult OT/PT to assist with strengthening/mobility   - Keep Call bell within reach  - Keep bed low and locked with side rails adjusted as appropriate  - Keep care items and personal belongings within reach  - Initiate and maintain comfort rounds  - Make Fall Risk Sign visible to staff  - Offer Toileting every 2 Hours, in advance of need  - Initiate/Maintain alarm  - Obtain necessary fall risk management equipment:   - Apply yellow socks and bracelet for high fall risk patients  - Consider moving patient to room near nurses station  Outcome: Progressing     Problem: PAIN - ADULT  Goal: Verbalizes/displays adequate comfort level or baseline comfort level  Description: Interventions:  - Encourage patient to monitor pain and request assistance  - Assess pain using appropriate pain scale  - Administer analgesics based on type and severity of pain and evaluate response  - Implement non-pharmacological measures as appropriate and evaluate response  - Consider cultural and social influences on pain and pain management  - Notify physician/advanced practitioner if interventions unsuccessful or patient reports new pain  Outcome: Progressing     Problem: INFECTION - ADULT  Goal: Absence or prevention of progression during hospitalization  Description: INTERVENTIONS:  - Assess and monitor for signs and symptoms of infection  - Monitor lab/diagnostic results  - Monitor all insertion sites, i e  indwelling lines, tubes, and drains  - Monitor endotracheal if appropriate and nasal secretions for changes in amount and color  - [place] appropriate cooling/warming therapies per order  - Administer medications as ordered  - Instruct and encourage patient and family to use good hand hygiene technique  - Identify and instruct in appropriate isolation precautions for identified infection/condition  Outcome: Progressing  Goal: Absence of fever/infection during neutropenic period  Description: INTERVENTIONS:  - Monitor WBC    Outcome: Progressing     Problem: SAFETY ADULT  Goal: Maintain or return to baseline ADL function  Description: INTERVENTIONS:  -  Assess patient's ability to carry out ADLs; assess patient's baseline for ADL function and identify physical deficits which impact ability to perform ADLs (bathing, care of mouth/teeth, toileting, grooming, dressing, etc )  - Assess/evaluate cause of self-care deficits   - Assess range of motion  - Assess patient's mobility; develop plan if impaired  - Assess patient's need for assistive devices and provide as appropriate  - Encourage maximum independence but intervene and supervise when necessary  - Involve family in performance of ADLs  - Assess for home care needs following discharge   - Consider OT consult to assist with ADL evaluation and planning for discharge  - Provide patient education as appropriate  Outcome: Progressing  Goal: Maintains/Returns to pre admission functional level  Description: INTERVENTIONS:  - Perform BMAT or MOVE assessment daily    - Set and communicate daily mobility goal to care team and patient/family/caregiver  - Collaborate with rehabilitation services on mobility goals if consulted  - Perform Range of Motion 3 times a day  - Reposition patient every 2 hours    - Dangle patient 3 times a day  - Stand patient 3 times a day  - Ambulate patient 3 times a day  - Out of bed to chair 3 times a day   - Out of bed for meals 3 times a day  - Out of bed for toileting  - Record patient progress and toleration of activity level   Outcome: Progressing  Goal: Patient will remain free of falls  Description: INTERVENTIONS:  - Educate patient/family on patient safety including physical limitations  - Instruct patient to call for assistance with activity   - Consult OT/PT to assist with strengthening/mobility   - Keep Call bell within reach  - Keep bed low and locked with side rails adjusted as appropriate  - Keep care items and personal belongings within reach  - Initiate and maintain comfort rounds  - Make Fall Risk Sign visible to staff  - Offer Toileting every 2 Hours, in advance of need  - Initiate/Maintain 2  - Obtain necessary fall risk management equipment:   - Apply yellow socks and bracelet for high fall risk patients  - Consider moving patient to room near nurses station  Outcome: Progressing     Problem: DISCHARGE PLANNING  Goal: Discharge to home or other facility with appropriate resources  Description: INTERVENTIONS:  - Identify barriers to discharge w/patient and caregiver  - Arrange for needed discharge resources and transportation as appropriate  - Identify discharge learning needs (meds, wound care, etc )  - Arrange for interpretive services to assist at discharge as needed  - Refer to Case Management Department for coordinating discharge planning if the patient needs post-hospital services based on physician/advanced practitioner order or complex needs related to functional status, cognitive ability, or social support system  Outcome: Progressing     Problem: Knowledge Deficit  Goal: Patient/family/caregiver demonstrates understanding of disease process, treatment plan, medications, and discharge instructions  Description: Complete learning assessment and assess knowledge base    Interventions:  - Provide teaching at level of understanding  - Provide teaching via preferred learning methods  Outcome: Progressing

## 2022-05-04 NOTE — OCCUPATIONAL THERAPY NOTE
Occupational Therapy Evaluation     Patient Name: Mayito Erickson  QSVYH'K Date: 5/4/2022  Problem List  Principal Problem:    Perforation of sigmoid colon [facility])    Past Medical History  Past Medical History:   Diagnosis Date    Anxiety     Back pain     BRCA negative 06/2016    [facility]    Cancer ([address] )     Gastric ulcer     Headache     History of chemotherapy 2016    Neoadjuvant; at [address] History of transfusion 06/2017    no adverse reaction    Limb alert care status     left    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)     s/p chemotherapy    Obesity (BMI 30-39  9)      Past Surgical History  Past Surgical History:   Procedure Laterality Date    ABDOMINAL ADHESION SURGERY N/A 10/4/2021    Procedure: Lysis Adhesions;  Surgeon: Celeste Mims MD;  Location: [address] [place] [address];  Service: Bariatrics    ABDOMINAL WALL SURGERY Left 9/21/2016    Procedure: DEBRIDEMENT OF LEFT ABDOMINAL TISSUE AND CLOSURE; DEBRIDEMENT OF LEFT BREAST FLAP; SUBMUSCULAR TISSUE EXPANDER PLACEMENT WITH ADM (ACELLULAR DERMAL MATRIX);   Surgeon: Luigi Flores MD;  Location:  MAIN OR;  Service:     BREAST BIOPSY Left 04/2016    with sentinel node biospy    BREAST BIOPSY Right 10/18/2021    benign    BREAST RECONSTRUCTION Left 12/22/2017    Procedure: REVISION OF LEFT BREAST SCAR, LOCAL FLAP;  Surgeon: Jaya Vargas MD;  [facility];  Service: Plastics    COLONOSCOPY      DILATION AND CURETTAGE OF UTERUS      [address] [place] N/A 2/6/2022    Procedure: [place] PROCEDURE WITH SIGMOID COLOSTOMY;  Surgeon: Santi Cordero MD;  Location: [address] [place] [address];  Service: General    LAPAROSCOPIC CHOLECYSTECTOMY  2000    LAPAROSCOPIC GASTRIC BANDING  2008    removed 2013    LAPAROSCOPIC GASTRIC BANDING  2013    removal    LAPAROTOMY N/A 2/6/2022    Procedure: LAPAROTOMY EXPLORATORY;  Surgeon: Santi Cordero MD;  Location: [address] [place] [address];  Service: General    LIPOSUCTION W/ FAT INJECTION Left 6/8/2017 Procedure: BREAST FAT GRAFTING ;  Surgeon: Gabbie Cervantes MD;  [facility];  Service:     MASTECTOMY Left 2016    MEDIPORT INSERTION, SINGLE  2016    VT BIOPSY/EXCISION, LYMPH NODE(S) Left 8/10/2016    Procedure: LYMPHOSCINTIGRAPHY; SENTINAL LYMPH NODE BIOPSY (INJECT AT 1100);   Surgeon: Shon De MD;  [facility];  Service: Surgical Oncology    VT BREAST RECONSTRUCTION W/FREE FLAP Left 9/19/2016    Procedure: BREAST DELAYED RECONSTRUCTION; DEIP FREE FLAP removal of port-a -cath;  Surgeon: Gabbie Cervantes MD;  [facility];  Service: Plastics    VT BREAST REDUCTION Right 12/19/2016    Procedure: BREAST REDUCTION/MASTOPEXY ;  Surgeon: Gabbie Cervantes MD;  [facility];  Service: Plastics    VT HYSTEROSCOPY,W/ENDO [address] N/A 5/21/2018    Procedure: DILATATION AND CURETTAGE (D&C), HYSTEROSCOPY;  Surgeon: Marty Cummings MD;  [facility];  Service: Gynecology Oncology    VT INSJ/RPLCMT BREAST IMPLANT SEP DAY MASTECTOMY Left 12/19/2016    Procedure: BREAST TISSUE EXPANDER REMOVAL; BREAST IMPLANT PLACEMENT;  Surgeon: Gabbie Cervantes MD;  [facility];  Service: Plastics    VT LAP GASTRIC BYPASS/RAMIN-EN-Y N/A 10/4/2021    Procedure: LAPAROSCOPIC RAMIN-EN-Y GASTRIC BYPASS AND INTRAOPERATIVE EGD;  Surgeon: Phuong Barrientos MD;  Location: [address] [place] [address];  Service: Bariatrics    VT LAP, SURG CLOSE ENTEROSTOMY RESECT ANAST N/A 5/3/2022    Procedure: CLOSURE COLOSTOMY, LAPAROSCOPIC closure Hartmens sigmoid resection;  Surgeon: Dionne Elmore MD;  [facility];  Service: Colorectal    VT MASTECTOMY, SIMPLE, COMPLETE Left 8/10/2016    Procedure: BREAST MASTECTOMY; FROZEN SECTION ;  Surgeon: Shon De MD;  [facility];  Service: Surgical Oncology    VT REMOVAL TISSUE EXPANDER W/O INSERTION IMPLANT Left 1/23/2017    Procedure: BREAST IMPLANT REMOVAL; WASHOUT AND DEBRIDEMENT;  Surgeon: Gabbie Cervantes MD;  [facility];  Service: Plastics   [facility] Direct Access Software [facility] 2016    REDUCTION MAMMAPLASTY  2018    REVISION OF SCAR ON TORSO N/A 12/19/2016    Procedure: ABDOMINAL SCAR REVISION ;  Surgeon: Katelin Sosa MD;  [facility];  Service:     US GUIDANCE  4/22/2016    US GUIDED BREAST BIOPSY RIGHT COMPLETE Right 10/18/2021    WISDOM TOOTH EXTRACTION             05/04/22 0852   OT Last Visit   OT Visit Date 05/04/22   Note Type   Note type Evaluation   Restrictions/Precautions   Weight Bearing Precautions Per Order No   Other Precautions Multiple lines; Fall Risk;Pain  (PCA)   Pain Assessment   Pain Assessment Tool 0-10   Pain Score 7   Pain Location/Orientation Orientation: Bilateral;Location: Abdomen   Hospital Pain Intervention(s) Ambulation/increased activity;Repositioned   Home Living   Type of [address] Two level   Bathroom Shower/Tub Walk-in shower   Bathroom Toilet Standard   Bathroom Equipment Shower chair   Home Equipment   (denies)   Additional Comments Pt resides with 15yo son in a [facility]  Prior Function   Level of [place] Independent with ADLs and functional mobility   Lives With Son   Receives Help From Family   ADL Assistance Independent   IADLs Independent   Falls in the last 6 months 0   Vocational Full time employment  (Works from home for [employer])   Comments PTA, Pt I with ADLs/IADLs/no AD/ +  Pt with potential plans to stay with SO upon DC  Pt with supportive local family     Lifestyle   Autonomy I with ADLs/IADLs/no AD/ +   Reciprocal Relationships Supportive family and SO   Service to Others FT- works from home   [facility]   ADL   Where Assessed Edge of bed   Eating Assistance 7  Independent   Grooming Assistance 7  Independent   Grooming Deficit Wash/dry hands   UB Bathing Assistance 5  Supervision/Setup   LB Bathing Assistance 4  Minimal Assistance   [address] Toileting Assistance  5  Supervision/Setup   Toileting Deficit Setup;Verbal cueing;Supervison/safety; Increased time to complete;Grab bar use  (toileting routine completed 2x during session)   Functional Assistance 5  Supervision/Setup   Additional Comments Pt educated on compensatory techniques with LB ADLs upon DC  Pt to progress with LB ADLs when pain decreases  Bed Mobility   Supine to Sit 5  Supervision   Additional items Increased time required  (log roll)   Sit to Supine Unable to assess   Additional Comments Pt greeted supine in bed and left OOB in recliner chair at end of session  All needs met and call bell nearby  Transfers   Sit to Stand 5  Supervision   Additional items Increased time required;Verbal cues   Stand to Sit 5  Supervision   Additional items Verbal cues; Increased time required   Additional Comments with RW   Functional Mobility   Functional Mobility 5  Supervision   Additional Comments S with RW- household distances  S with no AD within room  Additional items Rolling walker   Balance   Static Sitting Fair +   Dynamic Sitting Fair   Static Standing Fair   Dynamic Standing Fair -   Ambulatory Fair -   Activity Tolerance   Activity Tolerance Patient limited by fatigue; Other (Comment)  (nausea)   Medical Staff Made Aware Co-eval with SAMEER Griffith 2* to Pt's medical complexity, post-surgical, and decreased endurance  Nurse Made Aware RN cleared/updated  RUE Assessment   RUE Assessment WFL   LUE Assessment   LUE Assessment WFL   Hand Function   Gross Motor Coordination Functional   Fine Motor Coordination Functional   Sensation   Light Touch No apparent deficits   Cognition   Overall Cognitive Status WFL   Arousal/Participation Alert; Cooperative   Attention Within functional limits   Orientation Level Oriented X4   Memory Within functional limits   Following Commands Follows all commands and directions without difficulty   Comments Pt very pleasant and cooperative during OT session  Assessment   Limitation Decreased ADL status; Decreased UE strength;Decreased UE ROM; Decreased Safe judgement during ADL;Decreased cognition;Decreased endurance;Decreased self-care trans;Decreased high-level ADLs   Prognosis Fair   Assessment Pt is a 55 yo Female who presented to hospitals on 5/3/2022 with perforation of sigmoid colon, ventral hernia and colostomy in place  Pt s/p insertion ureteral catheters B/L, closure colostomy, lap closure hartmen's sigmoid resection, flexible sigmoidscopy, and ventral hernia repair on 5/3/2022  Pt  has a past medical history of Anxiety, Back pain, BRCA negative (06/2016), Cancer ([facility] [address] ), Gastric ulcer, Headache, History of chemotherapy (2016), History of transfusion (06/2017), Limb alert care status, Malignant neoplasm of upper-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 ([facility] [address] ), and Obesity (BMI 30-39 9)  Pt greeted bedside for OT evaluation on 5/4/2022  Pt resides with 16yo son in a [facility]  PTA, Pt I with ADLs/IADLs/no AD/ +  Pt with potential plans to stay with SO upon DC  Pt with supportive local family  Pt demonstrating the following occupational deficits: S with UB ADLs, min A with LB ADLs, S with bed mobility, S with functional transfers, and S with functional mobility with RW and no AD  Pt educated on compensatory techniques with LB ADLs upon DC  Pt to progress with LB ADLs when pain decreases  Pt with no further acute OT concerns Pt would benefit from returning home with increased social support upon DC to maximize safety and independence with ADLs and functional tasks of choice  DC skilled OT services  Goals   Patient Goals To feel better  Plan   OT Frequency Eval only   Recommendation   OT Discharge Recommendation No rehabilitation needs   OT - OK to Discharge Yes   Additional Comments  The patient's raw score on the AM-PAC Daily Activity inpatient short form is 20, standardized score is 42 03, greater than 39 4   Patients at this level are likely to benefit from discharge to home  Please refer to the recommendation of the Occupational Therapist for safe discharge planning     AM-PAC Daily Activity Inpatient   Lower Body Dressing 3   Bathing 3   Toileting 3   Upper Body Dressing 3   Grooming 4   Eating 4   Daily Activity Raw Score 20   Daily Activity Standardized Score (Calc for Raw Score >=11) 42 03   AM-PAC Applied Cognition Inpatient   Following a Speech/Presentation 4   Understanding Ordinary Conversation 4   Taking Medications 4   Remembering Where Things Are Placed or Put Away 4   Remembering List of 4-5 Errands 4   Taking Care of Complicated Tasks 4   Applied Cognition Raw Score 24   Applied Cognition Standardized Score 62 21       Debbi Pavon MS, OTR/L

## 2022-05-04 NOTE — PLAN OF CARE
Problem: MOBILITY - ADULT  Goal: Maintain or return to baseline ADL function  Description: INTERVENTIONS:  -  Assess patient's ability to carry out ADLs; assess patient's baseline for ADL function and identify physical deficits which impact ability to perform ADLs (bathing, care of mouth/teeth, toileting, grooming, dressing, etc )  - Assess/evaluate cause of self-care deficits   - Assess range of motion  - Assess patient's mobility; develop plan if impaired  - Assess patient's need for assistive devices and provide as appropriate  - Encourage maximum independence but intervene and supervise when necessary  - Involve family in performance of ADLs  - Assess for home care needs following discharge   - Consider OT consult to assist with ADL evaluation and planning for discharge  - Provide patient education as appropriate  Outcome: Progressing  Goal: Maintains/Returns to pre admission functional level  Description: INTERVENTIONS:  - Perform BMAT or MOVE assessment daily    - Set and communicate daily mobility goal to care team and patient/family/caregiver     - Collaborate with rehabilitation services on mobility goals if consulted  - Out of bed for toileting  - Record patient progress and toleration of activity level   Outcome: Progressing     Problem: Potential for Falls  Goal: Patient will remain free of falls  Description: INTERVENTIONS:  - Educate patient/family on patient safety including physical limitations  - Instruct patient to call for assistance with activity   - Consult OT/PT to assist with strengthening/mobility   - Keep Call bell within reach  - Keep bed low and locked with side rails adjusted as appropriate  - Keep care items and personal belongings within reach  - Initiate and maintain comfort rounds  - Make Fall Risk Sign visible to staff  - Apply yellow socks and bracelet for high fall risk patients  - Consider moving patient to room near nurses station  Outcome: Progressing     Problem: PAIN - ADULT  Goal: Verbalizes/displays adequate comfort level or baseline comfort level  Description: Interventions:  - Encourage patient to monitor pain and request assistance  - Assess pain using appropriate pain scale  - Administer analgesics based on type and severity of pain and evaluate response  - Implement non-pharmacological measures as appropriate and evaluate response  - Consider cultural and social influences on pain and pain management  - Notify physician/advanced practitioner if interventions unsuccessful or patient reports new pain  Outcome: Progressing

## 2022-05-04 NOTE — UTILIZATION REVIEW
Initial Clinical Review    Elective Inpatient surgical procedure  Age/Sex: 54 y o  female  Surgery Date: 5/3/22  Procedure: Cystoscopy with bilateral ureteral stent insertion  Anesthesia: general  Operative Findings: Normal bladder  Standard insertion of bilateral 5 [place] open-ended ureteral catheters preoperatively    POD#1 Progress Note: No acute events overnight  Pain stable on PCA  Tolerating clears   No bowel function  Clears diet, adv as aruna, MIVF, dc Hernadez, monitor IOs, dvt ppx, pain and nausea control, OOB and ambulate, inc spir      Admission Orders: Date/Time/Statement:   Admission Orders (From admission, onward)     Ordered        05/03/22 1227  Inpatient Admission  Once                      Orders Placed This Encounter   Procedures    Inpatient Admission     Standing Status:   Standing     Number of Occurrences:   1     Order Specific Question:   Level of Care     Answer:   Med Surg [16]     Order Specific Question:   Estimated length of stay     Answer:   Inpatient Only Surgery     Vital Signs: /70   Pulse 76   Temp 99 °F (37 2 °C)   Resp 20   Ht 5' (1 524 m)   Wt 72 6 kg (160 lb)   LMP 04/05/2022   SpO2 95%   BMI 31 25 kg/m²     Pertinent Labs/Diagnostic Test Results:     Results from last 7 days   Lab Units 04/27/22  1428   SARS-COV-2  Negative     Results from last 7 days   Lab Units 05/04/22  0535 05/03/22  1251   WBC Thousand/uL 7 69 9 34   HEMOGLOBIN g/dL 7 8* 10 9*   HEMATOCRIT % 25 5* 35 3   PLATELETS Thousands/uL 180 242     Results from last 7 days   Lab Units 05/04/22  0535 05/03/22  1251   SODIUM mmol/L 138 141   POTASSIUM mmol/L 4 4 3 9   CHLORIDE mmol/L 108 112*   CO2 mmol/L 24 21   ANION GAP mmol/L 6 8   BUN mg/dL 10 15   CREATININE mg/dL 0 56* 0 65   EGFR ml/min/1 73sq m 109 104   CALCIUM mg/dL 8 4 8 3     Results from last 7 days   Lab Units 05/04/22  0535 05/03/22  1251   GLUCOSE RANDOM mg/dL 87 111       Diet: clears  Mobility: OOB  DVT Prophylaxis: scd    Medications/Pain Control:   Scheduled Medications:  acetaminophen, 975 mg, Oral, Q8H [address]  buPROPion, 150 mg, Oral, BID  heparin (porcine), 5,000 Units, Subcutaneous, Q8H WILFRIDO  pantoprazole, 40 mg, Oral, Daily  sucralfate, 1 g, Oral, 4x Daily      Continuous IV Infusions:  dextrose 5 % and sodium chloride 0 45 % with KCl 20 mEq/L, 100 mL/hr, Intravenous, Continuous  HYDROmorphone, , Intravenous, Continuous      PRN Meds:  HYDROmorphone, 0 5 mg, Intravenous, Q4H PRN  lactated ringers, 1,000 mL, Intravenous, Once PRN   And  lactated ringers, 1,000 mL, Intravenous, Once PRN  LORazepam, 0 5 mg, Oral, BID PRN  methocarbamol, 500 mg, Oral, Q6H PRN x1 thus far  naloxone, 0 04 mg, Intravenous, Q3 min PRN  ondansetron, 4 mg, Intravenous, Q4H PRN  sodium chloride, 1,000 mL, Intravenous, Once PRN   And  sodium chloride, 1,000 mL, Intravenous, Once PRN        Network Utilization Review Department  ATTENTION: Please call with any questions or concerns to [phone] and carefully listen to the prompts so that you are directed to the right person  All voicemails are confidential   Lee Koo all requests for admission clinical reviews, approved or denied determinations and any other requests to dedicated fax number below belonging to the campus where the patient is receiving treatment   List of dedicated fax numbers for the Facilities:  [address] DENIALS (Administrative/Medical Necessity) [phone]   [address] (Maternity/NICU/Pediatrics) 261 [facility],[address]  [address] Medical [place] [address] [address] [address] 1924 [facility] Mary Ellen [address] 1481 [address] 9215 [facility] 951 [phone]

## 2022-05-04 NOTE — PROGRESS NOTES
Progress Note - Colorectal Surgery   [facility] 54 y o  female MRN: [id number]  Unit/Bed#: [facility] 826-01 Encounter: [id number]    Assessment:  Patient is a 54 y o  female s/p lap/hand-assist Bushra's reversal 5/4    Plan:   Clears  o Advance as tolerated   MIVF   D/c Hernadez  I/Os  DVT ppx  Pain/Nausea Control  OOB/ambulate   Incentive Spirometry   Please TigerText on call Colorectal Surgery Resident if any further questions    Subjective/Objective     Subjective:   No acute events overnight  Pain stable on PCA  Tolerating clears   No bowel function  Pertinent review of systems as above  All other review of systems negative  Objective:    Blood pressure 113/70, pulse 74, temperature 98 °F (36 7 °C), resp  rate 16, height 5' (1 524 m), weight 72 6 kg (160 lb), last menstrual period 04/05/2022, SpO2 95 %, not currently breastfeeding  ,Body mass index is 31 25 kg/m²  Intake/Output Summary (Last 24 hours) at 5/4/2022 2076  Last data filed at [place] [address]  Gross per 24 hour   Intake 4287 5 ml   Output 2250 ml   Net 2037 5 ml       Invasive Devices  Report    Peripheral Intravenous Line            Peripheral IV 05/03/22 Right Hand <1 day    Peripheral IV 05/03/22 Right Wrist <1 day          Drain            Urethral Catheter Non-latex;Straight-tip 16 Fr  <1 day                Physical Exam:   Gen:  NAD  HEENT: NCAT  MMM  CV: well perfused  Lungs: Normal respiratory effort  Abd: soft, nt/nd, incisions cdi  Skin: warm/ dry  Extremities: no peripheral edema, no clubbing or cyanosis  Neuro: AxO x3      Results from last 7 days   Lab Units 05/03/22  1251   WBC Thousand/uL 9 34   HEMOGLOBIN g/dL 10 9*   HEMATOCRIT % 35 3   PLATELETS Thousands/uL 242     Results from last 7 days   Lab Units 05/03/22  1251   POTASSIUM mmol/L 3 9   CHLORIDE mmol/L 112*   CO2 mmol/L 21   BUN mg/dL 15   CREATININE mg/dL 0 65   CALCIUM mg/dL 8 3            I have personally reviewed pertinent films in PACS      Medications: Scheduled Meds:  Current Facility-Administered Medications   Medication Dose Route Frequency Provider Last Rate    acetaminophen  975 mg Oral Q8H Porfirio Callahan MD      buPROPion  150 mg Oral BID Twin Macias MD      heparin (porcine)  5,000 Units Subcutaneous [facility] Mark Gutierrez MD      HYDROmorphone   Intravenous Continuous Twin Macias MD      lactated ringers  1,000 mL Intravenous Once PRN Mark Gutierrez MD      And    lactated ringers  1,000 mL Intravenous Once PRN Mark Gutierrez MD      lactated ringers  125 mL/hr Intravenous Continuous Mark Gutierrez  mL/hr (05/03/22 2200)    LORazepam  0 5 mg Oral BID PRN Mark Gutierrez MD      methocarbamol  500 mg Oral Q6H PRN Keri Blackwood MD      naloxone  0 04 mg Intravenous Q3 min PRN Twin Macias MD      ondansetron  4 mg Intravenous Q4H PRN Mark Gutierrez MD      pantoprazole  40 mg Oral Daily Mark Gutierrez MD      sodium chloride  1,000 mL Intravenous Once PRN Mark Gutierrez MD      And    sodium chloride  1,000 mL Intravenous Once PRN Mark Gutierrez MD      sucralfate  1 g Oral 4x Daily Mark Gutierrez MD       Continuous Infusions:HYDROmorphone,   lactated ringers, 125 mL/hr, Last Rate: 125 mL/hr (05/03/22 2200)      PRN Meds:  lactated ringers, 1,000 mL, Once PRN   And  lactated ringers, 1,000 mL, Once PRN  LORazepam, 0 5 mg, BID PRN  methocarbamol, 500 mg, Q6H PRN  naloxone, 0 04 mg, Q3 min PRN  ondansetron, 4 mg, Q4H PRN  sodium chloride, 1,000 mL, Once PRN   And  sodium chloride, 1,000 mL, Once PRN      VTE Pharmacologic Prophylaxis: Heparin  VTE Mechanical Prophylaxis: sequential compression device

## 2022-05-05 VITALS
HEART RATE: 67 BPM | WEIGHT: 160 LBS | OXYGEN SATURATION: 94 % | SYSTOLIC BLOOD PRESSURE: 113 MMHG | BODY MASS INDEX: 31.41 KG/M2 | RESPIRATION RATE: 18 BRPM | HEIGHT: 60 IN | TEMPERATURE: 99 F | DIASTOLIC BLOOD PRESSURE: 67 MMHG

## 2022-05-05 PROBLEM — K63.1 PERFORATION OF SIGMOID COLON (HCC): Status: RESOLVED | Noted: 2022-02-06 | Resolved: 2022-05-05

## 2022-05-05 PROBLEM — Z93.3 S/P COLOSTOMY (HCC): Status: ACTIVE | Noted: 2022-05-05

## 2022-05-05 LAB
BASOPHILS # BLD AUTO: 0.03 THOUSANDS/ΜL (ref 0–0.1)
BASOPHILS NFR BLD AUTO: 0 % (ref 0–1)
EOSINOPHIL # BLD AUTO: 0.3 THOUSAND/ΜL (ref 0–0.61)
EOSINOPHIL NFR BLD AUTO: 4 % (ref 0–6)
ERYTHROCYTE [DISTWIDTH] IN BLOOD BY AUTOMATED COUNT: 13.6 % (ref 11.6–15.1)
HCT VFR BLD AUTO: 29.4 % (ref 34.8–46.1)
HGB BLD-MCNC: 9 G/DL (ref 11.5–15.4)
IMM GRANULOCYTES # BLD AUTO: 0.02 THOUSAND/UL (ref 0–0.2)
IMM GRANULOCYTES NFR BLD AUTO: 0 % (ref 0–2)
LYMPHOCYTES # BLD AUTO: 1.49 THOUSANDS/ΜL (ref 0.6–4.47)
LYMPHOCYTES NFR BLD AUTO: 22 % (ref 14–44)
MCH RBC QN AUTO: 30.4 PG (ref 26.8–34.3)
MCHC RBC AUTO-ENTMCNC: 30.6 G/DL (ref 31.4–37.4)
MCV RBC AUTO: 99 FL (ref 82–98)
MONOCYTES # BLD AUTO: 0.6 THOUSAND/ΜL (ref 0.17–1.22)
MONOCYTES NFR BLD AUTO: 9 % (ref 4–12)
NEUTROPHILS # BLD AUTO: 4.45 THOUSANDS/ΜL (ref 1.85–7.62)
NEUTS SEG NFR BLD AUTO: 65 % (ref 43–75)
NRBC BLD AUTO-RTO: 0 /100 WBCS
PLATELET # BLD AUTO: 205 THOUSANDS/UL (ref 149–390)
PMV BLD AUTO: 9.6 FL (ref 8.9–12.7)
RBC # BLD AUTO: 2.96 MILLION/UL (ref 3.81–5.12)
WBC # BLD AUTO: 6.89 THOUSAND/UL (ref 4.31–10.16)

## 2022-05-05 PROCEDURE — 85025 COMPLETE CBC W/AUTO DIFF WBC: CPT | Performed by: PHYSICIAN ASSISTANT

## 2022-05-05 RX ORDER — ACETAMINOPHEN 325 MG/1
975 TABLET ORAL EVERY 8 HOURS SCHEDULED
Refills: 0 | COMMUNITY
Start: 2022-05-05

## 2022-05-05 RX ORDER — METHOCARBAMOL 500 MG/1
500 TABLET, FILM COATED ORAL EVERY 6 HOURS PRN
Qty: 30 TABLET | Refills: 0 | Status: SHIPPED | OUTPATIENT
Start: 2022-05-05 | End: 2022-06-29

## 2022-05-05 RX ORDER — SODIUM CHLORIDE 9 MG/ML
10 INJECTION, SOLUTION INTRAVENOUS CONTINUOUS
Status: DISCONTINUED | OUTPATIENT
Start: 2022-05-05 | End: 2022-05-05

## 2022-05-05 RX ORDER — OXYCODONE HYDROCHLORIDE 5 MG/1
5 TABLET ORAL EVERY 4 HOURS PRN
Qty: 20 TABLET | Refills: 0 | Status: SHIPPED | OUTPATIENT
Start: 2022-05-05 | End: 2022-05-11

## 2022-05-05 RX ORDER — OXYCODONE HYDROCHLORIDE 5 MG/1
5 TABLET ORAL EVERY 4 HOURS PRN
Status: DISCONTINUED | OUTPATIENT
Start: 2022-05-05 | End: 2022-05-05 | Stop reason: HOSPADM

## 2022-05-05 RX ORDER — OXYCODONE HYDROCHLORIDE 10 MG/1
10 TABLET ORAL EVERY 4 HOURS PRN
Status: DISCONTINUED | OUTPATIENT
Start: 2022-05-05 | End: 2022-05-05 | Stop reason: HOSPADM

## 2022-05-05 RX ADMIN — HEPARIN SODIUM 5000 UNITS: 5000 INJECTION INTRAVENOUS; SUBCUTANEOUS at 05:22

## 2022-05-05 RX ADMIN — ACETAMINOPHEN 975 MG: 325 TABLET, FILM COATED ORAL at 05:22

## 2022-05-05 RX ADMIN — BUPROPION HYDROCHLORIDE 150 MG: 150 TABLET, FILM COATED, EXTENDED RELEASE ORAL at 08:13

## 2022-05-05 RX ADMIN — OXYCODONE HYDROCHLORIDE 10 MG: 10 TABLET ORAL at 12:07

## 2022-05-05 RX ADMIN — SUCRALFATE 1 G: 1 TABLET ORAL at 08:13

## 2022-05-05 RX ADMIN — PANTOPRAZOLE SODIUM 40 MG: 40 TABLET, DELAYED RELEASE ORAL at 08:13

## 2022-05-05 RX ADMIN — SUCRALFATE 1 G: 1 TABLET ORAL at 12:07

## 2022-05-05 RX ADMIN — ACETAMINOPHEN 975 MG: 325 TABLET, FILM COATED ORAL at 12:08

## 2022-05-05 RX ADMIN — HEPARIN SODIUM 5000 UNITS: 5000 INJECTION INTRAVENOUS; SUBCUTANEOUS at 12:11

## 2022-05-05 NOTE — PROGRESS NOTES
Progress Note - Colorectal Surgery   Mayito Erickson 54 y o  female MRN: [id number]  Unit/Bed#: [facility] 826-01 Encounter: [id number]    Assessment:  Patient is a 54 y o  female s/p lap/hand-assist Bushra's reversal 5/4  Plan:   Clears t/c  o Advance as tolerated    MIVF decrease  F/u CBC- yesterday Hgb 7 8 from 10 9   I/Os  DVT ppx  Pain/Nausea Control  OOB/ambulate   Incentive Spirometry   Please TigerText on call Colorectal Surgery Resident if any further questions    Subjective/Objective     Subjective:   No acute events overnight  Pain improved  Tolerating clears t/c  Some intermittent nausea  No flatus or BM  Pertinent review of systems as above  All other review of systems negative  Objective:    Blood pressure 130/77, pulse 85, temperature 98 6 °F (37 °C), resp  rate 18, height 5' (1 524 m), weight 72 6 kg (160 lb), last menstrual period 04/05/2022, SpO2 94 %, not currently breastfeeding  ,Body mass index is 31 25 kg/m²  Intake/Output Summary (Last 24 hours) at 5/5/2022 0604  Last data filed at 5/5/2022 0527  Gross per 24 hour   Intake 1123 2 ml   Output 575 ml   Net 548 2 ml       Invasive Devices  Report    Peripheral Intravenous Line            Peripheral IV 05/03/22 Right Wrist 1 day                Physical Exam:   Gen:  NAD  HEENT: NCAT  MMM  CV: well perfused, pulses palpable  Lungs: Normal respiratory effort  Abd: soft, nt/nd, incisions cdi  Skin: warm/ dry  Extremities: no peripheral edema, no cyanosis  Neuro:  AxO x3      Results from last 7 days   Lab Units 05/04/22  0535 05/03/22  1251   WBC Thousand/uL 7 69 9 34   HEMOGLOBIN g/dL 7 8* 10 9*   HEMATOCRIT % 25 5* 35 3   PLATELETS Thousands/uL 180 242     Results from last 7 days   Lab Units 05/04/22  0535 05/03/22  1251   POTASSIUM mmol/L 4 4 3 9   CHLORIDE mmol/L 108 112*   CO2 mmol/L 24 21   BUN mg/dL 10 15   CREATININE mg/dL 0 56* 0 65   CALCIUM mg/dL 8 4 8 3            I have personally reviewed pertinent films in PACS     Medications:   Scheduled Meds:  Current Facility-Administered Medications   Medication Dose Route Frequency Provider Last Rate    acetaminophen  975 mg Oral Q8H Alda Hess MD      buPROPion  150 mg Oral BID Humaira Encarnacion MD      dextrose 5 % and sodium chloride 0 45 % with KCl 20 mEq/L  100 mL/hr Intravenous Continuous Franca Beasley PA-C 100 mL/hr (05/04/22 2248)    heparin (porcine)  5,000 Units Subcutaneous Q8H [facility] Corrie Boyd MD      HYDROmorphone   Intravenous Continuous Franca Beasley PA-C      HYDROmorphone  0 5 mg Intravenous Q4H PRN Franca Beasley PA-C      LORazepam  0 5 mg Oral BID PRN Corrie Boyd MD      methocarbamol  500 mg Oral Q6H PRN Michelle Mccullough MD      naloxone  0 04 mg Intravenous Q3 min PRN Humaira Encarnacion MD      ondansetron  4 mg Intravenous Q4H PRN Corrie Boyd MD      pantoprazole  40 mg Oral Daily Corrie Boyd MD      sucralfate  1 g Oral 4x Daily Corrie Boyd MD       Continuous Infusions:dextrose 5 % and sodium chloride 0 45 % with KCl 20 mEq/L, 100 mL/hr, Last Rate: 100 mL/hr (05/04/22 2248)  HYDROmorphone,       PRN Meds:  HYDROmorphone, 0 5 mg, Q4H PRN  LORazepam, 0 5 mg, BID PRN  methocarbamol, 500 mg, Q6H PRN  naloxone, 0 04 mg, Q3 min PRN  ondansetron, 4 mg, Q4H PRN      VTE Pharmacologic Prophylaxis: Heparin  VTE Mechanical Prophylaxis: sequential compression device

## 2022-05-05 NOTE — UTILIZATION REVIEW
Initial Clinical Review    Elective Inpatient surgical procedure  Age/Sex: 54 y o  female  Surgery Date: 5/3/22  Procedure: INSERTION URETERAL CATHETERS PREOP (Bilateral)  CLOSURE COLOSTOMY, LAPAROSCOPIC closure Hartmens sigmoid resection (N/A), FLEXIBLE SIGMOIDOSCOPY, REPAIR VENTRAL HERNIA  Anesthesia: general  Operative Findings: Bushra colostomy    POD#1 Progress Note: No acute events overnight  Pain stable on PCA  Tolerating clears adv as aruna  No bowel function  MVIF, dc alarcon, IO, dvt ppx, pain and nausea control, OOB, inc spir       Admission Orders: Date/Time/Statement:   Admission Orders (From admission, onward)     Ordered        05/03/22 1227  Inpatient Admission  Once                      Orders Placed This Encounter   Procedures    Inpatient Admission     Standing Status:   Standing     Number of Occurrences:   1     Order Specific Question:   Level of Care     Answer:   Med Surg [16]     Order Specific Question:   Estimated length of stay     Answer:   Inpatient Only Surgery     Vital Signs: /67   Pulse 67   Temp 99 °F (37 2 °C)   Resp 18   Ht 5' (1 524 m)   Wt 72 6 kg (160 lb)   LMP 04/05/2022   SpO2 94%   BMI 31 25 kg/m²     Pertinent Labs/Diagnostic Test Results:   No orders to display         Results from last 7 days   Lab Units 05/05/22  0503 05/04/22  0535 05/03/22  1251   WBC Thousand/uL 6 89 7 69 9 34   HEMOGLOBIN g/dL 9 0* 7 8* 10 9*   HEMATOCRIT % 29 4* 25 5* 35 3   PLATELETS Thousands/uL 205 180 242   NEUTROS ABS Thousands/µL 4 45  --   --      Results from last 7 days   Lab Units 05/04/22  0535 05/03/22  1251   SODIUM mmol/L 138 141   POTASSIUM mmol/L 4 4 3 9   CHLORIDE mmol/L 108 112*   CO2 mmol/L 24 21   ANION GAP mmol/L 6 8   BUN mg/dL 10 15   CREATININE mg/dL 0 56* 0 65   EGFR ml/min/1 73sq m 109 104   CALCIUM mg/dL 8 4 8 3     Results from last 7 days   Lab Units 05/04/22  0535 05/03/22  1251   GLUCOSE RANDOM mg/dL 87 111       Diet: clears  Mobility: OOB  DVT Prophylaxis: scd, heparin    Medications/Pain Control:   Scheduled Medications:  acetaminophen, 975 mg, Oral, Q8H [facility]  buPROPion, 150 mg, Oral, BID  heparin (porcine), 5,000 Units, Subcutaneous, Q8H WILFRIDO  pantoprazole, 40 mg, Oral, Daily  sucralfate, 1 g, Oral, 4x Daily      Continuous IV Infusions: none    albumin human (FLEXBUMIN) 5 % injection  Freq: Continuous PRN Route: IV  Last Dose: Stopped (05/03/22 1113)  Start: 05/03/22 1041 End: 05/03/22 1218    dextrose 5 % and sodium chloride 0 45 % with KCl 20 mEq/L infusion  Rate: 100 mL/hr Dose: 100 mL/hr  Freq: Continuous Route: IV  Indications of Use: IV Hydration  Last Dose: Stopped (05/05/22 1059)  Start: 05/04/22 0830 End: 05/05/22 0938    HYDROmorphone (DILAUDID) 1 mg/mL 50 mL PCA  Continuous Rate: 0 mg/hr  PCA Dose: 0 2 mg  PCA Lock-out: 6 Minutes  One Hour Limit: 2 mg  Freq: Continuous Route: IV  Last Dose: Stopped (05/05/22 1059)  Start: 05/04/22 0830 End: 05/05/22 0938    HYDROmorphone (DILAUDID) 1 mg/mL 50 mL PCA  Continuous Rate: 0 mg/hr  PCA Dose: 0 2 mg  PCA Lock-out: 6 Minutes  One Hour Limit: 1 2 mg  Freq: Continuous Route: IV  Last Dose: Stopped (05/05/22 1100)  Start: 05/03/22 1845 End: 05/04/22 0820    lactated ringers infusion  Rate: 125 mL/hr Dose: 125 mL/hr  Freq: Continuous Route: IV  Last Dose: Stopped (05/04/22 0859)  Start: 05/03/22 1230 End: 05/04/22 0820    PRN Meds:  LORazepam, 0 5 mg, Oral, BID PRN  methocarbamol, 500 mg, Oral, Q6H PRN  naloxone, 0 04 mg, Intravenous, Q3 min PRN  oxyCODONE, 10 mg, Oral, Q4H PRN  oxyCODONE, 5 mg, Oral, Q4H PRN        Network Utilization Review Department  ATTENTION: Please call with any questions or concerns to [phone] and carefully listen to the prompts so that you are directed to the right person   All voicemails are confidential   Nu Crowder all requests for admission clinical reviews, approved or denied determinations and any other requests to dedicated fax number below belonging to the campus where the patient is receiving treatment   List of dedicated fax numbers for the Facilities:  [address] [place] [address] DENIALS (Administrative/Medical Necessity) [phone]   [address] (Maternity/NICU/Pediatrics) [phone]   [address] [place] [address]  [address] Medical [place] [phone] [address] 14[address] [facility] [place] [address] [phone]

## 2022-05-05 NOTE — DISCHARGE SUMMARY
Discharge Summary - Colorectal Surgery   [facility] 54 y o  female MRN: [id number]  [facility] Encounter: [id number]    Admission Date: 5/3/2022     Discharge Date: 5/5/2022     Admitting Diagnosis: Perforation of sigmoid colon ([facility] [address] ) [K63 1]    Discharge Diagnosis: Principal Problem:    S/P colostomy ([facility] [address] )  Resolved Problems:    Perforation of sigmoid colon ([facility] [address] )      Attending and Service:   Tawana Eller MD; Colon and Rectal Surgery    Consulting Physician(s):   N/A    Procedures Performed:   1  INSERTION URETERAL CATHETERS PREOP (Bilateral)  2  CLOSURE COLOSTOMY, LAPAROSCOPIC  3  SIGMOID COLON RESECTION    Imaging:  No results found  Hospital Course:   [facility] is a 54 y o  female with history of Bushra's procedure performed in 02/2022 for perforated sigmoid colon  She presents now for elective laparoscopic Bushra's reversal      The patient was taken to the operating room on 5/3/2022 for performance of the above-stated procedures  The procedure went as planned and there were no operative complications  Her bilateral ureteral stents were removed at the conclusion of the case  The patient was admitted to the colorectal surgery service postoperatively for monitoring  She did well and her hospitalization was uncomplicated  She was started on a dilaudid PCA postoperatively, however was quickly transitioned to an oral analgesic regimen  Her Hernadez catheter was removed on POD#1 and she passed a void trial  Her diet was gradually advanced until she was able to tolerate a regular low residue diet  She had return of bowel function  She was evaluated by PT and OT, who determined that she did not have any rehabilitation needs  Patient was discharged on HD#2/POD#2  On the day of discharge, the patient was voiding spontaneously, ambulating at baseline, and pain was well controlled  The patient was sent home with medication for pain  She understood all instructions for discharge  She was also given the names and numbers of the providers as well as instructions for follow up appointments  Condition at Discharge: good     Discharge instructions/Information to patient and family:   See after visit summary for information provided to patient and family  Provisions for Follow-Up Care:  See after visit summary for information related to follow-up care and any pertinent home health orders  Disposition: See After Visit Summary for discharge disposition information  Planned Readmission: No    Discharge Statement   I spent 30 minutes discharging the patient  This time was spent on the day of discharge  I had direct contact with the patient on the day of discharge  Additional documentation is required if more than 30 minutes were spent on discharge  Discharge Medications:  See after visit summary for reconciled discharge medications provided to patient and family        Maryanne Carvalho MD  5/5/2022  1:30 PM

## 2022-05-05 NOTE — PLAN OF CARE
Problem: MOBILITY - ADULT  Goal: Maintain or return to baseline ADL function  Description: INTERVENTIONS:  -  Assess patient's ability to carry out ADLs; assess patient's baseline for ADL function and identify physical deficits which impact ability to perform ADLs (bathing, care of mouth/teeth, toileting, grooming, dressing, etc )  - Assess/evaluate cause of self-care deficits   - Assess range of motion  - Assess patient's mobility; develop plan if impaired  - Assess patient's need for assistive devices and provide as appropriate  - Encourage maximum independence but intervene and supervise when necessary  - Involve family in performance of ADLs  - Assess for home care needs following discharge   - Consider OT consult to assist with ADL evaluation and planning for discharge  - Provide patient education as appropriate  Outcome: Progressing  Goal: Maintains/Returns to pre admission functional level  Description: INTERVENTIONS:  - Perform BMAT or MOVE assessment daily    - Set and communicate daily mobility goal to care team and patient/family/caregiver     - Collaborate with rehabilitation services on mobility goals if consulted  - Out of bed for toileting  - Record patient progress and toleration of activity level   Outcome: Progressing     Problem: Potential for Falls  Goal: Patient will remain free of falls  Description: INTERVENTIONS:  - Educate patient/family on patient safety including physical limitations  - Instruct patient to call for assistance with activity   - Consult OT/PT to assist with strengthening/mobility   - Keep Call bell within reach  - Keep bed low and locked with side rails adjusted as appropriate  - Keep care items and personal belongings within reach  - Initiate and maintain comfort rounds  - Make Fall Risk Sign visible to staff  - Apply yellow socks and bracelet for high fall risk patients  - Consider moving patient to room near nurses station  Outcome: Progressing     Problem: PAIN - ADULT  Goal: Verbalizes/displays adequate comfort level or baseline comfort level  Description: Interventions:  - Encourage patient to monitor pain and request assistance  - Assess pain using appropriate pain scale  - Administer analgesics based on type and severity of pain and evaluate response  - Implement non-pharmacological measures as appropriate and evaluate response  - Consider cultural and social influences on pain and pain management  - Notify physician/advanced practitioner if interventions unsuccessful or patient reports new pain  Outcome: Progressing     Problem: INFECTION - ADULT  Goal: Absence or prevention of progression during hospitalization  Description: INTERVENTIONS:  - Assess and monitor for signs and symptoms of infection  - Monitor lab/diagnostic results  - Monitor all insertion sites, i e  indwelling lines, tubes, and drains  - Monitor endotracheal if appropriate and nasal secretions for changes in amount and color  - [place] appropriate cooling/warming therapies per order  - Administer medications as ordered  - Instruct and encourage patient and family to use good hand hygiene technique  - Identify and instruct in appropriate isolation precautions for identified infection/condition  Outcome: Progressing  Goal: Absence of fever/infection during neutropenic period  Description: INTERVENTIONS:  - Monitor WBC    Outcome: Progressing     Problem: SAFETY ADULT  Goal: Maintain or return to baseline ADL function  Description: INTERVENTIONS:  -  Assess patient's ability to carry out ADLs; assess patient's baseline for ADL function and identify physical deficits which impact ability to perform ADLs (bathing, care of mouth/teeth, toileting, grooming, dressing, etc )  - Assess/evaluate cause of self-care deficits   - Assess range of motion  - Assess patient's mobility; develop plan if impaired  - Assess patient's need for assistive devices and provide as appropriate  - Encourage maximum independence but intervene and supervise when necessary  - Involve family in performance of ADLs  - Assess for home care needs following discharge   - Consider OT consult to assist with ADL evaluation and planning for discharge  - Provide patient education as appropriate  Outcome: Progressing  Goal: Maintains/Returns to pre admission functional level  Description: INTERVENTIONS:  - Perform BMAT or MOVE assessment daily    - Set and communicate daily mobility goal to care team and patient/family/caregiver  - Collaborate with rehabilitation services on mobility goals if consulted  - Reposition patient every 2 hours    - Stand patient 3 times a day  - Ambulate patient 3 times a day  - Out of bed to chair 3 times a day   - Out of bed for meals 3 times a day  - Out of bed for toileting  - Record patient progress and toleration of activity level   Outcome: Progressing  Goal: Patient will remain free of falls  Description: INTERVENTIONS:  - Educate patient/family on patient safety including physical limitations  - Instruct patient to call for assistance with activity   - Consult OT/PT to assist with strengthening/mobility   - Keep Call bell within reach  - Keep bed low and locked with side rails adjusted as appropriate  - Keep care items and personal belongings within reach  - Initiate and maintain comfort rounds  - Make Fall Risk Sign visible to staff  - Offer Toileting every 2 Hours, in advance of need  - Initiate/Maintain alarm  - Obtain necessary fall risk management equipment  - Apply yellow socks and bracelet for high fall risk patients  - Consider moving patient to room near nurses station  Outcome: Progressing     Problem: DISCHARGE PLANNING  Goal: Discharge to home or other facility with appropriate resources  Description: INTERVENTIONS:  - Identify barriers to discharge w/patient and caregiver  - Arrange for needed discharge resources and transportation as appropriate  - Identify discharge learning needs (meds, wound care, etc )  - Arrange for interpretive services to assist at discharge as needed  - Refer to Case Management Department for coordinating discharge planning if the patient needs post-hospital services based on physician/advanced practitioner order or complex needs related to functional status, cognitive ability, or social support system  Outcome: Progressing     Problem: Knowledge Deficit  Goal: Patient/family/caregiver demonstrates understanding of disease process, treatment plan, medications, and discharge instructions  Description: Complete learning assessment and assess knowledge base    Interventions:  - Provide teaching at level of understanding  - Provide teaching via preferred learning methods  Outcome: Progressing

## 2022-05-05 NOTE — PLAN OF CARE
Problem: MOBILITY - ADULT  Goal: Maintain or return to baseline ADL function  Description: INTERVENTIONS:  -  Assess patient's ability to carry out ADLs; assess patient's baseline for ADL function and identify physical deficits which impact ability to perform ADLs (bathing, care of mouth/teeth, toileting, grooming, dressing, etc )  - Assess/evaluate cause of self-care deficits   - Assess range of motion  - Assess patient's mobility; develop plan if impaired  - Assess patient's need for assistive devices and provide as appropriate  - Encourage maximum independence but intervene and supervise when necessary  - Involve family in performance of ADLs  - Assess for home care needs following discharge   - Consider OT consult to assist with ADL evaluation and planning for discharge  - Provide patient education as appropriate  Outcome: Progressing  Goal: Maintains/Returns to pre admission functional level  Description: INTERVENTIONS:  - Perform BMAT or MOVE assessment daily    - Set and communicate daily mobility goal to care team and patient/family/caregiver     - Collaborate with rehabilitation services on mobility goals if consulted  Problem: Potential for Falls  Goal: Patient will remain free of falls  Description: INTERVENTIONS:  - Educate patient/family on patient safety including physical limitations  - Instruct patient to call for assistance with activity   - Consult OT/PT to assist with strengthening/mobility   - Keep Call bell within reach  - Keep bed low and locked with side rails adjusted as appropriate  - Keep care items and personal belongings within reach  - Initiate and maintain comfort rounds  Problem: PAIN - ADULT  Goal: Verbalizes/displays adequate comfort level or baseline comfort level  Description: Interventions:  - Encourage patient to monitor pain and request assistance  - Assess pain using appropriate pain scale  - Administer analgesics based on type and severity of pain and evaluate response  - Implement non-pharmacological measures as appropriate and evaluate response  - Consider cultural and social influences on pain and pain management  - Notify physician/advanced practitioner if interventions unsuccessful or patient reports new pain  Outcome: Progressing     Problem: INFECTION - ADULT  Goal: Absence or prevention of progression during hospitalization  Description: INTERVENTIONS:  - Assess and monitor for signs and symptoms of infection  - Monitor lab/diagnostic results  - Monitor all insertion sites, i e  indwelling lines, tubes, and drains  - Monitor endotracheal if appropriate and nasal secretions for changes in amount and color  - [place] appropriate cooling/warming therapies per order  - Administer medications as ordered  - Instruct and encourage patient and family to use good hand hygiene technique  - Identify and instruct in appropriate isolation precautions for identified infection/condition  Outcome: Progressing  Goal: Absence of fever/infection during neutropenic period  Description: INTERVENTIONS:  - Monitor WBC    Outcome: Progressing     Problem: SAFETY ADULT  Goal: Maintain or return to baseline ADL function  Description: INTERVENTIONS:  -  Assess patient's ability to carry out ADLs; assess patient's baseline for ADL function and identify physical deficits which impact ability to perform ADLs (bathing, care of mouth/teeth, toileting, grooming, dressing, etc )  - Assess/evaluate cause of self-care deficits   - Assess range of motion  - Assess patient's mobility; develop plan if impaired  - Assess patient's need for assistive devices and provide as appropriate  - Encourage maximum independence but intervene and supervise when necessary  - Involve family in performance of ADLs  - Assess for home care needs following discharge   - Consider OT consult to assist with ADL evaluation and planning for discharge  - Provide patient education as appropriate  Outcome: Progressing  Goal: Maintains/Returns to pre admission functional level  Description: INTERVENTIONS:  - Perform BMAT or MOVE assessment daily    - Set and communicate daily mobility goal to care team and patient/family/caregiver  - Collaborate with rehabilitation services on mobility goals if consulted  Problem: DISCHARGE PLANNING  Goal: Discharge to home or other facility with appropriate resources  Description: INTERVENTIONS:  - Identify barriers to discharge w/patient and caregiver  - Arrange for needed discharge resources and transportation as appropriate  - Identify discharge learning needs (meds, wound care, etc )  - Arrange for interpretive services to assist at discharge as needed  - Refer to Case Management Department for coordinating discharge planning if the patient needs post-hospital services based on physician/advanced practitioner order or complex needs related to functional status, cognitive ability, or social support system  Outcome: Progressing     Problem: Knowledge Deficit  Goal: Patient/family/caregiver demonstrates understanding of disease process, treatment plan, medications, and discharge instructions  Description: Complete learning assessment and assess knowledge base    Interventions:  - Provide teaching at level of understanding  - Provide teaching via preferred learning methods  Outcome: Progressing     - Ambulate patient 3 times a day    Outcome: Progressing  Goal: Patient will remain free of falls  Description: INTERVENTIONS:  - Educate patient/family on patient safety including physical limitations  - Instruct patient to call for assistance with activity   - Consult OT/PT to assist with strengthening/mobility   - Keep Call bell within reach  - Keep bed low and locked with side rails adjusted as appropriate  - Keep care items and personal belongings within reach  - Initiate and maintain comfort rounds  - Consider moving patient to room near nurses station  Outcome: Progressing     - Consider moving patient to room near nurses station  Outcome: Progressing     - Out of bed for toileting  - Record patient progress and toleration of activity level   Outcome: Progressing

## 2022-05-06 ENCOUNTER — TRANSITIONAL CARE MANAGEMENT (OUTPATIENT)
Dept: FAMILY MEDICINE CLINIC | Facility: CLINIC | Age: 52
End: 2022-05-06

## 2022-05-06 NOTE — UTILIZATION REVIEW
Notification of Discharge   This is a Notification of Discharge from our facility [address]  Please be advised that this patient has been discharge from our facility  Below you will find the admission and discharge date and time including the patients disposition  UTILIZATION REVIEW CONTACT:  Kelsey Domínguez  Utilization   Network Utilization Review Department  Phone: [phone] x carefully listen to the prompts  All voicemails are confidential   Email: [email or web address]     PHYSICIAN ADVISORY SERVICES:  FOR [id number] REVIEW - MEDICAL NECESSITY DENIAL  Phone: [phone]  Fax: [phone]  Email: [email or web address]     PRESENTATION DATE: 5/3/2022  7:00 AM  OBERVATION ADMISSION DATE:   INPATIENT ADMISSION DATE: 5/3/22 12:27 PM   DISCHARGE DATE: 5/5/2022  5:44 PM  DISPOSITION: Home/Self Care Home/Self Care      IMPORTANT INFORMATION:  Send all requests for admission clinical reviews, approved or denied determinations and any other requests to dedicated fax number below belonging to the campus where the patient is receiving treatment   List of dedicated fax numbers:  [address] DENIALS (Administrative/Medical Necessity) [phone]   [address] (Maternity/NICU/Pediatrics) [phone]   [facility] [phone]   [address] [facility] [phone]   [address] [place] [address] [phone]   [address] [phone]   [facility]  [phone]   [address] [place] [address], [facility]  [address] [facility] [address] [facility] [phone]

## 2022-05-10 ENCOUNTER — TELEPHONE (OUTPATIENT)
Dept: BARIATRICS | Facility: CLINIC | Age: 52
End: 2022-05-10

## 2022-05-10 NOTE — TELEPHONE ENCOUNTER
Patient left a vcmail stating she wanted to cancel her 5/13/22 appt due to not feeling well and recently having a surgery  I cancelled the appt and called the pt back  Left a vcmail to call me back to re-schedule

## 2022-05-11 ENCOUNTER — OFFICE VISIT (OUTPATIENT)
Dept: FAMILY MEDICINE CLINIC | Facility: CLINIC | Age: 52
End: 2022-05-11
Payer: COMMERCIAL

## 2022-05-11 VITALS
RESPIRATION RATE: 16 BRPM | TEMPERATURE: 98.3 F | HEART RATE: 71 BPM | DIASTOLIC BLOOD PRESSURE: 76 MMHG | OXYGEN SATURATION: 97 % | SYSTOLIC BLOOD PRESSURE: 118 MMHG

## 2022-05-11 DIAGNOSIS — Z48.89 ENCOUNTER FOR SURGICAL FOLLOW-UP CARE: Primary | ICD-10-CM

## 2022-05-11 DIAGNOSIS — G47.00 INSOMNIA, UNSPECIFIED TYPE: ICD-10-CM

## 2022-05-11 DIAGNOSIS — F33.9 DEPRESSION, RECURRENT (HCC): ICD-10-CM

## 2022-05-11 DIAGNOSIS — F41.9 ANXIETY: ICD-10-CM

## 2022-05-11 PROCEDURE — 99495 TRANSJ CARE MGMT MOD F2F 14D: CPT | Performed by: FAMILY MEDICINE

## 2022-05-11 PROCEDURE — 1111F DSCHRG MED/CURRENT MED MERGE: CPT | Performed by: FAMILY MEDICINE

## 2022-05-11 RX ORDER — NALOXONE HYDROCHLORIDE 4 MG/.1ML
SPRAY NASAL
Qty: 1 EACH | Refills: 1 | Status: SHIPPED | OUTPATIENT
Start: 2022-05-11

## 2022-05-11 RX ORDER — TRAZODONE HYDROCHLORIDE 50 MG/1
50 TABLET ORAL
Qty: 30 TABLET | Refills: 0 | Status: SHIPPED | OUTPATIENT
Start: 2022-05-11 | End: 2022-06-07

## 2022-05-11 RX ORDER — LORAZEPAM 0.5 MG/1
0.5 TABLET ORAL 2 TIMES DAILY PRN
Qty: 60 TABLET | Refills: 0 | Status: SHIPPED | OUTPATIENT
Start: 2022-05-11 | End: 2022-07-21

## 2022-05-11 NOTE — PROGRESS NOTES
Assessment/Plan:       Diagnoses and all orders for this visit:    Encounter for surgical follow-up care  Comments:  S/p Bsuhra reversal surgery  Doing well post op  Has visit scheduled with her surgeon for follow up  Depression, recurrent ([facility] [address] )  Comments:  She has been feeling down since recent surgery and having trouble sleeping  Continue wellbutrin  Will add trazodone  Orders:  -     traZODone (DESYREL) 50 mg tablet; Take 1 tablet (50 mg total) by mouth daily at bedtime    Insomnia, unspecified type  Comments: Will start trazodone  Orders:  -     traZODone (DESYREL) 50 mg tablet; Take 1 tablet (50 mg total) by mouth daily at bedtime    Anxiety  Comments:  continue wellbutrin and prn ativan  Add trazodone  Orders:  -     LORazepam (ATIVAN) 0 5 mg tablet; Take 1 tablet (0 5 mg total) by mouth as needed in the morning and 1 tablet (0 5 mg total) as needed in the evening for anxiety  -     naloxone (NARCAN) 4 mg/0 1 mL nasal spray; Administer 1 spray into a nostril  If no response after 2-3 minutes, give another dose in the other nostril using a new spray  TCM Call (since 4/10/2022)     Date and time call was made  5/6/2022  8:26 AM    Hospital care reviewed  Records reviewed    Patient was hospitialized at  [facility]    Date of Admission  05/03/22    Date of discharge  05/05/22    Diagnosis  S/P colostomy     Disposition  Home    Were the patients medications reviewed and updated  No  Will review at today's appt with Dr Chadwick Knowles    Current Symptoms  None      TCM Call (since 4/10/2022)     Post hospital issues  None    Should patient be enrolled in anticoag monitoring? No    Scheduled for follow up? Yes    I have advised the patient to call PCP with any new or worsening symptoms  Zechariah Melissa LPN    Interperter language line needed  No    Counseling  Patient    Counseling topics  Activities of daily living;  Importance of RX compliance; patient and family education; instructions for management    Comments  I spoke briefly with Hailee on 5/11/22  She has an appt this afternoon at 4 pm so we discussed reviewing her medications at the visit  She currently denies any complaints [place] Purl        Subjective:      Patient ID: Gary Guzmán is a 54 y o  female  HPI  Hailee presents today for hospital discharge follow up  She was recently admitted at [address] [place] [address] from 5/3 to 5/5/2022 for elective laparoscopic Bushra's reversal  There were no post op complications  She was discharged on POD # 2  On day of discharge she was voiding spontaneously, ambulating at baseline, and pain was well controlled per discharge summary  Today Hailee states that her pain is controlled on current pain medication regimen  The only issue reports is insomnia  Has not been able to get a good night sleep  Has tried [place] in the past, but had side effects with it  Has been taking benadryl and ativan, but he does not want to do this every day  She has visit set up with her surgeon on 5/23/22  PHQ-2/9 Depression Screening    Little interest or pleasure in doing things: 0 - not at all  Feeling down, depressed, or hopeless: 2 - more than half the days  Trouble falling or staying asleep, or sleeping too much: 3 - nearly every day  Feeling tired or having little energy: 3 - nearly every day  Poor appetite or overeating: 3 - nearly every day  Feeling bad about yourself - or that you are a failure or have let yourself or your family down: 2 - more than half the days  Trouble concentrating on things, such as reading the newspaper or watching television: 1 - several days  Moving or speaking so slowly that other people could have noticed   Or the opposite - being so fidgety or restless that you have been moving around a lot more than usual: 0 - not at all  Thoughts that you would be better off dead, or of hurting yourself in some way: 0 - not at all  PHQ-9 Score: 14   PHQ-9 Interpretation: Moderate depression The following portions of the patient's history were reviewed and updated as appropriate: allergies, current medications, past family history, past medical history, past social history, past surgical history and problem list     Review of Systems   Constitutional: Negative  HENT: Negative  Eyes: Negative  Respiratory: Negative  Cardiovascular: Negative  Gastrointestinal: Negative  Endocrine: Negative  Genitourinary: Negative  Musculoskeletal: Negative  Skin: Negative  Allergic/Immunologic: Negative  Neurological: Negative  Hematological: Negative  Psychiatric/Behavioral: Positive for sleep disturbance  Objective:      /76   Pulse 71   Temp 98 3 °F (36 8 °C)   Resp 16   LMP 03/08/2022 (Exact Date)   SpO2 97%          Physical Exam  Constitutional:       General: She is not in acute distress  Appearance: She is well-developed  She is not diaphoretic  HENT:      Head: Normocephalic and atraumatic  Cardiovascular:      Rate and Rhythm: Normal rate and regular rhythm  Heart sounds: Normal heart sounds  No murmur heard  No friction rub  No gallop  Pulmonary:      Effort: Pulmonary effort is normal  No respiratory distress  Breath sounds: Normal breath sounds  No wheezing or rales  Chest:      Chest wall: No tenderness  Abdominal:      General: Abdomen is flat  Bowel sounds are normal  There is no distension  Palpations: Abdomen is soft  There is no mass  Tenderness: There is no abdominal tenderness  There is no right CVA tenderness, left CVA tenderness, guarding or rebound  Hernia: No hernia is present  Comments: Incision sites healing well with staples in place  No erythema, drainage, bleeding, warmth to touch, swelling  Musculoskeletal:         General: No deformity  Normal range of motion  Cervical back: Normal range of motion and neck supple  Skin:     General: Skin is warm and dry     Neurological: Mental Status: She is alert and oriented to person, place, and time  Psychiatric:         Behavior: Behavior normal          Thought Content:  Thought content normal          Judgment: Judgment normal

## 2022-05-16 PROBLEM — K28.9 MARGINAL ULCER: Status: ACTIVE | Noted: 2022-05-16

## 2022-05-19 ENCOUNTER — PREP FOR PROCEDURE (OUTPATIENT)
Dept: BARIATRICS | Facility: CLINIC | Age: 52
End: 2022-05-19

## 2022-05-19 ENCOUNTER — OFFICE VISIT (OUTPATIENT)
Dept: BARIATRICS | Facility: CLINIC | Age: 52
End: 2022-05-19
Payer: COMMERCIAL

## 2022-05-19 VITALS
HEIGHT: 60 IN | WEIGHT: 163.6 LBS | HEART RATE: 80 BPM | SYSTOLIC BLOOD PRESSURE: 108 MMHG | DIASTOLIC BLOOD PRESSURE: 70 MMHG | BODY MASS INDEX: 32.12 KG/M2

## 2022-05-19 DIAGNOSIS — K28.9 MARGINAL ULCER: ICD-10-CM

## 2022-05-19 DIAGNOSIS — Z11.59 ENCOUNTER FOR SCREENING FOR OTHER VIRAL DISEASES: Primary | ICD-10-CM

## 2022-05-19 DIAGNOSIS — K91.2 POSTSURGICAL MALABSORPTION: ICD-10-CM

## 2022-05-19 DIAGNOSIS — D64.9 ANEMIA: ICD-10-CM

## 2022-05-19 DIAGNOSIS — Z48.815 ENCOUNTER FOR SURGICAL AFTERCARE FOLLOWING SURGERY OF DIGESTIVE SYSTEM: Primary | ICD-10-CM

## 2022-05-19 DIAGNOSIS — Z98.84 STATUS POST BARIATRIC SURGERY: Primary | ICD-10-CM

## 2022-05-19 PROCEDURE — 1111F DSCHRG MED/CURRENT MED MERGE: CPT | Performed by: PHYSICIAN ASSISTANT

## 2022-05-19 PROCEDURE — 1036F TOBACCO NON-USER: CPT | Performed by: PHYSICIAN ASSISTANT

## 2022-05-19 PROCEDURE — 99214 OFFICE O/P EST MOD 30 MIN: CPT | Performed by: PHYSICIAN ASSISTANT

## 2022-05-19 PROCEDURE — 3008F BODY MASS INDEX DOCD: CPT | Performed by: PHYSICIAN ASSISTANT

## 2022-05-19 NOTE — ASSESSMENT & PLAN NOTE
-s/p Napoleon-En-Y Gastric Bypass with Dr Dallas Jackson on 10/04/21  F/u with surgical RD to ensure adequacy of diet, work on increasing fluids, keep logs      Initial: 200 6lbs  Current: 163 6lbs   EWL: 50%  Ramirez: current  Current BMI is 32     · Tolerating a regular diet-yes, advised f/u with RD for additional support  · Eating at least 60 grams of protein per day-yes  · Following 30/60 minute rule with liquids-yes  · Drinking at least 64 ounces of fluid per day-no; increase to goal  · Drinking carbonated beverages-no  · Sufficient exercise-no, but will start walking when cleared  · Using NSAIDs regularly-no  · Using nicotine-no  · Using alcohol-no   Advised about the risks of alcohol s/p bariatric surgery and recommend avoiding all alcohol

## 2022-05-19 NOTE — ASSESSMENT & PLAN NOTE
-At risk for malabsorption of vitamins/minerals secondary to malabsorption and restriction of intake from bariatric surgery  -Currently taking adequate postop bariatric surgery vitamin supplementation: Bariatric Pal MVI with iron, calcium citrate 500mg TID   -Last set of bariatric labs completed on 04/19/22 Labs:  -Mildly elevated calcium  -High B1 and B12  -Ferritin 26   -Labs in hospital on 05/05 - calcium WNL and Hgb 9    -Repeat iron panel in 2 months  -Patient received education about the importance of adhering to a lifelong supplementation regimen to avoid vitamin/mineral deficiencies

## 2022-05-19 NOTE — PATIENT INSTRUCTIONS
GOALS:   Continued/Maintain healthy weight loss with good nutrition intakes  Adequate hydration with at least 64oz  fluid intake  Normal vitamin and mineral levels  Exercise as tolerated  Continue protonix 40mg twice a day and carafate 1g four times a day  Push hydrating fluids to goal of 64oz   Focus on protein - protein richardson and shakes  Repeat EGD in 2 months    Follow-up in 3 months  We kindly ask that your arrive 15 minutes before your scheduled appointment time with your provider to allow our staff to room you, get your vital signs and update your chart  Follow diet as discussed  Get lab work done in 3 months - iron panel  You have been given a lab slip today  Please call the office if you need a replacement  It is recommended to check with your insurance BEFORE getting labs done to make sure they are covered by your policy  Also, please check with your PCP and other providers before getting labs to avoid duplicate labs  Make sure to HOLD any multivitamins that may contain biotin and any biotin supplements FOR 5 DAYS before any labs since it can affect the results  Follow vitamin and mineral recommendations as reviewed with you  Call our office if you have any problems with abdominal pain especially associated with fever, chills, nausea, vomiting or any other concerns  All  Post-bariatric surgery patients should be aware that very small quantities of any alcohol can cause impairment and it is very possible not to feel the effect  The effect can be in the system for several hours  It is also a stomach irritant  It is advised to AVOID alcohol, Nonsteroidal antiinflammatory drugs (NSAIDS) and nicotine of all forms   Any of these can cause stomach irritation/pain

## 2022-05-19 NOTE — PROGRESS NOTES
Assessment/Plan:    Encounter for surgical aftercare following surgery of digestive system  -s/p Napoleon-En-Y Gastric Bypass with Dr Dorota Avalos on 10/04/21  F/u with surgical RD to ensure adequacy of diet, work on increasing fluids, keep logs      Initial: 200 6lbs  Current: 163 6lbs   EWL: 50%  Ramirez: current  Current BMI is 32     · Tolerating a regular diet-yes, advised f/u with RD for additional support  · Eating at least 60 grams of protein per day-yes  · Following 30/60 minute rule with liquids-yes  · Drinking at least 64 ounces of fluid per day-no; increase to goal  · Drinking carbonated beverages-no  · Sufficient exercise-no, but will start walking when cleared  · Using NSAIDs regularly-no  · Using nicotine-no  · Using alcohol-no  Advised about the risks of alcohol s/p bariatric surgery and recommend avoiding all alcohol      Marginal ulcer  -Continue protonix 40mg BID and 1g carafate QID  -Avoid gastric irritants   -Repeat EGD in 2 months      Postsurgical malabsorption  -At risk for malabsorption of vitamins/minerals secondary to malabsorption and restriction of intake from bariatric surgery  -Currently taking adequate postop bariatric surgery vitamin supplementation: Bariatric Pal MVI with iron, calcium citrate 500mg TID   -Last set of bariatric labs completed on 04/19/22 Labs:  -Mildly elevated calcium  -High B1 and B12  -Ferritin 26   -Labs in hospital on 05/05 - calcium WNL and Hgb 9    -Repeat iron panel in 2 months  -Patient received education about the importance of adhering to a lifelong supplementation regimen to avoid vitamin/mineral deficiencies        Diagnoses and all orders for this visit:    Encounter for surgical aftercare following surgery of digestive system    Marginal ulcer    Anemia  -     Cancel: CBC and differential; Future  -     Cancel: Iron Panel (Includes Ferritin, Iron Sat%, Iron, and TIBC);  Future  -     Cancel: CBC and differential  -     CBC and differential; Future  -     Iron Panel (Includes Ferritin, Iron Sat%, Iron, and TIBC); Future  -     CBC and differential    Postsurgical malabsorption          Subjective:      Patient ID: Jaspal Sauceda is a 54 y o  female  -s/p Napoleon-En-Y Gastric Bypass with Dr Omar Paulino on 10/04/21  Presents today for routine f/u  EGD on 04/13/22 found "Moderate, localized ulcerated mucosa measuring 5 mm x 20 mm in the stomach and jejunum "  She is taking protonix 40mg BID and carafate 1g QID  She is s/p lap closure of colostomy/Bushra's reversal on 05/03/22 and feeling much better  Reports having some mild mid-epigastric tenderness if she goes for longer periods without eating, no pain with eating  Now having regular daily BM's, takes colace  She had a few days last week where she had about 10 BM's in a day but now improved  She is taking vitamins, tolerating regular diet, no N/V, dysphagia  She denies blood in her stool or dark tarry stools, no smoking or NSAIDS  The following portions of the patient's history were reviewed and updated as appropriate: allergies, current medications, past family history, past medical history, past social history, past surgical history and problem list     Review of Systems   Constitutional: Positive for fatigue  Negative for chills and fever  Unexpected weight change: planned weight loss  HENT: Negative for trouble swallowing  Respiratory: Negative for cough and shortness of breath  Cardiovascular: Negative for chest pain and palpitations  Gastrointestinal: Positive for abdominal pain  Negative for constipation, diarrhea, nausea and vomiting  Neurological: Negative for dizziness  Psychiatric/Behavioral: The patient is nervous/anxious  Objective:      /70 (BP Location: Right arm, Patient Position: Sitting, Cuff Size: Standard)   Pulse 80   Ht 5' (1 524 m)   Wt 74 2 kg (163 lb 9 6 oz)   BMI 31 95 kg/m²     Colonoscopy-Completed       Physical Exam  Vitals reviewed     Constitutional: General: She is not in acute distress  Appearance: She is well-developed  HENT:      Head: Normocephalic and atraumatic  Eyes:      General: No scleral icterus  Cardiovascular:      Rate and Rhythm: Normal rate and regular rhythm  Pulmonary:      Effort: Pulmonary effort is normal  No respiratory distress  Abdominal:      General: There is no distension  Palpations: Abdomen is soft  Tenderness: There is no abdominal tenderness  Comments: Incisions C/D/staples intact, no signs of infection  Staples successfully removed without incident after she contacted Dr Darcy Garcia office and obtained consent for me to remove them in office d/t irritation from the staples   Skin:     General: Skin is warm and dry  Neurological:      Mental Status: She is alert and oriented to person, place, and time  Psychiatric:         Mood and Affect: Mood normal          Behavior: Behavior normal            BARRIERS: none identified    GOALS:   · Continued/Maintain healthy weight loss with good nutrition intakes  · Adequate hydration with at least 64oz  fluid intake  · Normal vitamin and mineral levels  · Exercise as tolerated  · Continue protonix 40mg twice a day and carafate 1g four times a day  · Push hydrating fluids to goal of 64oz   · Focus on protein - protein richardson and shakes  · Repeat EGD in 2 months    · Follow-up in 3 months  We kindly ask that your arrive 15 minutes before your scheduled appointment time with your provider to allow our staff to room you, get your vital signs and update your chart  · Follow diet as discussed  · Get lab work done in 3 months - iron panel  You have been given a lab slip today  Please call the office if you need a replacement  It is recommended to check with your insurance BEFORE getting labs done to make sure they are covered by your policy  Also, please check with your PCP and other providers before getting labs to avoid duplicate labs   Make sure to HOLD any multivitamins that may contain biotin and any biotin supplements FOR 5 DAYS before any labs since it can affect the results  · Follow vitamin and mineral recommendations as reviewed with you  · Call our office if you have any problems with abdominal pain especially associated with fever, chills, nausea, vomiting or any other concerns  · All  Post-bariatric surgery patients should be aware that very small quantities of any alcohol can cause impairment and it is very possible not to feel the effect  The effect can be in the system for several hours  It is also a stomach irritant  · It is advised to AVOID alcohol, Nonsteroidal antiinflammatory drugs (NSAIDS) and nicotine of all forms   Any of these can cause stomach irritation/pain

## 2022-05-31 ENCOUNTER — TELEPHONE (OUTPATIENT)
Dept: BARIATRICS | Facility: CLINIC | Age: 52
End: 2022-05-31

## 2022-05-31 NOTE — TELEPHONE ENCOUNTER
Patient was seen by Dr Awilda Sullivan on 05/25  She needs Flexible sigmoidoscopy in 2 months to evaluate her anastomosis  She has EGD scheduled with Dr Flor Salazar on 07/01/22 to evaluate for resolution of MU's  I suggest that she reach out to GI (She has seen Dr Goetz Half) and see if she can schedule combination EGD and flex sig for mid-end of July  Dr Flor Salazar does not do flex sigmoidoscopy

## 2022-05-31 NOTE — TELEPHONE ENCOUNTER
Pt called - she is having an EGD done on 7/1 to check on an ulcer - Dr Amanda Knox from Colorectal Surgery was to contact Dr Rudolph Bernardo about checking on pt's bowel resection while pt is under anesthesia  Pt reached out since she has yet to hear back

## 2022-06-01 NOTE — TELEPHONE ENCOUNTER
Spoke with patient and informed her that her flex sigmoidoscopy would have to be completed by a GI doc and that she has seen Dr Magnus Maddox in the past  If she were to contact his office they would most likely be able to complete both procedures at the same time  I also informed her that our surgeons are not capable of completing the other study needed and that if she were to coordinate this with GI that she may be able to avoid going under anesthesia twice  Patient was ultimately unhappy with that recommendation and expected that we could coordinate this with the GI doc for her  I explained that she would have to call them to arrange this to be done

## 2022-06-04 DIAGNOSIS — G47.00 INSOMNIA, UNSPECIFIED TYPE: ICD-10-CM

## 2022-06-04 DIAGNOSIS — F33.9 DEPRESSION, RECURRENT (HCC): ICD-10-CM

## 2022-06-07 RX ORDER — TRAZODONE HYDROCHLORIDE 50 MG/1
TABLET ORAL
Qty: 90 TABLET | Refills: 1 | Status: SHIPPED | OUTPATIENT
Start: 2022-06-07 | End: 2022-07-21 | Stop reason: DRUGHIGH

## 2022-06-15 ENCOUNTER — OFFICE VISIT (OUTPATIENT)
Dept: GASTROENTEROLOGY | Facility: AMBULARY SURGERY CENTER | Age: 52
End: 2022-06-15
Payer: COMMERCIAL

## 2022-06-15 VITALS
BODY MASS INDEX: 32.03 KG/M2 | HEART RATE: 77 BPM | SYSTOLIC BLOOD PRESSURE: 118 MMHG | HEIGHT: 60 IN | OXYGEN SATURATION: 98 % | DIASTOLIC BLOOD PRESSURE: 70 MMHG

## 2022-06-15 DIAGNOSIS — R10.13 EPIGASTRIC PAIN: ICD-10-CM

## 2022-06-15 DIAGNOSIS — Z93.3 S/P COLOSTOMY (HCC): ICD-10-CM

## 2022-06-15 DIAGNOSIS — R11.11 VOMITING WITHOUT NAUSEA, UNSPECIFIED VOMITING TYPE: ICD-10-CM

## 2022-06-15 DIAGNOSIS — K28.9 MARGINAL ULCER: Primary | ICD-10-CM

## 2022-06-15 PROBLEM — R10.9 ABDOMINAL PAIN: Status: ACTIVE | Noted: 2022-06-15

## 2022-06-15 PROCEDURE — 99213 OFFICE O/P EST LOW 20 MIN: CPT | Performed by: INTERNAL MEDICINE

## 2022-06-15 NOTE — PATIENT INSTRUCTIONS
Scheduled date of sigmoidoscopy /EGD(as of today):7/6/2022  Physician performing sigmoidoscopy/EGD:DR Janeen Pineda  Location:[facility]  Instructions reviewed with patient by:KATELYNN MAZARIEGOS  Clearances:   MIRALAX/DULCOLAX + FLEX SIG PREP GIVEN PER DR ORELLANA/CHRISTOPHER TEST 6/30/2022

## 2022-06-15 NOTE — LETTER
Haylie 15, 2022     Lucas Rivera MD  [address] [place] [address]    Patient: María Garner   YOB: 1970   Date of Visit: 6/15/2022       Dear Dr Shawn Hawthorne:    Thank you for referring María Garner to me for evaluation  Below are my notes for this consultation  If you have questions, please do not hesitate to call me  I look forward to following your patient along with you  Sincerely,        Ramez Ponce MD        CC: No Recipients  Ramez Ponce MD  6/15/2022  1:04 PM  Sign when Signing Visit  [facility]  María Garner 54 y o  female MRN: [id number]            Assessment & Plan:    53-year-old female, status post Napoleon-en-Y gastric bypass last fall, had a colon perforation with diverting colostomy and Bushra's pouch was his recently reversed  Now noted to have anastomotic ulceration    1  Anastomotic ulcer:  -continue twice daily PPI therapy   -discussed with her that she needs to take Carafate more regularly   -we will schedule her for an upper endoscopy to re-evaluate the anastomotic ulceration  -she was originally scheduled to have this done with bariatric surgery however she would like to have her flexible sigmoidoscopy done at the same time, therefore we will do both procedures  -we will check an upper GI series to evaluate for an anastomotic stricture    2  History of colon perforation: Status post colostomy with Bushra's pouch which has been reversed   -we will perform a flexible sigmoidoscopy at the same time as her EGD to evaluate the anastomosis  -continue Colace as per Colorectal surgery   -if she has worsening constipation would recommend MiraLax in the future      Hailee was seen today for follow-up  Diagnoses and all orders for this visit:    Marginal ulcer  -     EGD;  Future  -     FL upper GI UGI; Future    Vomiting without nausea, unspecified vomiting type    Epigastric pain    S/P colostomy (HCC)  -     Flexible Sigmoidoscopy; Future    Other orders  -     Diet NPO; Sips with meds; Standing  -     Void on call to OR; Standing            _____________________________________________________________        CC: Follow-up    HPI:  Dipak Lacey is a 54 y  o female who is here for follow-up  74-year-old female, initially presented with persistent vomiting, found to have significant anastomotic ulcers at the site of her prior Napoleon-en-Y gastric bypass, had also has a history of colon perforation, status post recent takedown of colostomy and reanastomosis  Patient reports that her bowel movements have been fairly regular, takes Colace daily  Has follow-up with Colorectal surgery, they recommended flexible sigmoidoscopy to evaluate for anastomotic site  She was also supposed have follow-up with Bariatric to re-evaluate the anastomosis and ulceration    Reports that her vomiting has improved, she has been taking PPI therapy twice daily, had been taking Carafate regularly but recently has been somewhat noncompliant over the past few days  Reports occasional abdominal pain which can last 1 to 2 days after eating  Denies any melena rectal bleeding           ROS:  The remainder of the ROS was negative except for the pertinent positives mentioned in HPI        Allergies: Ambien [zolpidem], Effexor [venlafaxine], and Bactrim [sulfamethoxazole-trimethoprim]    Medications:   Current Outpatient Medications:     pantoprazole (PROTONIX) 40 mg tablet, Take 1 tablet (40 mg total) by mouth daily (Patient taking differently: Take 40 mg by mouth 2 (two) times a day), Disp: 90 tablet, Rfl: 1    sucralfate (CARAFATE) 1 g tablet, Take 1 tablet (1 g total) by mouth 4 (four) times a day Crush tab into 1oz warm water, Disp: 180 tablet, Rfl: 1    traZODone (DESYREL) 50 mg tablet, TAKE 1 TABLET BY MOUTH DAILY AT BEDTIME, Disp: 90 tablet, Rfl: 1    acetaminophen (TYLENOL) 325 mg tablet, Take 3 tablets (975 mg total) by mouth every 8 (eight) hours, Disp: , Rfl: 0    buPROPion (WELLBUTRIN SR) 150 mg 12 hr tablet, TAKE 1 TABLET BY MOUTH TWICE A DAY, Disp: 180 tablet, Rfl: 0    Calcium Carbonate-Vit D-Min (CALCIUM 1200 PO), Take by mouth 2  times day geovanni , Disp: , Rfl:     LORazepam (ATIVAN) 0 5 mg tablet, Take 1 tablet (0 5 mg total) by mouth as needed in the morning and 1 tablet (0 5 mg total) as needed in the evening for anxiety  , Disp: 60 tablet, Rfl: 0    methocarbamol (ROBAXIN) 500 mg tablet, Take 1 tablet (500 mg total) by mouth every 6 (six) hours as needed for muscle spasms for up to 10 days, Disp: 30 tablet, Rfl: 0    Multiple Vitamin (multivitamin) capsule, Take 1 capsule by mouth in the morning  geovanni  , Disp: , Rfl:     naloxone (NARCAN) 4 mg/0 1 mL nasal spray, Administer 1 spray into a nostril  If no response after 2-3 minutes, give another dose in the other nostril using a new spray , Disp: 1 each, Rfl: 1    Past Medical History:   Diagnosis Date    Anxiety     Back pain     BRCA negative 06/2016    [facility]    Cancer ([facility] [address] )     Gastric ulcer     Headache     History of chemotherapy 2016    Neoadjuvant; at [address] History of transfusion 06/2017    no adverse reaction    Limb alert care status     left    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)     s/p chemotherapy    Obesity (BMI 30-39  9)        Past Surgical History:   Procedure Laterality Date    ABDOMINAL ADHESION SURGERY N/A 10/4/2021    Procedure: Lysis Adhesions;  Surgeon: Ramez Gallardo MD;  Location: [address] [place] [address];  Service: Bariatrics    ABDOMINAL WALL SURGERY Left 9/21/2016    Procedure: DEBRIDEMENT OF LEFT ABDOMINAL TISSUE AND CLOSURE; DEBRIDEMENT OF LEFT BREAST FLAP; SUBMUSCULAR TISSUE EXPANDER PLACEMENT WITH ADM (ACELLULAR DERMAL MATRIX);   Surgeon: Steve Bhatti MD;  Location:  MAIN OR;  Service:     BREAST BIOPSY Left 04/2016    with sentinel node biospy    BREAST BIOPSY Right 10/18/2021    benign    BREAST RECONSTRUCTION Left 12/22/2017    Procedure: REVISION OF LEFT BREAST SCAR, LOCAL FLAP;  Surgeon: Ramirez Garcia MD;  [facility];  Service: Plastics    COLONOSCOPY      DILATION AND CURETTAGE OF UTERUS      [address] N/A 2/6/2022    Procedure: Marilyn Chimera PROCEDURE WITH SIGMOID COLOSTOMY;  Surgeon: Ayush Peres MD;  Location: [address] [place] [address];  Service: General    [address] GASTRIC BANDING  2008    removed 2013    LAPAROSCOPIC GASTRIC BANDING  2013    removal    LAPAROTOMY N/A 2/6/2022    Procedure: LAPAROTOMY EXPLORATORY;  Surgeon: Ayush Peres MD;  Location: [facility];  Service: General    LIPOSUCTION W/ FAT INJECTION Left 6/8/2017    Procedure: BREAST FAT GRAFTING ;  Surgeon: Riaz Hwang MD;  [facility];  Service:     MASTECTOMY Left 2016    MEDIPORT INSERTION, SINGLE  2016    NV BIOPSY/EXCISION, LYMPH NODE(S) Left 8/10/2016    Procedure: LYMPHOSCINTIGRAPHY; SENTINAL LYMPH NODE BIOPSY (INJECT AT 1100);   Surgeon: Uri Nagy MD;  [facility];  Service: Surgical Oncology    NV BREAST RECONSTRUCTION W/FREE FLAP Left 9/19/2016    Procedure: BREAST DELAYED RECONSTRUCTION; DEIP FREE FLAP removal of port-a -cath;  Surgeon: Riaz Hwang MD;  [facility];  Service: Plastics    NV BREAST REDUCTION Right 12/19/2016    Procedure: BREAST REDUCTION/MASTOPEXY ;  Surgeon: Riaz Hwang MD;  [facility];  Service: Plastics    NV HYSTEROSCOPY,W/ENDO [address] N/A 5/21/2018    Procedure: DILATATION AND CURETTAGE (D&C), HYSTEROSCOPY;  Surgeon: June Burton MD;  [facility];  Service: Gynecology Oncology    NV INSJ/RPLCMT BREAST IMPLANT SEP DAY MASTECTOMY Left 12/19/2016    Procedure: BREAST TISSUE EXPANDER REMOVAL; BREAST IMPLANT PLACEMENT;  Surgeon: Riaz Hwang MD;  [facility];  Service: Plastics    NV LAP GASTRIC BYPASS/RAMIN-EN-Y N/A 10/4/2021    Procedure: LAPAROSCOPIC RAMIN-EN-Y GASTRIC BYPASS AND INTRAOPERATIVE EGD;  Surgeon: Froilan Mello MD;  Location: [facility];  Service: Bariatrics    OR LAP, SURG CLOSE ENTEROSTOMY RESECT ANAST N/A 5/3/2022    Procedure: CLOSURE COLOSTOMY, LAPAROSCOPIC closure Hartmens sigmoid resection;  Surgeon: Glynn Duke MD;  [facility];  Service: Colorectal    OR MASTECTOMY, SIMPLE, COMPLETE Left 8/10/2016    Procedure: BREAST MASTECTOMY; FROZEN SECTION ;  Surgeon: Tomas Goins MD;  [facility];  Service: Surgical Oncology    OR REMOVAL TISSUE EXPANDER W/O INSERTION IMPLANT Left 1/23/2017    Procedure: BREAST IMPLANT REMOVAL; WASHOUT AND DEBRIDEMENT;  Surgeon: Diane Richard MD;  [facility];  Service: Plastics    REDUCTION MAMMAPLASTY  2016    REDUCTION MAMMAPLASTY  2018    REVISION OF SCAR ON TORSO N/A 12/19/2016    Procedure: ABDOMINAL SCAR REVISION ;  Surgeon: Diane Richard MD;  [facility];  Service:    [address] [place] [address]  4/22/2016    US GUIDED BREAST BIOPSY RIGHT COMPLETE Right 10/18/2021    WISDOM TOOTH EXTRACTION         Family History   Problem Relation Age of Onset    Diabetes Mother     Heart disease Mother     Kidney disease Mother     Obesity Mother     Other Father     Cancer Father         melanoma    Obesity Father     Diabetes Maternal Aunt     No Known Problems Paternal Aunt     Breast cancer Cousin     Diverticulitis Maternal Grandmother         reports that she quit smoking about 20 years ago  She has a 15 00 pack-year smoking history  She has never used smokeless tobacco  She reports previous alcohol use  She reports that she does not use drugs        Physical Exam:    /70 (BP Location: Left arm, Patient Position: Sitting, Cuff Size: Standard)   Pulse 77   Ht 5' (1 524 m)   SpO2 98%   BMI 32 03 kg/m²     Gen: wn/wd, NAD, overweight but otherwise healthy  HEENT: anicteric, MMM, no cervical LAD  CVS: RRR, no m/r/g  CHEST: CTA b/l  ABD: +BS, soft, NT,ND, no hepatosplenomegaly  EXT: no c/c/e  NEURO: aaox3  SKIN: NO rashes

## 2022-06-15 NOTE — PROGRESS NOTES
[address] [facility]  [facility] 54 y o  female MRN: [id number]            Assessment & Plan:    47-year-old female, status post Napoleon-en-Y gastric bypass last fall, had a colon perforation with diverting colostomy and Bushra's pouch was his recently reversed  Now noted to have anastomotic ulceration    1  Anastomotic ulcer:  -continue twice daily PPI therapy   -discussed with her that she needs to take Carafate more regularly   -we will schedule her for an upper endoscopy to re-evaluate the anastomotic ulceration  -she was originally scheduled to have this done with bariatric surgery however she would like to have her flexible sigmoidoscopy done at the same time, therefore we will do both procedures  -we will check an upper GI series to evaluate for an anastomotic stricture    2  History of colon perforation: Status post colostomy with Bushra's pouch which has been reversed   -we will perform a flexible sigmoidoscopy at the same time as her EGD to evaluate the anastomosis  -continue Colace as per Colorectal surgery   -if she has worsening constipation would recommend MiraLax in the future      Hailee was seen today for follow-up  Diagnoses and all orders for this visit:    Marginal ulcer  -     EGD; Future  -     FL upper GI UGI; Future    Vomiting without nausea, unspecified vomiting type    Epigastric pain    S/P colostomy (HCC)  -     Flexible Sigmoidoscopy; Future    Other orders  -     Diet NPO; Sips with meds; Standing  -     Void on call to OR; Standing            _____________________________________________________________        CC: Follow-up    HPI:  [facility] is a 54 y  o female who is here for follow-up  47-year-old female, initially presented with persistent vomiting, found to have significant anastomotic ulcers at the site of her prior Napoleon-en-Y gastric bypass, had also has a history of colon perforation, status post recent takedown of colostomy and reanastomosis  Patient reports that her bowel movements have been fairly regular, takes Colace daily  Has follow-up with Colorectal surgery, they recommended flexible sigmoidoscopy to evaluate for anastomotic site  She was also supposed have follow-up with Bariatric to re-evaluate the anastomosis and ulceration    Reports that her vomiting has improved, she has been taking PPI therapy twice daily, had been taking Carafate regularly but recently has been somewhat noncompliant over the past few days  Reports occasional abdominal pain which can last 1 to 2 days after eating  Denies any melena rectal bleeding           ROS:  The remainder of the ROS was negative except for the pertinent positives mentioned in HPI  Allergies: Ambien [zolpidem], Effexor [venlafaxine], and Bactrim [sulfamethoxazole-trimethoprim]    Medications:   Current Outpatient Medications:     pantoprazole (PROTONIX) 40 mg tablet, Take 1 tablet (40 mg total) by mouth daily (Patient taking differently: Take 40 mg by mouth 2 (two) times a day), Disp: 90 tablet, Rfl: 1    sucralfate (CARAFATE) 1 g tablet, Take 1 tablet (1 g total) by mouth 4 (four) times a day Crush tab into 1oz warm water, Disp: 180 tablet, Rfl: 1    traZODone (DESYREL) 50 mg tablet, TAKE 1 TABLET BY MOUTH DAILY AT BEDTIME, Disp: 90 tablet, Rfl: 1    acetaminophen (TYLENOL) 325 mg tablet, Take 3 tablets (975 mg total) by mouth every 8 (eight) hours, Disp: , Rfl: 0    buPROPion (WELLBUTRIN SR) 150 mg 12 hr tablet, TAKE 1 TABLET BY MOUTH TWICE A DAY, Disp: 180 tablet, Rfl: 0    Calcium Carbonate-Vit D-Min (CALCIUM 1200 PO), Take by mouth 2  times day geovanni , Disp: , Rfl:     LORazepam (ATIVAN) 0 5 mg tablet, Take 1 tablet (0 5 mg total) by mouth as needed in the morning and 1 tablet (0 5 mg total) as needed in the evening for anxiety  , Disp: 60 tablet, Rfl: 0    methocarbamol (ROBAXIN) 500 mg tablet, Take 1 tablet (500 mg total) by mouth every 6 (six) hours as needed for muscle spasms for up to 10 days, Disp: 30 tablet, Rfl: 0    Multiple Vitamin (multivitamin) capsule, Take 1 capsule by mouth in the morning  geovanni  , Disp: , Rfl:     naloxone (NARCAN) 4 mg/0 1 mL nasal spray, Administer 1 spray into a nostril  If no response after 2-3 minutes, give another dose in the other nostril using a new spray , Disp: 1 each, Rfl: 1    Past Medical History:   Diagnosis Date    Anxiety     Back pain     BRCA negative 06/2016    [facility]    Cancer ([address] )     Gastric ulcer     Headache     History of chemotherapy 2016    Neoadjuvant; at [address] History of transfusion 06/2017    no adverse reaction    Limb alert care status     left    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)     s/p chemotherapy    Obesity (BMI 30-39  9)        Past Surgical History:   Procedure Laterality Date    ABDOMINAL ADHESION SURGERY N/A 10/4/2021    Procedure: Lysis Adhesions;  Surgeon: Phani Garcia MD;  Location: [address] [place] [address];  Service: Bariatrics    ABDOMINAL WALL SURGERY Left 9/21/2016    Procedure: DEBRIDEMENT OF LEFT ABDOMINAL TISSUE AND CLOSURE; DEBRIDEMENT OF LEFT BREAST FLAP; SUBMUSCULAR TISSUE EXPANDER PLACEMENT WITH ADM (ACELLULAR DERMAL MATRIX);   Surgeon: Karthik Acosta MD;  Location:  MAIN OR;  Service:     BREAST BIOPSY Left 04/2016    with sentinel node biospy    BREAST BIOPSY Right 10/18/2021    benign    BREAST RECONSTRUCTION Left 12/22/2017    Procedure: REVISION OF LEFT BREAST SCAR, LOCAL FLAP;  Surgeon: Consuelo Erickson MD;  [facility];  Service: Plastics    COLONOSCOPY      DILATION AND CURETTAGE OF UTERUS      [address] N/A 2/6/2022    Procedure: Latia Deutscher PROCEDURE WITH SIGMOID COLOSTOMY;  Surgeon: Ary Orellana MD;  Location: [address] [place] [address];  Service: General    LAPAROSCOPIC CHOLECYSTECTOMY  2000    LAPAROSCOPIC GASTRIC BANDING  2008    removed 2013    LAPAROSCOPIC GASTRIC BANDING  2013    removal    LAPAROTOMY N/A 2/6/2022 Procedure: LAPAROTOMY EXPLORATORY;  Surgeon: Sheldon Ornelas MD;  Location: [address] [place] [address];  Service: General    LIPOSUCTION W/ FAT INJECTION Left 6/8/2017    Procedure: BREAST FAT GRAFTING ;  Surgeon: Tai Roach MD;  [facility];  Service:     MASTECTOMY Left 2016    MEDIPORT INSERTION, SINGLE  2016    NV BIOPSY/EXCISION, LYMPH NODE(S) Left 8/10/2016    Procedure: LYMPHOSCINTIGRAPHY; SENTINAL LYMPH NODE BIOPSY (INJECT AT 1100);   Surgeon: Jeannie Valenzuela MD;  [facility];  Service: Surgical Oncology    NV BREAST RECONSTRUCTION W/FREE FLAP Left 9/19/2016    Procedure: BREAST DELAYED RECONSTRUCTION; DEIP FREE FLAP removal of port-a -cath;  Surgeon: Tai Roach MD;  [facility];  Service: Plastics    NV BREAST REDUCTION Right 12/19/2016    Procedure: BREAST REDUCTION/MASTOPEXY ;  Surgeon: aTi Roach MD;  [facility];  Service: Plastics    NV HYSTEROSCOPY,W/ENDO [address] N/A 5/21/2018    Procedure: DILATATION AND CURETTAGE (D&C), HYSTEROSCOPY;  Surgeon: Aida Field MD;  [facility];  Service: Gynecology Oncology    NV INSJ/RPLCMT BREAST IMPLANT SEP DAY MASTECTOMY Left 12/19/2016    Procedure: BREAST TISSUE EXPANDER REMOVAL; BREAST IMPLANT PLACEMENT;  Surgeon: Tai Roach MD;  [facility];  Service: Plastics    NV LAP GASTRIC BYPASS/RAMIN-EN-Y N/A 10/4/2021    Procedure: LAPAROSCOPIC RAMIN-EN-Y GASTRIC BYPASS AND INTRAOPERATIVE EGD;  Surgeon: Daily Light MD;  Location: [address] [place] [address];  Service: Bariatrics    NV LAP, SURG CLOSE ENTEROSTOMY RESECT ANAST N/A 5/3/2022    Procedure: CLOSURE COLOSTOMY, LAPAROSCOPIC closure Hartmens sigmoid resection;  Surgeon: Antoine Centeno MD;  [facility];  Service: Colorectal    NV MASTECTOMY, SIMPLE, COMPLETE Left 8/10/2016    Procedure: BREAST MASTECTOMY; FROZEN SECTION ;  Surgeon: Jeannie Valenzuela MD;  [facility];  Service: Surgical Oncology    NV REMOVAL TISSUE EXPANDER W/O INSERTION IMPLANT Left 1/23/2017 Procedure: BREAST IMPLANT REMOVAL; WASHOUT AND DEBRIDEMENT;  Surgeon: Amari Benoit MD;  [facility];  Service: Plastics    REDUCTION MAMMAPLASTY  2016    REDUCTION MAMMAPLASTY  2018    REVISION OF SCAR ON TORSO N/A 12/19/2016    Procedure: ABDOMINAL SCAR REVISION ;  Surgeon: Amari Benoit MD;  [facility];  Service:    [address] [place] [address]  4/22/2016    US GUIDED BREAST BIOPSY RIGHT COMPLETE Right 10/18/2021    WISDOM TOOTH EXTRACTION         Family History   Problem Relation Age of Onset    Diabetes Mother     Heart disease Mother     Kidney disease Mother     Obesity Mother     Other Father     Cancer Father         melanoma    Obesity Father     Diabetes Maternal Aunt     No Known Problems Paternal Aunt     Breast cancer Cousin     Diverticulitis Maternal Grandmother         reports that she quit smoking about 20 years ago  She has a 15 00 pack-year smoking history  She has never used smokeless tobacco  She reports previous alcohol use  She reports that she does not use drugs        Physical Exam:    /70 (BP Location: Left arm, Patient Position: Sitting, Cuff Size: Standard)   Pulse 77   Ht 5' (1 524 m)   SpO2 98%   BMI 32 03 kg/m²     Gen: wn/wd, NAD, overweight but otherwise healthy  HEENT: anicteric, MMM, no cervical LAD  CVS: RRR, no m/r/g  CHEST: CTA b/l  ABD: +BS, soft, NT,ND, no hepatosplenomegaly  EXT: no c/c/e  NEURO: aaox3  SKIN: NO rashes

## 2022-06-16 ENCOUNTER — TELEPHONE (OUTPATIENT)
Dept: BARIATRICS | Facility: CLINIC | Age: 52
End: 2022-06-16

## 2022-06-16 NOTE — TELEPHONE ENCOUNTER
Pt called - requesting a call from Lucia or Dr Dorota Avalos - pt declined to provide any info with what it is in regards to - she said it is too complicated to explain and she'd only like to explain it one time - please call pt back [phone] - if a msg is left, let her know what time is best to call back

## 2022-06-17 ENCOUNTER — TELEPHONE (OUTPATIENT)
Dept: BARIATRICS | Facility: CLINIC | Age: 52
End: 2022-06-17

## 2022-06-17 NOTE — TELEPHONE ENCOUNTER
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

## 2022-06-17 NOTE — TELEPHONE ENCOUNTER
Patient called in with concerns about her upcoming procedure on 7/6 and being off from work  She needs her FMLA extended through 7/10/22  FMLA is typically only completed from our office after bariatric surgery  She states she is now suffering from other issues, anxiety, etc  Informed patient she will have to get an extension from her provider who is following her now because we do not put patients on FMLA d/t an ulcer or anxiety  Informed patient I will speak to the PA and have her reach back out to her

## 2022-06-21 ENCOUNTER — OFFICE VISIT (OUTPATIENT)
Dept: FAMILY MEDICINE CLINIC | Facility: CLINIC | Age: 52
End: 2022-06-21
Payer: COMMERCIAL

## 2022-06-21 VITALS
DIASTOLIC BLOOD PRESSURE: 70 MMHG | SYSTOLIC BLOOD PRESSURE: 112 MMHG | HEART RATE: 72 BPM | TEMPERATURE: 98 F | BODY MASS INDEX: 32.03 KG/M2 | HEIGHT: 60 IN | RESPIRATION RATE: 18 BRPM | OXYGEN SATURATION: 99 %

## 2022-06-21 DIAGNOSIS — F33.9 DEPRESSION, RECURRENT (HCC): Primary | ICD-10-CM

## 2022-06-21 DIAGNOSIS — F41.9 ANXIETY: ICD-10-CM

## 2022-06-21 DIAGNOSIS — K95.89 COMPLICATION OF BARIATRIC PROCEDURE: ICD-10-CM

## 2022-06-21 PROCEDURE — 99214 OFFICE O/P EST MOD 30 MIN: CPT | Performed by: FAMILY MEDICINE

## 2022-06-21 PROCEDURE — 3725F SCREEN DEPRESSION PERFORMED: CPT | Performed by: FAMILY MEDICINE

## 2022-06-21 PROCEDURE — 1036F TOBACCO NON-USER: CPT | Performed by: FAMILY MEDICINE

## 2022-06-21 NOTE — PROGRESS NOTES
Assessment/Plan:       Diagnoses and all orders for this visit:    Depression, recurrent ([address] )  Anxiety  Comments:  Given her depression and anxiety related to her recent surgeries and complications, I did agree to extend her medical leave for an additional month  In the meantime, continue Wellbutrin and Trazodone  She will be looking into therapists as well  Complication of bariatric procedure  Comments:  Managed by her surgeons and GI physicians  Subjective:      Patient ID: Rita Zafar is a 54 y o  female  HPI   Hailee presents today to discuss medical leave paperwork  Has undergone 2 surgeries in the past few months with the most recent being elective laparoscopic Bushra's reversal surgery in May with closure of her colostomy  Has fatigue, anxiety, depression from the surgeries and post op issues such as abdominal pain, vomiting, decreased appetite  Has EGD coming up on 7/6/22  Has follow ups with her GI and surgeon due to her issues  The following portions of the patient's history were reviewed and updated as appropriate: allergies, current medications, past family history, past medical history, past social history, past surgical history and problem list     Review of Systems   Constitutional: Positive for appetite change and fatigue  HENT: Negative  Eyes: Negative  Respiratory: Negative  Cardiovascular: Negative  Gastrointestinal: Positive for abdominal pain and vomiting  Endocrine: Negative  Genitourinary: Negative  Musculoskeletal: Negative  Skin: Negative  Allergic/Immunologic: Negative  Neurological: Negative  Hematological: Negative  Psychiatric/Behavioral: Positive for dysphoric mood  The patient is nervous/anxious  Objective:      /70   Pulse 72   Temp 98 °F (36 7 °C)   Resp 18   Ht 5' (1 524 m)   SpO2 99%   BMI 32 03 kg/m²          Physical Exam  Constitutional:       General: She is not in acute distress  Appearance: She is well-developed  She is not diaphoretic  HENT:      Head: Normocephalic and atraumatic  Eyes:      General: No scleral icterus  Right eye: No discharge  Left eye: No discharge  Conjunctiva/sclera: Conjunctivae normal    Cardiovascular:      Rate and Rhythm: Normal rate and regular rhythm  Heart sounds: Normal heart sounds  No murmur heard  No friction rub  No gallop  Pulmonary:      Effort: Pulmonary effort is normal  No respiratory distress  Breath sounds: Normal breath sounds  No wheezing or rales  Chest:      Chest wall: No tenderness  Abdominal:      General: Abdomen is flat  Bowel sounds are normal  There is no distension  Palpations: Abdomen is soft  There is no mass  Tenderness: There is abdominal tenderness (at left lower incision site  no evidence of infection or bleeding  healing well  )  There is no right CVA tenderness, left CVA tenderness, guarding or rebound  Hernia: No hernia is present  Musculoskeletal:         General: No deformity  Normal range of motion  Cervical back: Normal range of motion and neck supple  Skin:     General: Skin is warm and dry  Neurological:      Mental Status: She is alert and oriented to person, place, and time  Psychiatric:         Behavior: Behavior normal          Thought Content:  Thought content normal          Judgment: Judgment normal

## 2022-06-23 ENCOUNTER — OFFICE VISIT (OUTPATIENT)
Dept: BARIATRICS | Facility: CLINIC | Age: 52
End: 2022-06-23

## 2022-06-23 VITALS — HEIGHT: 60 IN | WEIGHT: 163.8 LBS | BODY MASS INDEX: 32.16 KG/M2

## 2022-06-23 DIAGNOSIS — K91.2 POSTSURGICAL MALABSORPTION: Primary | ICD-10-CM

## 2022-06-23 PROCEDURE — 3008F BODY MASS INDEX DOCD: CPT | Performed by: FAMILY MEDICINE

## 2022-06-23 PROCEDURE — RECHECK

## 2022-06-23 NOTE — PROGRESS NOTES
Bariatric Follow Up Nutrition Note    Type of surgery  Gastric bypass: laparoscopic  Surgery Date: 10/4/21  Almost 9 months  post-op  Surgeon: Dr Jose F Ferris  54 y o   female  Height 5' (1 524 m), weight 74 3 kg (163 lb 12 8 oz), not currently breastfeeding  Body mass index is 31 99 kg/m²  Initial: 200 6#  Weight on Day of Weight Loss Surgery: 207 1  Weight in (lb) to have BMI = 25: 123 3#  Pre-Op Excess Wt: 84 from highest of 207 at day of surgery  Post-Op Wt Loss: 44#/ 52% EBWL in 9 month(s)    Review of History and Medications   Past Medical History:   Diagnosis Date    Anxiety     Back pain     BRCA negative 06/2016    [facility]    Cancer ([address] )     Gastric ulcer     Headache     History of chemotherapy 2016    Neoadjuvant; at [address] History of transfusion 06/2017    no adverse reaction    Limb alert care status     left    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)     s/p chemotherapy    Obesity (BMI 30-39  9)      Past Surgical History:   Procedure Laterality Date    ABDOMINAL ADHESION SURGERY N/A 10/4/2021    Procedure: Lysis Adhesions;  Surgeon: Aurora Moralez MD;  Location: [address] [place] [address];  Service: Bariatrics    ABDOMINAL WALL SURGERY Left 9/21/2016    Procedure: DEBRIDEMENT OF LEFT ABDOMINAL TISSUE AND CLOSURE; DEBRIDEMENT OF LEFT BREAST FLAP; SUBMUSCULAR TISSUE EXPANDER PLACEMENT WITH ADM (ACELLULAR DERMAL MATRIX);   Surgeon: Herminio Thompson MD;  Location:  MAIN OR;  Service:     BREAST BIOPSY Left 04/2016    with sentinel node biospy    BREAST BIOPSY Right 10/18/2021    benign    BREAST RECONSTRUCTION Left 12/22/2017    Procedure: REVISION OF LEFT BREAST SCAR, LOCAL FLAP;  Surgeon: Ahsan Shanks MD;  [facility];  Service: Plastics    COLONOSCOPY      DILATION AND CURETTAGE OF UTERUS      [facility] N/A 2/6/2022    Procedure: Giuliana Sotomayor PROCEDURE WITH SIGMOID COLOSTOMY;  Surgeon: Lila Villegas MD; Location: [facility] MAIN OR;  Service: General    LAPAROSCOPIC CHOLECYSTECTOMY  2000    LAPAROSCOPIC GASTRIC BANDING  2008    removed 2013    LAPAROSCOPIC GASTRIC BANDING  2013    removal    LAPAROTOMY N/A 2/6/2022    Procedure: LAPAROTOMY EXPLORATORY;  Surgeon: Abigail Stein MD;  Location: [address] [place] [address];  Service: General    LIPOSUCTION W/ FAT INJECTION Left 6/8/2017    Procedure: BREAST FAT GRAFTING ;  Surgeon: Mary Carlson MD;  [facility];  Service:     MASTECTOMY Left 2016    MEDIPORT INSERTION, SINGLE  2016    AK BIOPSY/EXCISION, LYMPH NODE(S) Left 8/10/2016    Procedure: LYMPHOSCINTIGRAPHY; SENTINAL LYMPH NODE BIOPSY (INJECT AT 1100);   Surgeon: Janet Rivas MD;  [facility];  Service: Surgical Oncology    AK BREAST RECONSTRUCTION W/FREE FLAP Left 9/19/2016    Procedure: BREAST DELAYED RECONSTRUCTION; DEIP FREE FLAP removal of port-a -cath;  Surgeon: Mary aCrlson MD;  [facility];  Service: Plastics    AK BREAST REDUCTION Right 12/19/2016    Procedure: BREAST REDUCTION/MASTOPEXY ;  Surgeon: Mary Carlson MD;  [facility];  Service: Plastics    AK HYSTEROSCOPY,W/ENDO [address] N/A 5/21/2018    Procedure: DILATATION AND CURETTAGE (D&C), HYSTEROSCOPY;  Surgeon: Vince Espinal MD;  [facility];  Service: Gynecology Oncology    AK INSJ/RPLCMT BREAST IMPLANT SEP DAY MASTECTOMY Left 12/19/2016    Procedure: BREAST TISSUE EXPANDER REMOVAL; BREAST IMPLANT PLACEMENT;  Surgeon: Mary Carlson MD;  [facility];  Service: Plastics    AK LAP GASTRIC BYPASS/RAMIN-EN-Y N/A 10/4/2021    Procedure: LAPAROSCOPIC RAMIN-EN-Y GASTRIC BYPASS AND INTRAOPERATIVE EGD;  Surgeon: Omid Hawthorne MD;  Location: [address] [place] [address];  Service: Bariatrics    AK LAP, SURG CLOSE ENTEROSTOMY RESECT ANAST N/A 5/3/2022    Procedure: CLOSURE COLOSTOMY, LAPAROSCOPIC closure Hartmens sigmoid resection;  Surgeon: Franky Law MD;  [facility];  Service: Colorectal    AK MASTECTOMY, SIMPLE, COMPLETE Left 8/10/2016    Procedure: BREAST MASTECTOMY; FROZEN SECTION ;  Surgeon: Yesenia Patrick MD;  [facility];  Service: Surgical Oncology    NV REMOVAL TISSUE EXPANDER W/O INSERTION IMPLANT Left 2017    Procedure: BREAST IMPLANT REMOVAL; WASHOUT AND DEBRIDEMENT;  Surgeon: Stefan Grimaldo MD;  [facility];  Service: Plastics    REDUCTION MAMMAPLASTY  2016    REDUCTION MAMMAPLASTY  2018    REVISION OF SCAR ON TORSO N/A 2016    Procedure: ABDOMINAL SCAR REVISION ;  Surgeon: Stefan Grimaldo MD;  [facility];  Service:    [address] [place] [address]  2016    US GUIDED BREAST BIOPSY RIGHT COMPLETE Right 10/18/2021    WISDOM TOOTH EXTRACTION       Social History     Socioeconomic History    Marital status:      Spouse name: Not on file    Number of children: Not on file    Years of education: Not on file    Highest education level: Not on file   Occupational History    Not on file   Tobacco Use    Smoking status: Former Smoker     Packs/day: 1 00     Years: 15 00     Pack years: 15 00     Quit date:      Years since quittin 4    Smokeless tobacco: Never Used   Vaping Use    Vaping Use: Never used   Substance and Sexual Activity    Alcohol use: Not Currently     Comment: rarely    Drug use: No    Sexual activity: Not on file   Other Topics Concern    Not on file   Social History Narrative    Not on file     Social Determinants of Health     Financial Resource Strain: Not on file   Food Insecurity: No Food Insecurity    Worried About [address] in the Last Year: Never true    [address] in the Last Year: Never true   Transportation Needs: No Transportation Needs    Lack of Transportation (Medical): No    Lack of Transportation (Non-Medical):  No   Physical Activity: Not on file   Stress: Not on file   Social Connections: Not on file   Intimate Partner Violence: Not on file   Housing Stability: Low Risk     Unable to Pay for Housing in the Last Year: No    Number of Places Lived in the Last Year: 1    Unstable Housing in the Last Year: No       Current Outpatient Medications:     acetaminophen (TYLENOL) 325 mg tablet, Take 3 tablets (975 mg total) by mouth every 8 (eight) hours, Disp: , Rfl: 0    buPROPion (WELLBUTRIN SR) 150 mg 12 hr tablet, TAKE 1 TABLET BY MOUTH TWICE A DAY, Disp: 180 tablet, Rfl: 0    Calcium Carbonate-Vit D-Min (CALCIUM 1200 PO), Take by mouth 2  times day geovanni , Disp: , Rfl:     LORazepam (ATIVAN) 0 5 mg tablet, Take 1 tablet (0 5 mg total) by mouth as needed in the morning and 1 tablet (0 5 mg total) as needed in the evening for anxiety  , Disp: 60 tablet, Rfl: 0    methocarbamol (ROBAXIN) 500 mg tablet, Take 1 tablet (500 mg total) by mouth every 6 (six) hours as needed for muscle spasms for up to 10 days (Patient not taking: Reported on 6/21/2022), Disp: 30 tablet, Rfl: 0    Multiple Vitamin (multivitamin) capsule, Take 1 capsule by mouth in the morning  geovanni  , Disp: , Rfl:     naloxone (NARCAN) 4 mg/0 1 mL nasal spray, Administer 1 spray into a nostril  If no response after 2-3 minutes, give another dose in the other nostril using a new spray , Disp: 1 each, Rfl: 1    pantoprazole (PROTONIX) 40 mg tablet, Take 1 tablet (40 mg total) by mouth daily (Patient taking differently: Take 40 mg by mouth 2 (two) times a day), Disp: 90 tablet, Rfl: 1    sucralfate (CARAFATE) 1 g tablet, Take 1 tablet (1 g total) by mouth 4 (four) times a day Crush tab into 1oz warm water, Disp: 180 tablet, Rfl: 1    traZODone (DESYREL) 50 mg tablet, TAKE 1 TABLET BY MOUTH DAILY AT BEDTIME, Disp: 90 tablet, Rfl: 1    Food Intake and Lifestyle Assessment   Food Intake Assessment completed via usual diet recall  Portions about 1/2 to 3/4 cup  Doesn't go back to work until July 27th     Breakfast: JBN protein w/almond milk, frozen banana and PB2--one per day  Snack: -   Lunch: 2:30pm- [employer] morrisno + protein bar (Build)  Snack: -  Dinner: 5pm-sometimes banana and PB, turkey burger over the past 4 days, but did through up twice  OR sometimes yogurt w/PB2 w/part of banana  Snack: sometimes grazes on pirate booty  Beverage intake: SF hisbicus iced tea w/ dehydrated strawberries (only 35cals)  Diet texture/stage: regular  Protein supplement: JBN   Estimated protein intake per day: 60gm  Estimated fluid intake per day: 40oz of water/hisbiscus tea + 16oz protein shake + 8oz protein water diluted at times  Meals eaten away from home: varies  Typical meal pattern: 2 meals per day and 0-2 snacks per day  Eating Behaviors: Appropriate portion sizes, Does not drink with meals and waits 30-minutes after meal before resuming drinking and Craves salty foods    Food allergies or intolerances: dense meats, eggs vary, rice  Cultural or Catholic considerations: none    Vitamins:  BA chewy calcium 1 TID  Bariatricpal capsule one a day    Physical Assessment  Nutrition Related Findings  Vomiting w/certain foods    Physical Activity  Types of exercise: Started back at [employer] there and will do walking out side  Current physical limitations: not at this time    Psychosocial Assessment   Support systems: friend(s) relative(s)  Socioeconomic factors: none    Nutrition Diagnosis  Diagnosis: Inadequate protein intake (NI-5 7  3)  Related to: Altered GI function  As Evidenced by: Expected anthropometric outcomes are not achieved     Interventions and Teaching   Patient educated on post-op nutrition guidelines  Patient educated and handouts provided    Adequate hydration  Expected weight loss  Weight loss plateaus/ possibility of weight regain  Exercise  Nutrition considerations after surgery  Protein supplements  Meal planning and preparation  Appropriate carbohydrate, protein, and fat intake, and food/fluid choices to maximize safe weight loss, nutrient intake, and tolerance   Dietary and lifestyle changes  Intuitive eating  Techniques for self monitoring and keeping daily food journal  Vitamin / Mineral supplementation of Multivitamin with minerals and Calcium    Education provided to: patient    Barriers to learning: No barriers identified    Readiness to change: action    Comprehension: verbalizes understanding     Expected Compliance: good    Pt presents today maintaining her weight from her visit one month ago  She would still like to lose 13lb to get out of the "obese" category  Reviewed her diet recall and it appears she is getting about 62gm protein per day  Advised should work on increasing to 75gm per day and limit to about 1000 calories  Did some menu planning with pt to help her better meet her needs  We figured, that up until dinner she is consuming about 700 calories, 62gm protein therefore at dinner she needs to get at least 2oz protein and limit to 300 calories  Pt happy with plan we came up with      Goals  Food journal  Exercise 30 minutes 5 times per week--start strength training at the gym and walking outside  Eat 3 meals per day  One protein shake per day, limit to one protein bar per day  Eliminate mindless snacking  Aim for 75gm protein per day     Time Spent:   45 Minutes

## 2022-06-29 ENCOUNTER — TELEPHONE (OUTPATIENT)
Dept: GASTROENTEROLOGY | Facility: CLINIC | Age: 52
End: 2022-06-29

## 2022-06-29 NOTE — TELEPHONE ENCOUNTER
Per the PAT nurse at [place] [address], patient is very upset and nervous about having to do an entire bowel prep for a flex/sig procedure as it will dehydrate her and make getting and keeping an IV almost impossible  She is also concerned that she will be late in the day further dehydrating her and causing more IV problems  It does look like, per the AVS, that Dr Naty Ann wanted miralax/dulcolax in addition to the flex sig prep  Can you please call the patient to discuss? Thanks

## 2022-06-29 NOTE — TELEPHONE ENCOUNTER
Dr Navdeep Stern patient is concerned about the MiraLax and Dulcolax prep for a flexible sigmoid endoscopy  She wants to know if there is a prep that would cause less dehydration such as magnesium citrate and enemas  Patient has a history of breast cancer and they can only use her right arm for IV access  Patient has been a very difficult IV stick in the past and she is concerned that with taking the MiraLax i and dulcolax is going to contribute to her being dehydrated and causing more difficulty with trying to find finding IV for the procedure  I told her I would reach out and see if you are agreeable to a different prep for the flexible sigmoidoscopy  Please send response to the [employer] provider pool does I am only today and tomorrow  Patient is scheduled for her procedure on July 6    Thank you

## 2022-07-01 DIAGNOSIS — Z11.59 SCREENING FOR VIRAL DISEASE: ICD-10-CM

## 2022-07-01 PROCEDURE — U0003 INFECTIOUS AGENT DETECTION BY NUCLEIC ACID (DNA OR RNA); SEVERE ACUTE RESPIRATORY SYNDROME CORONAVIRUS 2 (SARS-COV-2) (CORONAVIRUS DISEASE [COVID-19]), AMPLIFIED PROBE TECHNIQUE, MAKING USE OF HIGH THROUGHPUT TECHNOLOGIES AS DESCRIBED BY CMS-2020-01-R: HCPCS | Performed by: INTERNAL MEDICINE

## 2022-07-01 PROCEDURE — U0005 INFEC AGEN DETEC AMPLI PROBE: HCPCS | Performed by: INTERNAL MEDICINE

## 2022-07-03 LAB — SARS-COV-2 RNA RESP QL NAA+PROBE: NEGATIVE

## 2022-07-05 RX ORDER — SODIUM CHLORIDE, SODIUM LACTATE, POTASSIUM CHLORIDE, CALCIUM CHLORIDE 600; 310; 30; 20 MG/100ML; MG/100ML; MG/100ML; MG/100ML
75 INJECTION, SOLUTION INTRAVENOUS CONTINUOUS
Status: CANCELLED | OUTPATIENT
Start: 2022-07-05

## 2022-07-05 NOTE — TELEPHONE ENCOUNTER
I called the patient this morning, I left a extended voicemail for her  She had an inadequate prep on her last colonoscopy, because of her gastric bypass in fact she needs more for prep to clear out well  I would not recommend an enema because of her recent anastomosis  I think the best option is to do a full Dulcolax and MiraLax prep  What I recommended that she drink a lot of fluids before she starts the prep, and continues to drink additional fluids throughout the prep such as Gatorade or electrolyte containing fluids and she will not be  dehydrated then

## 2022-07-06 ENCOUNTER — ANESTHESIA (OUTPATIENT)
Dept: GASTROENTEROLOGY | Facility: AMBULARY SURGERY CENTER | Age: 52
End: 2022-07-06

## 2022-07-06 ENCOUNTER — ANESTHESIA EVENT (OUTPATIENT)
Dept: GASTROENTEROLOGY | Facility: AMBULARY SURGERY CENTER | Age: 52
End: 2022-07-06

## 2022-07-06 ENCOUNTER — HOSPITAL ENCOUNTER (OUTPATIENT)
Dept: GASTROENTEROLOGY | Facility: AMBULARY SURGERY CENTER | Age: 52
Setting detail: OUTPATIENT SURGERY
Discharge: HOME | End: 2022-07-06
Attending: INTERNAL MEDICINE | Admitting: INTERNAL MEDICINE
Payer: COMMERCIAL

## 2022-07-06 VITALS
HEART RATE: 67 BPM | TEMPERATURE: 97.8 F | SYSTOLIC BLOOD PRESSURE: 113 MMHG | DIASTOLIC BLOOD PRESSURE: 64 MMHG | RESPIRATION RATE: 18 BRPM | OXYGEN SATURATION: 100 %

## 2022-07-06 DIAGNOSIS — Z93.3 S/P COLOSTOMY (HCC): ICD-10-CM

## 2022-07-06 DIAGNOSIS — K28.9 MARGINAL ULCER: ICD-10-CM

## 2022-07-06 LAB
EXT PREGNANCY TEST URINE: NEGATIVE
EXT. CONTROL: NORMAL

## 2022-07-06 PROCEDURE — 43239 EGD BIOPSY SINGLE/MULTIPLE: CPT | Performed by: INTERNAL MEDICINE

## 2022-07-06 PROCEDURE — 88305 TISSUE EXAM BY PATHOLOGIST: CPT | Performed by: PATHOLOGY

## 2022-07-06 PROCEDURE — 45331 SIGMOIDOSCOPY AND BIOPSY: CPT | Performed by: INTERNAL MEDICINE

## 2022-07-06 PROCEDURE — 81025 URINE PREGNANCY TEST: CPT | Performed by: ANESTHESIOLOGY

## 2022-07-06 RX ORDER — SODIUM CHLORIDE, SODIUM LACTATE, POTASSIUM CHLORIDE, CALCIUM CHLORIDE 600; 310; 30; 20 MG/100ML; MG/100ML; MG/100ML; MG/100ML
INJECTION, SOLUTION INTRAVENOUS CONTINUOUS PRN
Status: DISCONTINUED | OUTPATIENT
Start: 2022-07-06 | End: 2022-07-06

## 2022-07-06 RX ORDER — SODIUM CHLORIDE, SODIUM LACTATE, POTASSIUM CHLORIDE, CALCIUM CHLORIDE 600; 310; 30; 20 MG/100ML; MG/100ML; MG/100ML; MG/100ML
75 INJECTION, SOLUTION INTRAVENOUS CONTINUOUS
Status: DISCONTINUED | OUTPATIENT
Start: 2022-07-06 | End: 2022-07-10 | Stop reason: HOSPADM

## 2022-07-06 RX ORDER — ONDANSETRON 2 MG/ML
4 INJECTION INTRAMUSCULAR; INTRAVENOUS ONCE AS NEEDED
Status: CANCELLED | OUTPATIENT
Start: 2022-07-06

## 2022-07-06 RX ORDER — PROPOFOL 10 MG/ML
INJECTION, EMULSION INTRAVENOUS AS NEEDED
Status: DISCONTINUED | OUTPATIENT
Start: 2022-07-06 | End: 2022-07-06

## 2022-07-06 RX ADMIN — SODIUM CHLORIDE, SODIUM LACTATE, POTASSIUM CHLORIDE, AND CALCIUM CHLORIDE: .6; .31; .03; .02 INJECTION, SOLUTION INTRAVENOUS at 08:47

## 2022-07-06 RX ADMIN — SODIUM CHLORIDE, SODIUM LACTATE, POTASSIUM CHLORIDE, AND CALCIUM CHLORIDE 75 ML/HR: .6; .31; .03; .02 INJECTION, SOLUTION INTRAVENOUS at 07:43

## 2022-07-06 RX ADMIN — PROPOFOL 50 MG: 10 INJECTION, EMULSION INTRAVENOUS at 08:59

## 2022-07-06 RX ADMIN — PROPOFOL 200 MG: 10 INJECTION, EMULSION INTRAVENOUS at 08:55

## 2022-07-06 RX ADMIN — PROPOFOL 50 MG: 10 INJECTION, EMULSION INTRAVENOUS at 09:05

## 2022-07-06 NOTE — ANESTHESIA PREPROCEDURE EVALUATION
Procedure:  EGD  FLEXIBLE SIGMOIDOSCOPY    Relevant Problems   ANESTHESIA (within normal limits)      CARDIO (within normal limits)      ENDO (within normal limits)      GI/HEPATIC   (+) GERD (gastroesophageal reflux disease)   (+) Rectal bleeding      GYN   (+) Malignant neoplasm of upper-outer quadrant of left breast in female, estrogen receptor positive (HCC)      MUSCULOSKELETAL   (+) Low back pain      NEURO/PSYCH   (+) Anxiety   (+) Continuous opioid dependence (HCC)   (+) Depression, recurrent (HCC)        Physical Exam    Airway    Mallampati score: I  TM Distance: >3 FB  Neck ROM: full     Dental   No notable dental hx     Cardiovascular  Cardiovascular exam normal    Pulmonary  Pulmonary exam normal     Other Findings        Anesthesia Plan  ASA Score- 2     Anesthesia Type- IV sedation with anesthesia with ASA Monitors  Additional Monitors:   Airway Plan:           Plan Factors-Exercise tolerance (METS): >4 METS  Chart reviewed  Existing labs reviewed  Patient summary reviewed  Patient is not a current smoker  Induction-     Postoperative Plan-     Informed Consent- Anesthetic plan and risks discussed with patient  I personally reviewed this patient with the CRNA  Discussed and agreed on the Anesthesia Plan with the CRNA  Kei Zaragoza

## 2022-07-06 NOTE — H&P
History and Physical - [facility]  [facility] 54 y o  female MRN: [id number]    HPI: [facility] is a 54y o  year old female who presents with follow up of anastomotic ulcer and hx of perf colon  Review of Systems    Historical Information   Past Medical History:   Diagnosis Date    Anxiety     Back pain     BRCA negative 06/2016    [facility]    Cancer ([address] )     Gastric ulcer     Headache     History of chemotherapy 2016    Neoadjuvant; at [address] History of transfusion 06/2017    no adverse reaction    Limb alert care status     left    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)     s/p chemotherapy    Obesity (BMI 30-39  9)      Past Surgical History:   Procedure Laterality Date    ABDOMINAL ADHESION SURGERY N/A 10/4/2021    Procedure: Lysis Adhesions;  Surgeon: Hermelindo Ponce MD;  Location: [address] [place] [address];  Service: Bariatrics    ABDOMINAL WALL SURGERY Left 9/21/2016    Procedure: DEBRIDEMENT OF LEFT ABDOMINAL TISSUE AND CLOSURE; DEBRIDEMENT OF LEFT BREAST FLAP; SUBMUSCULAR TISSUE EXPANDER PLACEMENT WITH ADM (ACELLULAR DERMAL MATRIX);   Surgeon: Criselda Rich MD;  Location:  MAIN OR;  Service:     BREAST BIOPSY Left 04/2016    with sentinel node biospy    BREAST BIOPSY Right 10/18/2021    benign    BREAST RECONSTRUCTION Left 12/22/2017    Procedure: REVISION OF LEFT BREAST SCAR, LOCAL FLAP;  Surgeon: Qasim Starr MD;  [facility];  Service: Plastics    COLONOSCOPY      DILATION AND CURETTAGE OF UTERUS      [address] N/A 2/6/2022    Procedure: Lennis Levels PROCEDURE WITH SIGMOID COLOSTOMY;  Surgeon: Vin Mejia MD;  Location: [address] [place] [address];  Service: General    LAPAROSCOPIC CHOLECYSTECTOMY  2000    LAPAROSCOPIC GASTRIC BANDING  2008    removed 2013    LAPAROSCOPIC GASTRIC BANDING  2013    removal    LAPAROTOMY N/A 2/6/2022    Procedure: LAPAROTOMY EXPLORATORY;  Surgeon: Vin Mejia MD;  [facility] OR;  Service: General    LIPOSUCTION W/ FAT INJECTION Left 6/8/2017    Procedure: BREAST FAT GRAFTING ;  Surgeon: Libertad Valdes MD;  [facility];  Service:     MASTECTOMY Left 2016    MEDIPORT INSERTION, SINGLE  2016    TX BIOPSY/EXCISION, LYMPH NODE(S) Left 8/10/2016    Procedure: LYMPHOSCINTIGRAPHY; SENTINAL LYMPH NODE BIOPSY (INJECT AT 1100);   Surgeon: Ne Watters MD;  [facility];  Service: Surgical Oncology    TX BREAST RECONSTRUCTION W/FREE FLAP Left 9/19/2016    Procedure: BREAST DELAYED RECONSTRUCTION; DEIP FREE FLAP removal of port-a -cath;  Surgeon: Libertad Valdes MD;  [facility];  Service: Plastics    TX BREAST REDUCTION Right 12/19/2016    Procedure: BREAST REDUCTION/MASTOPEXY ;  Surgeon: Libertad Valdes MD;  [facility];  Service: Plastics    TX HYSTEROSCOPY,W/ENDO [address] N/A 5/21/2018    Procedure: DILATATION AND CURETTAGE (D&C), HYSTEROSCOPY;  Surgeon: Elvira Tavares MD;  [facility];  Service: Gynecology Oncology    TX INSJ/RPLCMT BREAST IMPLANT SEP DAY MASTECTOMY Left 12/19/2016    Procedure: BREAST TISSUE EXPANDER REMOVAL; BREAST IMPLANT PLACEMENT;  Surgeon: Libertad Valdes MD;  [facility];  Service: Plastics    TX LAP GASTRIC BYPASS/RAMNI-EN-Y N/A 10/4/2021    Procedure: LAPAROSCOPIC RAMIN-EN-Y GASTRIC BYPASS AND INTRAOPERATIVE EGD;  Surgeon: Luis Eduardo Gutierrez MD;  Location: [address] [place] [address];  Service: Bariatrics    TX LAP, SURG CLOSE ENTEROSTOMY RESECT ANAST N/A 5/3/2022    Procedure: CLOSURE COLOSTOMY, LAPAROSCOPIC closure Hartmens sigmoid resection;  Surgeon: Michael Anderson MD;  [facility];  Service: Colorectal    TX MASTECTOMY, SIMPLE, COMPLETE Left 8/10/2016    Procedure: BREAST MASTECTOMY; FROZEN SECTION ;  Surgeon: Ne Watters MD;  [facility];  Service: Surgical Oncology    TX REMOVAL TISSUE EXPANDER W/O INSERTION IMPLANT Left 1/23/2017    Procedure: BREAST IMPLANT REMOVAL; WASHOUT AND DEBRIDEMENT;  Surgeon: Libertad Valdes MD; [facility];  Service: Plastics    REDUCTION MAMMAPLASTY  2016    REDUCTION MAMMAPLASTY  2018    REVISION OF SCAR ON TORSO N/A 2016    Procedure: ABDOMINAL SCAR REVISION ;  Surgeon: Criselda Rich MD;  [facility];  Service:    [address] [place] [address]  2016    US GUIDED BREAST BIOPSY RIGHT COMPLETE Right 10/18/2021    WISDOM TOOTH EXTRACTION       Social History   Social History     Substance and Sexual Activity   Alcohol Use Not Currently    Comment: rarely     Social History     Substance and Sexual Activity   Drug Use No     Social History     Tobacco Use   Smoking Status Former Smoker    Packs/day:  00    Years: 15     Pack years: 15     Quit date:     Years since quittin 5   Smokeless Tobacco Never Used     Family History   Problem Relation Age of Onset    Diabetes Mother     Heart disease Mother     Kidney disease Mother     Obesity Mother     Other Father     Cancer Father         melanoma    Obesity Father     Diabetes Maternal Aunt     No Known Problems Paternal Aunt     Breast cancer Cousin     Diverticulitis Maternal Grandmother        Meds/Allergies     (Not in a hospital admission)      Allergies   Allergen Reactions    Ambien [Zolpidem] Other (See Comments)     Amnesia-does not want to take    Effexor [Venlafaxine] GI Intolerance    Bactrim [Sulfamethoxazole-Trimethoprim] Itching       Objective     /58   Pulse 77   Temp 97 8 °F (36 6 °C) (Temporal)   Resp 18   LMP  (LMP Unknown) Comment: negative pregnancy test  SpO2 99%       PHYSICAL EXAM    Gen: NAD  CV: RRR  CHEST: Clear  ABD: soft, NT/ND  EXT: no edema  Neuro: AAO      ASSESSMENT/PLAN:  This is a 54y o  year old female here for follow up of anastomotic ulcer and hx of perf colon         PLAN:   Procedure: egd/flex sig

## 2022-07-06 NOTE — ANESTHESIA POSTPROCEDURE EVALUATION
Post-Op Assessment Note    CV Status:  Stable  Pain Score: 0    Pain management: adequate     Mental Status:  Alert and awake   Hydration Status:  Euvolemic   PONV Controlled:  Controlled   Airway Patency:  Patent      Post Op Vitals Reviewed: Yes      Staff: CRNA, Anesthesiologist         No complications documented      BP   99/65   Temp      Pulse  63   Resp   16   SpO2   99

## 2022-07-07 DIAGNOSIS — F41.9 ANXIETY: ICD-10-CM

## 2022-07-07 DIAGNOSIS — F32.A DEPRESSION, UNSPECIFIED DEPRESSION TYPE: ICD-10-CM

## 2022-07-07 RX ORDER — BUPROPION HYDROCHLORIDE 150 MG/1
TABLET, EXTENDED RELEASE ORAL
Qty: 180 TABLET | Refills: 0 | Status: SHIPPED | OUTPATIENT
Start: 2022-07-07 | End: 2022-10-12

## 2022-07-08 ENCOUNTER — TELEPHONE (OUTPATIENT)
Dept: BARIATRICS | Facility: CLINIC | Age: 52
End: 2022-07-08

## 2022-07-08 NOTE — TELEPHONE ENCOUNTER
Patient completed EGD with Dr Oumou De Leon on 07/06/22 - MU now healed  She will continue PPI daily, may D/C carafate  Continue to push hydrating fluids to goal and f/u as scheduled

## 2022-07-14 ENCOUNTER — RA CDI HCC (OUTPATIENT)
Dept: OTHER | Facility: HOSPITAL | Age: 52
End: 2022-07-14

## 2022-07-14 NOTE — PROGRESS NOTES
[facility]  coding opportunities       Chart reviewed, no opportunity found: CHART REVIEWED, NO OPPORTUNITY FOUND        Patients Insurance        Commercial Insurance: Varsha Kaur

## 2022-07-21 ENCOUNTER — OFFICE VISIT (OUTPATIENT)
Dept: FAMILY MEDICINE CLINIC | Facility: CLINIC | Age: 52
End: 2022-07-21
Payer: COMMERCIAL

## 2022-07-21 VITALS
DIASTOLIC BLOOD PRESSURE: 78 MMHG | TEMPERATURE: 98 F | HEART RATE: 76 BPM | HEIGHT: 60 IN | RESPIRATION RATE: 16 BRPM | BODY MASS INDEX: 31.99 KG/M2 | SYSTOLIC BLOOD PRESSURE: 120 MMHG | OXYGEN SATURATION: 99 %

## 2022-07-21 DIAGNOSIS — F41.9 ANXIETY: ICD-10-CM

## 2022-07-21 DIAGNOSIS — F33.9 DEPRESSION, RECURRENT (HCC): Primary | ICD-10-CM

## 2022-07-21 DIAGNOSIS — G47.00 INSOMNIA, UNSPECIFIED TYPE: ICD-10-CM

## 2022-07-21 PROBLEM — R55 SYNCOPE: Status: RESOLVED | Noted: 2022-02-06 | Resolved: 2022-07-21

## 2022-07-21 PROBLEM — F11.20 CONTINUOUS OPIOID DEPENDENCE (HCC): Status: RESOLVED | Noted: 2021-12-13 | Resolved: 2022-07-21

## 2022-07-21 PROCEDURE — 99214 OFFICE O/P EST MOD 30 MIN: CPT | Performed by: FAMILY MEDICINE

## 2022-07-21 PROCEDURE — 3725F SCREEN DEPRESSION PERFORMED: CPT | Performed by: FAMILY MEDICINE

## 2022-07-21 RX ORDER — TRAZODONE HYDROCHLORIDE 100 MG/1
100 TABLET ORAL
Qty: 30 TABLET | Refills: 1 | Status: SHIPPED | OUTPATIENT
Start: 2022-07-21 | End: 2022-08-12

## 2022-07-21 NOTE — PROGRESS NOTES
Assessment/Plan:       Diagnoses and all orders for this visit:    Depression, recurrent ([address] )  Anxiety   Insomnia        - Given Hailee's uncontrolled depression, anxiety and insomnia, will increase her dose of trazodone to 100mg at bedtime  Continue wellbutrin at current dose  Continue therapy  - I did agree to extend her FMLA leave for an additional few weeks  New return to work date will be 22    -     traZODone (DESYREL) 100 mg tablet; Take 1 tablet (100 mg total) by mouth daily at bedtime  - Follow up in 1 month  Subjective:      Patient ID: Idris De is a 54 y o  female  HPI   Hailee presents today for follow up of depression and anxiety  She has had these in the past, but recently worsened over the past year after multiple medical issues and surgeries that she has undergone  She has been on wellbutrin and trazodone  States that symptoms have slightly improved, but still persist  She has been on FMLA leave from work  Started therapy which is helping and she will continue this  PHQ-2/9 Depression Screening    Little interest or pleasure in doing things: 2 - more than half the days  Feeling down, depressed, or hopeless: 1 - several days  Trouble falling or staying asleep, or sleeping too much: 3 - nearly every day  Feeling tired or having little energy: 3 - nearly every day  Poor appetite or overeatin - more than half the days  Feeling bad about yourself - or that you are a failure or have let yourself or your family down: 1 - several days  Trouble concentrating on things, such as reading the newspaper or watching television: 1 - several days  Moving or speaking so slowly that other people could have noticed   Or the opposite - being so fidgety or restless that you have been moving around a lot more than usual: 1 - several days  Thoughts that you would be better off dead, or of hurting yourself in some way: 0 - not at all  PHQ-9 Score: 14   PHQ-9 Interpretation: Moderate depression        NA-7 Flowsheet Screening    Flowsheet Row Most Recent Value   Over the last 2 weeks, how often have you been bothered by any of the following problems? Feeling nervous, anxious, or on edge 3   Not being able to stop or control worrying 3   Worrying too much about different things 3   Trouble relaxing 3   Being so restless that it is hard to sit still 2   Becoming easily annoyed or irritable 2   Feeling afraid as if something awful might happen 2   NA-7 Total Score 18        The following portions of the patient's history were reviewed and updated as appropriate: allergies, current medications, past family history, past medical history, past social history, past surgical history and problem list     Review of Systems   Constitutional: Positive for fatigue  HENT: Negative  Eyes: Negative  Respiratory: Negative  Cardiovascular: Negative  Gastrointestinal: Negative  Endocrine: Negative  Genitourinary: Negative  Musculoskeletal: Negative  Skin: Negative  Allergic/Immunologic: Negative  Neurological: Negative  Hematological: Negative  Psychiatric/Behavioral: Positive for decreased concentration, dysphoric mood and sleep disturbance  Negative for self-injury and suicidal ideas  The patient is nervous/anxious  Objective:      /78   Pulse 76   Temp 98 °F (36 7 °C)   Resp 16   Ht 5' (1 524 m)   LMP 07/18/2022 (Exact Date) Comment: negative pregnancy test  SpO2 99%   BMI 31 99 kg/m²          Physical Exam  Constitutional:       General: She is not in acute distress  Appearance: She is well-developed  She is not diaphoretic  HENT:      Head: Normocephalic and atraumatic  Eyes:      General: No scleral icterus  Right eye: No discharge  Left eye: No discharge  Conjunctiva/sclera: Conjunctivae normal    Pulmonary:      Effort: Pulmonary effort is normal    Musculoskeletal:      Cervical back: Normal range of motion  Skin:     General: Skin is warm  Neurological:      Mental Status: She is alert and oriented to person, place, and time  Psychiatric:         Behavior: Behavior normal          Thought Content:  Thought content normal          Judgment: Judgment normal

## 2022-07-22 ENCOUNTER — TELEPHONE (OUTPATIENT)
Dept: FAMILY MEDICINE CLINIC | Facility: CLINIC | Age: 52
End: 2022-07-22

## 2022-07-22 NOTE — TELEPHONE ENCOUNTER
Pt dropped off forms to be filled out by Dr Flor Torres  Please call when complete  Placed in nurse pending one

## 2022-07-22 NOTE — TELEPHONE ENCOUNTER
Form completed and placed for faxing  Can we please let pt know that I agree to extend her leave until 9/6/22 and that form is complete  Thank you

## 2022-08-04 ENCOUNTER — TELEPHONE (OUTPATIENT)
Dept: GASTROENTEROLOGY | Facility: CLINIC | Age: 52
End: 2022-08-04

## 2022-08-04 NOTE — TELEPHONE ENCOUNTER
Tried contacting patient but she has no number listed and her /significant other is not listed on her consent form sending results via a [employer] message     Apt recall placed  Colon recall placed

## 2022-08-04 NOTE — TELEPHONE ENCOUNTER
----- Message from Toya Roberts sent at 7/25/2022  4:27 PM EDT -----    ----- Message -----  From: Nestor Bertrand MD  Sent: 7/25/2022   9:39 AM EDT  To: [facility]    The biopsies from stomach were negative for H pylori  Biopsies at her anastomosis showed some inflammation and granulation tissue which is healing from your recent surgery  Repeat colonoscopy in 3 years  Follow-up in the office in 6 months time, sooner if needed  Please call with any questions or concerns

## 2022-08-12 DIAGNOSIS — G47.00 INSOMNIA, UNSPECIFIED TYPE: ICD-10-CM

## 2022-08-12 DIAGNOSIS — F33.9 DEPRESSION, RECURRENT (HCC): ICD-10-CM

## 2022-08-12 DIAGNOSIS — F41.9 ANXIETY: ICD-10-CM

## 2022-08-12 RX ORDER — TRAZODONE HYDROCHLORIDE 100 MG/1
TABLET ORAL
Qty: 90 TABLET | Refills: 1 | Status: SHIPPED | OUTPATIENT
Start: 2022-08-12

## 2022-08-12 NOTE — TELEPHONE ENCOUNTER
Requested medication(s) are due for refill today: Yes  Patient has already received a courtesy refill: No  Other reason request has been forwarded to provider: fail due to change

## 2022-08-31 ENCOUNTER — OFFICE VISIT (OUTPATIENT)
Dept: FAMILY MEDICINE CLINIC | Facility: CLINIC | Age: 52
End: 2022-08-31
Payer: COMMERCIAL

## 2022-08-31 VITALS
SYSTOLIC BLOOD PRESSURE: 96 MMHG | RESPIRATION RATE: 16 BRPM | TEMPERATURE: 97.7 F | HEART RATE: 59 BPM | DIASTOLIC BLOOD PRESSURE: 60 MMHG | OXYGEN SATURATION: 100 %

## 2022-08-31 DIAGNOSIS — G47.00 INSOMNIA, UNSPECIFIED TYPE: ICD-10-CM

## 2022-08-31 DIAGNOSIS — F41.9 ANXIETY: ICD-10-CM

## 2022-08-31 DIAGNOSIS — F33.9 DEPRESSION, RECURRENT (HCC): Primary | ICD-10-CM

## 2022-08-31 PROCEDURE — 3725F SCREEN DEPRESSION PERFORMED: CPT | Performed by: FAMILY MEDICINE

## 2022-08-31 PROCEDURE — 99214 OFFICE O/P EST MOD 30 MIN: CPT | Performed by: FAMILY MEDICINE

## 2022-08-31 NOTE — PROGRESS NOTES
Assessment/Plan:       Diagnoses and all orders for this visit:    Depression, recurrent ([address] )  Anxiety  Comments: For now continue trazodone and wellbutrin  Symptoms are not fully controlled, but she would like to hold off changing medication at this time  She will look for a psychiatrist    She is cleared to return to work on 22  Orders:  -     Ambulatory Referral to Psychiatry; Future        Subjective:      Patient ID: Cristobal Moralez is a 54 y o  female  [facility] of depression, insomnia and anxiety  She has had these in the past, but recently worsened over the past year after multiple medical issues and surgeries that she has undergone  She has been on wellbutrin and trazodone  States that symptoms have slightly improved, but still persist  She has been on FMLA leave from work, but will be returning next week  PHQ-2/9 Depression Screening    Little interest or pleasure in doing things: 0 - not at all  Feeling down, depressed, or hopeless: 1 - several days  Trouble falling or staying asleep, or sleeping too much: 3 - nearly every day  Feeling tired or having little energy: 2 - more than half the days  Poor appetite or overeatin - several days  Feeling bad about yourself - or that you are a failure or have let yourself or your family down: 3 - nearly every day  Trouble concentrating on things, such as reading the newspaper or watching television: 2 - more than half the days  Moving or speaking so slowly that other people could have noticed   Or the opposite - being so fidgety or restless that you have been moving around a lot more than usual: 0 - not at all  Thoughts that you would be better off dead, or of hurting yourself in some way: 0 - not at all  PHQ-9 Score: 12   PHQ-9 Interpretation: Moderate depression        NA-7 Flowsheet Screening    Flowsheet Row Most Recent Value   Over the last 2 weeks, how often have you been bothered by any of the following problems? Feeling nervous, anxious, or on edge 2   Not being able to stop or control worrying 2   Worrying too much about different things 3   Trouble relaxing 3   Being so restless that it is hard to sit still 2   Becoming easily annoyed or irritable 1   Feeling afraid as if something awful might happen 0   NA-7 Total Score 13        The following portions of the patient's history were reviewed and updated as appropriate: allergies, current medications, past family history, past medical history, past social history, past surgical history and problem list     Review of Systems   Constitutional: Positive for fatigue  HENT: Negative  Eyes: Negative  Respiratory: Negative  Cardiovascular: Negative  Gastrointestinal: Negative  Endocrine: Negative  Genitourinary: Negative  Musculoskeletal: Negative  Skin: Negative  Allergic/Immunologic: Negative  Neurological: Negative  Hematological: Negative  Psychiatric/Behavioral: Positive for decreased concentration and sleep disturbance  The patient is nervous/anxious  Objective:      BP 96/60   Pulse 59   Temp 97 7 °F (36 5 °C)   Resp 16   SpO2 100%          Physical Exam  Constitutional:       General: She is not in acute distress  Appearance: She is well-developed  She is not diaphoretic  HENT:      Head: Normocephalic and atraumatic  Eyes:      General: No scleral icterus  Right eye: No discharge  Left eye: No discharge  Conjunctiva/sclera: Conjunctivae normal    Pulmonary:      Effort: Pulmonary effort is normal    Musculoskeletal:      Cervical back: Normal range of motion  Skin:     General: Skin is warm  Neurological:      Mental Status: She is alert and oriented to person, place, and time  Psychiatric:         Behavior: Behavior normal          Thought Content:  Thought content normal          Judgment: Judgment normal

## 2022-09-06 ENCOUNTER — TELEPHONE (OUTPATIENT)
Dept: BARIATRICS | Facility: CLINIC | Age: 52
End: 2022-09-06

## 2022-09-06 DIAGNOSIS — R79.0 HIGH ZINC LEVEL: ICD-10-CM

## 2022-09-06 DIAGNOSIS — D64.9 ANEMIA: ICD-10-CM

## 2022-09-06 DIAGNOSIS — K91.2 POSTSURGICAL MALABSORPTION: Primary | ICD-10-CM

## 2022-09-06 DIAGNOSIS — E83.52 HIGH CALCIUM LEVELS: ICD-10-CM

## 2022-09-06 NOTE — TELEPHONE ENCOUNTER
Patient called in today in regards to labs needed before seeing Dr Angel Gee on 9/14  I only seen a CBC and iron panel  Is there more that the patient should have before her appt  Please advise

## 2022-09-27 ENCOUNTER — TELEPHONE (OUTPATIENT)
Dept: PSYCHIATRY | Facility: CLINIC | Age: 52
End: 2022-09-27

## 2022-09-27 NOTE — TELEPHONE ENCOUNTER
Called patient and lvm requesting a return call to the intake dept regarding a routine psychiatry referral on file   Need to establish if patient is still interested and if they would like med mgmt or therapy services as well as if they would like to be added to the waitlist

## 2022-10-03 LAB
25(OH)D3+25(OH)D2 SERPL-MCNC: 70.9 NG/ML (ref 30–100)
ALBUMIN SERPL-MCNC: 4.8 G/DL (ref 3.8–4.8)
ALBUMIN/GLOB SERPL: 2.1 {RATIO} (ref 1.2–2.2)
ALP SERPL-CCNC: 96 IU/L (ref 44–121)
ALT SERPL-CCNC: 23 IU/L (ref 0–32)
AST SERPL-CCNC: 23 IU/L (ref 0–40)
BASOPHILS # BLD AUTO: 0 X10E3/UL (ref 0–0.2)
BASOPHILS NFR BLD AUTO: 1 %
BILIRUB SERPL-MCNC: 0.3 MG/DL (ref 0–1.2)
BUN SERPL-MCNC: 20 MG/DL (ref 6–24)
BUN/CREAT SERPL: 29 (ref 9–23)
CALCIUM SERPL-MCNC: 9.7 MG/DL (ref 8.7–10.2)
CHLORIDE SERPL-SCNC: 104 MMOL/L (ref 96–106)
CHOLEST SERPL-MCNC: 185 MG/DL (ref 100–199)
CHOLEST/HDLC SERPL: 2.5 RATIO (ref 0–4.4)
CO2 SERPL-SCNC: 20 MMOL/L (ref 20–29)
CREAT SERPL-MCNC: 0.68 MG/DL (ref 0.57–1)
EGFR: 107 ML/MIN/1.73
EOSINOPHIL # BLD AUTO: 0.2 X10E3/UL (ref 0–0.4)
EOSINOPHIL NFR BLD AUTO: 2 %
ERYTHROCYTE [DISTWIDTH] IN BLOOD BY AUTOMATED COUNT: 13.7 % (ref 11.7–15.4)
EST. AVERAGE GLUCOSE BLD GHB EST-MCNC: 108 MG/DL
FERRITIN SERPL-MCNC: 10 NG/ML (ref 15–150)
FOLATE SERPL-MCNC: >20 NG/ML
GLOBULIN SER-MCNC: 2.3 G/DL (ref 1.5–4.5)
GLUCOSE SERPL-MCNC: 80 MG/DL (ref 70–99)
HBA1C MFR BLD: 5.4 % (ref 4.8–5.6)
HCT VFR BLD AUTO: 40.7 % (ref 34–46.6)
HDLC SERPL-MCNC: 75 MG/DL
HGB BLD-MCNC: 13.4 G/DL (ref 11.1–15.9)
IMM GRANULOCYTES # BLD: 0 X10E3/UL (ref 0–0.1)
IMM GRANULOCYTES NFR BLD: 0 %
IRON SATN MFR SERPL: 18 % (ref 15–55)
IRON SERPL-MCNC: 69 UG/DL (ref 27–159)
LDLC SERPL CALC-MCNC: 92 MG/DL (ref 0–99)
LYMPHOCYTES # BLD AUTO: 2.4 X10E3/UL (ref 0.7–3.1)
LYMPHOCYTES NFR BLD AUTO: 29 %
MCH RBC QN AUTO: 30.1 PG (ref 26.6–33)
MCHC RBC AUTO-ENTMCNC: 32.9 G/DL (ref 31.5–35.7)
MCV RBC AUTO: 92 FL (ref 79–97)
MONOCYTES # BLD AUTO: 0.5 X10E3/UL (ref 0.1–0.9)
MONOCYTES NFR BLD AUTO: 7 %
NEUTROPHILS # BLD AUTO: 5 X10E3/UL (ref 1.4–7)
NEUTROPHILS NFR BLD AUTO: 61 %
PLATELET # BLD AUTO: 332 X10E3/UL (ref 150–450)
POTASSIUM SERPL-SCNC: 4.4 MMOL/L (ref 3.5–5.2)
PROT SERPL-MCNC: 7.1 G/DL (ref 6–8.5)
PTH-INTACT SERPL-MCNC: 23 PG/ML (ref 15–65)
RBC # BLD AUTO: 4.45 X10E6/UL (ref 3.77–5.28)
SL AMB VLDL CHOLESTEROL CALC: 18 MG/DL (ref 5–40)
SODIUM SERPL-SCNC: 139 MMOL/L (ref 134–144)
TIBC SERPL-MCNC: 393 UG/DL (ref 250–450)
TRIGL SERPL-MCNC: 101 MG/DL (ref 0–149)
UIBC SERPL-MCNC: 324 UG/DL (ref 131–425)
VIT A SERPL-MCNC: 49.3 UG/DL (ref 20.1–62)
VIT B1 BLD-SCNC: 197.6 NMOL/L (ref 66.5–200)
VIT B12 SERPL-MCNC: 1418 PG/ML (ref 232–1245)
WBC # BLD AUTO: 8.1 X10E3/UL (ref 3.4–10.8)
ZINC SERPL-MCNC: 85 UG/DL (ref 44–115)

## 2022-10-04 ENCOUNTER — TELEPHONE (OUTPATIENT)
Dept: BARIATRICS | Facility: CLINIC | Age: 52
End: 2022-10-04

## 2022-10-04 DIAGNOSIS — R79.0 LOW FERRITIN: Primary | ICD-10-CM

## 2022-10-04 DIAGNOSIS — K91.2 POSTSURGICAL MALABSORPTION: ICD-10-CM

## 2022-10-07 ENCOUNTER — TELEPHONE (OUTPATIENT)
Dept: BARIATRICS | Facility: CLINIC | Age: 52
End: 2022-10-07

## 2022-10-07 NOTE — TELEPHONE ENCOUNTER
Pt was scheduled with Dr Sarah Lopez on 9/28/22 and cancelled the appt  She is now requesting someone review her labs and get back to her  Please advise

## 2022-10-07 NOTE — TELEPHONE ENCOUNTER
Upon review of the labs I did see that labs were reviewed and a message was sent to patient on 10/4/22  Called patient and left message on VM advising her of this  Asked patient to look at her [employer] message and call us with any further questions

## 2022-10-11 PROBLEM — E86.0 MILD DEHYDRATION: Status: RESOLVED | Noted: 2022-03-08 | Resolved: 2022-10-11

## 2022-10-12 DIAGNOSIS — F32.A DEPRESSION, UNSPECIFIED DEPRESSION TYPE: ICD-10-CM

## 2022-10-12 DIAGNOSIS — F41.9 ANXIETY: ICD-10-CM

## 2022-10-12 RX ORDER — BUPROPION HYDROCHLORIDE 150 MG/1
TABLET, EXTENDED RELEASE ORAL
Qty: 180 TABLET | Refills: 0 | Status: SHIPPED | OUTPATIENT
Start: 2022-10-12

## 2022-10-24 ENCOUNTER — TELEPHONE (OUTPATIENT)
Dept: BARIATRICS | Facility: CLINIC | Age: 52
End: 2022-10-24

## 2022-10-24 DIAGNOSIS — R79.0 LOW FERRITIN: ICD-10-CM

## 2022-10-24 DIAGNOSIS — K91.2 POSTSURGICAL MALABSORPTION: Primary | ICD-10-CM

## 2022-10-24 NOTE — TELEPHONE ENCOUNTER
Pt called - pt is not having any luck getting through to Hematology to katarzyna appt - pt has called and waited on hold just to be hung up on -  upon review of referral it looks like it was closed, can this be opened and a note be sent to Referral Dept within Hematology to call pt to set up appt  Pt also request to please call her with any lab results and not leave messages in [employer]hart

## 2022-10-26 ENCOUNTER — TELEPHONE (OUTPATIENT)
Dept: HEMATOLOGY ONCOLOGY | Facility: CLINIC | Age: 52
End: 2022-10-26

## 2022-10-26 NOTE — TELEPHONE ENCOUNTER
Scheduling Appointment SEND TO [facility]    Who Is Calling to Schedule  Patient    Doctor Dr Jessy Lucas   Date and Time 2/21/22   Reason for scheduling appointment Was told to make appointment for her infusion since she has not seen Flora Allison  Patient is very upset as she was waiting on hold for a long time she stated  Put on waiting list for earlier appointment   Patient verbalized understanding    Yes

## 2022-10-26 NOTE — TELEPHONE ENCOUNTER
Made a attempt to schedule a follow up appointment , patient has a referral for hematology oncology and is a current patient of Dr Wiliam Malcolm last office visit 9/15/2020  There was no answer left a voicemail with the hopeline number

## 2022-10-26 NOTE — TELEPHONE ENCOUNTER
Appointment Cancellation Or Reschedule     Person calling in Patient    If other than patient calling, are they listed on the communication consent form? Provider Dr Dave Mckeon   Office Visit Date and Time 02/21 at 1:40pm with [address] [place] [address]    Office Visit Location [facility]   Did patient want to reschedule their office appointment? If so, when was it scheduled to? 10/27 at 8:00am   Did you have STAR scheduled for this appointment? no   Do you need STAR set up for your new appointment? If yes, please send to "PATIENT RIDESHARE" pool for STAR rescheduling no   If you are cancelling appointment, can we notify STAR to cancel ride? If yes, please send to "PATIENT RIDESHARE" pool for STAR to cancel service no   Is this patient calling to reschedule an infusion appointment? no   When is their next infusion appointment? n/a   Is this patient a Chemo patient? no   Reason for Cancellation or Reschedule Patient is requesting a sooner appointment      If the patient is a treatment patient, please route this to the office nurse  If the patient is not on treatment, please route to the office MA  If the patient is a surgical oncology patient, please route to surg/onc clinical pool

## 2022-10-26 NOTE — TELEPHONE ENCOUNTER
LM on patients VM asking her to call me here at the office to see if she has anything set up with hematology or if she needs my assistance scheduling  Awaiting return call

## 2022-10-26 NOTE — TELEPHONE ENCOUNTER
[facility] heme referral re-submitted  Please call Hematology for her and see if you can help her schedule? Patient's ferritin is a 10 and she has postsurgical malabsorption and constipation and cannot tolerate high levels of PO iron  Thank you!

## 2022-10-27 ENCOUNTER — OFFICE VISIT (OUTPATIENT)
Dept: HEMATOLOGY ONCOLOGY | Facility: CLINIC | Age: 52
End: 2022-10-27
Payer: COMMERCIAL

## 2022-10-27 ENCOUNTER — OFFICE VISIT (OUTPATIENT)
Dept: SURGICAL ONCOLOGY | Facility: CLINIC | Age: 52
End: 2022-10-27
Payer: COMMERCIAL

## 2022-10-27 VITALS
DIASTOLIC BLOOD PRESSURE: 72 MMHG | SYSTOLIC BLOOD PRESSURE: 116 MMHG | TEMPERATURE: 97.9 F | OXYGEN SATURATION: 99 % | HEART RATE: 79 BPM | RESPIRATION RATE: 16 BRPM | HEIGHT: 60 IN | BODY MASS INDEX: 31.99 KG/M2

## 2022-10-27 DIAGNOSIS — R79.0 LOW FERRITIN: ICD-10-CM

## 2022-10-27 DIAGNOSIS — K91.2 POSTSURGICAL MALABSORPTION: ICD-10-CM

## 2022-10-27 DIAGNOSIS — Z08 ENCOUNTER FOR FOLLOW-UP SURVEILLANCE OF BREAST CANCER: ICD-10-CM

## 2022-10-27 DIAGNOSIS — Z85.3 ENCOUNTER FOR FOLLOW-UP SURVEILLANCE OF BREAST CANCER: ICD-10-CM

## 2022-10-27 DIAGNOSIS — Z85.3 HISTORY OF LEFT BREAST CANCER: Primary | ICD-10-CM

## 2022-10-27 DIAGNOSIS — Z98.84 S/P GASTRIC BYPASS: ICD-10-CM

## 2022-10-27 PROBLEM — Z79.810 USE OF TAMOXIFEN (NOLVADEX): Status: RESOLVED | Noted: 2018-04-24 | Resolved: 2022-10-27

## 2022-10-27 PROCEDURE — 99213 OFFICE O/P EST LOW 20 MIN: CPT | Performed by: SURGERY

## 2022-10-27 PROCEDURE — 99215 OFFICE O/P EST HI 40 MIN: CPT | Performed by: INTERNAL MEDICINE

## 2022-10-27 NOTE — PROGRESS NOTES
Hailee Espinoza  1970  [address] [facility] [place]  [address]   [place] [id number]    DISCUSSION/SUMMARY:    81-year-old female previously diagnosed with early stage invasive breast cancer status post neoadjuvant chemotherapy, surgery and then hormonal manipulation  Patient will continue her follow-ups with Surgical Oncology  Mrs Espinoza will also follow up with Medical Oncology as directed  Because of recent blood work, patient was sent to Hematology for evaluation of IV iron  Mrs Espinoza feels okay, fatigue is the same as before  Clinically there are no concerning hematology findings, color is good  CBC parameters and iron studies are listed below  The entire CBC/different, TIBC, iron and iron saturation are within normal limits  Ferritin level is lower than before  Patient states that she is compliant with her bariatric vitamins and minerals  We talked about options  Patient is not interested in IV iron at this time - I agree  Patient does not have anemia, she has hypoferritinemia  Patient does not seem to be symptomatic specifically from the decreased ferritin level  We discussed what to monitor for as far as progressive fatigue, pallor, decreased activities, worsening respiratory issues, chewing ice, restless legs etc  Patient can add either gummy bear vitamin with iron or a Children's Flintstones chewable with iron to try and increase her iron intake  This office will also get a copy of a list of foods that are high in iron (besides red meat) to the patient  Return to Hematology (me) is now on a prn basis; Mrs Becca Randall knows to call the office if she has any other hematology questions or concerns  Bariatric surgery can recheck CBC and iron studies in a few months  Patient knows to call the hematology/oncology office if there are any other questions or concerns      Carefully review your medication list and verify that the list is accurate and up-to-date  Please call the hematology/oncology office if there are medications missing from the list, medications on the list that you are not currently taking or if there is a dosage or instruction that is different from how you're taking that medication  Patient goals and areas of care: Follow-up with bariatric surgery, follow-up with medical oncology and surgical oncology  Barriers to care:  None  Patient is able to self-care   _____________________________________________________________________________________    Chief Complaint   Patient presents with   • Follow-up     Follow-up   • Low ferritin level       Oncology History   History of left breast cancer    - 6/2016 Chemotherapy    Theron Adjuvant chemo at [facility] for four cycles  Patient declined further chemo and came to [place] [id number] for second opinion     4/25/2016 Initial Diagnosis    Left lumpectomy  [facility]  Invasive ductal carcinoma  Grade 1  1 86 on utrasound    SD 0  Her 2 2+  Fish negative  Stage IIA per [facility]     6/21/2016 Genetic Testing    BRCA negative  Myriad-Done through [facility]     8/10/2016 Surgery    Left mastectomy with sentinel lymph node biopsy  Invasive ductal carcinoma  Grade 2  1 6 cm  0/1 lymph node  Stage 1A    Co surgery with Dr Marizol Lora  Failed STEVE flap     10/2016 -  Hormone Therapy    Tamoxifen 20 mg daily  Dr Elizabeth Sofia     12/22/2017 Surgery    Revision of left breast scar/local flap  Dr Luis Felipe Mary       History of Present Illness:  31-year-old female previously diagnosed with invasive ductal carcinoma at [facility]   Patient was found to have a T2 lesion, ER positive SD negative HER2 Sage negative Ki-67 50-60% originally seen by Dr Tory Sharma  Patient received dose dense AC with a minor response theron adjuvantly  Patient underwent left mastectomy with sentinel biopsy in August 2016  Workup demonstrated patient be positive for BRCA    Patient received adjuvant hormonal manipulation  Patient is followed by Dr Charlie Langley and Dr Shon Toussaint  Patient underwent gastric bypass approximately 1 year ago  Recent blood work demonstrated a decreased ferritin level  Patient was instructed to return to Hematology Oncology - needed an earlier appointment  Mrs Espinoza states feeling okay, about the same as before  Patient has fatigue but this is also the same as before  Patient works the 2nd shift so sleep pattern is off  No chewing ice, no restless legs  No shortness of breath or dyspnea on exertion  No headaches, blurred vision or dizziness  Patient did have postoperative bypass surgery complications, bowel perforation  Patient is compliant with her bariatric vitamins and minerals  Review of Systems   Constitutional: Negative  HENT: Negative  Eyes: Negative  Respiratory: Negative  Cardiovascular: Negative  Gastrointestinal: Negative  Endocrine: Negative  Genitourinary: Negative  Musculoskeletal: Negative  Skin: Negative  Allergic/Immunologic: Negative  Neurological: Negative  Hematological: Negative  Psychiatric/Behavioral: Negative  All other systems reviewed and are negative      Patient Active Problem List   Diagnosis   • History of left breast cancer   • Depression, recurrent (HCC)   • Anxiety   • Complication of bariatric procedure   • GERD (gastroesophageal reflux disease)   • Low back pain   • Encounter for surgical aftercare following surgery of digestive system   • Postsurgical malabsorption   • Fecal peritonitis ([address] )   • Pneumatosis intestinalis of large intestine   • S/P gastric bypass   • Rectal bleeding   • Vomiting   • S/P colostomy ([address] )   • Marginal ulcer   • Abdominal pain     Past Medical History:   Diagnosis Date   • Anxiety    • Back pain    • BRCA negative 06/2016    Myriad   • Cancer [facility])    • Continuous opioid dependence ([address] ) 12/13/2021   • Gastric ulcer    • Headache    • History of chemotherapy 2016    [facility]oadjuvant[facility] at [facility]   • History of transfusion 06/2017    no adverse reaction   • Limb alert care status     left   • Malignant neoplasm of upper-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 [facility])     s/p chemotherapy   • Obesity (BMI 30-39  9)    • Syncope 2/6/2022     Past Surgical History:   Procedure Laterality Date   • ABDOMINAL ADHESION SURGERY N/A 10/4/2021    Procedure: Lysis Adhesions;  Surgeon: Shaila Roberts MD;  Location: [address] [place] [address];  Service: Bariatrics   • ABDOMINAL WALL SURGERY Left 9/21/2016    Procedure: DEBRIDEMENT OF LEFT ABDOMINAL TISSUE AND CLOSURE; DEBRIDEMENT OF LEFT BREAST FLAP; SUBMUSCULAR TISSUE EXPANDER PLACEMENT WITH ADM (ACELLULAR DERMAL MATRIX); Surgeon: Jonathon Elizabeth MD;  [facility];  Service:    • BREAST BIOPSY Left 04/2016    with sentinel node biospy   • BREAST BIOPSY Right 10/18/2021    benign   • BREAST RECONSTRUCTION Left 12/22/2017    Procedure: REVISION OF LEFT BREAST SCAR, LOCAL FLAP;  Surgeon: Rick Vazquez MD;  [facility];  Service: Plastics   • COLONOSCOPY     • DILATION AND CURETTAGE OF UTERUS     • [address] N/A 2/6/2022    Procedure: José Antonio Ray PROCEDURE WITH SIGMOID COLOSTOMY;  Surgeon: Taylor Palumbo MD;  Location: [address] [place] [address];  Service: General   • LAPAROSCOPIC CHOLECYSTECTOMY  2000   • [address] GASTRIC BANDING  2008    removed 2013   • [address]  2013    removal   • LAPAROTOMY N/A 2/6/2022    Procedure: LAPAROTOMY EXPLORATORY;  Surgeon: Taylor Palumbo MD;  Location: [address] [place] [address];  Service: General   • LIPOSUCTION W/ FAT INJECTION Left 6/8/2017    Procedure: BREAST FAT GRAFTING ;  Surgeon: Jonathon Elizabeth MD;  [facility];  Service:    • MASTECTOMY Left 2016   • MEDIPORT INSERTION, SINGLE  2016   • ME BIOPSY/EXCISION, LYMPH NODE(S) Left 8/10/2016    Procedure: LYMPHOSCINTIGRAPHY; SENTINAL LYMPH NODE BIOPSY (INJECT AT 1100);   Surgeon: Filipe Steve MD;  [facility];  Service: Surgical Oncology   • IA BREAST RECONSTRUCTION W/FREE FLAP Left 9/19/2016    Procedure: BREAST DELAYED RECONSTRUCTION; DEIP FREE FLAP removal of port-a -cath;  Surgeon: Yocasta Desai MD;  [facility];  Service: Plastics   • IA BREAST REDUCTION Right 12/19/2016    Procedure: BREAST REDUCTION/MASTOPEXY ;  Surgeon: Yocasta Desai MD;  [facility];  Service: Plastics   • IA HYSTEROSCOPY,W/ENDO BX N/A 5/21/2018    Procedure: DILATATION AND CURETTAGE (D&C), HYSTEROSCOPY;  Surgeon: Vernia Lombard, MD;  [facility];  Service: Gynecology Oncology   • IA INSJ/RPLCMT BREAST IMPLANT SEP DAY MASTECTOMY Left 12/19/2016    Procedure: BREAST TISSUE EXPANDER REMOVAL; BREAST IMPLANT PLACEMENT;  Surgeon: Yocasta Desai MD;  [facility];  Service: Plastics   • IA LAP GASTRIC BYPASS/RAMIN-EN-Y N/A 10/4/2021    Procedure: LAPAROSCOPIC RAMIN-EN-Y GASTRIC BYPASS AND INTRAOPERATIVE EGD;  Surgeon: Ct Salazar MD;  Location: [address] [place] [address];  Service: Bariatrics   • IA LAP, SURG CLOSE ENTEROSTOMY RESECT ANAST N/A 5/3/2022    Procedure: CLOSURE COLOSTOMY, LAPAROSCOPIC closure Hartmens sigmoid resection;  Surgeon: Jovana Abdul MD;  [facility];  Service: Colorectal   • IA MASTECTOMY, SIMPLE, COMPLETE Left 8/10/2016    Procedure: BREAST MASTECTOMY; FROZEN SECTION ;  Surgeon: Rad Ugarte MD;  [facility];  Service: Surgical Oncology   • IA REMOVAL TISSUE EXPANDER W/O INSERTION IMPLANT Left 1/23/2017    Procedure: BREAST IMPLANT REMOVAL; WASHOUT AND DEBRIDEMENT;  Surgeon: Yocasta Desai MD;  [facility];  Service: Plastics   • REDUCTION MAMMAPLASTY  2016   • REDUCTION MAMMAPLASTY  2018   • REVISION OF SCAR ON TORSO N/A 12/19/2016    Procedure: ABDOMINAL SCAR REVISION ;  Surgeon: Yocasta Desai MD;  [facility];  Service:    • US GUIDANCE  4/22/2016   • US GUIDED BREAST BIOPSY RIGHT COMPLETE Right 10/18/2021   • WISDOM TOOTH EXTRACTION       Family History   Problem Relation Age of Onset   • Diabetes Mother    • Heart disease Mother    • Kidney disease Mother    • Obesity Mother    • Other Father    • Cancer Father         melanoma   • Obesity Father    • Diabetes Maternal Aunt    • No Known Problems Paternal Aunt    • Breast cancer Cousin    • Diverticulitis Maternal Grandmother      Social History     Socioeconomic History   • Marital status:      Spouse name: Not on file   • Number of children: Not on file   • Years of education: Not on file   • Highest education level: Not on file   Occupational History   • Not on file   Tobacco Use   • Smoking status: Former Smoker     Packs/day:  00     Years: 15 00     Pack years: 15 00     Quit date:      Years since quittin 8   • Smokeless tobacco: Never Used   Vaping Use   • Vaping Use: Never used   Substance and Sexual Activity   • Alcohol use: Not Currently     Comment: rarely   • Drug use: No   • Sexual activity: Not on file   Other Topics Concern   • Not on file   Social History Narrative   • Not on file     Social Determinants of Health     Financial Resource Strain: Not on file   Food Insecurity: No Food Insecurity   • Worried About Running Out of Food in the Last Year: Never true   • Ran Out of Food in the Last Year: Never true   Transportation Needs: No Transportation Needs   • Lack of Transportation (Medical): No   • Lack of Transportation (Non-Medical):  No   Physical Activity: Not on file   Stress: Not on file   Social Connections: Not on file   Intimate Partner Violence: Not on file   Housing Stability: Low Risk    • Unable to Pay for Housing in the Last Year: No   • Number of Places Lived in the Last Year: 1   • Unstable Housing in the Last Year: No       Current Outpatient Medications:   •  acetaminophen (TYLENOL) 325 mg tablet, Take 3 tablets (975 mg total) by mouth every 8 (eight) hours, Disp: , Rfl: 0  •  buPROPion (WELLBUTRIN SR) 150 mg 12 hr tablet, TAKE 1 TABLET BY MOUTH TWICE A DAY, Disp: 180 tablet, Rfl: 0  •  Calcium Carbonate-Vit D-Min (CALCIUM 1200 PO), Take by mouth 2  times day geovanni , Disp: , Rfl:   •  Multiple Vitamin (multivitamin) capsule, Take 1 capsule by mouth in the morning  geovanni  , Disp: , Rfl:   •  naloxone (NARCAN) 4 mg/0 1 mL nasal spray, Administer 1 spray into a nostril  If no response after 2-3 minutes, give another dose in the other nostril using a new spray , Disp: 1 each, Rfl: 1  •  LORazepam (ATIVAN) 0 5 mg tablet, Take 1 tablet (0 5 mg total) by mouth as needed in the morning and 1 tablet (0 5 mg total) as needed in the evening for anxiety  , Disp: 60 tablet, Rfl: 0  •  traZODone (DESYREL) 100 mg tablet, TAKE 1 TABLET BY MOUTH DAILY AT BEDTIME, Disp: 90 tablet, Rfl: 1    Allergies   Allergen Reactions   • Ambien [Zolpidem] Other (See Comments)     Amnesia-does not want to take   • Effexor [Venlafaxine] GI Intolerance   • Bactrim [Sulfamethoxazole-Trimethoprim] Itching       Vitals:    10/27/22 0806   BP: 116/72   Pulse: 79   Resp: 16   Temp: 97 9 °F (36 6 °C)   SpO2: 99%     Physical Exam  Constitutional:       Appearance: She is well-developed  Comments: Well-nourished female, no respiratory distress, no signs of pain   HENT:      Head: Normocephalic and atraumatic  Right Ear: External ear normal       Left Ear: External ear normal    Eyes:      Conjunctiva/sclera: Conjunctivae normal       Pupils: Pupils are equal, round, and reactive to light  Cardiovascular:      Rate and Rhythm: Normal rate and regular rhythm  Heart sounds: Normal heart sounds  Pulmonary:      Effort: Pulmonary effort is normal       Breath sounds: Normal breath sounds  Abdominal:      General: Bowel sounds are normal       Palpations: Abdomen is soft  Musculoskeletal:         General: Normal range of motion  Cervical back: Normal range of motion and neck supple  Skin:     General: Skin is warm        Comments: Warm, moist, good color, no petechiae or ecchymoses   Neurological:      Mental Status: She is alert and oriented to person, place, and time  Deep Tendon Reflexes: Reflexes are normal and symmetric  Psychiatric:         Behavior: Behavior normal          Thought Content:  Thought content normal          Judgment: Judgment normal      Extremities:  No lower extremity edema bilaterally, no cords, pulses are 1+    Labs

## 2022-10-27 NOTE — PROGRESS NOTES
Surgical Oncology Follow Up       [address]  [facility]  [address] [facility] [place] [address]    [facility] [place]  1970  [id number]  [address]  [facility]  [address] [address]    Chief Complaint   Patient presents with   • Breast Cancer          Assessment & Plan:   Eleonora Reeves presents today for 1 year follow-up visit  She is doing well  She she is rescheduled her mammogram so that it could be coordinated also with an ultrasound  She feels occasional small lumps in her breast which most likely are related to her reconstruction  Clinically they are not concerning  We will follow with her imaging  Cancer History:     Oncology History   History of left breast cancer    - 6/2016 Chemotherapy    Theron Adjuvant chemo at [facility]   Adriamycin and Cytoxan for four cycles  Patient declined further chemo and came to [place] [address] for second opinion     4/25/2016 Initial Diagnosis    Left lumpectomy  [facility]  Invasive ductal carcinoma  Grade 1  1 86 on utrasound    WI 0  Her 2 2+  Fish negative  Stage IIA per [facility]     6/21/2016 Genetic Testing    BRCA negative  Myriad-Done through [facility]     8/10/2016 Surgery    Left mastectomy with sentinel lymph node biopsy  Invasive ductal carcinoma  Grade 2  1 6 cm  0/1 lymph node  Stage 1A    Co surgery with Dr Renetta Hollingsworth  Failed STEVE flap     10/2016 -  Hormone Therapy    Tamoxifen 20 mg daily  Dr Thapa Ubaldo     12/22/2017 Surgery    Revision of left breast scar/local flap  Dr Bang Manning           Interval History:   See above, the patient overall is doing well  She has no complaints referable to her breast other than some small lumps which she does not think there are any significant changes  She did develop an ulcer as well as a colonic perforation had a colostomy this is now resolved      Review of Systems:   Review of Systems    Past Medical History     Patient Active Problem List   Diagnosis   • History of left breast cancer   • Depression, recurrent ([facility] [address] )   • Anxiety   • Complication of bariatric procedure   • GERD (gastroesophageal reflux disease)   • Low back pain   • Encounter for surgical aftercare following surgery of digestive system   • Postsurgical malabsorption   • Fecal peritonitis ([facility] [address] )   • Pneumatosis intestinalis of large intestine   • S/P gastric bypass   • Rectal bleeding   • Vomiting   • S/P colostomy ([facility] [address] )   • Marginal ulcer   • Abdominal pain     Past Medical History:   Diagnosis Date   • Anxiety    • Back pain    • BRCA negative 06/2016    Myriad   • Cancer [facility])    • Continuous opioid dependence ([facility] [address] ) 12/13/2021   • Gastric ulcer    • Headache    • History of chemotherapy 2016    Neoadjuvant; at [facility]   • History of transfusion 06/2017    no adverse reaction   • Limb alert care status     left   • Malignant neoplasm of upper-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 [facility])     s/p chemotherapy   • Obesity (BMI 30-39  9)    • Syncope 2/6/2022     Past Surgical History:   Procedure Laterality Date   • ABDOMINAL ADHESION SURGERY N/A 10/4/2021    Procedure: Lysis Adhesions;  Surgeon: Ifrah Dunn MD;  Location: [address] [place] [address];  Service: Bariatrics   • ABDOMINAL WALL SURGERY Left 9/21/2016    Procedure: DEBRIDEMENT OF LEFT ABDOMINAL TISSUE AND CLOSURE; DEBRIDEMENT OF LEFT BREAST FLAP; SUBMUSCULAR TISSUE EXPANDER PLACEMENT WITH ADM (ACELLULAR DERMAL MATRIX);   Surgeon: Abhay Castaneda MD;  Location:  MAIN OR;  Service:    • BREAST BIOPSY Left 04/2016    with sentinel node biospy   • BREAST BIOPSY Right 10/18/2021    benign   • BREAST RECONSTRUCTION Left 12/22/2017    Procedure: REVISION OF LEFT BREAST SCAR, LOCAL FLAP;  Surgeon: Obed Mayer MD;  [facility]: [facility];  Service: Plastics   • COLONOSCOPY     • DILATION AND CURETTAGE OF UTERUS     • HARTMANS PROCEDURE N/A 2/6/2022    Procedure: HARTMANS PROCEDURE WITH SIGMOID COLOSTOMY;  Surgeon: Dax Herrera MD;  Location: [address] [place] [address];  Service: General   • LAPAROSCOPIC CHOLECYSTECTOMY  2000   • LAPAROSCOPIC GASTRIC BANDING  2008    removed 2013   • [address]  2013    removal   • LAPAROTOMY N/A 2/6/2022    Procedure: LAPAROTOMY EXPLORATORY;  Surgeon: Dax Herrera MD;  Location: [address] [place] [address];  Service: General   • LIPOSUCTION W/ FAT INJECTION Left 6/8/2017    Procedure: BREAST FAT GRAFTING ;  Surgeon: Madeleine Rucker MD;  Location: [facility] Main OR;  Service:    • MASTECTOMY Left 2016   • MEDIPORT INSERTION, SINGLE  2016   • MO BIOPSY/EXCISION, LYMPH NODE(S) Left 8/10/2016    Procedure: LYMPHOSCINTIGRAPHY; SENTINAL LYMPH NODE BIOPSY (INJECT AT 1100);   Surgeon: Eric Elmore MD;  Location: [facility] Main OR;  Service: Surgical Oncology   • MO BREAST RECONSTRUCTION W/FREE FLAP Left 9/19/2016    Procedure: BREAST DELAYED RECONSTRUCTION; DEIP FREE FLAP removal of port-a -cath;  Surgeon: Madeleine Rucker MD;  Location: [facility] MA[facility] OR;  Service: Plastics   • MO BREAST REDUCTION Right 12/19/2016    Procedure: BREAST REDUCTION/MASTOPEXY ;  Surgeon: Madeleine Rucker MD;  Location: [facility] MA[facility] OR;  Service: Plastics   • MO HYSTEROSCOPY,W/ENDO BX N/A 5/21/2018    Procedure: DILATATION AND CURETTAGE (D&C), HYSTEROSCOPY;  Surgeon: Ezequiel Pitt MD;  Location: [facility] OR;  Service: Gynecology Oncology   • MO INSJ/RPLCMT BREAST IMPLANT SEP DAY MASTECTOMY Left 12/19/2016    Procedure: BREAST TISSUE EXPANDER REMOVAL; BREAST IMPLANT PLACEMENT;  Surgeon: Madeleine Rucker MD;  Location: [facility] OR;  Service: Plastics   • MO LAP GASTRIC BYPASS/RAMIN-EN-Y N/A 10/4/2021    Procedure: LAPAROSCOPIC RAMIN-EN-Y GASTRIC BYPASS AND INTRAOPERATIVE EGD;  Surgeon: Familia Bright MD;  Location: [address] [place] [address];  Service: Bariatrics   • MO LAP, SURG CLOSE ENTEROSTOMY RESECT ANAST N/A 5/3/2022    Procedure: CLOSURE COLOSTOMY, LAPAROSCOPIC closure Hartmens sigmoid resection;  Surgeon: Janet Christianson MD;  Location: [facility];  Service: Colorectal   • GA MASTECTOMY, SIMPLE, COMPLETE Left 8/10/2016    Procedure: BREAST MASTECTOMY; FROZEN SECTION ;  Surgeon: Crystal Romano MD;  Location: [facility] Main OR;  Service: Surgical Oncology   • GA REMOVAL TISSUE EXPANDER W/O INSERTION IMPLANT Left 2017    Procedure: BREAST IMPLANT REMOVAL; WASHOUT AND DEBRIDEMENT;  Surgeon: Balta Duval MD;  Location: [facility] OR;  Service: Plastics   • REDUCTION MAMMAPLASTY     • REDUCTION MAMMAPLASTY     • REVISION OF SCAR ON TORSO N/A 2016    Procedure: ABDOMINAL SCAR REVISION ;  Surgeon: Balta Duval MD;  Location: [facility] OR;  Service:    • US GUIDANCE  2016   • US GUIDED BREAST BIOPSY RIGHT COMPLETE Right 10/18/2021   • WISDOM TOOTH EXTRACTION       Family History   Problem Relation Age of Onset   • Diabetes Mother    • Heart disease Mother    • Kidney disease Mother    • Obesity Mother    • Other Father    • Cancer Father         melanoma   • Obesity Father    • Diabetes Maternal Aunt    • No Known Problems Paternal Aunt    • Breast cancer Cousin    • Diverticulitis Maternal Grandmother      Social History     Socioeconomic History   • Marital status:      Spouse name: Not on file   • Number of children: Not on file   • Years of education: Not on file   • Highest education level: Not on file   Occupational History   • Not on file   Tobacco Use   • Smoking status: Former Smoker     Packs/day: 1 00     Years: 15 00     Pack years: 15 00     Quit date:      Years since quittin 8   • Smokeless tobacco: Never Used   Vaping Use   • Vaping Use: Never used   Substance and Sexual Activity   • Alcohol use: Not Currently     Comment: rarely   • Drug use: No   • Sexual activity: Not on file   Other Topics Concern   • Not on file   Social History Narrative   • Not on file     Social Determinants of Health     Financial Resource Strain: Not on file   Food Insecurity: No Food Insecurity   • Worried About Running Out of Food in the Last Year: Never true • Ran Out of Food in the Last Year: Never true   Transportation Needs: No Transportation Needs   • Lack of Transportation (Medical): No   • Lack of Transportation (Non-Medical): No   Physical Activity: Not on file   Stress: Not on file   Social Connections: Not on file   Intimate Partner Violence: Not on file   Housing Stability: Low Risk    • Unable to Pay for Housing in the Last Year: No   • Number of Places Lived in the Last Year: 1   • Unstable Housing in the Last Year: No       Current Outpatient Medications:   •  acetaminophen (TYLENOL) 325 mg tablet, Take 3 tablets (975 mg total) by mouth every 8 (eight) hours, Disp: , Rfl: 0  •  buPROPion (WELLBUTRIN SR) 150 mg 12 hr tablet, TAKE 1 TABLET BY MOUTH TWICE A DAY, Disp: 180 tablet, Rfl: 0  •  Calcium Carbonate-Vit D-Min (CALCIUM 1200 PO), Take by mouth 2  times day geovanni , Disp: , Rfl:   •  Multiple Vitamin (multivitamin) capsule, Take 1 capsule by mouth in the morning  geovanni  , Disp: , Rfl:   •  naloxone (NARCAN) 4 mg/0 1 mL nasal spray, Administer 1 spray into a nostril  If no response after 2-3 minutes, give another dose in the other nostril using a new spray , Disp: 1 each, Rfl: 1  •  traZODone (DESYREL) 100 mg tablet, TAKE 1 TABLET BY MOUTH DAILY AT BEDTIME, Disp: 90 tablet, Rfl: 1  •  LORazepam (ATIVAN) 0 5 mg tablet, Take 1 tablet (0 5 mg total) by mouth as needed in the morning and 1 tablet (0 5 mg total) as needed in the evening for anxiety  , Disp: 60 tablet, Rfl: 0  Allergies   Allergen Reactions   • Ambien [Zolpidem] Other (See Comments)     Amnesia-does not want to take   • Effexor [Venlafaxine] GI Intolerance   • Bactrim [Sulfamethoxazole-Trimethoprim] Itching       Physical Exam:   There were no vitals filed for this visit  Physical Exam  Vitals reviewed  Constitutional:       Appearance: She is well-developed  HENT:      Head: Normocephalic and atraumatic  Eyes:      Pupils: Pupils are equal, round, and reactive to light     Neck: Thyroid: No thyromegaly  Vascular: No JVD  Trachea: No tracheal deviation  Cardiovascular:      Rate and Rhythm: Normal rate and regular rhythm  Heart sounds: Normal heart sounds  No murmur heard  No friction rub  No gallop  Pulmonary:      Effort: Pulmonary effort is normal  No respiratory distress  Breath sounds: Normal breath sounds  No wheezing or rales  Chest:      Comments: Examination of the right breast demonstrates minor nodularities consistent with breast reduction surgery  We will follow up with her imaging there is no axillary adenopathy there are no worrisome skin findings    Examination of left mastectomy reconstructed breast demonstrate good cosmetic outcome with no worrisome skin findings dominant masses or axillary adenopathy  Abdominal:      General: There is no distension  Palpations: Abdomen is soft  There is no hepatomegaly or mass  Tenderness: There is no abdominal tenderness  There is no guarding or rebound  Musculoskeletal:         General: No tenderness  Normal range of motion  Cervical back: Normal range of motion and neck supple  Lymphadenopathy:      Cervical: No cervical adenopathy  Skin:     General: Skin is warm and dry  Findings: No erythema or rash  Neurological:      Mental Status: She is alert and oriented to person, place, and time  Cranial Nerves: No cranial nerve deficit  Psychiatric:         Behavior: Behavior normal            Results & Discussion:   The patient is free of any clinical evidence of local regional or distant recurrence disease  We will see her back in 1 year's time  She will get her mammogram and ultrasound in the near future  All questions were answered the patient's satisfaction  Advance Care Planning/Advance Directives:  I discussed the disease status, treatment plans and follow-up with the patient

## 2022-10-29 DIAGNOSIS — F41.9 ANXIETY: ICD-10-CM

## 2022-10-29 RX ORDER — LORAZEPAM 0.5 MG/1
0.5 TABLET ORAL 2 TIMES DAILY PRN
Qty: 60 TABLET | Refills: 0 | Status: SHIPPED | OUTPATIENT
Start: 2022-10-29 | End: 2022-11-28

## 2022-11-02 ENCOUNTER — HOSPITAL ENCOUNTER (OUTPATIENT)
Dept: RADIOLOGY | Facility: HOSPITAL | Age: 52
Discharge: HOME | End: 2022-11-02

## 2022-11-02 VITALS — WEIGHT: 163 LBS | BODY MASS INDEX: 32 KG/M2 | HEIGHT: 60 IN

## 2022-11-02 DIAGNOSIS — R92.8 ABNORMAL MAMMOGRAM: ICD-10-CM

## 2022-11-02 DIAGNOSIS — R92.8 ABNORMAL ULTRASOUND OF BREAST: ICD-10-CM

## 2022-11-04 ENCOUNTER — TELEMEDICINE (OUTPATIENT)
Dept: BARIATRICS | Facility: CLINIC | Age: 52
End: 2022-11-04

## 2022-11-04 DIAGNOSIS — Z98.84 STATUS POST GASTRIC BYPASS FOR OBESITY: Primary | ICD-10-CM

## 2022-11-04 NOTE — PROGRESS NOTES
Virtual Regular Visit    Verification of patient location:    Patient is located in the following state in which I hold an active license [place]      Assessment/Plan:    - schedule for surgical RD & MWM  - f/u in 1 year with surgical [place]      Problem List Items Addressed This Visit    None     Visit Diagnoses     Status post gastric bypass for obesity    -  Primary               Reason for visit is   Chief Complaint   Patient presents with   • Annual Exam     Pt is virtual for Annual Post Op  • Virtual Regular Visit        Encounter provider Alcon Briggs MD    Provider located at [address] [place] [address]  [address] [facility]  [address] [facility]  [address] [phone]      Recent Visits  No visits were found meeting these conditions  Showing recent visits within past 7 days and meeting all other requirements  Today's Visits  Date Type Provider Dept   11/04/22 Telemedicine Alcon Briggs MD [facility]   Showing today's visits and meeting all other requirements  Future Appointments  No visits were found meeting these conditions  Showing future appointments within next 150 days and meeting all other requirements       The patient was identified by name and date of birth  Rc Robbins was informed that this is a telemedicine visit and that the visit is being conducted through the [employer] [employer]  She agrees to proceed     My office door was closed  No one else was in the room  She acknowledged consent and understanding of privacy and security of the video platform  The patient has agreed to participate and understands they can discontinue the visit at any time  Patient is aware this is a billable service  Subjective  Rc Robbins is a 54 y o  female s/p LRYGB 10/4/2021   She had a very difficult postoperative year in which she developed a stercoral ulcer perforation from dehydration and subsequent Bushra's procedure and eventual reversal of her ostomy  Her weight loss has stalled due to the above factors, but she is ready to resume her efforts  HPI     Past Medical History:   Diagnosis Date   • Anxiety    • Back pain    • BRCA negative 06/2016    Myriad   • Cancer [facility])    • Continuous opioid dependence ([address] ) 12/13/2021   • Gastric ulcer    • Headache    • History of chemotherapy 2016    Neoadjuvant; at [facility]   • History of transfusion 06/2017    no adverse reaction   • Limb alert care status     left   • Malignant neoplasm of upper-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 [facility])     s/p chemotherapy   • Obesity (BMI 30-39  9)    • Syncope 2/6/2022       Past Surgical History:   Procedure Laterality Date   • ABDOMINAL ADHESION SURGERY N/A 10/4/2021    Procedure: Lysis Adhesions;  Surgeon: Vanessa Keith MD;  Location: [address] [place] [address];  Service: Bariatrics   • ABDOMINAL WALL SURGERY Left 9/21/2016    Procedure: DEBRIDEMENT OF LEFT ABDOMINAL TISSUE AND CLOSURE; DEBRIDEMENT OF LEFT BREAST FLAP; SUBMUSCULAR TISSUE EXPANDER PLACEMENT WITH ADM (ACELLULAR DERMAL MATRIX);   Surgeon: Kelsie Barriga MD;  Location:  MAIN OR;  Service:    • BREAST BIOPSY Left 04/2016    with sentinel node biospy   • BREAST BIOPSY Right 10/18/2021    benign   • BREAST RECONSTRUCTION Left 12/22/2017    Procedure: REVISION OF LEFT BREAST SCAR, LOCAL FLAP;  Surgeon: Madai Houston MD;  [facility];  Service: Plastics   • COLONOSCOPY     • DILATION AND CURETTAGE OF UTERUS     • [address] N/A 2/6/2022    Procedure: Lovetta Ache PROCEDURE WITH SIGMOID COLOSTOMY;  Surgeon: Dipika Cross MD;  Location: [address] [place] [address];  Service: General   • LAPAROSCOPIC CHOLECYSTECTOMY  2000   • LAPAROSCOPIC GASTRIC BANDING  2008    removed 2013   • [address] GASTRIC BANDING  2013    removal   • LAPAROTOMY N/A 2/6/2022    Procedure: LAPAROTOMY EXPLORATORY;  Surgeon: Dipika Cross MD;  [facility]: [facility];  Service: General   • LIPOSUCTION W/ FAT INJECTION Left 6/8/2017    Procedure: BREAST FAT GRAFTING ;  Surgeon: Lizz Alicea MD;  Location: [facility];  Service:    • MASTECTOMY Left 2016   • MEDIPORT INSERTION, SINGLE  2016   • UT BIOPSY/EXCISION, LYMPH NODE(S) Left 8/10/2016    Procedure: LYMPHOSCINTIGRAPHY; SENTINAL LYMPH NODE BIOPSY (INJECT AT 1100);   Surgeon: Mehrdad Walls MD;  Location: [facility];  Service: Surgical Oncology   • UT BREAST RECONSTRUCTION W/FREE FLAP Left 9/19/2016    Procedure: BREAST DELAYED RECONSTRUCTION; DEIP FREE FLAP removal of port-a -cath;  Surgeon: Lizz Alicea MD;  Location: [facility];  Service: Plastics   • UT BREAST REDUCTION Right 12/19/2016    Procedure: BREAST REDUCTION/MASTOPEXY ;  Surgeon: Lizz Alicea MD;  Location: [facility];  Service: Plastics   • UT HYSTEROSCOPY,W/ENDO BX N/A 5/21/2018    Procedure: DILATATION AND CURETTAGE (D&C), HYSTEROSCOPY;  Surgeon: Hector Main MD;  Location: [facility];  Service: Gynecology Oncology   • UT INSJ/RPLCMT BREAST IMPLANT SEP DAY MASTECTOMY Left 12/19/2016    Procedure: BREAST TISSUE EXPANDER REMOVAL; BREAST IMPLANT PLACEMENT;  Surgeon: Lizz Alicea MD;  [facility]: [facility];  Service: Plastics   • UT LAP GASTRIC BYPASS/RAMIN-EN-Y N/A 10/4/2021    Procedure: LAPAROSCOPIC RAMIN-EN-Y GASTRIC BYPASS AND INTRAOPERATIVE EGD;  Surgeon: Sammie Ortega MD;  Location: [address] [place] [address];  Service: Bariatrics   • UT LAP, SURG CLOSE ENTEROSTOMY RESECT ANAST N/A 5/3/2022    Procedure: CLOSURE COLOSTOMY, LAPAROSCOPIC closure Hartmens sigmoid resection;  Surgeon: Dwight Jones MD;  Location: [facility];  Service: Colorectal   • UT MASTECTOMY, SIMPLE, COMPLETE Left 8/10/2016    Procedure: BREAST MASTECTOMY; FROZEN SECTION ;  Surgeon: Mehrdad Walls MD;  Location: [facility];  Service: Surgical Oncology   • UT REMOVAL TISSUE EXPANDER W/O INSERTION IMPLANT Left 1/23/2017    Procedure: BREAST IMPLANT REMOVAL; WASHOUT AND DEBRIDEMENT;  Surgeon: Lizz Alicea MD;  Location: [facility];  Service: Plastics   • REDUCTION MAMMAPLASTY  2016   • REDUCTION MAMMAPLASTY  2018   • REVISION OF SCAR ON TORSO N/A 12/19/2016    Procedure: ABDOMINAL SCAR REVISION ;  Surgeon: Kelsie Barriga MD;  Location: [facility];  Service:    • US GUIDANCE  4/22/2016   • US GUIDED BREAST BIOPSY RIGHT COMPLETE Right 10/18/2021   • WISDOM TOOTH EXTRACTION         Current Outpatient Medications   Medication Sig Dispense Refill   • acetaminophen (TYLENOL) 325 mg tablet Take 3 tablets (975 mg total) by mouth every 8 (eight) hours  0   • buPROPion (WELLBUTRIN SR) 150 mg 12 hr tablet TAKE 1 TABLET BY MOUTH TWICE A  tablet 0   • Calcium Carbonate-Vit D-Min (CALCIUM 1200 PO) Take by mouth 2  times day geovanni      • LORazepam (ATIVAN) 0 5 mg tablet Take 1 tablet (0 5 mg total) by mouth 2 (two) times a day as needed for anxiety 60 tablet 0   • Multiple Vitamin (multivitamin) capsule Take 1 capsule by mouth in the morning  geovanni   • naloxone (NARCAN) 4 mg/0 1 mL nasal spray Administer 1 spray into a nostril  If no response after 2-3 minutes, give another dose in the other nostril using a new spray  1 each 1   • traZODone (DESYREL) 100 mg tablet TAKE 1 TABLET BY MOUTH DAILY AT BEDTIME 90 tablet 1     No current facility-administered medications for this visit  Allergies   Allergen Reactions   • Ambien [Zolpidem] Other (See Comments)     Amnesia-does not want to take   • Effexor [Venlafaxine] GI Intolerance   • Bactrim [Sulfamethoxazole-Trimethoprim] Itching       Review of Systems   Constitutional: Negative  Respiratory: Negative  Cardiovascular: Negative  Gastrointestinal: Negative  Musculoskeletal: Negative  Neurological: Negative  All other systems reviewed and are negative  Video Exam    There were no vitals filed for this visit  Physical Exam  Eyes:      Extraocular Movements: Extraocular movements intact     Psychiatric:         Mood and Affect: Mood normal           I spent 20 minutes with patient today in which greater than 50% of the time was spent in counseling/coordination of care regarding care

## 2022-11-09 NOTE — TELEPHONE ENCOUNTER
I spoke with pt, she is aware Rx was sent over to the pharmacy  No further action needed   Tae Cochran, MICHAEL
Pt left a message on refill hotline regarding her refill from her appointment yesterday  She stated that 2 scripts were supposed to be sent over and they only received on  She will need the zolpidem to go to the pharmacy as well   Coleen Hatchet, LPN
sent
no

## 2023-01-31 DIAGNOSIS — F32.A DEPRESSION, UNSPECIFIED DEPRESSION TYPE: ICD-10-CM

## 2023-01-31 DIAGNOSIS — F41.9 ANXIETY: ICD-10-CM

## 2023-01-31 RX ORDER — BUPROPION HYDROCHLORIDE 150 MG/1
TABLET, EXTENDED RELEASE ORAL
Qty: 180 TABLET | Refills: 0 | Status: SHIPPED | OUTPATIENT
Start: 2023-01-31

## 2023-03-13 ENCOUNTER — TELEPHONE (OUTPATIENT)
Dept: PSYCHIATRY | Facility: CLINIC | Age: 53
End: 2023-03-13

## 2023-03-13 NOTE — TELEPHONE ENCOUNTER
Sent Wait List Letter VIA [employer]   Verify patients need of services from wait list after 15 days   If no communication remove from Medication Management  wait list

## 2023-05-04 DIAGNOSIS — F41.9 ANXIETY: ICD-10-CM

## 2023-05-04 DIAGNOSIS — F32.A DEPRESSION, UNSPECIFIED DEPRESSION TYPE: ICD-10-CM

## 2023-05-04 RX ORDER — BUPROPION HYDROCHLORIDE 150 MG/1
TABLET, EXTENDED RELEASE ORAL
Qty: 180 TABLET | Refills: 0 | Status: SHIPPED | OUTPATIENT
Start: 2023-05-04

## 2023-05-17 ENCOUNTER — RA CDI HCC (OUTPATIENT)
Dept: OTHER | Facility: HOSPITAL | Age: 53
End: 2023-05-17

## 2023-05-17 NOTE — PROGRESS NOTES
[facility]  coding opportunities       Chart reviewed, no opportunity found: CHART REVIEWED, NO OPPORTUNITY FOUND        Patients Insurance        Commercial Insurance: Varsha Kaur

## 2023-05-24 ENCOUNTER — OFFICE VISIT (OUTPATIENT)
Dept: FAMILY MEDICINE CLINIC | Facility: CLINIC | Age: 53
End: 2023-05-24

## 2023-05-24 VITALS
SYSTOLIC BLOOD PRESSURE: 118 MMHG | TEMPERATURE: 97 F | OXYGEN SATURATION: 100 % | HEART RATE: 70 BPM | DIASTOLIC BLOOD PRESSURE: 70 MMHG | HEIGHT: 62 IN | RESPIRATION RATE: 18 BRPM | BODY MASS INDEX: 30.3 KG/M2

## 2023-05-24 DIAGNOSIS — C50.412 MALIGNANT NEOPLASM OF UPPER-OUTER QUADRANT OF LEFT BREAST IN FEMALE, ESTROGEN RECEPTOR POSITIVE (HCC): ICD-10-CM

## 2023-05-24 DIAGNOSIS — F33.9 DEPRESSION, RECURRENT (HCC): ICD-10-CM

## 2023-05-24 DIAGNOSIS — Z00.00 WELL ADULT EXAM: Primary | ICD-10-CM

## 2023-05-24 DIAGNOSIS — Z17.0 MALIGNANT NEOPLASM OF UPPER-OUTER QUADRANT OF LEFT BREAST IN FEMALE, ESTROGEN RECEPTOR POSITIVE (HCC): ICD-10-CM

## 2023-05-24 DIAGNOSIS — F41.9 ANXIETY: ICD-10-CM

## 2023-05-24 NOTE — PROGRESS NOTES
FAMILY PRACTICE HEALTH MAINTENANCE OFFICE VISIT  [facility]    NAME: Hailee Espinoza  AGE: 50 y o  SEX: female  : 1970     DATE: 2023    Assessment and Plan     1  Well adult exam    2  Malignant neoplasm of upper-outer quadrant of left breast in female, estrogen receptor positive ([facility] [address] )  Comments:  Stable  Has follow ups yearly with surgical oncology  3  Anxiety  Assessment & Plan:  Stable on wellbutrin  Takes Ativan rarely  4  Depression, recurrent ([facility] )  Assessment & Plan:  Continue wellbutrin  Patient Counseling:   Nutrition: Stressed importance of a well balanced diet, moderation of sodium/saturated fat, caloric balance and sufficient intake of fiber  Exercise: Stressed the importance of regular exercise with a goal of 150 minutes per week  Dental Health: Discussed daily flossing and brushing and regular dental visits   Immunizations reviewed: Up To Date  BMI Counseling: Body mass index is 30 3 kg/m²  Discussed with patient's BMI with her  The BMI is above normal  Nutrition recommendations include reducing portion sizes, decreasing overall calorie intake, 3-5 servings of fruits/vegetables daily, reducing fast food intake and consuming healthier snacks  Exercise recommendations include moderate aerobic physical activity for 150 minutes/week  No follow-ups on file          Chief Complaint     Chief Complaint   Patient presents with   • Annual Exam     Lw cma       History of Present Illness     HPI    Well Adult Physical   Patient here for a comprehensive physical exam       Diet and Physical Activity  Diet: not sure if she is eating as well as she should be  Exercise: never      Depression Screen  PHQ-2/9 Depression Screening    Little interest or pleasure in doing things: 1 - several days  Feeling down, depressed, or hopeless: 1 - several days  Trouble falling or staying asleep, or sleeping too much: 3 - nearly every day  Feeling tired or having little energy: 3 - nearly every day  Poor appetite or overeatin - more than half the days  Feeling bad about yourself - or that you are a failure or have let yourself or your family down: 2 - more than half the days  Trouble concentrating on things, such as reading the newspaper or watching television: 2 - more than half the days  Moving or speaking so slowly that other people could have noticed  Or the opposite - being so fidgety or restless that you have been moving around a lot more than usual: 0 - not at all  Thoughts that you would be better off dead, or of hurting yourself in some way: 0 - not at all  PHQ-9 Score: 14   PHQ-9 Interpretation: Moderate depression           General Health  Hearing: Normal:  bilateral  Vision: vision problems: left eye vision problem  Dental: regular dental visits, brushes teeth twice daily and flosses teeth occasionally    Reproductive Health  Post-menopausal       The following portions of the patient's history were reviewed and updated as appropriate: allergies, current medications, past family history, past medical history, past social history, past surgical history and problem list     Review of Systems     Review of Systems   Constitutional: Negative  HENT: Negative  Eyes: Negative  Respiratory: Negative  Cardiovascular: Negative  Gastrointestinal: Negative  Endocrine: Negative  Genitourinary: Negative  Musculoskeletal: Negative  Skin: Negative  Allergic/Immunologic: Negative  Neurological: Negative  Hematological: Negative  Psychiatric/Behavioral: Negative          Past Medical History     Past Medical History:   Diagnosis Date   • Anxiety    • Back pain    • BRCA negative 2016    [facility]   • Cancer [facility])    • Continuous opioid dependence ([facility] [address] ) 2021   • Gastric ulcer    • Headache    • History of chemotherapy 2016    Neoadjuvant; at [facility]   • History of transfusion 2017    no adverse reaction   • Limb alert care status     left   • Malignant neoplasm of upper-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 [facility])     s/p chemotherapy   • Obesity (BMI 30-39  9)    • Syncope 2/6/2022       Past Surgical History     Past Surgical History:   Procedure Laterality Date   • ABDOMINAL ADHESION SURGERY N/A 10/4/2021    Procedure: Lysis Adhesions;  Surgeon: Donta Collins MD;  Location: [address] [place] [address];  Service: Bariatrics   • ABDOMINAL WALL SURGERY Left 9/21/2016    Procedure: DEBRIDEMENT OF LEFT ABDOMINAL TISSUE AND CLOSURE; DEBRIDEMENT OF LEFT BREAST FLAP; SUBMUSCULAR TISSUE EXPANDER PLACEMENT WITH ADM (ACELLULAR DERMAL MATRIX);   Surgeon: Mykel Stern MD;  [facility];  Service:    • BREAST BIOPSY Left 04/2016    with sentinel node biospy   • BREAST BIOPSY Right 10/18/2021    benign   • BREAST RECONSTRUCTION Left 12/22/2017    Procedure: REVISION OF LEFT BREAST SCAR, LOCAL FLAP;  Surgeon: Nathanael Bee MD;  [facility];  Service: Plastics   • COLONOSCOPY     • DILATION AND CURETTAGE OF UTERUS     • [address] N/A 2/6/2022    Procedure: [address] PROCEDURE WITH SIGMOID COLOSTOMY;  Surgeon: Efren Cervantes MD;  Location: [address] [place] [address];  Service: General   • LAPAROSCOPIC CHOLECYSTECTOMY  2000   • [address] GASTRIC BANDING  2008    removed 2013   • [address]  2013    removal   • LAPAROTOMY N/A 2/6/2022    Procedure: LAPAROTOMY EXPLORATORY;  Surgeon: Efren Cervantes MD;  Location: [address] [place] [address];  Service: General   • LIPOSUCTION W/ FAT INJECTION Left 6/8/2017    Procedure: BREAST FAT GRAFTING ;  Surgeon: Mykel Stern MD;  [facility];  Service:    • MASTECTOMY Left 2016   • MEDIPORT INSERTION, SINGLE  2016   • MS BREAST RECONSTRUCTION W/FREE FLAP Left 9/19/2016    Procedure: BREAST DELAYED RECONSTRUCTION; DEIP FREE FLAP removal of port-a -cath;  Surgeon: Mykel Stern MD;  [facility];  Service: Plastics   • MS BREAST REDUCTION Right 12/19/2016    Procedure: BREAST REDUCTION/MASTOPEXY ; Surgeon: Anali Chanel MD;  [facility];  Service: Plastics   • TX BX/EXC LYMPH NODE OPEN SUPERFICIAL Left 8/10/2016    Procedure: LYMPHOSCINTIGRAPHY; SENTINAL LYMPH NODE BIOPSY (INJECT AT 1100);   Surgeon: Melissa Faith MD;  [facility];  Service: Surgical Oncology   • TX HYSTEROSCOPY BX ENDOMETRIUM&/POLYPC W/WO D&C N/A 5/21/2018    Procedure: DILATATION AND CURETTAGE (D&C), HYSTEROSCOPY;  Surgeon: Kulwinder Green MD;  [facility];  Service: Gynecology Oncology   • TX INSJ/RPLCMT BREAST IMPLANT SEP DAY MASTECTOMY Left 12/19/2016    Procedure: BREAST TISSUE EXPANDER REMOVAL; BREAST IMPLANT PLACEMENT;  Surgeon: Anali Chanel MD;  [facility];  Service: Plastics   • TX LAPS CLSR NTRSTM LG/SM INT W/RESCJ & ANASTOMOSIS N/A 5/3/2022    Procedure: CLOSURE COLOSTOMY, LAPAROSCOPIC closure Hartmens sigmoid resection;  Surgeon: Francheska Love MD;  [facility];  Service: Colorectal   • TX LAPS GSTR RSTCV [address] W/BYP RAMIN-EN-Y LIMB <150 CM N/A 10/4/2021    Procedure: LAPAROSCOPIC RAMIN-EN-Y GASTRIC BYPASS AND INTRAOPERATIVE EGD;  Surgeon: Catarina Rico MD;  [facility]: [facility];  Service: Bariatrics   • TX MASTECTOMY SIMPLE COMPLETE Left 8/10/2016    Procedure: BREAST MASTECTOMY; FROZEN SECTION ;  Surgeon: Melissa Faith MD;  [facility];  Service: Surgical Oncology   • TX REMOVAL TISSUE EXPANDER W/O INSERTION IMPLANT Left 1/23/2017    Procedure: BREAST IMPLANT REMOVAL; WASHOUT AND DEBRIDEMENT;  Surgeon: Anali Chanel MD;  [facility];  Service: Plastics   • REDUCTION MAMMAPLASTY  2016   • REDUCTION MAMMAPLASTY  2018   • REVISION OF SCAR ON TORSO N/A 12/19/2016    Procedure: ABDOMINAL SCAR REVISION ;  Surgeon: Anali Chanel MD;  [facility];  Service:    • US GUIDANCE  4/22/2016   • US GUIDED BREAST BIOPSY RIGHT COMPLETE Right 10/18/2021   • WISDOM TOOTH EXTRACTION         Social History     Social History     Socioeconomic History   • Marital status:  Spouse name: None   • Number of children: None   • Years of education: None   • Highest education level: None   Occupational History   • None   Tobacco Use   • Smoking status: Former     Packs/day: 1 00     Years: 15      Total pack years: 15 00     Types: Cigarettes     Quit date:      Years since quittin 4   • Smokeless tobacco: Never   Vaping Use   • Vaping Use: Never used   Substance and Sexual Activity   • Alcohol use: Not Currently     Comment: rarely   • Drug use: No   • Sexual activity: None   Other Topics Concern   • None   Social History Narrative   • None     Social Determinants of Health     Financial Resource Strain: Not on file   Food Insecurity: Not on file   Transportation Needs: Not on file   Physical Activity: Not on file   Stress: Not on file   Social Connections: Not on file   Intimate Partner Violence: Not on file   Housing Stability: Not on file       Family History     Family History   Problem Relation Age of Onset   • Diabetes Mother    • Heart disease Mother    • Kidney disease Mother    • Obesity Mother    • Other Father    • Cancer Father         melanoma   • Obesity Father    • Diabetes Maternal Aunt    • No Known Problems Paternal Aunt    • Breast cancer Cousin    • Diverticulitis Maternal Grandmother        Current Medications       Current Outpatient Medications:   •  acetaminophen (TYLENOL) 325 mg tablet, Take 3 tablets (975 mg total) by mouth every 8 (eight) hours, Disp: , Rfl: 0  •  buPROPion (WELLBUTRIN SR) 150 mg 12 hr tablet, TAKE 1 TABLET BY MOUTH TWICE A DAY, Disp: 180 tablet, Rfl: 0  •  Calcium Carbonate-Vit D-Min (CALCIUM 1200 PO), Take by mouth 2  times day geovanni , Disp: , Rfl:   •  LORazepam (ATIVAN) 0 5 mg tablet, Take 1 tablet (0 5 mg total) by mouth 2 (two) times a day as needed for anxiety, Disp: 60 tablet, Rfl: 0  •  Multiple Vitamin (multivitamin) capsule, Take 1 capsule by mouth in the morning   geovanni  , Disp: , Rfl:      Allergies     Allergies   Allergen "Reactions   • Ambien [Zolpidem] Other (See Comments)     Amnesia-does not want to take   • Effexor [Venlafaxine] GI Intolerance   • Bactrim [Sulfamethoxazole-Trimethoprim] Itching       Objective     /70   Pulse 70   Temp (!) 97 °F (36 1 °C) (Tympanic)   Resp 18   Ht 5' 1 5\" (1 562 m)   SpO2 100%   BMI 30 30 kg/m²      Physical Exam  Constitutional:       General: She is not in acute distress  Appearance: Normal appearance  She is well-developed  She is not diaphoretic  HENT:      Head: Normocephalic and atraumatic  Right Ear: Tympanic membrane, ear canal and external ear normal  There is no impacted cerumen  Left Ear: Tympanic membrane, ear canal and external ear normal  There is no impacted cerumen  Eyes:      General: No scleral icterus  Right eye: No discharge  Left eye: No discharge  Extraocular Movements: Extraocular movements intact  Conjunctiva/sclera: Conjunctivae normal       Pupils: Pupils are equal, round, and reactive to light  Cardiovascular:      Rate and Rhythm: Normal rate and regular rhythm  Heart sounds: Normal heart sounds  No murmur heard  No friction rub  No gallop  Pulmonary:      Effort: Pulmonary effort is normal  No respiratory distress  Breath sounds: Normal breath sounds  No wheezing or rales  Chest:      Chest wall: No tenderness  Abdominal:      General: Bowel sounds are normal  There is no distension  Palpations: Abdomen is soft  There is no mass  Tenderness: There is no abdominal tenderness  There is no guarding or rebound  Musculoskeletal:         General: No deformity  Normal range of motion  Cervical back: Normal range of motion and neck supple  Skin:     General: Skin is warm and dry  Findings: No erythema or rash  Neurological:      Mental Status: She is alert and oriented to person, place, and time     Psychiatric:         Behavior: Behavior normal          Thought Content: " Thought content normal          Judgment: Judgment normal            Vision Screening    Right eye Left eye Both eyes   Without correction  20/40 20/40   With correction      Comments: Legally blind in right eye             MD DENIZ [facility] [facility]

## 2023-07-31 DIAGNOSIS — F41.9 ANXIETY: ICD-10-CM

## 2023-07-31 DIAGNOSIS — F32.A DEPRESSION, UNSPECIFIED DEPRESSION TYPE: ICD-10-CM

## 2023-07-31 RX ORDER — BUPROPION HYDROCHLORIDE 150 MG/1
TABLET, EXTENDED RELEASE ORAL
Qty: 180 TABLET | Refills: 0 | Status: SHIPPED | OUTPATIENT
Start: 2023-07-31

## 2023-08-07 NOTE — TELEPHONE ENCOUNTER
[facility] [id number] [address] Please send all of her medication to this pharmacy. Even the buproprion.

## 2023-08-21 DIAGNOSIS — F41.9 ANXIETY: ICD-10-CM

## 2023-08-21 DIAGNOSIS — F32.A DEPRESSION, UNSPECIFIED DEPRESSION TYPE: ICD-10-CM

## 2023-08-21 RX ORDER — BUPROPION HYDROCHLORIDE 150 MG/1
150 TABLET, EXTENDED RELEASE ORAL 2 TIMES DAILY
Qty: 180 TABLET | Refills: 0 | Status: SHIPPED | OUTPATIENT
Start: 2023-08-21

## 2023-09-12 ENCOUNTER — TELEPHONE (OUTPATIENT)
Dept: BARIATRICS | Facility: CLINIC | Age: 53
End: 2023-09-12

## 2023-09-12 NOTE — TELEPHONE ENCOUNTER
Returned patients vm in-regards to appt scheduled for tomorrow with surgeon. Patient would like to switch it to virtual. Left vm to call office back.

## 2023-09-13 ENCOUNTER — TELEPHONE (OUTPATIENT)
Dept: BARIATRICS | Facility: CLINIC | Age: 53
End: 2023-09-13

## 2023-09-13 NOTE — TELEPHONE ENCOUNTER
Returned patients vm in-regards to r/s appt scheduled for today, 9/13/23 with surgeon.  Left vm to call office back

## (undated) DEVICE — SUT VICRYL 3-0 SH 27 IN J416H

## (undated) DEVICE — TUBING SUCTION 5MM X 12 FT

## (undated) DEVICE — ENDOPATH 5MM CURVED SCISSORS WITH MONOPOLAR CAUTERY: Brand: ENDOPATH

## (undated) DEVICE — SUT VICRYL 2-0 SH 27 IN UNDYED J417H

## (undated) DEVICE — GLOVE SZ 8 LINER PROTEXIS PI BL

## (undated) DEVICE — POOLE SUCTION HANDLE: Brand: CARDINAL HEALTH

## (undated) DEVICE — PROXIMATE PLUS MD MULTI-DIRECTIONAL RELEASE SKIN STAPLERS CONTAINS 35 STAINLESS STEEL STAPLES APPROXIMATE CLOSED DIMENSIONS: 6.9MM X 3.9MM WIDE: Brand: PROXIMATE

## (undated) DEVICE — 3M™ STERI-STRIP™ REINFORCED ADHESIVE SKIN CLOSURES, R1547, 1/2 IN X 4 IN (12 MM X 100 MM), 6 STRIPS/ENVELOPE: Brand: 3M™ STERI-STRIP™

## (undated) DEVICE — DRESSING ABD STER LGE 8X10

## (undated) DEVICE — BLANKET HYPOTHERMIA ADULT GAYMAR

## (undated) DEVICE — ***USE 56921*** SUTURE ETHILON 5-0   1666G

## (undated) DEVICE — CURITY NON-ADHERENT STRIPS: Brand: CURITY

## (undated) DEVICE — DRAPE EQUIPMENT RF WAND

## (undated) DEVICE — TIP BOVIE BLADE COATED 2.5IN

## (undated) DEVICE — CATH SECURE FOLEY

## (undated) DEVICE — BLADE SCALPEL #10

## (undated) DEVICE — STOCKINETTE 6IN MEDC

## (undated) DEVICE — SURGICAL GOWN, XL SMARTSLEEVE: Brand: CONVERTORS

## (undated) DEVICE — Device

## (undated) DEVICE — APPLICATOR CHLORAPREP 26ML ORANGE TINT

## (undated) DEVICE — SMOKE REMOVAL SYSTEM: Brand: BOEHRINGER® SMOKE REMOVAL SYSTEM

## (undated) DEVICE — TROCAR: Brand: KII FIOS FIRST ENTRY

## (undated) DEVICE — SPONGE 4 X 4 XRAY 16 PLY STRL LF RFD

## (undated) DEVICE — GLOVE INDICATOR PI UNDERGLOVE SZ 8 BLUE

## (undated) DEVICE — SUT MONOCRYL 4-0 PS-2 27 IN Y426H

## (undated) DEVICE — TRAY FOLEY 16FR URIMETER SURESTEP

## (undated) DEVICE — CANISTER LINER/LIDS SAL RED

## (undated) DEVICE — WEBRIL 6 IN UNSTERILE

## (undated) DEVICE — PENCIL ESU HANDSWITCHING W/HOL

## (undated) DEVICE — PROXIMATE SKIN STAPLERS (35 WIDE) CONTAINS 35 STAINLESS STEEL STAPLES (FIXED HEAD): Brand: PROXIMATE

## (undated) DEVICE — GLOVE SRG BIOGEL 7.5

## (undated) DEVICE — SUTURE STRATAFIX PGA 3-0 FS-1 CUTTING 30CM

## (undated) DEVICE — JP PERF DRN SIL FLT 10MM FULL: Brand: CARDINAL HEALTH

## (undated) DEVICE — INSUFFLATION NEEDLE TO ESTABLISH PNEUMOPERITONEUM.: Brand: INSUFFLATION NEEDLE

## (undated) DEVICE — MEDI-VAC YANK SUCT HNDL W/TPRD BULBOUS TIP: Brand: CARDINAL HEALTH

## (undated) DEVICE — UNDER BUTTOCKS DRAPE W/FLUID CONTROL POUCH: Brand: CONVERTORS

## (undated) DEVICE — REM POLYHESIVE ADULT PATIENT RETURN ELECTRODE: Brand: VALLEYLAB

## (undated) DEVICE — PLUMEPEN PRO 10FT

## (undated) DEVICE — 3M™ IOBAN™ 2 ANTIMICROBIAL INCISE DRAPE 6650EZ: Brand: IOBAN™ 2

## (undated) DEVICE — TIBURON TRANSVERSE LAPAROTOMY SHEET: Brand: CONVERTORS

## (undated) DEVICE — INTENDED FOR TISSUE SEPARATION, AND OTHER PROCEDURES THAT REQUIRE A SHARP SURGICAL BLADE TO PUNCTURE OR CUT.: Brand: BARD-PARKER SAFETY BLADES SIZE 15, STERILE

## (undated) DEVICE — SUT PROLENE 0 CT 30 IN 8434H

## (undated) DEVICE — POWER SHELL SIGNIA

## (undated) DEVICE — CHLORAPREP HI-LITE 26ML ORANGE

## (undated) DEVICE — BLADE SCALPEL #15

## (undated) DEVICE — STAPLER SKIN

## (undated) DEVICE — COVER LIGHTHANDLE (STERILE SINGLE PA

## (undated) DEVICE — CULTURE TUBE AEROBIC

## (undated) DEVICE — MEDI-VAC YANKAUER SUCTION HANDLE W/BULBOUS AND CONTROL VENT: Brand: CARDINAL HEALTH

## (undated) DEVICE — STERILE UNIVERSAL BREAST PACK: Brand: CARDINAL HEALTH

## (undated) DEVICE — STERILE CYSTO PACK: Brand: CARDINAL HEALTH

## (undated) DEVICE — 2000CC GUARDIAN II: Brand: GUARDIAN

## (undated) DEVICE — SYRINGE 10ML LL CONTROL TOP

## (undated) DEVICE — VISUALIZATION SYSTEM: Brand: CLEARIFY

## (undated) DEVICE — CHLORHEXIDINE 4PCT 4 OZ

## (undated) DEVICE — TRAY FOLEY 16FR URIMETER SILICONE SURESTEP

## (undated) DEVICE — ULTRACLEAN ACCESSORY ELECTRODE 1" (2.54 CM) COATED BLADE: Brand: ULTRACLEAN

## (undated) DEVICE — KERLIX BANDAGE ROLL: Brand: KERLIX

## (undated) DEVICE — TROCARS: Brand: KII® BALLOON BLUNT TIP SYSTEM

## (undated) DEVICE — SUT PROLENE 2-0 KS 30 IN 8623H

## (undated) DEVICE — UNDYED MONOFILAMENT (POLYDIOXANONE), ABSORBABLE SURGICAL SUTURE: Brand: PDS

## (undated) DEVICE — INSULATED BLADE ELECTRODE: Brand: EDGE

## (undated) DEVICE — GLOVE SRG BIOGEL 7

## (undated) DEVICE — STRL UNIVERSAL MINOR GENERAL: Brand: CARDINAL HEALTH

## (undated) DEVICE — GLOVE INDICATOR PI UNDERGLOVE SZ 6.5 BLUE

## (undated) DEVICE — TUBING SMOKE EVAC PENCIL COATED

## (undated) DEVICE — PMI DISPOSABLE PUNCTURE CLOSURE DEVICE / SUTURE GRASPER: Brand: PMI

## (undated) DEVICE — FILTER ULPA W/WATER TRAP FOR SMOKE EVAC

## (undated) DEVICE — COVIDIEN ENDO GIA PURPLE (MED) RELOAD 60MM

## (undated) DEVICE — ADHESIVE SKIN CLSR DERMABOND NX

## (undated) DEVICE — CYSTO TUBING SINGLE IRRIGATION

## (undated) DEVICE — SUTURE MONOCRYL 4-0  Y496G PS-2 I8IN

## (undated) DEVICE — ***USE 138847*** SUTURE PDS II  2-0  Z339H CT-1

## (undated) DEVICE — STAPLER EEA 25 MM COVIDIEN

## (undated) DEVICE — SYRINGE 50ML LL

## (undated) DEVICE — NEEDLE 25G X 1 1/2

## (undated) DEVICE — ADHESIVE, SKIN DERMABOND ADV .7 ML

## (undated) DEVICE — SPONGE STICK WITH PVP-I: Brand: KENDALL

## (undated) DEVICE — SUT PROLENE 3-0 SH 36 IN 8522H

## (undated) DEVICE — 10 MM BABCOCKS WITH RATCHET HANDLES: Brand: ENDOPATH

## (undated) DEVICE — GLOVE SRG BIOGEL ECLIPSE 7.5

## (undated) DEVICE — 3M™ DURAPORE™ SURGICAL TAPE 2 INCHES X 10YARDS (5.0CM X 9.1M) 6ROLLS/CARTON 10CARTONS/CASE 1538-2: Brand: 3M™ DURAPORE™

## (undated) DEVICE — SPONGE LAP 18 X 18 IN STRL RFD

## (undated) DEVICE — PACK PBDS STERILE LAP LITHOTOMY RF

## (undated) DEVICE — FIRST STEP BEDSIDE KIT - STAND-UP POUCH, ENDOSCOPIC CLEANING PAD - 1 POUCH: Brand: FIRST STEP BEDSIDE KIT - STAND-UP POUCH, ENDOSCOPIC CLEANING PAD

## (undated) DEVICE — 3M™ TEGADERM™ TRANSPARENT FILM DRESSING FRAME STYLE, 1626W, 4 IN X 4-3/4 IN (10 CM X 12 CM), 50/CT 4CT/CASE: Brand: 3M™ TEGADERM™

## (undated) DEVICE — INTENDED FOR TISSUE SEPARATION, AND OTHER PROCEDURES THAT REQUIRE A SHARP SURGICAL BLADE TO PUNCTURE OR CUT.: Brand: BARD-PARKER ® CARBON RIB-BACK BLADES

## (undated) DEVICE — ***USE 121391***PACK MAJOR BREAST BMH

## (undated) DEVICE — PREP PAD BNS: Brand: CONVERTORS

## (undated) DEVICE — MANIFOLD SINGLE PORT NEPTUNE

## (undated) DEVICE — [HIGH FLOW INSUFFLATOR,  DO NOT USE IF PACKAGE IS DAMAGED,  KEEP DRY,  KEEP AWAY FROM SUNLIGHT,  PROTECT FROM HEAT AND RADIOACTIVE SOURCES.]: Brand: PNEUMOSURE

## (undated) DEVICE — ADHESIVE SKN CLSR HISTOACRYL FLEX 0.5ML LF

## (undated) DEVICE — 3M™ TRANSPORE™ WHITE SURGICAL TAPE 1534-2, 2 INCH X 10 YARD (5CM X 9,1M), 6 ROLLS/CARTON 10 CARTONS/CASE: Brand: 3M™ TRANSPORE™

## (undated) DEVICE — INTENDED FOR TISSUE SEPARATION, AND OTHER PROCEDURES THAT REQUIRE A SHARP SURGICAL BLADE TO PUNCTURE OR CUT.: Brand: BARD-PARKER SAFETY BLADES SIZE 10, STERILE

## (undated) DEVICE — SUTURE ETHILON 5-0 PL 1965G

## (undated) DEVICE — PENEVAC1 NONSTICK SMOKE EVAC

## (undated) DEVICE — GAUZE XEROFORM 1X8 STERILE/OVERWRAPPED PACKET

## (undated) DEVICE — STOCKINETTE IMPERVIOUS X-LARGE 12X48

## (undated) DEVICE — SYRINGE 10ML LL

## (undated) DEVICE — SUT ETHILON 3-0 FSL 30 IN 1671H

## (undated) DEVICE — ENDOPATH XCEL BLADELESS TROCARS WITH STABILITY SLEEVES: Brand: ENDOPATH XCEL

## (undated) DEVICE — ENSEAL 20 CM SHAFT, LARGE JAW: Brand: ENSEAL X1

## (undated) DEVICE — SKIN MARKER DUAL TIP WITH RULER CAP, FLEXIBLE RULER AND LABELS: Brand: DEVON

## (undated) DEVICE — X-RAY DETECTABLE SPONGES,16 PLY: Brand: VISTEC

## (undated) DEVICE — SOLN IRRIG .9%SOD 1000ML

## (undated) DEVICE — VESSEL CANNULA

## (undated) DEVICE — GLOVE INDICATOR PI UNDERGLOVE SZ 7.5 BLUE

## (undated) DEVICE — GOWN SURGICAL REINFORCED X-LAR

## (undated) DEVICE — INSUFLATION TUBING INSUFLOW (LEXION)

## (undated) DEVICE — STAPLE RELOAD ENDO EGIA ARTICULATING MEDIUM TAN 2MM.5MM.3MM

## (undated) DEVICE — 3M™ STERI-STRIP™ REINFORCED ADHESIVE SKIN CLOSURES, R1542, 1/4 IN X 1-1/2 IN (6 MM X 38 MM), 6 STRIPS/ENVELOPE: Brand: 3M™ STERI-STRIP™

## (undated) DEVICE — TIBURON LAPAROTOMY DRAPE: Brand: CONVERTORS

## (undated) DEVICE — SUT PDS II 2-0 SH 27 IN Z317H

## (undated) DEVICE — SUT MONOCRYL 4-0 PS-2 18 IN Y496G

## (undated) DEVICE — VIOLET BRAIDED (POLYGLACTIN 910), SYNTHETIC ABSORBABLE SUTURE: Brand: COATED VICRYL

## (undated) DEVICE — TELFA NON-ADHERENT ABSORBENT DRESSING: Brand: TELFA

## (undated) DEVICE — DRAPE UTILITY

## (undated) DEVICE — ***USE 57698*** SLEEVE FLOWTRON DVT CALF SINGLE USE

## (undated) DEVICE — PROXIMATE LINEAR CUTTER RELOAD, BLUE, 75MM: Brand: PROXIMATE

## (undated) DEVICE — OCCLUSIVE GAUZE STRIP,3% BISMUTH TRIBROMOPHENATE IN PETROLATUM BLEND: Brand: XEROFORM

## (undated) DEVICE — DRAIN SPONGES,6 PLY: Brand: EXCILON

## (undated) DEVICE — BRA SURGICAL SZ LGE (36-39)

## (undated) DEVICE — CATH FOLEY 18FR 5ML 2 WAY SILICONE ELASTIMER

## (undated) DEVICE — ABDOMINAL PAD: Brand: DERMACEA

## (undated) DEVICE — SUT MONOCRYL 3-0 PS-2 27 IN Y427H

## (undated) DEVICE — ELECTRODE LAP SPATULA STR E-Z CLEAN 33CM -0018

## (undated) DEVICE — PACK GENERAL LF

## (undated) DEVICE — SUT SILK 3-0 SH CR/8 18 IN C013D

## (undated) DEVICE — CATH URETERAL 5FR X 70 CM FLEX TIP POLYUR BARD

## (undated) DEVICE — SHEARS MONOPOL MINI 5MM X 31CM RATCHET HNDL

## (undated) DEVICE — SUT PDS PLUS 4-0 SH 27IN PDP315H

## (undated) DEVICE — GUIDEWIRE ANGLE TIP 0.038 IN SOLO PLUS

## (undated) DEVICE — SYRINGE 20ML LL

## (undated) DEVICE — PROXIMATE RELOADABLE LINEAR CUTTER WITH SAFETY LOCK-OUT, 75MM: Brand: PROXIMATE

## (undated) DEVICE — SUT VICRYL 0 REEL 54 IN J287G

## (undated) DEVICE — INTENDED FOR TISSUE SEPARATION, AND OTHER PROCEDURES THAT REQUIRE A SHARP SURGICAL BLADE TO PUNCTURE OR CUT.: Brand: BARD-PARKER SAFETY BLADES SIZE 11, STERILE

## (undated) DEVICE — Device: Brand: REVOLVE ADVANCED ADIPOSE SYSTEM

## (undated) DEVICE — JACKSON-PRATT 100CC BULB RESERVOIR: Brand: CARDINAL HEALTH

## (undated) DEVICE — VIAL DECANTER

## (undated) DEVICE — CULTURE TUBE ANAEROBIC

## (undated) DEVICE — ASTOUND IMPERVIOUS SURGICAL GOWN: Brand: CONVERTORS

## (undated) DEVICE — POUCH INSTRUMENT 7X11 3-4

## (undated) DEVICE — ADHESIVE SKIN DERMABOND ADVANCED 0.7ML

## (undated) DEVICE — SPECIMEN CONTAINER STERILE PEEL PACK

## (undated) DEVICE — PAD GROUND ELECTROSURGICAL W/CORD

## (undated) DEVICE — SUT VICRYL 0 18 IN J906G

## (undated) DEVICE — GLOVE SURG PROTEXIS PF 7.5

## (undated) DEVICE — SUT SILK 3-0 PS-1 18 IN 1684G

## (undated) DEVICE — CHEST/BREAST DRAPE: Brand: CONVERTORS

## (undated) DEVICE — BASIC DOUBLE BASIN 2-LF: Brand: MEDLINE INDUSTRIES, INC.

## (undated) DEVICE — DRESSING GUAZE ADH BORDER 4 X 4 IN

## (undated) DEVICE — 40595 XL TRENDELENBURG POSITIONING KIT: Brand: 40595 XL TRENDELENBURG POSITIONING KIT

## (undated) DEVICE — STRL UNIVERSAL LAPAROTOMY PACK: Brand: CARDINAL HEALTH

## (undated) DEVICE — SUT PDS II 0 TP-1 60 IN Z991G

## (undated) DEVICE — 3M™ STERI-STRIP™ COMPOUND BENZOIN TINCTURE 40 BAGS/CARTON 4 CARTONS/CASE C1544: Brand: 3M™ STERI-STRIP™

## (undated) DEVICE — ENDO STITCH DEVICE 10 MM

## (undated) DEVICE — SUTURE MONOCRYL 3-0  Y497G

## (undated) DEVICE — TIBURON LAPAROSCOPIC ABDOMINAL DRAPE: Brand: CONVERTORS

## (undated) DEVICE — ELECTRODE BLADE MOD  E-Z CLEAN 6.5IN -0014M

## (undated) DEVICE — SUT PDS II 0 CT-1 36 IN Z346H

## (undated) DEVICE — Device: Brand: DEFENDO AIR/WATER/SUCTION AND BIOPSY VALVE

## (undated) DEVICE — CATH URETHERAL CONNECTOR

## (undated) DEVICE — SPONGE LAP 18 X 18 IN

## (undated) DEVICE — ANTIBACTERIAL UNDYED BRAIDED (POLYGLACTIN 910), SYNTHETIC ABSORBABLE SUTURE: Brand: COATED VICRYL

## (undated) DEVICE — SUT ETHILON 3-0 PS-1 18 IN 1663H

## (undated) DEVICE — HARMONIC 1100 SHEARS, 36CM SHAFT LENGTH: Brand: HARMONIC

## (undated) DEVICE — IRRIG ENDO FLO TUBING

## (undated) DEVICE — ACCESS PLATFORM FOR MINIMALLY INVASIVE SURGERY.: Brand: GELPORT® LAPAROSCOPIC  SYSTEM

## (undated) DEVICE — SYRINGE DISP LUER-LOK 10 CC

## (undated) DEVICE — SOLN IRRIG .9%SOD 500ML

## (undated) DEVICE — ENDO STITCH 2-0 SURGIDAC 48 IN

## (undated) DEVICE — DRESSING MEPILEX AG BORDER 4 X 12 IN

## (undated) DEVICE — ANTI-FOG SOLUTION WITH FOAM PAD: Brand: DEVON

## (undated) DEVICE — STRL ALLENTOWN HYSTEROSCOPY PK: Brand: CARDINAL HEALTH

## (undated) DEVICE — MAYO STAND COVER: Brand: CONVERTORS

## (undated) DEVICE — GLOVE SRG BIOGEL 6.5

## (undated) DEVICE — ECHELON CIRCULAR POWERED STAPLER: Brand: ECHELON CIRCULAR

## (undated) DEVICE — MEDI-VAC TUBING CONNECTOR 6-IN-1 STRAIGHT: Brand: CARDINAL HEALTH

## (undated) DEVICE — NEEDLE SPINAL 20G X 3.5 LF

## (undated) DEVICE — GLOVE INDICATOR PI UNDERGLOVE SZ 7 BLUE

## (undated) DEVICE — SCD SEQUENTIAL COMPRESSION COMFORT SLEEVE MEDIUM KNEE LENGTH: Brand: KENDALL SCD

## (undated) DEVICE — SEPRA FILM 6 X 5

## (undated) DEVICE — COVER MAYO STAND

## (undated) DEVICE — DRAPE SHEET THREE QUARTER

## (undated) DEVICE — BANDAID FLEXIBLE FABRIC 1IN 50/BX LATEX FREE

## (undated) DEVICE — SUPPORT BREAST XLG 42-46

## (undated) DEVICE — SUT VICRYL PLUS 2-0 CTB-1 27 IN VCPB259H

## (undated) DEVICE — ALL PURPOSE SPONGES,NON-WOVEN, 4 PLY: Brand: CURITY

## (undated) DEVICE — MEDI-VAC YANKAUER SUCTION HANDLE: Brand: CARDINAL HEALTH

## (undated) DEVICE — SUT VICRYL PLUS 0 UR-6 27IN VCP603H

## (undated) DEVICE — LIGHT HANDLE COVER SLEEVE DISP BLUE STELLAR

## (undated) DEVICE — TUBING SUCTION SAL

## (undated) DEVICE — CHLORAPREP HI-LITE 10.5ML ORANGE

## (undated) DEVICE — 1840 FOAM BLOCK NEEDLE COUNTER: Brand: DEVON

## (undated) DEVICE — PREMIUM DRY TRAY LF: Brand: MEDLINE INDUSTRIES, INC.

## (undated) DEVICE — TISSUE RETRIEVAL SYSTEM: Brand: INZII RETRIEVAL SYSTEM

## (undated) DEVICE — SYRINGE DISP LUER-LOK 50 CC

## (undated) DEVICE — SUT PDS II 3-0 SH 27 IN Z316H

## (undated) DEVICE — BAG DECANTER

## (undated) DEVICE — CAST PADDING 6IN

## (undated) DEVICE — CO2 AND WATER TUBING/CAP SET FOR OLYMPUS® SCOPES & UCR: Brand: ERBE

## (undated) DEVICE — DRAPE IOBAN